# Patient Record
Sex: MALE | Race: WHITE | Employment: OTHER | URBAN - METROPOLITAN AREA
[De-identification: names, ages, dates, MRNs, and addresses within clinical notes are randomized per-mention and may not be internally consistent; named-entity substitution may affect disease eponyms.]

---

## 2017-05-10 ENCOUNTER — OFFICE VISIT (OUTPATIENT)
Dept: UROLOGY | Facility: CLINIC | Age: 77
End: 2017-05-10
Payer: COMMERCIAL

## 2017-05-10 VITALS
WEIGHT: 160 LBS | SYSTOLIC BLOOD PRESSURE: 124 MMHG | BODY MASS INDEX: 23.7 KG/M2 | HEART RATE: 68 BPM | DIASTOLIC BLOOD PRESSURE: 66 MMHG | HEIGHT: 69 IN

## 2017-05-10 DIAGNOSIS — C61 MALIGNANT NEOPLASM OF PROSTATE (H): Primary | ICD-10-CM

## 2017-05-10 PROCEDURE — 99214 OFFICE O/P EST MOD 30 MIN: CPT | Performed by: UROLOGY

## 2017-05-10 RX ORDER — CIPROFLOXACIN 500 MG/1
500 TABLET, FILM COATED ORAL 2 TIMES DAILY
Qty: 3 TABLET | Refills: 0 | Status: SHIPPED | OUTPATIENT
Start: 2017-05-10 | End: 2017-05-12

## 2017-05-10 ASSESSMENT — PAIN SCALES - GENERAL: PAINLEVEL: NO PAIN (0)

## 2017-05-10 NOTE — MR AVS SNAPSHOT
After Visit Summary   5/10/2017    Lowell Arredondo    MRN: [id number]           Patient Information     Date Of Birth          1940        Visit Information        Provider Department      5/10/2017 11:10 AM Mushtaq Astorga MD [facility] [place]        Today's Diagnoses     Malignant neoplasm of prostate (H)    -  1      Care Instructions    Ultrasound Guided Prostate Biopsy      What is a prostate biopsy?  A prostate biopsy is a relatively painless procedure performed in the physician's office, outpatient facility or hospital.  A long, thin needle is inserted into the prostate to collect a sample of tissue from the prostate.  These samples are then examined by a pathologist for abnormal cells.    Why do I need a prostate biopsy? During a man's lifetime the prostate gradually increases in size.  The patient may or may not experience symptoms.  Symptoms of an enlarge prostate include:    Increased frequency of urination    Decreased force of urinary stream    Trouble with urination    Awakening at night to urinate    When the prostate is examined, the physician may feel a nodule (hard or firm growth) which would require a biopsy.    Another reason for having a prostate biopsy is an increase in the prostate specific androgen (PSA) in your blood.  A prostate biopsy may be necessary to determine the cause of the increased PSA.    Your physician will also perform an ultrasound (an image created by sound waves).  The ultrasound is performed by placing a small probe in the rectum to image the prosate gland.    Preparation for the Prostate Biopsy    1.  During the week before your prostate biopsy substances that may thin your blood (ASPIRIN, BABY ASPIRIN, ACETYLSALICYLIC ACID, ADVIL/MOTRIN, IBUPROFEN, ALEVE, COUMADIN (WARFARIN), PRADAXA, ELIQUIS, PLAVIX, XARELTO, VITAMIN E, FISH OIL) must be discontinued.  If you are in doubt, please call.  This is a very important  requirement and your procedure may be cancelled if you have not stopped taking all of these medications.    2.  If you have any bleeding problems (thin blood), please tell your doctor.    3.  If you have been told by another doctor to take antibiotics before dental work or surgery, please tell the doctor.  This is common for patients that have had a joint replacement.    YOU HAVE BEEN PRESCRIBED AN ANTIBIOTIC.  Please follow the directions written on the bottle.  The directions usually will tell you to start the antibiotic the day before your biopsy.  You will continue taking the medication until it is gone.    The day of the biopsy you will be asked to use an enema one hour before you leave for your appointment.    Unless you have been prescribed a sedative (Valium or a similar drug) you will be able to drive to and from your appointment.  If you have taken a sedative you must have a ride.    AFTER THE BIOPSY    DANGER SIGNS    Small amount of blood in the urine for 10-14 days Excessive blood in the urine, stool or ejaculate  Small amount of blood in the stool for 48 hours Fever over 100 or chills  Small amount of blood in the ejaculate for up to Frequent urination or burning when you urinate   4 weeks          Your biopsy is scheduled on____________________________ at our [place] office.  Please be at     The office at ____________________.  A follow up visit has been scheduled for you on     _______________________________ at the  ________________________________.    Your doctor will discuss your results with you at this visit.    CALL THE DOCTOR'S OFFICE [phone] IF YOU HAVE ANY OF THESE SYMPTOMS.  IF YOU CANNOT CONTACT THE OFFICE, GO TO THE EMERGENCY ROOM.    _           Follow-ups after your visit        Follow-up notes from your care team     Return for TRUS with prostate biopsy.      Your next 10 appointments already scheduled     May 23, 2017  9:00 AM CDT   Sonography/Biopsy with Mushtaq Astorga  "MD, [facility] BX ROOM   [facility] [place] ([facility])    [address]  [address]  [facility] [address] [phone]              Who to contact     If you have questions or need follow up information about today's clinic visit or your schedule please contact [facility] directly at [phone].  Normal or non-critical lab and imaging results will be communicated to you by Kwanjihart, letter or phone within 4 business days after the clinic has received the results. If you do not hear from us within 7 days, please contact the clinic through Adzerkt or phone. If you have a critical or abnormal lab result, we will notify you by phone as soon as possible.  Submit refill requests through [email or web address] or call your pharmacy and they will forward the refill request to us. Please allow 3 business days for your refill to be completed.          Additional Information About Your Visit        KwanjiharEmergent Views Information     [email or web address] gives you secure access to your electronic health record. If you see a primary care provider, you can also send messages to your care team and make appointments. If you have questions, please call your primary care clinic.  If you do not have a primary care provider, please call [phone] and they will assist you.        Care EveryWhere ID     This is your Care EveryWhere ID. This could be used by other organizations to access your [facility] medical records  [id number]        Your Vitals Were     Pulse Height BMI (Body Mass Index)             68 1.753 m (5' 9\") 23.63 kg/m2          Blood Pressure from Last 3 Encounters:   05/10/17 124/66   10/19/16 128/82    Weight from Last 3 Encounters:   05/10/17 72.6 kg (160 lb)   10/19/16 72.6 kg (160 lb)              Today, you had the following     No orders found for display         Today's Medication Changes          These changes are accurate as of: 5/10/17 11:52 AM.  If you have " any questions, ask your nurse or doctor.               Start taking these medicines.        Dose/Directions    ciprofloxacin 500 MG tablet   Commonly known as:  CIPRO   Used for:  Malignant neoplasm of prostate (H)   Started by:  Mushtaq Astorga MD        Dose:  500 mg   Take 1 tablet (500 mg) by mouth 2 times daily for 3 doses   Quantity:  3 tablet   Refills:  0            Where to get your medicines      These medications were sent to [email or web address] [id number] [place] [address] AT [facility] [address] [address], [facility] [address]    Hours:  24-hours Phone:  [phone]     ciprofloxacin 500 MG tablet                Primary Care Provider Office Phone # Fax #    Dawson Dang [phone]       [address] [address]        Thank you!     Thank you for choosing [facility] [place]  for your care. Our goal is always to provide you with excellent care. Hearing back from our patients is one way we can continue to improve our services. Please take a few minutes to complete the written survey that you may receive in the mail after your visit with us. Thank you!             Your Updated Medication List - Protect others around you: Learn how to safely use, store and throw away your medicines at www.disposemymeds.org.          This list is accurate as of: 5/10/17 11:52 AM.  Always use your most recent med list.                   Brand Name Dispense Instructions for use    ASPIRIN ADULT LOW STRENGTH PO      Take 81 mg by mouth daily       ciprofloxacin 500 MG tablet    CIPRO    3 tablet    Take 1 tablet (500 mg) by mouth 2 times daily for 3 doses       tamsulosin 80 mcg/mL Susp    FLOMAX     Take 0.4 mg by mouth

## 2017-05-10 NOTE — PROGRESS NOTES
History: it is a great pleasure to see this very pleasant 77-year-old gentlemanin follow-up consultation today.  We recall, he had been diagnosed with low volume adenocarcinoma of the prostate after being seen with a rising PSA and some textural changes on digital rectal examination at the right apex the prostateto course in this area showed 40% of the specimen involved byGleason 6 adenocarcinoma and only one other biopsy which was in the left lateral base showed any disease and this was also Swapnil 6 with only 5% of the specimen.   He's been managed by surveillance since that time.  Recently however  The PSA had risen againup to 6.9.  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  The patient has no major symptoms at this time    History reviewed. No pertinent past medical history.    Social History     Social History     Marital status:      Spouse name: N/A     Number of children: N/A     Years of education: N/A     Social History Main Topics     Smoking status: Never Smoker     Smokeless tobacco: None     Alcohol use 6.0 - 8.4 oz/week     10 - 14 Standard drinks or equivalent per week     Drug use: None     Sexual activity: Not Asked     Other Topics Concern     None     Social History Narrative       Past Surgical History:   Procedure Laterality Date     FOOT SURGERY Left 1992     PROSTATE SURGERY         History reviewed. No pertinent family history.      Current Outpatient Prescriptions:      ciprofloxacin (CIPRO) 500 MG tablet, Take 1 tablet (500 mg) by mouth 2 times daily for 3 doses, Disp: 3 tablet, Rfl: 0     tamsulosin (FLOMAX) 80 mcg/mL, Take 0.4 mg by mouth, Disp: , Rfl:      ASPIRIN ADULT LOW STRENGTH PO, Take 81 mg by mouth daily, Disp: , Rfl:     10 point ROS of systems including Constitutional, Eyes, Respiratory, Cardiovascular, Gastroenterology, Genitourinary, Integumentary, Muscularskeletal, Psychiatric were all negative except for  "pertinent positives noted in my HPI.    Examination:   /66 (BP Location: Left arm)  Pulse 68  Ht 1.753 m (5' 9\")  Wt 72.6 kg (160 lb)  BMI 23.63 kg/m2  General Impression: very pleasant gentleman in no acute distress otherwise well oriented to time place and person  Mental Status: normal      Impression: I went over the current and the previous MRIs with the patient and his wife in detail today..  Volume of the prostateis only 22 cc  There is still considerable concern about the findings on the MRI although there is no evidence of significant spread beyond the prostate.   It does not appear to be significant change since the study 2 years ago.  There is no sign of spread Beyond the prostate.  We did have a lengthy discussion therefore to discuss how we should proceed.  Clearly the concern is that  They could be disease there that is more aggressive might influence our current plan of management.  However he is 77 years of age, and if we do consider an alternative management he clearly would not be radical prostatectomy.  I would recommend that we do repeat the biopsies however to determine if there is more aggressive disease present so we can then have further discussion about potential treatment options.  I did carefully go over the entire situation with both the patient  And his wife in detail today.  i carefully reviewed all the previous records and previous and current MRI scans  I answered many questions    Plan: transrectal ultrasound biopsy of prostate.  He will discontinue aspirin for 10 days prior to the procedure    Time: 25 minutes.  Greater than 50% was spent in discussion and consultation    \"This dictation was performed with voice recognition software and may contain errors,  omissions and inadvertent word substitution.\"      "

## 2017-05-10 NOTE — PATIENT INSTRUCTIONS
Ultrasound Guided Prostate Biopsy      What is a prostate biopsy?  A prostate biopsy is a relatively painless procedure performed in the physician's office, outpatient facility or hospital.  A long, thin needle is inserted into the prostate to collect a sample of tissue from the prostate.  These samples are then examined by a pathologist for abnormal cells.    Why do I need a prostate biopsy? During a man's lifetime the prostate gradually increases in size.  The patient may or may not experience symptoms.  Symptoms of an enlarge prostate include:    Increased frequency of urination    Decreased force of urinary stream    Trouble with urination    Awakening at night to urinate    When the prostate is examined, the physician may feel a nodule (hard or firm growth) which would require a biopsy.    Another reason for having a prostate biopsy is an increase in the prostate specific androgen (PSA) in your blood.  A prostate biopsy may be necessary to determine the cause of the increased PSA.    Your physician will also perform an ultrasound (an image created by sound waves).  The ultrasound is performed by placing a small probe in the rectum to image the prosate gland.    Preparation for the Prostate Biopsy    1.  During the week before your prostate biopsy substances that may thin your blood (ASPIRIN, BABY ASPIRIN, ACETYLSALICYLIC ACID, ADVIL/MOTRIN, IBUPROFEN, ALEVE, COUMADIN (WARFARIN), PRADAXA, ELIQUIS, PLAVIX, XARELTO, VITAMIN E, FISH OIL) must be discontinued.  If you are in doubt, please call.  This is a very important requirement and your procedure may be cancelled if you have not stopped taking all of these medications.    2.  If you have any bleeding problems (thin blood), please tell your doctor.    3.  If you have been told by another doctor to take antibiotics before dental work or surgery, please tell the doctor.  This is common for patients that have had a joint replacement.    YOU HAVE BEEN PRESCRIBED AN  ANTIBIOTIC.  Please follow the directions written on the bottle.  The directions usually will tell you to start the antibiotic the day before your biopsy.  You will continue taking the medication until it is gone.    The day of the biopsy you will be asked to use an enema one hour before you leave for your appointment.    Unless you have been prescribed a sedative (Valium or a similar drug) you will be able to drive to and from your appointment.  If you have taken a sedative you must have a ride.    AFTER THE BIOPSY    DANGER SIGNS    Small amount of blood in the urine for 10-14 days Excessive blood in the urine, stool or ejaculate  Small amount of blood in the stool for 48 hours Fever over 100 or chills  Small amount of blood in the ejaculate for up to Frequent urination or burning when you urinate   4 weeks          Your biopsy is scheduled on____________________________ at our [place] office.  Please be at     The office at ____________________.  A follow up visit has been scheduled for you on     _______________________________ at the  ________________________________.    Your doctor will discuss your results with you at this visit.    CALL THE DOCTOR'S OFFICE -[phone] IF YOU HAVE ANY OF THESE SYMPTOMS.  IF YOU CANNOT CONTACT THE OFFICE, GO TO THE EMERGENCY ROOM.    _

## 2017-05-10 NOTE — NURSING NOTE
Chief Complaint   Patient presents with     Results     Go over MRI-History of Prostate Cancer     Eveline Machado LPN

## 2017-05-10 NOTE — LETTER
5/10/2017       RE: Lowell Arredondo  [address] [place] [facility] [address]     Dear Colleague,    Thank you for referring your patient, Lowell Arredondo, to the [facility] at [facility]. Please see a copy of my visit note below.    History: it is a great pleasure to see this very pleasant 77-year-old gentlemanin follow-up consultation today.  We recall, he had been diagnosed with low volume adenocarcinoma of the prostate after being seen with a rising PSA and some textural changes on digital rectal examination at the right apex the prostateto course in this area showed 40% of the specimen involved byGleason 6 adenocarcinoma and only one other biopsy which was in the left lateral base showed any disease and this was also Swapnil 6 with only 5% of the specimen.   He's been managed by surveillance since that time.  Recently however  The PSA had risen againup to 6.9.  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  The patient has no major symptoms at this time    History reviewed. No pertinent past medical history.    Social History     Social History     Marital status:      Spouse name: N/A     Number of children: N/A     Years of education: N/A     Social History Main Topics     Smoking status: Never Smoker     Smokeless tobacco: None     Alcohol use 6.0 - 8.4 oz/week     10 - 14 Standard drinks or equivalent per week     Drug use: None     Sexual activity: Not Asked     Other Topics Concern     None     Social History Narrative       Past Surgical History:   Procedure Laterality Date     FOOT SURGERY Left 1992     PROSTATE SURGERY         History reviewed. No pertinent family history.      Current Outpatient Prescriptions:      ciprofloxacin (CIPRO) 500 MG tablet, Take 1 tablet (500 mg) by mouth 2 times daily for 3 doses, Disp: 3 tablet, Rfl: 0     tamsulosin (FLOMAX) 80  "mcg/mL, Take 0.4 mg by mouth, Disp: , Rfl:      ASPIRIN ADULT LOW STRENGTH PO, Take 81 mg by mouth daily, Disp: , Rfl:     10 point ROS of systems including Constitutional, Eyes, Respiratory, Cardiovascular, Gastroenterology, Genitourinary, Integumentary, Muscularskeletal, Psychiatric were all negative except for pertinent positives noted in my HPI.    Examination:   /66 (BP Location: Left arm)  Pulse 68  Ht 1.753 m (5' 9\")  Wt 72.6 kg (160 lb)  BMI 23.63 kg/m2  General Impression: very pleasant gentleman in no acute distress otherwise well oriented to time place and person  Mental Status: normal      Impression: I went over the current and the previous MRIs with the patient and his wife in detail today..  Volume of the prostateis only 22 cc  There is still considerable concern about the findings on the MRI although there is no evidence of significant spread beyond the prostate.   It does not appear to be significant change since the study 2 years ago.  There is no sign of spread Beyond the prostate.  We did have a lengthy discussion therefore to discuss how we should proceed.  Clearly the concern is that  They could be disease there that is more aggressive might influence our current plan of management.  However he is 77 years of age, and if we do consider an alternative management he clearly would not be radical prostatectomy.  I would recommend that we do repeat the biopsies however to determine if there is more aggressive disease present so we can then have further discussion about potential treatment options.  I did carefully go over the entire situation with both the patient  And his wife in detail today.  i carefully reviewed all the previous records and previous and current MRI scans  I answered many questions    Plan: transrectal ultrasound biopsy of prostate.  He will discontinue aspirin for 10 days prior to the procedure    Time: 25 minutes.  Greater than 50% was spent in discussion and " "consultation    \"This dictation was performed with voice recognition software and may contain errors,  omissions and inadvertent word substitution.\"    Again, thank you for allowing me to participate in the care of your patient.      Sincerely,    Mushtaq Astorga MD      "

## 2017-05-23 ENCOUNTER — HOSPITAL ENCOUNTER (EMERGENCY)
Facility: CLINIC | Age: 77
Discharge: HOME | End: 2017-05-23
Attending: PHYSICIAN ASSISTANT | Admitting: PHYSICIAN ASSISTANT
Payer: MEDICARE

## 2017-05-23 ENCOUNTER — OFFICE VISIT (OUTPATIENT)
Dept: UROLOGY | Facility: CLINIC | Age: 77
End: 2017-05-23
Payer: COMMERCIAL

## 2017-05-23 ENCOUNTER — TRANSFERRED RECORDS (OUTPATIENT)
Dept: HEALTH INFORMATION MANAGEMENT | Facility: CLINIC | Age: 77
End: 2017-05-23

## 2017-05-23 VITALS — HEART RATE: 56 BPM | DIASTOLIC BLOOD PRESSURE: 76 MMHG | SYSTOLIC BLOOD PRESSURE: 110 MMHG

## 2017-05-23 VITALS
OXYGEN SATURATION: 96 % | HEART RATE: 61 BPM | RESPIRATION RATE: 18 BRPM | HEIGHT: 69 IN | DIASTOLIC BLOOD PRESSURE: 70 MMHG | TEMPERATURE: 97.6 F | BODY MASS INDEX: 23.7 KG/M2 | SYSTOLIC BLOOD PRESSURE: 124 MMHG | WEIGHT: 160 LBS

## 2017-05-23 DIAGNOSIS — I95.9 TRANSIENT HYPOTENSION: ICD-10-CM

## 2017-05-23 DIAGNOSIS — R97.20 ELEVATED PROSTATE SPECIFIC ANTIGEN (PSA): Primary | ICD-10-CM

## 2017-05-23 DIAGNOSIS — R55 NEAR SYNCOPE: ICD-10-CM

## 2017-05-23 LAB
ANION GAP SERPL CALCULATED.3IONS-SCNC: 8 MMOL/L (ref 3–14)
BASOPHILS # BLD AUTO: 0 10E9/L (ref 0–0.2)
BASOPHILS NFR BLD AUTO: 0.5 %
BUN SERPL-MCNC: 18 MG/DL (ref 7–30)
CALCIUM SERPL-MCNC: 9 MG/DL (ref 8.5–10.1)
CHLORIDE SERPL-SCNC: 105 MMOL/L (ref 94–109)
CO2 SERPL-SCNC: 27 MMOL/L (ref 20–32)
CREAT SERPL-MCNC: 1.1 MG/DL (ref 0.66–1.25)
DIFFERENTIAL METHOD BLD: ABNORMAL
EOSINOPHIL # BLD AUTO: 0.1 10E9/L (ref 0–0.7)
EOSINOPHIL NFR BLD AUTO: 1.6 %
ERYTHROCYTE [DISTWIDTH] IN BLOOD BY AUTOMATED COUNT: 14.3 % (ref 10–15)
GFR SERPL CREATININE-BSD FRML MDRD: 65 ML/MIN/1.7M2
GLUCOSE SERPL-MCNC: 112 MG/DL (ref 70–99)
HCT VFR BLD AUTO: 38.8 % (ref 40–53)
HGB BLD-MCNC: 13.6 G/DL (ref 13.3–17.7)
IMM GRANULOCYTES # BLD: 0 10E9/L (ref 0–0.4)
IMM GRANULOCYTES NFR BLD: 0.2 %
INTERPRETATION ECG - MUSE: NORMAL
LYMPHOCYTES # BLD AUTO: 1.2 10E9/L (ref 0.8–5.3)
LYMPHOCYTES NFR BLD AUTO: 13.7 %
MCH RBC QN AUTO: 32.9 PG (ref 26.5–33)
MCHC RBC AUTO-ENTMCNC: 35.1 G/DL (ref 31.5–36.5)
MCV RBC AUTO: 94 FL (ref 78–100)
MONOCYTES # BLD AUTO: 0.5 10E9/L (ref 0–1.3)
MONOCYTES NFR BLD AUTO: 6.3 %
NEUTROPHILS # BLD AUTO: 6.5 10E9/L (ref 1.6–8.3)
NEUTROPHILS NFR BLD AUTO: 77.7 %
NRBC # BLD AUTO: 0 10*3/UL
NRBC BLD AUTO-RTO: 0 /100
PLATELET # BLD AUTO: 194 10E9/L (ref 150–450)
POTASSIUM SERPL-SCNC: 4.1 MMOL/L (ref 3.4–5.3)
RBC # BLD AUTO: 4.14 10E12/L (ref 4.4–5.9)
SODIUM SERPL-SCNC: 140 MMOL/L (ref 133–144)
WBC # BLD AUTO: 8.4 10E9/L (ref 4–11)

## 2017-05-23 PROCEDURE — 80048 BASIC METABOLIC PNL TOTAL CA: CPT | Performed by: PHYSICIAN ASSISTANT

## 2017-05-23 PROCEDURE — 88344 IMHCHEM/IMCYTCHM EA MLT ANTB: CPT | Mod: 59 | Performed by: UROLOGY

## 2017-05-23 PROCEDURE — 00000093 ZZHCL STATISTIC COURTESY CONSULT: Performed by: UROLOGY

## 2017-05-23 PROCEDURE — 99285 EMERGENCY DEPT VISIT HI MDM: CPT | Mod: 25

## 2017-05-23 PROCEDURE — 99284 EMERGENCY DEPT VISIT MOD MDM: CPT

## 2017-05-23 PROCEDURE — 85025 COMPLETE CBC W/AUTO DIFF WBC: CPT | Performed by: PHYSICIAN ASSISTANT

## 2017-05-23 PROCEDURE — 96360 HYDRATION IV INFUSION INIT: CPT

## 2017-05-23 PROCEDURE — 88305 TISSUE EXAM BY PATHOLOGIST: CPT | Mod: 59 | Performed by: UROLOGY

## 2017-05-23 PROCEDURE — 93005 ELECTROCARDIOGRAM TRACING: CPT

## 2017-05-23 PROCEDURE — 25000128 H RX IP 250 OP 636: Performed by: PHYSICIAN ASSISTANT

## 2017-05-23 PROCEDURE — 88342 IMHCHEM/IMCYTCHM 1ST ANTB: CPT | Mod: 59 | Performed by: UROLOGY

## 2017-05-23 PROCEDURE — 55700 ZZHC BIOPSY PROSTATE NEEDLE/PUNCH: CPT | Performed by: UROLOGY

## 2017-05-23 PROCEDURE — 76872 US TRANSRECTAL: CPT | Performed by: UROLOGY

## 2017-05-23 RX ADMIN — SODIUM CHLORIDE 1000 ML: 9 INJECTION, SOLUTION INTRAVENOUS at 11:39

## 2017-05-23 ASSESSMENT — ENCOUNTER SYMPTOMS
DIZZINESS: 0
VOMITING: 1
SHORTNESS OF BREATH: 0
WEAKNESS: 0
LIGHT-HEADEDNESS: 1
NAUSEA: 1
NUMBNESS: 0
FACIAL ASYMMETRY: 0
SPEECH DIFFICULTY: 1
HEADACHES: 0
ABDOMINAL PAIN: 0
FEVER: 0

## 2017-05-23 ASSESSMENT — PAIN SCALES - GENERAL: PAINLEVEL: NO PAIN (0)

## 2017-05-23 NOTE — ED AVS SNAPSHOT
Emergency Department    [address] [place] [address]    Phone:  [phone]    Fax:  [phone]                                       Lowell Arredondo   MRN: [id number]    Department:   Emergency Department   Date of Visit:  5/23/2017           After Visit Summary Signature Page     I have received my discharge instructions, and my questions have been answered. I have discussed any challenges I see with this plan with the nurse or doctor.    ..........................................................................................................................................  Patient/Patient Representative Signature      ..........................................................................................................................................  Patient Representative Print Name and Relationship to Patient    ..................................................               ................................................  Date                                            Time    ..........................................................................................................................................  Reviewed by Signature/Title    ...................................................              ..............................................  Date                                                            Time

## 2017-05-23 NOTE — LETTER
5/23/2017       RE: Lowell Arredondo  [address] [facility] [address]     Dear Colleague,    Thank you for referring your patient, Lowell Arredondo, to the [facility] at [facility]. Please see a copy of my visit note below.    This very pleasant 77-year-old gentleman returns today for transrectal ultrasound biopsy of prostate.  We recall he has a history of low volume low-grade prostate cancer and is being managed by active surveillance.  However we were concerned about a mild rise in PSA and some slight nodularity of the right apical area of prostate.  The T3 MRI had indicated areas considered PIRADS 5, in the anterior aspect of the gland and the peripheral zone and the transitional zone near the apex.    Procedure.  A transrectal ultrasound biopsy of prostate.  Surgeon. Gauri.  Anesthesia.  Local periprostatic block.  Description.  With the patient in the left lateral position and the genital area prepped and draped in the customary fashion, and after digital rectal examination, the transrectal ultrasound probe was inserted into the rectum.  Following the prostate was 22 cc.  Right apical area did show some hypoechogenicity.  S[place]ant biopsies were taken from each side of the gland.      The patient has had a mild reactive to lidocaine following the procedure.    Impression.  I discussed the situation with the patient.  We will write the results of the pathology report.  We may need to consider a change in management if we do find I'll volume of disease of more aggressive disease in the gland.    I did discuss the situation with the patient in detail today.  I answered all questions.    Plan.  I will see him in follow-up in the office to discuss the pathology report.    Time.  10 minutes is spent in addition to the procedure to discuss the findings of about potential alternative treatment options based on the results of the  "biopsies.    \"This dictation was performed with voice recognition software and may contain errors,  omissions and inadvertent word substitution.\"       Again, thank you for allowing me to participate in the care of your patient.      Sincerely,  Mushtaq Astorga MD      "

## 2017-05-23 NOTE — ED AVS SNAPSHOT
Emergency Department    [address] [facility] [address]    Phone:  [phone]    Fax:  [phone]                                       Lowell Arredondo   MRN: [id number]    Department:   Emergency Department   Date of Visit:  5/23/2017           Patient Information     Date Of Birth          1940        Your diagnoses for this visit were:     Near syncope     Transient hypotension        You were seen by Olamide Rodriguez PA-C.      Follow-up Information     Follow up with Dawson Dang MD.    Specialty:  Family Practice    Contact information:    [facility] [place] [facility]  [address]  [facility] [id number] [phone]          Follow up with  Emergency Department.    Specialty:  EMERGENCY MEDICINE    Why:  If symptoms worsen    Contact information:    [address] [facility] [address] [phone]        Discharge Instructions       Discharge Instructions  Syncope    Syncope (fainting) is a sudden, short loss of consciousness (passing out spell). People will usually fall to the ground when they faint or slump over if seated.  At this time your doctor does not find that your fainting spell is a sign of anything dangerous or life-threatening.  However, sometimes the signs of serious illness do not show up right away.  If you have new or worse symptoms, you may need to be seen again in the Emergency Department or by your primary doctor. Be sure to follow up as directed, or at least within 1 week.      Return to the Emergency Department if:    You faint again.     You have any significant bleeding.    You have chest pain or fast heartbeat, or if your heartbeat is irregular or skipping beats.    You feel short of breath.    You cough up any blood.    You have belly (abdominal) pain or unusual back pain.    You have ongoing vomiting (throwing up) or diarrhea, or have a black or tarry bowel movement or blood in the bowels or blood in your vomit.    You have  a fever over 101 degrees.    You lose feeling or cannot move a part of your body or cannot talk normally.    You are confused, have a headache, cannot see well, or have a seizure.    DO NOT DRIVE. CALL 911 INSTEAD!    What can I do to help myself?    Follow any specific instructions that your provider discussed with you.    If you feel light-headed, make sure to sit down right away, even if you have to sit on the floor.    Follow up with your regular medical doctor as discussed for further management. This may include lowering your blood pressure medications, insulin or other diabetic medications, checking your blood sugar more frequently, and drinking more fluids, taking medicines for vomiting or diarrhea or getting up slower.  If you were given a prescription for medicine here today, be sure to read all of the information (including the package insert) that comes with your prescription.  This will include important information about the medicine, its side effects, and any warnings that you need to know about.  The pharmacist who fills the prescription can provide more information and answer questions you may have about the medicine.  If you have questions or concerns that the pharmacist cannot address, please call or return to the Emergency Department.       Remember that you can always come back to the Emergency Department if you are not able to see your regular doctor in the amount of time listed above, if you get any new symptoms, or if there is anything that worries you.      Discharge Instructions for Low Blood Pressure (Hypotension)  When you have hypotension, your blood pressure is lower than normal. Low blood pressure can make you feel dizzy or faint. This condition is sometimes a side effect of taking certain medications, including medications for high blood pressure (hypertension). It can also result from medical conditions such as dehydration.  Home care  These home care steps can help manage your  condition:    Follow your doctor s instructions.    Rest in bed and ask for help with daily activities until you feel better. You may need to slowly increase the amount of time you spend sitting or doing light activity.    Don t drive while your blood pressure is not controlled.    Be careful when you get up from sitting or lying down.    Take your time. Sudden movements can cause dizziness or fainting.    When you first sit up after lying down, be sure to sit in bed for 30 seconds or so before getting up to walk.    Tell your doctor about the medications you are taking. Many kinds of medications trigger low blood pressure.    Limit your alcohol intake to no more than 2 drinks a day for men and 1 drink a day for women.    Prevent dehydration by drinking plenty of fluids, unless otherwise instructed by your doctor    Learn to take your own blood pressure. Keep a record of your results. Ask your doctor which readings mean that you need medical attention.    Tell your family members to call an ambulance if you become unconscious. Request that they learn CPR.  Follow-up care  Make a follow-up appointment as directed by our staff.      When to seek medical care  Call your doctor immediately if you have any of the following:    Chest pain or shortness of breath (call 911)    Dizziness or fainting spells    Black, maroon, or tarry stools    Irregular heartbeat    Stiff neck    Severe upper back pain    Diarrhea or vomiting that doesn t go away    Inability to eat or drink    Burning sensation when you urinate    Urine with a strong, unpleasant odor     8086-3072 [employer]. [address] [place] [address], [place] [address]. All rights reserved. This information is not intended as a substitute for professional medical care. Always follow your healthcare professional's instructions.             Future Appointments        Provider Department Dept Phone Center    6/6/2017 4:20 PM Mushtaq Astorga MD [facility]  [facility] [place] [phone]  [facility]      24 Hour Appointment Hotline       To make an appointment at any [facility] call [phone] ([phone]). If you don't have a family doctor or clinic, we will help you find one. [facility] are conveniently located to serve the needs of you and your family.             Review of your medicines      Our records show that you are taking the medicines listed below. If these are incorrect, please call your family doctor or clinic.        Dose / Directions Last dose taken    ASPIRIN ADULT LOW STRENGTH PO   Dose:  81 mg        Take 81 mg by mouth daily   Refills:  0        tamsulosin 80 mcg/mL Susp   Commonly known as:  FLOMAX   Dose:  0.4 mg        Take 0.4 mg by mouth   Refills:  0                Procedures and tests performed during your visit     Basic metabolic panel    CBC with platelets differential    EKG 12-lead, tracing only      Orders Needing Specimen Collection     None      Pending Results     Date and Time Order Name Status Description    5/23/2017 1019 SURGICAL PATHOLOGY EXAM In process             Pending Culture Results     Date and Time Order Name Status Description    5/23/2017 1019 SURGICAL PATHOLOGY EXAM In process             Pending Results Instructions     If you had any lab results that were not finalized at the time of your Discharge, you can call the ED Lab Result RN at [phone]. You will be contacted by this team for any positive Lab results or changes in treatment. The nurses are available 7 days a week from 10A to 6:30P.  You can leave a message 24 hours per day and they will return your call.        Test Results From Your Hospital Stay        5/23/2017 11:52 AM      Component Results     Component Value Ref Range & Units Status    WBC 8.4 4.0 - 11.0 10e9/L Final    RBC Count 4.14 (L) 4.4 - 5.9 10e12/L Final    Hemoglobin 13.6 13.3 - 17.7 g/dL Final    Hematocrit 38.8 (L) 40.0 - 53.0 % Final    MCV 94 78 -  100 fl Final    MCH 32.9 26.5 - 33.0 pg Final    MCHC 35.1 31.5 - 36.5 g/dL Final    RDW 14.3 10.0 - 15.0 % Final    Platelet Count 194 150 - 450 10e9/L Final    Diff Method Automated Method  Final    % Neutrophils 77.7 % Final    % Lymphocytes 13.7 % Final    % Monocytes 6.3 % Final    % Eosinophils 1.6 % Final    % Basophils 0.5 % Final    % Immature Granulocytes 0.2 % Final    Nucleated RBCs 0 0 /100 Final    Absolute Neutrophil 6.5 1.6 - 8.3 10e9/L Final    Absolute Lymphocytes 1.2 0.8 - 5.3 10e9/L Final    Absolute Monocytes 0.5 0.0 - 1.3 10e9/L Final    Absolute Eosinophils 0.1 0.0 - 0.7 10e9/L Final    Absolute Basophils 0.0 0.0 - 0.2 10e9/L Final    Abs Immature Granulocytes 0.0 0 - 0.4 10e9/L Final    Absolute Nucleated RBC 0.0  Final         5/23/2017 12:10 PM      Component Results     Component Value Ref Range & Units Status    Sodium 140 133 - 144 mmol/L Final    Potassium 4.1 3.4 - 5.3 mmol/L Final    Chloride 105 94 - 109 mmol/L Final    Carbon Dioxide 27 20 - 32 mmol/L Final    Anion Gap 8 3 - 14 mmol/L Final    Glucose 112 (H) 70 - 99 mg/dL Final    Urea Nitrogen 18 7 - 30 mg/dL Final    Creatinine 1.10 0.66 - 1.25 mg/dL Final    GFR Estimate 65 >60 mL/min/1.7m2 Final    Non  GFR Calc    GFR Estimate If Black 78 >60 mL/min/1.7m2 Final    African American GFR Calc    Calcium 9.0 8.5 - 10.1 mg/dL Final                Clinical Quality Measure: Blood Pressure Screening     Your blood pressure was checked while you were in the emergency department today. The last reading we obtained was  BP: 122/73 . Please read the guidelines below about what these numbers mean and what you should do about them.  If your systolic blood pressure (the top number) is less than 120 and your diastolic blood pressure (the bottom number) is less than 80, then your blood pressure is normal. There is nothing more that you need to do about it.  If your systolic blood pressure (the top number) is 120-139 or your  diastolic blood pressure (the bottom number) is 80-89, your blood pressure may be higher than it should be. You should have your blood pressure rechecked within a year by a primary care provider.  If your systolic blood pressure (the top number) is 140 or greater or your diastolic blood pressure (the bottom number) is 90 or greater, you may have high blood pressure. High blood pressure is treatable, but if left untreated over time it can put you at risk for heart attack, stroke, or kidney failure. You should have your blood pressure rechecked by a primary care provider within the next 4 weeks.  If your provider in the emergency department today gave you specific instructions to follow-up with your doctor or provider even sooner than that, you should follow that instruction and not wait for up to 4 weeks for your follow-up visit.        Thank you for choosing [facility]       Thank you for choosing [facility] for your care. Our goal is always to provide you with excellent care. Hearing back from our patients is one way we can continue to improve our services. Please take a few minutes to complete the written survey that you may receive in the mail after you visit with us. Thank you!        [employer] Information     [employer] gives you secure access to your electronic health record. If you see a primary care provider, you can also send messages to your care team and make appointments. If you have questions, please call your primary care clinic.  If you do not have a primary care provider, please call [phone] and they will assist you.        Care EveryWhere ID     This is your Care EveryWhere ID. This could be used by other organizations to access your [facility] medical records  [id number]        After Visit Summary       This is your record. Keep this with you and show to your community pharmacist(s) and doctor(s) at your next visit.

## 2017-05-23 NOTE — DISCHARGE INSTRUCTIONS
Discharge Instructions  Syncope    Syncope (fainting) is a sudden, short loss of consciousness (passing out spell). People will usually fall to the ground when they faint or slump over if seated.  At this time your doctor does not find that your fainting spell is a sign of anything dangerous or life-threatening.  However, sometimes the signs of serious illness do not show up right away.  If you have new or worse symptoms, you may need to be seen again in the Emergency Department or by your primary doctor. Be sure to follow up as directed, or at least within 1 week.      Return to the Emergency Department if:    You faint again.     You have any significant bleeding.    You have chest pain or fast heartbeat, or if your heartbeat is irregular or skipping beats.    You feel short of breath.    You cough up any blood.    You have belly (abdominal) pain or unusual back pain.    You have ongoing vomiting (throwing up) or diarrhea, or have a black or tarry bowel movement or blood in the bowels or blood in your vomit.    You have a fever over 101 degrees.    You lose feeling or cannot move a part of your body or cannot talk normally.    You are confused, have a headache, cannot see well, or have a seizure.    DO NOT DRIVE. CALL 911 INSTEAD!    What can I do to help myself?    Follow any specific instructions that your provider discussed with you.    If you feel light-headed, make sure to sit down right away, even if you have to sit on the floor.    Follow up with your regular medical doctor as discussed for further management. This may include lowering your blood pressure medications, insulin or other diabetic medications, checking your blood sugar more frequently, and drinking more fluids, taking medicines for vomiting or diarrhea or getting up slower.  If you were given a prescription for medicine here today, be sure to read all of the information (including the package insert) that comes with your prescription.  This  will include important information about the medicine, its side effects, and any warnings that you need to know about.  The pharmacist who fills the prescription can provide more information and answer questions you may have about the medicine.  If you have questions or concerns that the pharmacist cannot address, please call or return to the Emergency Department.       Remember that you can always come back to the Emergency Department if you are not able to see your regular doctor in the amount of time listed above, if you get any new symptoms, or if there is anything that worries you.      Discharge Instructions for Low Blood Pressure (Hypotension)  When you have hypotension, your blood pressure is lower than normal. Low blood pressure can make you feel dizzy or faint. This condition is sometimes a side effect of taking certain medications, including medications for high blood pressure (hypertension). It can also result from medical conditions such as dehydration.  Home care  These home care steps can help manage your condition:    Follow your doctor s instructions.    Rest in bed and ask for help with daily activities until you feel better. You may need to slowly increase the amount of time you spend sitting or doing light activity.    Don t drive while your blood pressure is not controlled.    Be careful when you get up from sitting or lying down.    Take your time. Sudden movements can cause dizziness or fainting.    When you first sit up after lying down, be sure to sit in bed for 30 seconds or so before getting up to walk.    Tell your doctor about the medications you are taking. Many kinds of medications trigger low blood pressure.    Limit your alcohol intake to no more than 2 drinks a day for men and 1 drink a day for women.    Prevent dehydration by drinking plenty of fluids, unless otherwise instructed by your doctor    Learn to take your own blood pressure. Keep a record of your results. Ask your doctor  which readings mean that you need medical attention.    Tell your family members to call an ambulance if you become unconscious. Request that they learn CPR.  Follow-up care  Make a follow-up appointment as directed by our staff.      When to seek medical care  Call your doctor immediately if you have any of the following:    Chest pain or shortness of breath (call 911)    Dizziness or fainting spells    Black, maroon, or tarry stools    Irregular heartbeat    Stiff neck    Severe upper back pain    Diarrhea or vomiting that doesn t go away    Inability to eat or drink    Burning sensation when you urinate    Urine with a strong, unpleasant odor     [id number] [employer]. [address] [place] [address], [place] [address]. All rights reserved. This information is not intended as a substitute for professional medical care. Always follow your healthcare professional's instructions.

## 2017-05-23 NOTE — NURSING NOTE
Pt had a lidocaine / vasovagal response post procedure with slurred speech. His vitals have been taken Q 20 min and have remained a steady 110/70 with variance of only 4 points either way with heart rate fluctuating between 60 and 70. His O2 has remained steady at 97%, this includes bringing him up in slow positions from trendelenburg to sitting. He was sat up slowly in small increments Q 20 min. Pt was steady while sitting, however, each time he would go to stands we would need to start the process over. I consulted with Dr Francois who agreed the pt should go to the ER for observation and fluids. She did speak with the pt. The pt's wife insisted that the pt do so, as he was being a bit hesitant. Pt was discharged and taken to the ER at 1100am with vitals remaining steady at /70 / HR 63 / O2 67%. Mary Ann Gerardo,A

## 2017-05-23 NOTE — MR AVS SNAPSHOT
After Visit Summary   5/23/2017    Lowell Arredondo    MRN: [id number]           Patient Information     Date Of Birth          1940        Visit Information        Provider Department      5/23/2017 9:00 AM Mushtaq Astorga MD; UA BX ROOM [facility] [place]        Today's Diagnoses     Elevated prostate specific antigen (PSA)    -  1      Care Instructions    [facility] Urology  Transrectal Ultrasound Post Operative Information    The physician who performed your Transrectal Ultrasound is Dr. Astorga (telephone number [phone]).  Please contact this doctor if you have any problems or questions.  If unable to reach your doctor, please return to the Emergency Department.      Take one antibiotic the evening of the procedure and then as directed on your prescription.    Drink at least 6-8 glasses of fluids for the first 48 hours.    Avoid heavy lifting and strenuous activity for 48 hours.    Avoid sexual intercourse for the first 24 hours.    No aspirin or ibuprofen products (Motrin, Advil, Nuprin, ect.) for one week.  You may take acetaminophen (Tylenol) for pain.    You may notice a small amount of blood on the tissue after a bowel movement.    You may pass blood with clots in your urine following the procedure.  The amount will decrease with time but may be visible for up to two weeks.     You make have blood in your semen for 4 weeks after the procedure.    You may experience mild perineal (groin area) discomfort after the procedure.    Please call you doctor if you have any of the follow symptoms:   Fever   Increase in the amount of blood passed   Severe discomfort or pain          Follow-ups after your visit        Your next 10 appointments already scheduled     Jun 06, 2017  4:20 PM CDT   Return Visit with Mushtaq Astorga MD   [facility] [place] ([facility])    [address]  [facility] [address] [phone]              Future tests that were ordered for you today     Open Future Orders        Priority Expected Expires Ordered    Surgical pathology exam Routine  5/23/2018 5/23/2017            Who to contact     If you have questions or need follow up information about today's clinic visit or your schedule please contact [facility] directly at [phone].  Normal or non-critical lab and imaging results will be communicated to you by Brightbluehart, letter or phone within 4 business days after the clinic has received the results. If you do not hear from us within 7 days, please contact the clinic through [employer] or phone. If you have a critical or abnormal lab result, we will notify you by phone as soon as possible.  Submit refill requests through [employer] or call your pharmacy and they will forward the refill request to us. Please allow 3 business days for your refill to be completed.          Additional Information About Your Visit        [employer]right[employer] Information     [employer] gives you secure access to your electronic health record. If you see a primary care provider, you can also send messages to your care team and make appointments. If you have questions, please call your primary care clinic.  If you do not have a primary care provider, please call [phone] and they will assist you.        Care EveryWhere ID     This is your Care EveryWhere ID. This could be used by other organizations to access your [facility] medical records  [id number]        Your Vitals Were     Pulse                   56            Blood Pressure from Last 3 Encounters:   05/23/17 110/76   05/10/17 124/66   10/19/16 128/82    Weight from Last 3 Encounters:   05/10/17 72.6 kg (160 lb)   10/19/16 72.6 kg (160 lb)               Primary Care Provider Office Phone # Fax #    Dawson Dang [phone]       [place] [address] [place] [address]  [place] [address]        Thank you!     Thank you for choosing [facility] [place]  for your care. Our goal is always to provide you with excellent care. Hearing back from our patients is one way we can continue to improve our services. Please take a few minutes to complete the written survey that you may receive in the mail after your visit with us. Thank you!             Your Updated Medication List - Protect others around you: Learn how to safely use, store and throw away your medicines at www.disposemymeds.org.          This list is accurate as of: 5/23/17  9:38 AM.  Always use your most recent med list.                   Brand Name Dispense Instructions for use    ASPIRIN ADULT LOW STRENGTH PO      Take 81 mg by mouth daily       tamsulosin 80 mcg/mL Susp    FLOMAX     Take 0.4 mg by mouth

## 2017-05-23 NOTE — ED PROVIDER NOTES
"  History     Chief Complaint:  Hypotension       HPI   Lowell Arredondo is a 77 year old male with a past medical history of prostate cancer, hyperlipidemia who presents with wife for evaluation of hypotension. The patient was having a trans rectal ultrasound with biopsy done today when he started to feel lightheaded and nauseous. He had one episode of vomiting and states that he felt back to \"normal\" after this. The patient's blood pressure dropped during the procedure with his last reading 110/70 and he was requested he present to the ED for further evaluation. The patient reports he also started to have slurred speech during this that resolved completely. He denies any headache, chest pain, shortness of breath, visual changes, numbness or tingling, trouble walking or any other complications or concerns. He indicates that he is symptom free currently.      Allergies:  Demerol     Medications:    Flomax  Aspirin     Past Medical History:    Prostate cancer  Hyperlipidemia  Basal cell carcinoma of kin     Past Surgical History:    Foot surgery  Prostate surgery   Hernia repair    Family History:    Father: Prostate cancer  Brother: Prostate cancer    Social History:  Marital Status:     Smoking status: Never smoker  Alcohol use: 10-14 standard drinks/week      Review of Systems   Constitutional: Negative for fever.   Eyes: Negative for visual disturbance.   Respiratory: Negative for shortness of breath.    Cardiovascular: Negative for chest pain.   Gastrointestinal: Positive for nausea and vomiting. Negative for abdominal pain.   Musculoskeletal: Negative for gait problem.   Neurological: Positive for speech difficulty and light-headedness. Negative for dizziness, syncope, facial asymmetry, weakness, numbness and headaches.   All other systems reviewed and are negative.    Physical Exam     Patient Vitals for the past 24 hrs:   BP Temp Temp src Pulse Resp SpO2 Height Weight   05/23/17 1245 124/70 - - 61 18 96 " "% - -   05/23/17 1200 122/73 - - 59 18 97 % - -   05/23/17 1130 125/68 - - 58 16 99 % - -   05/23/17 1120 129/73 - - 62 18 98 % - -   05/23/17 1114 131/69 97.6  F (36.4  C) Oral 60 16 98 % 1.753 m (5' 9\") 72.6 kg (160 lb)   05/23/17 1108 131/69 - - - - - - -           Physical Exam  General: Resting comfortably on the gurney.    Head:  The scalp, head and face appear normal. No evidence of trauma.   ENT:  Pupils are equal, round and reactive to light. EOM intact.     Oropharynx is moist.  No uvular deviation. Posterior oropharynx is without erythema, exudate or tonsillar swelling.   Neck:  Supple, no rigidity noted. Normal ROM.     Trachea midline. No mass detected.    Resp:  Non-labored breathing. No tachypnea.     Lung fields clear to auscultation without wheezes or rales.   CV:  Regular rate and rhythm. Normal S1 and S2, no S3 or S4.     No pathological murmur detected.     Radial, DP and PT pulses intact and symmetric.   GI:  Abdomen is soft and non-distended.     Non-tender to palpation in all four quadrants.    MS:  Normal muscular tone.     Normal and symmetric motor strength of all four extremities.     No asymmetrical lower leg swelling or calf tenderness.   Neuro:  Awake and alert. Speech is clear.     Normal coordination. Symmetric facial expressions.   Skin:  No rash or pallor.  Psych: Normal affect. Appropriate interactions.       Emergency Department Course   ECG @ 1144  Indication: Hypotension  Rate 58 bpm.   VA interval 190 ms.   QRS duration 94 ms.   QT/QTc 444/435 ms.   P-R-T axes 22.  Notes: Sinus bradycardia. Cannot rule out anterior infarct, age undetermined.    Time read 1201    Laboratory:  CBC: WBC 8.4 (WNL) HGB 13.6 (WNL)  (WNL)   BMP: Cr 1.10 (WNL) Glucose 112 (H)  Rest WNL    Interventions:  1139: Normal saline, 1000 ml, IV    ED Course:  Nursing notes and past medical history reviewed.   I performed a physical examination of the patient as documented above.  I explained the plan " with the patient and wife who consents to this.   The above workups were undertaken.   1200: The patient was updated.   I personally reviewed the laboratory results with the Patient and wife and answered all related questions prior to discharge.   Findings and plan explained to the Patient. Patient discharged home with instructions regarding supportive care, medications, and reasons to return. The importance of close follow-up was reviewed.     Impression & Plan      Medical Decision Making:  Lowell Arredondo is a 77 year old male with a history of prostate cancer and hyperlipidemia who presents to the ED with hypotension. He was receiving a prostate biopsy in clinic when he had onset of lightheadedness, slurred speech and an episode of emesis, he did not fully pass out. At that time, he was noted to have blood pressure in the 110s/70s so he was sent to the ED. On arrival to the ED, his blood pressure is normal and he is completely asymptomatic. This sounds to be vasovagal response which can be quite common with rectal stimulation. His basic blood work is reassuring with no leukocytosis, anemia, concerning electrolyte abnormality or renal dysfunction. He was given a liter of fluids. EKG shows mild sinus bradycardia, but no signs of acute ischemic event or arrhythmia. He was able to ambulate here in the ED without any symptoms. At this time, I do feel he is safe to be discharged home. He will follow with primary care and return to the ED with return of his symptoms or if he comes worse in any way. I discussed the results, plan and any additional questions with the patient and his wife. They verbalized understanding and agreement with the plan.      Diagnosis:    ICD-10-CM   1. Near syncope R55   2. Transient hypotension I95.9         Disposition:   Discharge to home with primary care follow up.       Carmelita BORDEN, am serving as a scribe on 5/23/2017 at 11:14 AM to personally document services performed by Michael  Olamide THOMASON PA-C, based on my observations and the provider's statements to me.       Olamide Rodriguez PA-C  05/23/17 1826

## 2017-05-23 NOTE — PATIENT INSTRUCTIONS
[facility] Urology  Transrectal Ultrasound Post Operative Information    The physician who performed your Transrectal Ultrasound is Dr. Astorga (telephone number [phone]).  Please contact this doctor if you have any problems or questions.  If unable to reach your doctor, please return to the Emergency Department.      Take one antibiotic the evening of the procedure and then as directed on your prescription.    Drink at least 6-8 glasses of fluids for the first 48 hours.    Avoid heavy lifting and strenuous activity for 48 hours.    Avoid sexual intercourse for the first 24 hours.    No aspirin or ibuprofen products (Motrin, Advil, Nuprin, ect.) for one week.  You may take acetaminophen (Tylenol) for pain.    You may notice a small amount of blood on the tissue after a bowel movement.    You may pass blood with clots in your urine following the procedure.  The amount will decrease with time but may be visible for up to two weeks.     You make have blood in your semen for 4 weeks after the procedure.    You may experience mild perineal (groin area) discomfort after the procedure.    Please call you doctor if you have any of the follow symptoms:   Fever   Increase in the amount of blood passed   Severe discomfort or pain

## 2017-05-23 NOTE — NURSING NOTE
Pt has signed the consent form today affirming that a TRUS BIOPSY OF THE PROSTATE is the correct procedure. I verbally confirmed the patient s identity using two indicators, that the pt has started any medication as prescribed for this procedure, relevant allergies, that they have not used blood thinning products in the last 7 - 10 days, that they have done their fleet enema within the last two hours, and that the correct equipment was available. Immediately prior to starting the procedure I conducted the Time Out with the procedural staff and re-confirmed the patient s name and procedure. Pathology ordered and sent to lab. Post-procedure information given to pt at time of check out.    TATY Aquino

## 2017-06-06 ENCOUNTER — OFFICE VISIT (OUTPATIENT)
Dept: UROLOGY | Facility: CLINIC | Age: 77
End: 2017-06-06
Payer: COMMERCIAL

## 2017-06-06 VITALS — HEIGHT: 69 IN | HEART RATE: 60 BPM | BODY MASS INDEX: 23.7 KG/M2 | WEIGHT: 160 LBS | OXYGEN SATURATION: 97 %

## 2017-06-06 DIAGNOSIS — C61 PROSTATE CANCER (H): Primary | ICD-10-CM

## 2017-06-06 PROCEDURE — 99214 OFFICE O/P EST MOD 30 MIN: CPT | Performed by: UROLOGY

## 2017-06-06 ASSESSMENT — PAIN SCALES - GENERAL: PAINLEVEL: NO PAIN (0)

## 2017-06-06 NOTE — MR AVS SNAPSHOT
"              After Visit Summary   6/6/2017    Lowell Arredondo    MRN: [id number]           Patient Information     Date Of Birth          1940        Visit Information        Provider Department      6/6/2017 4:20 PM Mushtaq Astorga MD [facility] [place]        Today's Diagnoses     Prostate cancer (H)    -  1       Follow-ups after your visit        Who to contact     If you have questions or need follow up information about today's clinic visit or your schedule please contact [facility] [place] directly at [phone].  Normal or non-critical lab and imaging results will be communicated to you by Spotzerhart, letter or phone within 4 business days after the clinic has received the results. If you do not hear from us within 7 days, please contact the clinic through FANCRUt or phone. If you have a critical or abnormal lab result, we will notify you by phone as soon as possible.  Submit refill requests through Casentric or call your pharmacy and they will forward the refill request to us. Please allow 3 business days for your refill to be completed.          Additional Information About Your Visit        MyChart Information     Casentric gives you secure access to your electronic health record. If you see a primary care provider, you can also send messages to your care team and make appointments. If you have questions, please call your primary care clinic.  If you do not have a primary care provider, please call [phone] and they will assist you.        Care EveryWhere ID     This is your Care EveryWhere ID. This could be used by other organizations to access your [place] medical records  [id number]        Your Vitals Were     Pulse Height Pulse Oximetry BMI (Body Mass Index)          60 1.753 m (5' 9\") 97% 23.63 kg/m2         Blood Pressure from Last 3 Encounters:   05/23/17 124/70   05/23/17 110/76   05/10/17 124/66    Weight " from Last 3 Encounters:   06/06/17 72.6 kg (160 lb)   05/23/17 72.6 kg (160 lb)   05/10/17 72.6 kg (160 lb)              Today, you had the following     No orders found for display       Primary Care Provider Office Phone # Fax #    Dawson Dang [phone]       [facility] [address] [address]        Thank you!     Thank you for choosing [facility] [place]  for your care. Our goal is always to provide you with excellent care. Hearing back from our patients is one way we can continue to improve our services. Please take a few minutes to complete the written survey that you may receive in the mail after your visit with us. Thank you!             Your Updated Medication List - Protect others around you: Learn how to safely use, store and throw away your medicines at www.disposemymeds.org.          This list is accurate as of: 6/6/17  5:14 PM.  Always use your most recent med list.                   Brand Name Dispense Instructions for use    ASPIRIN ADULT LOW STRENGTH PO      Take 81 mg by mouth daily       tamsulosin 80 mcg/mL Susp    FLOMAX     Take 0.4 mg by mouth

## 2017-06-06 NOTE — LETTER
6/6/2017       RE: Lowell Arredondo  [address] [address]     Dear Colleague,    Thank you for referring your patient, Lowell Arredondo, to the [facility] [place] at [facility]. Please see a copy of my visit note below.    History: This very pleasant 77-year-old gentleman returns today.  We recall, he has a history of low volume low-grade adenocarcinoma prostate, first diagnosed in 2015, with 2 biopsies showing low volume, low-grade adenocarcinoma with a Sentinel score 6.  We had become recently more concerned because the PSA had risen to 6.9.  We had therefore done a T3 MRI which showed suspicious findings consideredPIRADS 5.  Subsequently therefore we have now rebiopsy of the prostate  The results of the biopsies show that 2 biopsies, both on the left side showed evidence of low volume Swapnil 6 and the second showing low volume Swapnil 7 adenocarcinoma.  In both cases less than 5% of the biopsy was involved.  10 other biopsies were negative although one did show some atypia     History reviewed. No pertinent past medical history.    Social History     Social History     Marital status:      Spouse name: N/A     Number of children: N/A     Years of education: N/A     Social History Main Topics     Smoking status: Never Smoker     Smokeless tobacco: None     Alcohol use 6.0 - 8.4 oz/week     10 - 14 Standard drinks or equivalent per week     Drug use: None     Sexual activity: Not Asked     Other Topics Concern     None     Social History Narrative       Past Surgical History:   Procedure Laterality Date     FOOT SURGERY Left 1992     PROSTATE SURGERY         History reviewed. No pertinent family history.      Current Outpatient Prescriptions:      tamsulosin (FLOMAX) 80 mcg/mL, Take 0.4 mg by mouth, Disp: , Rfl:      ASPIRIN ADULT LOW STRENGTH PO, Take 81 mg by mouth daily, Disp: , Rfl:     10 point ROS of  "systems including Constitutional, Eyes, Respiratory, Cardiovascular, Gastroenterology, Genitourinary, Integumentary, Muscularskeletal, Psychiatric were all negative except for pertinent positives noted in my HPI.    Examination:   Pulse 60  Ht 1.753 m (5' 9\")  Wt 72.6 kg (160 lb)  SpO2 97%  BMI 23.63 kg/m2  General Impression: Very pleasant gentleman in no acute distress, well-oriented to time place and person   Mental Status: Normal     Impression: I discussed the situation in detail with the patient and his wife.  The findings showed that he continues to have a low volume R adenocarcinoma though one biopsy does show a slight increase in the grade 7 out of 10 however this biopsy has less than 5% of the specimen involved and the second positive biopsy is low volume [place] 6 disease.  He is 77 years of age with a neck to where a life expectancy of 8.73 years.  There is however a strong family history of prostate cancer   We did have a careful discussion about whether we should continue active surveillance or whether we should consider other options.  I'm going to recommend that we do a genomic test to give us a genetic profile of the biopsy specimens that were positive and to determine what his level of risk is.  I am confident however that we will likely continue active surveillance unless this test is significantly aggressive.  I did go over all the potential treatment options we might consider although clearly radical surgery would not be one of them.  I went over the entire situation with the patient in detail.  I answered all questions   Plan: We will arrange for genomic test I would like to see him in the office once we have the results of this test to finalize our plan of management   Time: 35 minutes.  Greater than 50% was spent in discussion and consultation    \"This dictation was performed with voice recognition software and may contain errors,  omissions and inadvertent word substitution.\"        Mushtaq " MD Gauri

## 2017-06-06 NOTE — PROGRESS NOTES
"History: This very pleasant 77-year-old gentleman returns today.  We recall, he has a history of low volume low-grade adenocarcinoma prostate, first diagnosed in 2015, with 2 biopsies showing low volume, low-grade adenocarcinoma with a Swapnil score 6.  We had become recently more concerned because the PSA had risen to 6.9.  We had therefore done a T3 MRI which showed suspicious findings consideredPIRADS 5.  Subsequently therefore we have now rebiopsy of the prostate  The results of the biopsies show that 2 biopsies, both on the left side showed evidence of low volume Swapnil 6 and the second showing low volume Swapnil 7 adenocarcinoma.  In both cases less than 5% of the biopsy was involved.  10 other biopsies were negative although one did show some atypia     History reviewed. No pertinent past medical history.    Social History     Social History     Marital status:      Spouse name: N/A     Number of children: N/A     Years of education: N/A     Social History Main Topics     Smoking status: Never Smoker     Smokeless tobacco: None     Alcohol use 6.0 - 8.4 oz/week     10 - 14 Standard drinks or equivalent per week     Drug use: None     Sexual activity: Not Asked     Other Topics Concern     None     Social History Narrative       Past Surgical History:   Procedure Laterality Date     FOOT SURGERY Left 1992     PROSTATE SURGERY         History reviewed. No pertinent family history.      Current Outpatient Prescriptions:      tamsulosin (FLOMAX) 80 mcg/mL, Take 0.4 mg by mouth, Disp: , Rfl:      ASPIRIN ADULT LOW STRENGTH PO, Take 81 mg by mouth daily, Disp: , Rfl:     10 point ROS of systems including Constitutional, Eyes, Respiratory, Cardiovascular, Gastroenterology, Genitourinary, Integumentary, Muscularskeletal, Psychiatric were all negative except for pertinent positives noted in my HPI.    Examination:   Pulse 60  Ht 1.753 m (5' 9\")  Wt 72.6 kg (160 lb)  SpO2 97%  BMI 23.63 kg/m2  General " "Impression: Very pleasant gentleman in no acute distress, well-oriented to time place and person   Mental Status: Normal     Impression: I discussed the situation in detail with the patient and his wife.  The findings showed that he continues to have a low volume R adenocarcinoma though one biopsy does show a slight increase in the grade 7 out of 10 however this biopsy has less than 5% of the specimen involved and the second positive biopsy is low volume Pauline 6 disease.  He is 77 years of age with a neck to where a life expectancy of 8.73 years.  There is however a strong family history of prostate cancer   We did have a careful discussion about whether we should continue active surveillance or whether we should consider other options.  I'm going to recommend that we do a genomic test to give us a genetic profile of the biopsy specimens that were positive and to determine what his level of risk is.  I am confident however that we will likely continue active surveillance unless this test is significantly aggressive.  I did go over all the potential treatment options we might consider although clearly radical surgery would not be one of them.  I went over the entire situation with the patient in detail.  I answered all questions   Plan: We will arrange for genomic test I would like to see him in the office once we have the results of this test to finalize our plan of management   Time: 35 minutes.  Greater than 50% was spent in discussion and consultation    \"This dictation was performed with voice recognition software and may contain errors,  omissions and inadvertent word substitution.\"      "

## 2017-06-07 LAB — COPATH REPORT: NORMAL

## 2018-05-25 DIAGNOSIS — R97.20 ELEVATED PROSTATE SPECIFIC ANTIGEN (PSA): Primary | ICD-10-CM

## 2018-05-29 ENCOUNTER — OFFICE VISIT (OUTPATIENT)
Dept: UROLOGY | Facility: CLINIC | Age: 78
End: 2018-05-29
Payer: COMMERCIAL

## 2018-05-29 VITALS
WEIGHT: 160 LBS | SYSTOLIC BLOOD PRESSURE: 130 MMHG | DIASTOLIC BLOOD PRESSURE: 76 MMHG | BODY MASS INDEX: 23.7 KG/M2 | HEIGHT: 69 IN

## 2018-05-29 DIAGNOSIS — C61 PROSTATE CANCER (H): Primary | ICD-10-CM

## 2018-05-29 DIAGNOSIS — R97.20 ELEVATED PROSTATE SPECIFIC ANTIGEN (PSA): ICD-10-CM

## 2018-05-29 LAB — PSA SERPL-MCNC: 10.5 NG/ML (ref 0–4)

## 2018-05-29 PROCEDURE — 36415 COLL VENOUS BLD VENIPUNCTURE: CPT | Performed by: UROLOGY

## 2018-05-29 PROCEDURE — 84153 ASSAY OF PSA TOTAL: CPT | Performed by: UROLOGY

## 2018-05-29 PROCEDURE — 99214 OFFICE O/P EST MOD 30 MIN: CPT | Performed by: UROLOGY

## 2018-05-29 ASSESSMENT — PAIN SCALES - GENERAL: PAINLEVEL: NO PAIN (0)

## 2018-05-29 NOTE — LETTER
5/29/2018       RE: Lowell Arredondo  [address] [address]     Dear Colleague,    Thank you for referring your patient, Lowell Arredondo, to the [facility] [place] at [facility]. Please see a copy of my visit note below.    History: He is a great pleasure to see this very pleasant 78-year-old gentleman in follow-up consultation today.  He has a history of low volume, low-grade adenocarcinoma the prostate, first diagnosed in 2015, with 2 biopsies showing low volume low-grade with a [place] score of 6.  More recently we did know concerned because the PSA had risen to 6.9 and we therefore did a T3 MRI of the prostate which showed suspicious findings considered P.IRADS 5  We therefore decided to rebiopsy of the prostate and on this occasion 2 biopsies were positive both on the left side showing evidence of low volume Swapnil 6 in the first biopsy and the second showing low-volume [place] 7.  In both cases less than 5% of the biopsy was involved by malignancy.  10 other biopsies are negative for malignancy.  The PSA today however has risen to 10.5    No past medical history on file.    Social History     Social History     Marital status:      Spouse name: N/A     Number of children: N/A     Years of education: N/A     Social History Main Topics     Smoking status: Never Smoker     Smokeless tobacco: Not on file     Alcohol use 6.0 - 8.4 oz/week     10 - 14 Standard drinks or equivalent per week     Drug use: Not on file     Sexual activity: Not on file     Other Topics Concern     Not on file     Social History Narrative       Past Surgical History:   Procedure Laterality Date     FOOT SURGERY Left 1992     PROSTATE SURGERY         No family history on file.      Current Outpatient Prescriptions:      ASPIRIN ADULT LOW STRENGTH PO, Take 81 mg by mouth daily, Disp: , Rfl:      tamsulosin (FLOMAX) 80 mcg/mL, Take 0.4 mg by  "mouth, Disp: , Rfl:     10 point ROS of systems including Constitutional, Eyes, Respiratory, Cardiovascular, Gastroenterology, Genitourinary, Integumentary, Muscularskeletal, Psychiatric were all negative except for pertinent positives noted in my HPI.    Examination:   /76  Ht 1.753 m (5' 9\")  Wt 72.6 kg (160 lb)  BMI 23.63 kg/m2  General Impression: Very pleasant gentleman in no acute distress, well oriented in time place and person  Mental Status: Normal.  HEENT.  There is no clinical evidence of jaundice in the mucous membranes are normal  Skin: Skin is otherwise normal to examination  Respiratory System: The respiratory cycle is normal  Lymph Nodes: Not examined  Back/Flank Tenderness: Not examined  Cardiovascular System: Not examined  Abdominal Examination: Not examined  Extremities: There is no significant peripheral edema  Genitial: Not examined  Rectal Examination: Good sphincter tone, normal perianal sensation.  Rectal mucosa without hemorrhoids or fissures.  Smooth soft and benign feeling prostate which is mildly enlarged but show no evidence of tenderness, bogginess, though there may be slight thickening in the region of the apex in the midline  Seminal vesicles.  No palpable.  Perineum is otherwise normal to examination    Neurologic System: There are no focal abnormal clinical neurological signs in the central, or peripheral nervous systems    Impression: There has been a significant rise in the PSA.  This is of some concern given the fact that we know there is low volume Swapnil 7 disease in the prostate which we are managing by active surveillance.  For this reason I would like to repeat the T3 MRI of the prostate see if there is been any significant change and we may decide if there are areas of concern that we should do MRI fusion biopsy on this occasion to see if there is a greater volume of more aggressive disease.  Clearly not going to be considering major surgery even if we do find " "more significant cancer, but we may consider the need for the possibility of radiation therapy.  I did carefully discuss the situation with the patient in detail today, we did have a careful discussion about treatments such as active surveillance, radiation therapy, hormone treatment and cryotherapy as potential options.  I did describe the entire situation to the patient in detail today.  I answered all questions    Plan:  T3 MRI of prostate and see me off the    Time: 35 minutes with greater than 50% spent in careful discussion and consultation, involving a discussion about prostate cancer, active surveillance and some of the controversial issues related to the management of prostate cancer related to his particular situation    \"This dictation was performed with voice recognition software and may contain errors,  omissions and inadvertent word substitution.\"        Again, thank you for allowing me to participate in the care of your patient.      Sincerely,    Mushtaq Astorga MD      "

## 2018-05-29 NOTE — PROGRESS NOTES
"History: He is a great pleasure to see this very pleasant 78-year-old gentleman in follow-up consultation today.  He has a history of low volume, low-grade adenocarcinoma the prostate, first diagnosed in 2015, with 2 biopsies showing low volume low-grade with a Broseley score of 6.  More recently we did know concerned because the PSA had risen to 6.9 and we therefore did a T3 MRI of the prostate which showed suspicious findings considered P.IRADS 5  We therefore decided to rebiopsy of the prostate and on this occasion 2 biopsies were positive both on the left side showing evidence of low volume Broseley 6 in the first biopsy and the second showing low-volume Broseley 7.  In both cases less than 5% of the biopsy was involved by malignancy.  10 other biopsies are negative for malignancy.  The PSA today however has risen to 10.5    No past medical history on file.    Social History     Social History     Marital status:      Spouse name: N/A     Number of children: N/A     Years of education: N/A     Social History Main Topics     Smoking status: Never Smoker     Smokeless tobacco: Not on file     Alcohol use 6.0 - 8.4 oz/week     10 - 14 Standard drinks or equivalent per week     Drug use: Not on file     Sexual activity: Not on file     Other Topics Concern     Not on file     Social History Narrative       Past Surgical History:   Procedure Laterality Date     FOOT SURGERY Left 1992     PROSTATE SURGERY         No family history on file.      Current Outpatient Prescriptions:      ASPIRIN ADULT LOW STRENGTH PO, Take 81 mg by mouth daily, Disp: , Rfl:      tamsulosin (FLOMAX) 80 mcg/mL, Take 0.4 mg by mouth, Disp: , Rfl:     10 point ROS of systems including Constitutional, Eyes, Respiratory, Cardiovascular, Gastroenterology, Genitourinary, Integumentary, Muscularskeletal, Psychiatric were all negative except for pertinent positives noted in my HPI.    Examination:   /76  Ht 1.753 m (5' 9\")  Wt 72.6 kg " (160 lb)  BMI 23.63 kg/m2  General Impression: Very pleasant gentleman in no acute distress, well oriented in time place and person  Mental Status: Normal.  HEENT.  There is no clinical evidence of jaundice in the mucous membranes are normal  Skin: Skin is otherwise normal to examination  Respiratory System: The respiratory cycle is normal  Lymph Nodes: Not examined  Back/Flank Tenderness: Not examined  Cardiovascular System: Not examined  Abdominal Examination: Not examined  Extremities: There is no significant peripheral edema  Genitial: Not examined  Rectal Examination: Good sphincter tone, normal perianal sensation.  Rectal mucosa without hemorrhoids or fissures.  Smooth soft and benign feeling prostate which is mildly enlarged but show no evidence of tenderness, bogginess, though there may be slight thickening in the region of the apex in the midline  Seminal vesicles.  No palpable.  Perineum is otherwise normal to examination    Neurologic System: There are no focal abnormal clinical neurological signs in the central, or peripheral nervous systems    Impression: There has been a significant rise in the PSA.  This is of some concern given the fact that we know there is low volume Swapnil 7 disease in the prostate which we are managing by active surveillance.  For this reason I would like to repeat the T3 MRI of the prostate see if there is been any significant change and we may decide if there are areas of concern that we should do MRI fusion biopsy on this occasion to see if there is a greater volume of more aggressive disease.  Clearly not going to be considering major surgery even if we do find more significant cancer, but we may consider the need for the possibility of radiation therapy.  I did carefully discuss the situation with the patient in detail today, we did have a careful discussion about treatments such as active surveillance, radiation therapy, hormone treatment and cryotherapy as potential  "options.  I did describe the entire situation to the patient in detail today.  I answered all questions    Plan:  T3 MRI of prostate and see me off the    Time: 35 minutes with greater than 50% spent in careful discussion and consultation, involving a discussion about prostate cancer, active surveillance and some of the controversial issues related to the management of prostate cancer related to his particular situation    \"This dictation was performed with voice recognition software and may contain errors,  omissions and inadvertent word substitution.\"      "

## 2018-06-13 ENCOUNTER — HOSPITAL ENCOUNTER (OUTPATIENT)
Dept: MRI IMAGING | Facility: CLINIC | Age: 78
Discharge: HOME | End: 2018-06-13
Attending: UROLOGY | Admitting: UROLOGY
Payer: MEDICARE

## 2018-06-13 DIAGNOSIS — C61 PROSTATE CANCER (H): ICD-10-CM

## 2018-06-13 LAB
CREAT BLD-MCNC: 1.1 MG/DL (ref 0.66–1.25)
GFR SERPL CREATININE-BSD FRML MDRD: 64 ML/MIN/1.7M2

## 2018-06-13 PROCEDURE — 72197 MRI PELVIS W/O & W/DYE: CPT

## 2018-06-13 PROCEDURE — 25000128 H RX IP 250 OP 636: Performed by: UROLOGY

## 2018-06-13 PROCEDURE — A9585 GADOBUTROL INJECTION: HCPCS | Performed by: UROLOGY

## 2018-06-13 PROCEDURE — 82565 ASSAY OF CREATININE: CPT

## 2018-06-13 RX ORDER — GADOBUTROL 604.72 MG/ML
7.5 INJECTION INTRAVENOUS ONCE
Status: COMPLETED | OUTPATIENT
Start: 2018-06-13 | End: 2018-06-13

## 2018-06-13 RX ADMIN — GADOBUTROL 7.5 ML: 604.72 INJECTION INTRAVENOUS at 14:07

## 2018-06-20 ENCOUNTER — TELEPHONE (OUTPATIENT)
Dept: UROLOGY | Facility: CLINIC | Age: 78
End: 2018-06-20

## 2018-07-11 ENCOUNTER — OFFICE VISIT (OUTPATIENT)
Dept: UROLOGY | Facility: CLINIC | Age: 78
End: 2018-07-11
Payer: COMMERCIAL

## 2018-07-11 VITALS
HEIGHT: 68 IN | BODY MASS INDEX: 24.25 KG/M2 | SYSTOLIC BLOOD PRESSURE: 120 MMHG | WEIGHT: 160 LBS | HEART RATE: 60 BPM | DIASTOLIC BLOOD PRESSURE: 70 MMHG

## 2018-07-11 DIAGNOSIS — C61 PROSTATE CANCER (H): Primary | ICD-10-CM

## 2018-07-11 PROCEDURE — 99214 OFFICE O/P EST MOD 30 MIN: CPT | Performed by: UROLOGY

## 2018-07-11 ASSESSMENT — PAIN SCALES - GENERAL: PAINLEVEL: NO PAIN (0)

## 2018-07-11 NOTE — PROGRESS NOTES
History: This is a great pleasure to see this very pleasant 78-year-old gentleman in follow-up consultation today.  He has a history of low volume [place] 7 adenocarcinoma the prostate which we have managed by active surveillance given his age  One year ago his PSA was 6.9, T3 MRI of the prostate showed suspicious findings in the anterior area of the kidney considered PIRADS 5, at that time the prostate was rebiopsied which showed evidence of low volume Swapnil 6 and low volume [place] 7 disease but in less than 5% of the biopsy.  10 other biopsies were all negative.  A year later however the PSA deisi to 10.5 and we therefore decided to repeat the MRI scan.    MRI PROSTATE:  6/13/2018 2:07 PM     CLINICAL HISTORY: Prostate cancer (H). According to medical record,  PSA at 6.9. Prostate biopsy: Left lateral mid gland based on 6; left  lateral base placement 6; left lateral apex atypical cells suspicious  for malignancy.     Comparison: 5/7/2017.     TECHNIQUE:      The following sequences were obtained: High-resolution axial  T2-weighted, coronal T2-weighted, 3D volumetric T2-weighted, axial  pre-contrast T1, axial diffusion-weighted, axial apparent diffusion  coefficient and axial dynamic contrast-enhanced T1. Postcontrast  images were evaluated on a separate workstation to evaluate dynamic  contrast enhancement.  Contrast dose: 7.5cc Gadavist Injected     The images are interpreted according to PI-RADS v.2. Using an image  analysis software package (IntroMaps), three-dimensional (3D) volumetric  images of the prostate were reconstructed by a radiologist and  archived to PACS for prostate lesion analysis and biopsy planning.   Additionally, the software package was utilized for contrast kinetic  analysis.     FINDINGS:  Prostate gland size: 2.8 x 4.0 x 3.3 cm  Volume: 19 g     Peripheral zone: Again noted 1.2 cm irregular area of T2 hypointense  signal in the anterior aspect of the gland, centered in the right  apex,  10-1:00 position involving the peripheral and transitional  zones, series 7 image 47. There is low ADC signal, ADC value of  550-600, mild increased signal at high B value DWI and early contrast  enhancement. There is a long segment of capsular abutment with bulging  and irregularity concerning for extracapsular extension. There is also  involvement of the anterior fibromuscular stroma and abutment of the  external urethral sphincter.     PI-RADS:  Peripheral zone T2: 5  Diffusion-weighted image: 4    Contrast-enhanced images: Positive       Overall assessment: 4     Transitional zone: There are BPH type changes. As described above  there is involvement of the anterior aspect of the right transitional  zone.     Remainder of the pelvis:  Colonic diverticulosis. No associated  findings of acute diverticulitis. Normally distended urinary bladder,  unremarkable. No inguinal lymphadenopathy. No free fluid in the  pelvis. Symmetric seminal vesicles. No suspicious pelvic lymph nodes.  Degenerative changes of the spine. No suspicious bone lesions.         IMPRESSION:   1. Based on the most suspicious abnormality, this exam is  characterized as PIRADS 4 - High probability.  Clinically significant  cancer is likely to be present.  The most suspicious abnormality is  located at the right apex, anterior aspect of the gland involving the  peripheral and transitional zones, 10-1:00 position, stable from the  prior study. There is a long segment of capsular abutment diffuse  bulging and irregularity concerning for extracapsular extension. There  is also involvement of the anterior fibromuscular stroma and abutment  of the external urethral sphincter.  2. No evidence of extraprostatic malignancy. No suspicious adenopathy  or evidence of pelvic metastases.     The images are interpreted according to PI-RADS v2.  http://www.acr.org/~/media/ACR/Documents/PDF/QualitySafety/Resources  PIRADS/[email or web address]     I have personally  "reviewed the examination and initial interpretation  and I agree with the findings.     ROBERTA CLINTON MD      The findings, which I have compared to the previous MRIs show that the area on the anterior aspect of the prostate is quite stable and is now considered only PIRADS 4.  In addition I reviewed the actuarial statistics indicating the expected life expectancy  Of the average of 80-year-old American male is 8.25 years.    I had a lengthy discussion with the patient and his wife today about this situation, carefully reviewing both the previous and current MRI scans which although indicating an area suspicious in the anterior zone of the prostate, indicate stability.  For that reason I am content to continue careful active surveillance given the above issues outlined.  We did have a discussion about radiation therapy which we still may need to consider if we are concerned in the future but in the current circumstances we will continue with active surveillance at the present time.  We did have an extended discussion to go over this in detail, we will continue to monitor the PSA closely.  The patient does live in [place] more than half of the year and will return to my office early next year for PSA and examination once again.  If I am a total concerned at that time we will arrange for MRI fusion biopsy of this area.  I did discuss this in great detail with him today.  I answered many questions     Plan.  1 year for PSA, examination and we may decide to repeat the MRI scan at that time    Time.  25 minutes with greater than 50% of discussion consultation given the time that was required to review current and previous MRI scans and have a lengthy discussion with the patient about the entire situation as noted above    \"This dictation was performed with voice recognition software and may contain errors,  omissions and inadvertent word substitution.\"          "

## 2018-07-11 NOTE — MR AVS SNAPSHOT
"              After Visit Summary   7/11/2018    Lowell Arredondo    MRN: [id number]           Patient Information     Date Of Birth          1940        Visit Information        Provider Department      7/11/2018 9:40 AM Mushtaq Astorga MD [facility] Urology [facility]        Today's Diagnoses     Prostate cancer (H)    -  1       Follow-ups after your visit        Follow-up notes from your care team     Return in about 1 year (around 7/11/2019) for PSA, Physical Exam.      Who to contact     If you have questions or need follow up information about today's clinic visit or your schedule please contact [facility] UROLOGY [facility] directly at [phone].  Normal or non-critical lab and imaging results will be communicated to you by DealsAndYouhart, letter or phone within 4 business days after the clinic has received the results. If you do not hear from us within 7 days, please contact the clinic through DealsAndYouhart or phone. If you have a critical or abnormal lab result, we will notify you by phone as soon as possible.  Submit refill requests through [email or web address] or call your pharmacy and they will forward the refill request to us. Please allow 3 business days for your refill to be completed.          Additional Information About Your Visit        MyChart Information     [email or web address] gives you secure access to your electronic health record. If you see a primary care provider, you can also send messages to your care team and make appointments. If you have questions, please call your primary care clinic.  If you do not have a primary care provider, please call [phone] and they will assist you.        Care EveryWhere ID     This is your Care EveryWhere ID. This could be used by other organizations to access your [facility] medical records  [id number]        Your Vitals Were     Pulse Height BMI (Body Mass Index)             60 1.727 m (5' 8\") 24.33 kg/m2          Blood Pressure " from Last 3 Encounters:   07/11/18 120/70   05/29/18 130/76   05/23/17 124/70    Weight from Last 3 Encounters:   07/11/18 72.6 kg (160 lb)   05/29/18 72.6 kg (160 lb)   06/06/17 72.6 kg (160 lb)              Today, you had the following     No orders found for display       Primary Care Provider Office Phone # Fax #    Dawson Dang [phone]       [facility] [address] [facility] [address]        Equal Access to Services     [facility]: Hadii aad ku hadasho Soomaali, waaxda luqadaha, qaybta kaalmada adeegyada, cherry moran . So [facility] [phone].    ATENCIÓN: Si habla español, tiene a bhardwaj disposición servicios gratuitos de asistencia lingüística. [facility] [phone].    We comply with applicable federal civil rights laws and [place] laws. We do not discriminate on the basis of race, color, national origin, age, disability, sex, sexual orientation, or gender identity.            Thank you!     Thank you for choosing [facility]IN[facility] [place]  for your care. Our goal is always to provide you with excellent care. Hearing back from our patients is one way we can continue to improve our services. Please take a few minutes to complete the written survey that you may receive in the mail after your visit with us. Thank you!             Your Updated Medication List - Protect others around you: Learn how to safely use, store and throw away your medicines at www.disposemymeds.org.          This list is accurate as of 7/11/18 10:39 AM.  Always use your most recent med list.                   Brand Name Dispense Instructions for use Diagnosis    ASPIRIN ADULT LOW STRENGTH PO      Take 81 mg by mouth daily        tamsulosin 80 mcg/mL Susp    FLOMAX     Take 0.4 mg by mouth

## 2018-07-11 NOTE — NURSING NOTE
Chief Complaint   Patient presents with     Follow Up For     Pt. is here to go over the imaging.      Milly Dee

## 2018-07-11 NOTE — LETTER
7/11/2018       RE: Lowell Arredondo  [address] [address]     Dear Colleague,    Thank you for referring your patient, Lowell Arredondo, to the [facility] [place] at [facility]. Please see a copy of my visit note below.    History: This is a great pleasure to see this very pleasant 78-year-old gentleman in follow-up consultation today.  He has a history of low volume Swapnil 7 adenocarcinoma the prostate which we have managed by active surveillance given his age  One year ago his PSA was 6.9, T3 MRI of the prostate showed suspicious findings in the anterior area of the kidney considered PIRADS 5, at that time the prostate was rebiopsied which showed evidence of low volume [place] 6 and low volume [place] 7 disease but in less than 5% of the biopsy.  10 other biopsies were all negative.  A year later however the PSA deisi to 10.5 and we therefore decided to repeat the MRI scan.    MRI PROSTATE:  6/13/2018 2:07 PM     CLINICAL HISTORY: Prostate cancer (H). According to medical record,  PSA at 6.9. Prostate biopsy: Left lateral mid gland based on 6; left  lateral base placement 6; left lateral apex atypical cells suspicious  for malignancy.     Comparison: 5/7/2017.     TECHNIQUE:      The following sequences were obtained: High-resolution axial  T2-weighted, coronal T2-weighted, 3D volumetric T2-weighted, axial  pre-contrast T1, axial diffusion-weighted, axial apparent diffusion  coefficient and axial dynamic contrast-enhanced T1. Postcontrast  images were evaluated on a separate workstation to evaluate dynamic  contrast enhancement.  Contrast dose: 7.5cc Gadavist Injected     The images are interpreted according to PI-RADS v.2. Using an image  analysis software package (Brayola), three-dimensional (3D) volumetric  images of the prostate were reconstructed by a radiologist and  archived to PACS for prostate lesion analysis and  biopsy planning.   Additionally, the software package was utilized for contrast kinetic  analysis.     FINDINGS:  Prostate gland size: 2.8 x 4.0 x 3.3 cm  Volume: 19 g     Peripheral zone: Again noted 1.2 cm irregular area of T2 hypointense  signal in the anterior aspect of the gland, centered in the right  apex, 10-1:00 position involving the peripheral and transitional  zones, series 7 image 47. There is low ADC signal, ADC value of  550-600, mild increased signal at high B value DWI and early contrast  enhancement. There is a long segment of capsular abutment with bulging  and irregularity concerning for extracapsular extension. There is also  involvement of the anterior fibromuscular stroma and abutment of the  external urethral sphincter.     PI-RADS:  Peripheral zone T2: 5  Diffusion-weighted image: 4    Contrast-enhanced images: Positive       Overall assessment: 4     Transitional zone: There are BPH type changes. As described above  there is involvement of the anterior aspect of the right transitional  zone.     Remainder of the pelvis:  Colonic diverticulosis. No associated  findings of acute diverticulitis. Normally distended urinary bladder,  unremarkable. No inguinal lymphadenopathy. No free fluid in the  pelvis. Symmetric seminal vesicles. No suspicious pelvic lymph nodes.  Degenerative changes of the spine. No suspicious bone lesions.         IMPRESSION:   1. Based on the most suspicious abnormality, this exam is  characterized as PIRADS 4 - High probability.  Clinically significant  cancer is likely to be present.  The most suspicious abnormality is  located at the right apex, anterior aspect of the gland involving the  peripheral and transitional zones, 10-1:00 position, stable from the  prior study. There is a long segment of capsular abutment diffuse  bulging and irregularity concerning for extracapsular extension. There  is also involvement of the anterior fibromuscular stroma and abutment  of  the external urethral sphincter.  2. No evidence of extraprostatic malignancy. No suspicious adenopathy  or evidence of pelvic metastases.     The images are interpreted according to PI-RADS v2.  http://www.acr.org/~/media/ACR/Documents/PDF/QualitySafety/Resources  PIRADS/[email or web address].pdf     I have personally reviewed the examination and initial interpretation  and I agree with the findings.     ROBERTA CLINTON MD      The findings, which I have compared to the previous MRIs show that the area on the anterior aspect of the prostate is quite stable and is now considered only PIRADS 4.  In addition I reviewed the actuarial statistics indicating the expected life expectancy  Of the average of 80-year-old American male is 8.25 years.    I had a lengthy discussion with the patient and his wife today about this situation, carefully reviewing both the previous and current MRI scans which although indicating an area suspicious in the anterior zone of the prostate, indicate stability.  For that reason I am content to continue careful active surveillance given the above issues outlined.  We did have a discussion about radiation therapy which we still may need to consider if we are concerned in the future but in the current circumstances we will continue with active surveillance at the present time.  We did have an extended discussion to go over this in detail, we will continue to monitor the PSA closely.  The patient does live in [place] more than half of the year and will return to my office early next year for PSA and examination once again.  If I am a total concerned at that time we will arrange for MRI fusion biopsy of this area.  I did discuss this in great detail with him today.  I answered many questions     Plan.  1 year for PSA, examination and we may decide to repeat the MRI scan at that time    Time.  25 minutes with greater than 50% of discussion consultation given the time that was required to review current  "and previous MRI scans and have a lengthy discussion with the patient about the entire situation as noted above    \"This dictation was performed with voice recognition software and may contain errors,  omissions and inadvertent word substitution.\"      Again, thank you for allowing me to participate in the care of your patient.      Sincerely,    Mushtaq Astorga MD      "

## 2019-05-26 NOTE — MR AVS SNAPSHOT
After Visit Summary   5/29/2018    Lowell Arredondo    MRN: [id number]           Patient Information     Date Of Birth          1940        Visit Information        Provider Department      5/29/2018 3:50 PM Mushtaq Astorga MD [facility] [place]        Today's Diagnoses     Prostate cancer (H)    -  1    Elevated prostate specific antigen (PSA)           Follow-ups after your visit        Follow-up notes from your care team     Return for T3 MRI of prostate at , SEE ME AFTER.      Your next 10 appointments already scheduled     Jun 11, 2018  1:00 PM CDT   MR PROSTATE with UUMR2   [facility], [place], MRI ([facility], [facility])    [address] [address] [phone]           Take your medicines as usual, unless your doctor tells you not to. Bring a list of your current medicines to your exam (including vitamins, minerals and over-the-counter drugs).  You may or may not receive intravenous (IV) contrast for this exam pending the discretion of the Radiologist.  You do not need to do anything special to prepare.  The MRI machine uses a strong magnet. Please wear clothes without metal (snaps, zippers). A sweatsuit works well, or we may give you a hospital gown.  Please remove any body piercings and hair extensions before you arrive. You will also remove watches, jewelry, hairpins, wallets, dentures, partial dental plates and hearing aids. You may wear contact lenses, and you may be able to wear your rings. We have a safe place to keep your personal items, but it is safer to leave them at home.  **IMPORTANT** THE INSTRUCTIONS BELOW ARE ONLY FOR THOSE PATIENTS WHO HAVE BEEN PRESCRIBED SEDATION OR GENERAL ANESTHESIA DURING THEIR MRI PROCEDURE:  IF YOUR DOCTOR PRESCRIBED ORAL SEDATION (take medicine to help you relax during your exam):   You must get the medicine from your doctor (oral medication) before  you arrive. Bring the medicine to the exam. Do not take it at home. You ll be told when to take it upon arriving for your exam.   Arrive one hour early. Bring someone who can take you home after the test. Your medicine will make you sleepy. After the exam, you may not drive, take a bus or take a taxi by yourself.  IF YOUR DOCTOR PRESCRIBED IV SEDATION:   Arrive one hour early. Bring someone who can take you home after the test. Your medicine will make you sleepy. After the exam, you may not drive, take a bus or take a taxi by yourself.   No eating 6 hours before your exam. You may have clear liquids up until 4 hours before your exam. (Clear liquids include water, clear tea, black coffee and fruit juice without pulp.)  IF YOUR DOCTOR PRESCRIBED ANESTHESIA (be asleep for your exam):   Arrive 1 1/2 hours early. Bring someone who can take you home after the test. You may not drive, take a bus or take a taxi by yourself.   No eating 8 hours before your exam. You may have clear liquids up until 4 hours before your exam. (Clear liquids include water, clear tea, black coffee and fruit juice without pulp.)   You will spend four to five hours in the recovery room.  Please call the Imaging Department at your exam site with any questions.              Future tests that were ordered for you today     Open Future Orders        Priority Expected Expires Ordered    MR Prostate [[id number]] Routine  5/29/2019 5/29/2018            Who to contact     If you have questions or need follow up information about today's clinic visit or your schedule please contact [facility] directly at [phone].  Normal or non-critical lab and imaging results will be communicated to you by MyChart, letter or phone within 4 business days after the clinic has received the results. If you do not hear from us within 7 days, please contact the clinic through MyChart or phone. If you have a critical or abnormal lab  "result, we will notify you by phone as soon as possible.  Submit refill requests through [employer] or call your pharmacy and they will forward the refill request to us. Please allow 3 business days for your refill to be completed.          Additional Information About Your Visit        [facility] [employer] Information     [employer] gives you secure access to your electronic health record. If you see a primary care provider, you can also send messages to your care team and make appointments. If you have questions, please call your primary care clinic.  If you do not have a primary care provider, please call [phone] and they will assist you.        Care EveryWhere ID     This is your Care EveryWhere ID. This could be used by other organizations to access your [facility] medical records  [id number]        Your Vitals Were     Height BMI (Body Mass Index)                1.753 m (5' 9\") 23.63 kg/m2           Blood Pressure from Last 3 Encounters:   05/29/18 130/76   05/23/17 124/70   05/23/17 110/76    Weight from Last 3 Encounters:   05/29/18 72.6 kg (160 lb)   06/06/17 72.6 kg (160 lb)   05/23/17 72.6 kg (160 lb)              We Performed the Following     PSA Diag Urologic Phys [[id number]]        Primary Care Provider Office Phone # Fax #    Dawson TATUM Dang [phone]       [address] [address]        Equal Access to Services     [facility] AH: Hadii aad ku shao Sosalud, waaxda luqadaha, qaybta kaalmada adeegyada, cherry hinton. So [facility] [phone].    ATENCIÓN: Si habla español, tiene a bhardwaj disposición servicios gratuitos de asistencia lingüística. Dameon al [phone].    We comply with applicable federal civil rights laws and [place] laws. We do not discriminate on the basis of race, color, national origin, age, disability, sex, sexual orientation, or gender identity.            Thank you!     Thank you for choosing [facility] [place]  for your care. Our goal is always to provide you with excellent care. Hearing back from our patients is one way we can continue to improve our services. Please take a few minutes to complete the written survey that you may receive in the mail after your visit with us. Thank you!             Your Updated Medication List - Protect others around you: Learn how to safely use, store and throw away your medicines at www.disposemymeds.org.          This list is accurate as of 5/29/18  5:01 PM.  Always use your most recent med list.                   Brand Name Dispense Instructions for use Diagnosis    ASPIRIN ADULT LOW STRENGTH PO      Take 81 mg by mouth daily        tamsulosin 80 mcg/mL Susp    FLOMAX     Take 0.4 mg by mouth           No

## 2019-07-03 DIAGNOSIS — C61 PROSTATE CANCER (H): Primary | ICD-10-CM

## 2019-07-11 ENCOUNTER — OFFICE VISIT (OUTPATIENT)
Dept: UROLOGY | Facility: CLINIC | Age: 79
End: 2019-07-11
Payer: COMMERCIAL

## 2019-07-11 VITALS
WEIGHT: 160 LBS | HEIGHT: 68 IN | OXYGEN SATURATION: 98 % | BODY MASS INDEX: 24.25 KG/M2 | SYSTOLIC BLOOD PRESSURE: 102 MMHG | DIASTOLIC BLOOD PRESSURE: 64 MMHG | HEART RATE: 66 BPM

## 2019-07-11 DIAGNOSIS — C61 PROSTATE CANCER (H): ICD-10-CM

## 2019-07-11 LAB — PSA SERPL-MCNC: 13.2 NG/ML (ref 0–4)

## 2019-07-11 PROCEDURE — 36415 COLL VENOUS BLD VENIPUNCTURE: CPT | Performed by: UROLOGY

## 2019-07-11 PROCEDURE — 84153 ASSAY OF PSA TOTAL: CPT | Performed by: UROLOGY

## 2019-07-11 PROCEDURE — 99214 OFFICE O/P EST MOD 30 MIN: CPT | Performed by: UROLOGY

## 2019-07-11 ASSESSMENT — PAIN SCALES - GENERAL: PAINLEVEL: NO PAIN (0)

## 2019-07-11 ASSESSMENT — MIFFLIN-ST. JEOR: SCORE: 1405.26

## 2019-07-11 NOTE — LETTER
7/11/2019       RE: Kari Yang  [address] [address]     Dear Colleague,    Thank you for referring your patient, Kari Yang, to the [facility] at [facility]. Please see a copy of my visit note below.    History: It is a great pleasure to see this very pleasant 79-year-old gentleman in follow-up consultation today.  We recall, he was first diagnosed in 2015 with low volume, Swapnil 6 adenocarcinoma the prostate in 2 out of 12 biopsies after having been found to have an elevated PSA.  The initial MRI scan in 2015 had revealed a small gland of only 21 cc, with changes considered PIRADS 5, at that time the most suspicious area was located at the anterior apex.    The MRI scan was repeated in 2017 which showed a volume of 19 ml , was consideredPIRADS 4, but no interval change in the concerning findings seen 2 years before.  However because of the rise in the PSA at that time, further biopsies were taken on this occasion 2 biopsies once again showed evidence of cancer both with a very low volume, both from the left side 1 of which was Swapnil 6 with very low volume of 1 [place] 7 with very low volume.  Results for KARI YANG (MRN [id number]) as of 7/11/2019 13:43   Ref. Range 10/19/2016 13:56 5/29/2018 15:58 7/11/2019 13:01   PSA Diag Urologic Phys Latest Ref Range: 0.00 - 4.00 ng/mL 6.90 (H) 10.50 (H) 13.20 (H)   Today however the PSA has risen from 10.51-year ago up to 13.2.  His general health is otherwise stable      History reviewed. No pertinent past medical history.    Social History     Socioeconomic History     Marital status:      Spouse name: None     Number of children: None     Years of education: None     Highest education level: None   Occupational History     None   Social Needs     Financial resource strain: None     Food insecurity:     Worry: None     Inability: None      "Transportation needs:     Medical: None     Non-medical: None   Tobacco Use     Smoking status: Never Smoker     Smokeless tobacco: Never Used   Substance and Sexual Activity     Alcohol use: Yes     Alcohol/week: 6.0 - 8.4 oz     Types: 10 - 14 Standard drinks or equivalent per week     Drug use: None     Sexual activity: None   Lifestyle     Physical activity:     Days per week: None     Minutes per session: None     Stress: None   Relationships     Social connections:     Talks on phone: None     Gets together: None     Attends Baptism service: None     Active member of club or organization: None     Attends meetings of clubs or organizations: None     Relationship status: None     Intimate partner violence:     Fear of current or ex partner: None     Emotionally abused: None     Physically abused: None     Forced sexual activity: None   Other Topics Concern     Parent/sibling w/ CABG, MI or angioplasty before 65F 55M? Not Asked   Social History Narrative     None       Past Surgical History:   Procedure Laterality Date     FOOT SURGERY Left 1992     PROSTATE SURGERY         History reviewed. No pertinent family history.      Current Outpatient Medications:      ASPIRIN ADULT LOW STRENGTH PO, Take 81 mg by mouth daily, Disp: , Rfl:      tamsulosin (FLOMAX) 80 mcg/mL, Take 0.4 mg by mouth, Disp: , Rfl:     10 point ROS of systems including Constitutional, Eyes, Respiratory, Cardiovascular, Gastroenterology, Genitourinary, Integumentary, Muscularskeletal, Psychiatric were all negative except for pertinent positives noted in my HPI.    Examination:   /64 (BP Location: Right arm, Patient Position: Sitting, Cuff Size: Adult Regular)   Pulse 66   Ht 1.727 m (5' 8\")   Wt 72.6 kg (160 lb)   SpO2 98%   BMI 24.33 kg/m     General Impression: Very pleasant gentleman in no acute distress, well-oriented in time place and person  Mental Status: Normal.  HEENT.  There is no clinical evidence of jaundice in the " "mucous membranes are normal  Skin: Skin is normal to examination  Respiratory System: The respiratory cycle is normal  Lymph Nodes: Not examined  Back/Flank Tenderness: There is no flank tenderness  Cardiovascular System: There is no significant peripheral edema  Abdominal Examination: The abdomen is not obese  Extremities: The extremities are otherwise unremarkable  Genitial: Not examined  Rectal Examination: Good sphincter tone, normal perianal sensation.  Smooth rectal mucosa without hemorrhoids or fissures.  Smooth soft small prostate without evidence of tenderness or bogginess, there is a very slight change in texture prior to the apex on the right side.  The seminal vesicles are not palpable.  Perineum is otherwise normal to examination  Neurologic System: There are no focal abnormal clinical neurological signs and central, peripheral nervous systems    Impression: There has been slight increase in the PSA with a PSA velocity over the last year of 25%.  He is 79 years of age and I am reluctant to be aggressive but I think it appropriate we recheck the MRI scan once again.  If there is obvious evidence of new disease we may have to consider an MRI fusion biopsy but only if we think it might change current management.  I carefully explained the entire situation to the patient in detail today.  I went over the previous and current records labs and other pertinent studies in detail.  I answered all the questions    Plan: T3 MRI of the prostate and see me after    We did do a careful review of all records today given the complexity of the situation associated with a lengthy discussion about the issues as outlined above    \"This dictation was performed with voice recognition software and may contain errors,  omissions and inadvertent word substitution.\"        Again, thank you for allowing me to participate in the care of your patient.      Sincerely,    Mushtaq Astorga MD      "

## 2019-07-11 NOTE — NURSING NOTE
Chief Complaint   Patient presents with     Clinic Care Coordination - Follow-up     Pt here for same day PSA     Veronique Alvarado

## 2019-07-11 NOTE — PROGRESS NOTES
History: It is a great pleasure to see this very pleasant 79-year-old gentleman in follow-up consultation today.  We recall, he was first diagnosed in 2015 with low volume, Swapnil 6 adenocarcinoma the prostate in 2 out of 12 biopsies after having been found to have an elevated PSA.  The initial MRI scan in 2015 had revealed a small gland of only 21 cc, with changes considered PIRADS 5, at that time the most suspicious area was located at the anterior apex.    The MRI scan was repeated in 2017 which showed a volume of 19 ml , was consideredPIRADS 4, but no interval change in the concerning findings seen 2 years before.  However because of the rise in the PSA at that time, further biopsies were taken on this occasion 2 biopsies once again showed evidence of cancer both with a very low volume, both from the left side 1 of which was [place] 6 with very low volume of 1 [place] 7 with very low volume.  Results for KARI YANG (MRN [id number]) as of 7/11/2019 13:43   Ref. Range 10/19/2016 13:56 5/29/2018 15:58 7/11/2019 13:01   PSA Diag Urologic Phys Latest Ref Range: 0.00 - 4.00 ng/mL 6.90 (H) 10.50 (H) 13.20 (H)   Today however the PSA has risen from 10.51-year ago up to 13.2.  His general health is otherwise stable      History reviewed. No pertinent past medical history.    Social History     Socioeconomic History     Marital status:      Spouse name: None     Number of children: None     Years of education: None     Highest education level: None   Occupational History     None   Social Needs     Financial resource strain: None     Food insecurity:     Worry: None     Inability: None     Transportation needs:     Medical: None     Non-medical: None   Tobacco Use     Smoking status: Never Smoker     Smokeless tobacco: Never Used   Substance and Sexual Activity     Alcohol use: Yes     Alcohol/week: 6.0 - 8.4 oz     Types: 10 - 14 Standard drinks or equivalent per week     Drug use: None     Sexual activity:  "None   Lifestyle     Physical activity:     Days per week: None     Minutes per session: None     Stress: None   Relationships     Social connections:     Talks on phone: None     Gets together: None     Attends Yarsani service: None     Active member of club or organization: None     Attends meetings of clubs or organizations: None     Relationship status: None     Intimate partner violence:     Fear of current or ex partner: None     Emotionally abused: None     Physically abused: None     Forced sexual activity: None   Other Topics Concern     Parent/sibling w/ CABG, MI or angioplasty before 65F 55M? Not Asked   Social History Narrative     None       Past Surgical History:   Procedure Laterality Date     FOOT SURGERY Left 1992     PROSTATE SURGERY         History reviewed. No pertinent family history.      Current Outpatient Medications:      ASPIRIN ADULT LOW STRENGTH PO, Take 81 mg by mouth daily, Disp: , Rfl:      tamsulosin (FLOMAX) 80 mcg/mL, Take 0.4 mg by mouth, Disp: , Rfl:     10 point ROS of systems including Constitutional, Eyes, Respiratory, Cardiovascular, Gastroenterology, Genitourinary, Integumentary, Muscularskeletal, Psychiatric were all negative except for pertinent positives noted in my HPI.    Examination:   /64 (BP Location: Right arm, Patient Position: Sitting, Cuff Size: Adult Regular)   Pulse 66   Ht 1.727 m (5' 8\")   Wt 72.6 kg (160 lb)   SpO2 98%   BMI 24.33 kg/m    General Impression: Very pleasant gentleman in no acute distress, well-oriented in time place and person  Mental Status: Normal.  HEENT.  There is no clinical evidence of jaundice in the mucous membranes are normal  Skin: Skin is normal to examination  Respiratory System: The respiratory cycle is normal  Lymph Nodes: Not examined  Back/Flank Tenderness: There is no flank tenderness  Cardiovascular System: There is no significant peripheral edema  Abdominal Examination: The abdomen is not obese  Extremities: The " "extremities are otherwise unremarkable  Genitial: Not examined  Rectal Examination: Good sphincter tone, normal perianal sensation.  Smooth rectal mucosa without hemorrhoids or fissures.  Smooth soft small prostate without evidence of tenderness or bogginess, there is a very slight change in texture prior to the apex on the right side.  The seminal vesicles are not palpable.  Perineum is otherwise normal to examination  Neurologic System: There are no focal abnormal clinical neurological signs and central, peripheral nervous systems    Impression: There has been slight increase in the PSA with a PSA velocity over the last year of 25%.  He is 79 years of age and I am reluctant to be aggressive but I think it appropriate we recheck the MRI scan once again.  If there is obvious evidence of new disease we may have to consider an MRI fusion biopsy but only if we think it might change current management.  I carefully explained the entire situation to the patient in detail today.  I went over the previous and current records labs and other pertinent studies in detail.  I answered all the questions    Plan: T3 MRI of the prostate and see me after    We did do a careful review of all records today given the complexity of the situation associated with a lengthy discussion about the issues as outlined above    \"This dictation was performed with voice recognition software and may contain errors,  omissions and inadvertent word substitution.\"      "

## 2019-07-20 ENCOUNTER — ANCILLARY PROCEDURE (OUTPATIENT)
Dept: MRI IMAGING | Facility: CLINIC | Age: 79
End: 2019-07-20
Attending: UROLOGY
Payer: COMMERCIAL

## 2019-07-20 DIAGNOSIS — C61 PROSTATE CANCER (H): ICD-10-CM

## 2019-07-20 RX ORDER — GADOBUTROL 604.72 MG/ML
6 INJECTION INTRAVENOUS ONCE
Status: COMPLETED | OUTPATIENT
Start: 2019-07-20 | End: 2019-07-20

## 2019-07-20 RX ADMIN — GADOBUTROL 6 ML: 604.72 INJECTION INTRAVENOUS at 10:05

## 2019-07-20 NOTE — DISCHARGE INSTRUCTIONS
MRI Contrast Discharge Instructions    The IV contrast you received today will pass out of your body in your  urine. This will happen in the next 24 hours. You will not feel this process.  Your urine will not change color.    Drink at least 4 extra glasses of water or juice today (unless your doctor  has restricted your fluids). This reduces the stress on your kidneys.  You may take your regular medicines.    If you are on dialysis: It is best to have dialysis today.    If you have a reaction: Most reactions happen right away. If you have  any new symptoms after leaving the hospital (such as hives or swelling),  call your hospital at the correct number below. Or call your family doctor.  If you have breathing distress or wheezing, call 911.    Special instructions: ***    I have read and understand the above information.    Signature:______________________________________ Date:___________    Staff:__________________________________________ Date:___________     Time:__________    [place] [facility] Departments:    ___[place]: [phone]  ___[facility]: [phone]  ___[place]: [phone] ___[facility]: [phone]  ___[facility]: [phone]  ___[facility]: [phone]  ___[phone]  ___[facility]: [phone]  ___[phone]

## 2019-07-22 LAB
CREAT BLD-MCNC: 1.2 MG/DL (ref 0.66–1.25)
GFR SERPL CREATININE-BSD FRML MDRD: 58 ML/MIN/{1.73_M2}

## 2019-08-06 ENCOUNTER — OFFICE VISIT (OUTPATIENT)
Dept: UROLOGY | Facility: CLINIC | Age: 79
End: 2019-08-06
Payer: COMMERCIAL

## 2019-08-06 VITALS
HEIGHT: 68 IN | DIASTOLIC BLOOD PRESSURE: 52 MMHG | SYSTOLIC BLOOD PRESSURE: 116 MMHG | WEIGHT: 160 LBS | BODY MASS INDEX: 24.25 KG/M2

## 2019-08-06 DIAGNOSIS — Z85.46 PERSONAL HISTORY OF MALIGNANT NEOPLASM OF PROSTATE: Primary | ICD-10-CM

## 2019-08-06 DIAGNOSIS — R97.20 ELEVATED PROSTATE SPECIFIC ANTIGEN (PSA): ICD-10-CM

## 2019-08-06 PROCEDURE — 99214 OFFICE O/P EST MOD 30 MIN: CPT | Performed by: UROLOGY

## 2019-08-06 ASSESSMENT — MIFFLIN-ST. JEOR: SCORE: 1405.26

## 2019-08-06 NOTE — PROGRESS NOTES
This is a very pleasant 79-year-old gentleman returns today for review of her recent T3 MRI of the prostate.  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  2 out of 12 biopsies were found to be involved with a low volume of low-grade disease.  The initial MRI scan that showed a small gland of only 21 cc with changes considered PI-RADS 5 and the most suspicious areas at that point were at the anterior apex.  The MRI scan was repeated in 2017 which showed at that time a volume of only 19 cc  There had been a rise in the PSA and further biopsies were taken on this occasion 2 biopsies once again showed evidence of cancer with a very low volume, both on the left side 1 of which was [place] 6 and the second a very low volume of [place] 7.  We decided to continue, in particular in view of his age, active surveillance at that time.  We will continue to monitor the PSA, and it deisi again from 10.5 in Billie of May 2020 18-13.2 in July 2019.  Examination of the prostate at this time showed a slight change in texture on the right side of the prostate adjacent to the apex.  We therefore decided to repeat the MRI scan.  MRI PROSTATE: 7/20/2019 11:07 AM     CLINICAL HISTORY: Prostate cancer (H) [place] 6.     Most Recent PSA: 13.2 ug/L     Comparison: 6/13/2018; 5/7/2017.     TECHNIQUE:  The following sequences were obtained: High-resolution axial  T2-weighted, coronal T2-weighted, 3D volumetric T2-weighted, axial  pre-contrast T1, axial diffusion-weighted, axial apparent diffusion  coefficient and axial dynamic contrast-enhanced T1. Postcontrast  images were evaluated on a separate workstation to evaluate dynamic  contrast enhancement. The technique of this exam is PI-RADS v2.1  compliant. Contrast dose: 6mL Gadavist     FINDINGS:  Size: 20 grams  Hemorrhage: Absent  Again noted a 1.7 cm irregular lesion with decreased T2 signal in  the  anterior aspect of the gland involving the peripheral and transitional  zones centered on the right apex, Amber-1:00 position, series 7 image  57. Restricted diffusion at high B value DWI. Low signal on ADC  ranging from 650-700. Early and contemporaneous contrast enhancement.  There is abutment of the external urethral sphincter. There is  involvement of the anterior fibromuscular stroma. Again noted a long  segment of capsular abutment with bulging and irregularity which  remain concerning for extracapsular extension.  PIRADS 5.     Neurovascular bundles: No neurovascular bundle involvement by  malignancy.    Seminal vesicles: No seminal vesicle involvement by malignancy.   Colonic diverticulosis. Urinary bladder wall thickening with  trabeculation likely due to chronic outlet obstructive changes.  Scattered pelvic lymph nodes measuring up to 6 mm with no convincing  worrisome features. No convincing suspicious bone lesions. Minimal  free fluid in the pelvis.                                                                      IMPRESSION:  1. Based on the most suspicious abnormality, this exam is  characterized as PIRADS 5 - Clinically significant cancer is highly  likely to be present.  The most suspicious abnormality is located at  the anterior aspect of the gland involving the peripheral and  transitional zones centered at the right apex, 10-1:00 position  increased in size from the prior study. There is high suspicion of  extraprostatic extension. There is associated involvement off the  anterior fibromuscular stroma and abutment of the external urethral  sphincter.  2. No suspicious adenopathy or evidence of pelvic metastases.        PIRADS? v2.1 Assessment Categories   PIRADS 1: Very low (clinically significant cancer is highly unlikely  to be present)   PIRADS 2: Low (clinically significant cancer is unlikely to be  present)   PIRADS 3: Intermediate (the presence of clinically significant cancer  is  equivocal)   PIRADS 4: High (clinically significant cancer is likely to be present)     PIRADS 5: Very high (clinically significant cancer is highly likely to  be present)              FRANK DICKERSON MD    I reviewed these films very carefully.  Once again this is described as BI-RADS 5, and once again we are seeing the suspicious area between 10 and 1:00 in the region of the right apex of the prostate with a suspicion of extraprostatic extension.  I have carefully compared these to previous MRI scans in the penis that the area of concern is not 1.7 cm in diameter and appears to be gradually enlarging.    Impression and discussion.  I had a careful discussion with the patient about the situation.  He is 79 years of age,  However the lesion seems to be increasing in size and it would be important to know, even at his age, if there is more aggressive disease in this area than we have previously identified  I am going to recommend that we do an MRI fusion biopsy based on the recent MRI scan and I went over this procedure with the patient in detail today including the potential side effects and complications such as hematuria and blood in the stool, blood in the semen and the rare potential for urosepsis.  If we find more extensive disease and more aggressive disease we may need to consider treatment other than active surveillance which would likely include either intermittent hormone treatment or radiation therapy.  I did have a very careful discussion with him about these 2 different options today so he is familiar with him.  I have also advised him I did not think speed was necessary, he has a wife who is requiring treatment of the [facility] and any further investigation can be delayed until the situation with his wife is more manageable for him.  I did have a very careful discussion about the entire situation with him in detail today.  I addressed and answered all his questions.    Plan.  We will plan to do  "an MRI fusion biopsy of the prostate at the next convenient time for him.    Time.  25 minutes was spent in careful discussion today about an issue of some complexity as noted above which required careful description of different therapeutic plans and plans for further investigation as well as careful review of current and previous lab and radiologic studies and records    \"This dictation was performed with voice recognition software and may contain errors,  omissions and inadvertent word substitution.\"        "

## 2019-08-06 NOTE — PATIENT INSTRUCTIONS
[facility]  MRI Fusion Prostate Bx Patient Instructions  What is MRI Fusion Prostate Bx?  The MRI fusion biopsy is used to target a specific area for sampling. It requires a needle guide to be inserted into the rectum next to the prostate. Next, MR images are viewed, the abnormal area is identified, and a needle is inserted through the guide to the targeted area for tissue samples.  How do I prepare for the exam?    Take Cipro 500mg one tablet in the morning and one in the evening starting the day prior to procedure x 3 days    You will be receiving a Gentamicin intramuscular injection in the Urology clinic 30 min prior to your procedure. Please plan to arrive 30 min earlier.      Do Fleets Enema 2 hrs prior to procedure (eat a light meal)    Stop NSAIDS/Aspirin 7 days prior to procedure. If you are taking any other anticoagulation medications such as Coumadin, Heparin, or Lovenox, please consult with your physician prior to scheduling the procedure    How long will procedure take?  MRI fusion biopsy will take about 1 hr. Please allow 2 hrs for the whole procedure (including pre and post)  When will I know my results?  We will schedule a 2 week follow up appointment for you to review your pathology results with your physician.   *Please arrive 30 min prior to your scheduled procedure time*  Who do I contact if I have questions?  Please call [facility] at [phone] Opt # 3 to speak to a nurse.

## 2019-08-06 NOTE — LETTER
8/6/2019       RE: Lowell Arredondo  [address] [address]     Dear Colleague,    Thank you for referring your patient, Lowell Arredondo, to the [facility] [place] at [facility]. Please see a copy of my visit note below.    This is a very pleasant 79-year-old gentleman returns today for review of her recent T3 MRI of the prostate.  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  2 out of 12 biopsies were found to be involved with a low volume of low-grade disease.  The initial MRI scan that showed a small gland of only 21 cc with changes considered PI-RADS 5 and the most suspicious areas at that point were at the anterior apex.  The MRI scan was repeated in 2017 which showed at that time a volume of only 19 cc  There had been a rise in the PSA and further biopsies were taken on this occasion 2 biopsies once again showed evidence of cancer with a very low volume, both on the left side 1 of which was [place] 6 and the second a very low volume of [place] 7.  We decided to continue, in particular in view of his age, active surveillance at that time.  We will continue to monitor the PSA, and it deisi again from 10.5 in Billie of May 2020 18-13.2 in July 2019.  Examination of the prostate at this time showed a slight change in texture on the right side of the prostate adjacent to the apex.  We therefore decided to repeat the MRI scan.  MRI PROSTATE: 7/20/2019 11:07 AM     CLINICAL HISTORY: Prostate cancer (H) [place] 6.     Most Recent PSA: 13.2 ug/L     Comparison: 6/13/2018; 5/7/2017.     TECHNIQUE:  The following sequences were obtained: High-resolution axial  T2-weighted, coronal T2-weighted, 3D volumetric T2-weighted, axial  pre-contrast T1, axial diffusion-weighted, axial apparent diffusion  coefficient and axial dynamic  contrast-enhanced T1. Postcontrast  images were evaluated on a separate workstation to evaluate dynamic  contrast enhancement. The technique of this exam is PI-RADS v2.1  compliant. Contrast dose: 6mL Gadavist     FINDINGS:  Size: 20 grams  Hemorrhage: Absent  Again noted a 1.7 cm irregular lesion with decreased T2 signal in the  anterior aspect of the gland involving the peripheral and transitional  zones centered on the right apex, Amber-1:00 position, series 7 image  57. Restricted diffusion at high B value DWI. Low signal on ADC  ranging from 650-700. Early and contemporaneous contrast enhancement.  There is abutment of the external urethral sphincter. There is  involvement of the anterior fibromuscular stroma. Again noted a long  segment of capsular abutment with bulging and irregularity which  remain concerning for extracapsular extension.  PIRADS 5.     Neurovascular bundles: No neurovascular bundle involvement by  malignancy.    Seminal vesicles: No seminal vesicle involvement by malignancy.   Colonic diverticulosis. Urinary bladder wall thickening with  trabeculation likely due to chronic outlet obstructive changes.  Scattered pelvic lymph nodes measuring up to 6 mm with no convincing  worrisome features. No convincing suspicious bone lesions. Minimal  free fluid in the pelvis.                                                                      IMPRESSION:  1. Based on the most suspicious abnormality, this exam is  characterized as PIRADS 5 - Clinically significant cancer is highly  likely to be present.  The most suspicious abnormality is located at  the anterior aspect of the gland involving the peripheral and  transitional zones centered at the right apex, 10-1:00 position  increased in size from the prior study. There is high suspicion of  extraprostatic extension. There is associated involvement off the  anterior fibromuscular stroma and abutment of the external urethral  sphincter.  2. No suspicious  adenopathy or evidence of pelvic metastases.        PIRADS? v2.1 Assessment Categories   PIRADS 1: Very low (clinically significant cancer is highly unlikely  to be present)   PIRADS 2: Low (clinically significant cancer is unlikely to be  present)   PIRADS 3: Intermediate (the presence of clinically significant cancer  is equivocal)   PIRADS 4: High (clinically significant cancer is likely to be present)     PIRADS 5: Very high (clinically significant cancer is highly likely to  be present)              FRANK DICKERSON MD    I reviewed these films very carefully.  Once again this is described as BI-RADS 5, and once again we are seeing the suspicious area between 10 and 1:00 in the region of the right apex of the prostate with a suspicion of extraprostatic extension.  I have carefully compared these to previous MRI scans in the penis that the area of concern is not 1.7 cm in diameter and appears to be gradually enlarging.    Impression and discussion.  I had a careful discussion with the patient about the situation.  He is 79 years of age,  However the lesion seems to be increasing in size and it would be important to know, even at his age, if there is more aggressive disease in this area than we have previously identified  I am going to recommend that we do an MRI fusion biopsy based on the recent MRI scan and I went over this procedure with the patient in detail today including the potential side effects and complications such as hematuria and blood in the stool, blood in the semen and the rare potential for urosepsis.  If we find more extensive disease and more aggressive disease we may need to consider treatment other than active surveillance which would likely include either intermittent hormone treatment or radiation therapy.  I did have a very careful discussion with him about these 2 different options today so he is familiar with him.  I have also advised him I did not think speed was necessary, he has a  "wife who is requiring treatment of the [facility] and any further investigation can be delayed until the situation with his wife is more manageable for him.  I did have a very careful discussion about the entire situation with him in detail today.  I addressed and answered all his questions.    Plan.  We will plan to do an MRI fusion biopsy of the prostate at the next convenient time for him.    Time.  25 minutes was spent in careful discussion today about an issue of some complexity as noted above which required careful description of different therapeutic plans and plans for further investigation as well as careful review of current and previous lab and radiologic studies and records    \"This dictation was performed with voice recognition software and may contain errors,  omissions and inadvertent word substitution.\"      Again, thank you for allowing me to participate in the care of your patient.      Sincerely,    Mushtaq Astorga MD      "

## 2019-08-06 NOTE — NURSING NOTE
"Chief Complaint   Patient presents with     Follow Up     Pt is here to review MRI       There is no problem list on file for this patient.      Allergies   Allergen Reactions     Demerol [Meperidine] Difficulty breathing       /52   Ht 1.727 m (5' 8\")   Wt 72.6 kg (160 lb)   BMI 24.33 kg/m            Darrell Ferrari, EMT-B  8/6/2019         "

## 2019-08-19 ENCOUNTER — TELEPHONE (OUTPATIENT)
Dept: UROLOGY | Facility: CLINIC | Age: 79
End: 2019-08-19

## 2019-08-19 DIAGNOSIS — Z79.2 PROPHYLACTIC ANTIBIOTIC: Primary | ICD-10-CM

## 2019-08-19 RX ORDER — CIPROFLOXACIN 500 MG/1
500 TABLET, FILM COATED ORAL 2 TIMES DAILY
Qty: 6 TABLET | Refills: 0 | Status: SHIPPED | OUTPATIENT
Start: 2019-08-19 | End: 2019-10-03

## 2019-08-19 NOTE — TELEPHONE ENCOUNTER
[facility] Call Center    Phone Message    May a detailed message be left on voicemail: yes    Reason for Call: Other: Patient has upcoming procedure on 9-10 and the instructions state to take Cipro prior and they are calling to figure out how to get the Cipro from the pharmacy.      Action Taken: Message routed to:  [facility] ([facility] Uro

## 2019-08-19 NOTE — TELEPHONE ENCOUNTER
Spoke with patient's wife about ABT. Sent into patient's preferred pharmacy and they will pick-up ABT.     Eveline Machado LPN

## 2019-09-09 ENCOUNTER — OFFICE VISIT (OUTPATIENT)
Dept: UROLOGY | Facility: CLINIC | Age: 79
End: 2019-09-09
Payer: COMMERCIAL

## 2019-09-09 ENCOUNTER — APPOINTMENT (OUTPATIENT)
Dept: UROLOGY | Facility: CLINIC | Age: 79
End: 2019-09-09
Payer: COMMERCIAL

## 2019-09-09 VITALS
DIASTOLIC BLOOD PRESSURE: 60 MMHG | SYSTOLIC BLOOD PRESSURE: 120 MMHG | WEIGHT: 160 LBS | OXYGEN SATURATION: 98 % | HEIGHT: 68 IN | HEART RATE: 63 BPM | BODY MASS INDEX: 24.25 KG/M2

## 2019-09-09 DIAGNOSIS — C61 PROSTATE CANCER (H): Primary | ICD-10-CM

## 2019-09-09 PROCEDURE — 99212 OFFICE O/P EST SF 10 MIN: CPT | Mod: 25 | Performed by: UROLOGY

## 2019-09-09 PROCEDURE — 55700 HC BIOPSY PROSTATE NEEDLE/PUNCH: CPT | Performed by: UROLOGY

## 2019-09-09 PROCEDURE — 88305 TISSUE EXAM BY PATHOLOGIST: CPT | Performed by: UROLOGY

## 2019-09-09 PROCEDURE — 76872 US TRANSRECTAL: CPT | Performed by: UROLOGY

## 2019-09-09 RX ORDER — GENTAMICIN 40 MG/ML
80 INJECTION, SOLUTION INTRAMUSCULAR; INTRAVENOUS ONCE
Status: DISCONTINUED | OUTPATIENT
Start: 2019-09-09 | End: 2019-09-09 | Stop reason: HOSPADM

## 2019-09-09 ASSESSMENT — MIFFLIN-ST. JEOR
SCORE: 1405.26
SCORE: 1405.26

## 2019-09-09 ASSESSMENT — PAIN SCALES - GENERAL
PAINLEVEL: NO PAIN (0)
PAINLEVEL: NO PAIN (0)

## 2019-09-09 NOTE — PROGRESS NOTES
This very pleasant 79-year-old patient returns today for MRI fusion biopsy of the prostate.  We recall, he had previously been diagnosed with low volume [place] 6 adenocarcinoma of the prostate in 2015 when his PSA was found to be elevated with 2 out of 12 biopsies involved with low volume low-grade [place] 6 disease.  At that time the gland was only 21 cc with changes on the MRI consistent with PI-RADS 5 with the most suspicious areas at the anterior apex.  MRI was repeated in 2017 which showed a volume of only 19 cc.  A further rise in the PSA prompted further biopsies being taken and only 2 biopsies showed evidence of the disease with a very low volume of [place] 6 and one biopsy and a second showing a very low volume of [place] 7 and we decided to continue active surveillance.  However, the PSA is continued to rise, from 10.5 in June 2018 up to 13.2 in July 2019.  At this time the clinical examination of the prostate did show a slight change in texture on the right side of the prostate gland adjacent to the apex.  The MRI scan was therefore repeated.  Once again it was considered PI-RADS 5 with the most suspicious abnormality on the anterior aspect of the gland at the right apex with a high suspicion of extraprostatic extension.  We felt therefore we should consider biopsying this area to determine if there is a large volume of more aggressive disease that might change our treatment plans.  The procedure have been carefully discussed with the patient previously in detail including potential side effects and complications.    Procedure.  MRI fusion biopsy of the prostate.  Surgeon.  Gauri.  Anesthesia.  Periprosthetic block.  Description.  With the patient in the left lateral position, and after digital rectal examination, the ultrasound probe was placed in the rectum.  Routine ultrasonography was performed.  Utilizing a scan of the prostate by the ultrasound probe from seminal vesicles down to the apex this  "image was then fused to the MRI and very precise localization of the concerning area in the anterior aspects on the right side was achieved.  For very carefully directed biopsies of this area were taken and then subsequently 3 biopsies were taken from the base, mid section and apical area on each side in addition.  Digital pressure was then applied to the prostate through the rectum for approximately 2 minutes.  Patient tolerated the procedure well.    Discussion.  I explained the potential side effects once again to the patient including blood in the urine, blood in the stool or blood in the semen and then cautioned him that he should come straight to the emergency room should there be any evidence of high fever or chills in the postoperative period.  We will go over the results when we receive them and discuss how we should proceed based on these results.  I had a careful discussion with the patient regarding all of these issues today.  I answered all his questions.    Plan.  We will see him in approximately 1 week's time for discussion of the biopsy results.    Time.  10 minutes was spent in addition to procedure in order to carefully review the records discussed the findings of the procedure go over the precautions to be taken afterwards and to discuss possible applications and how to address these and discuss follow-up.    \"This dictation was performed with voice recognition software and may contain errors,  omissions and inadvertent word substitution.\"      "

## 2019-09-09 NOTE — LETTER
9/9/2019       RE: Lowell Arredondo  [address] [address]     Dear Colleague,    Thank you for referring your patient, Lowell Arredondo, to the [facility] [place] at [facility]. Please see a copy of my visit note below.    This very pleasant 79-year-old patient returns today for MRI fusion biopsy of the prostate.  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  At that time the gland was only 21 cc with changes on the MRI consistent with PI-RADS 5 with the most suspicious areas at the anterior apex.  MRI was repeated in 2017 which showed a volume of only 19 cc.  A further rise in the PSA prompted further biopsies being taken and only 2 biopsies showed evidence of the disease with a very low volume of [place] 6 and one biopsy and a second showing a very low volume of [place] 7 and we decided to continue active surveillance.  However, the PSA is continued to rise, from 10.5 in June 2018 up to 13.2 in July 2019.  At this time the clinical examination of the prostate did show a slight change in texture on the right side of the prostate gland adjacent to the apex.  The MRI scan was therefore repeated.  Once again it was considered PI-RADS 5 with the most suspicious abnormality on the anterior aspect of the gland at the right apex with a high suspicion of extraprostatic extension.  We felt therefore we should consider biopsying this area to determine if there is a large volume of more aggressive disease that might change our treatment plans.  The procedure have been carefully discussed with the patient previously in detail including potential side effects and complications.    Procedure.  MRI fusion biopsy of the prostate.  Surgeon.   "Gauri.  Anesthesia.  Periprosthetic block.  Description.  With the patient in the left lateral position, and after digital rectal examination, the ultrasound probe was placed in the rectum.  Routine ultrasonography was performed.  Utilizing a scan of the prostate by the ultrasound probe from seminal vesicles down to the apex this image was then fused to the MRI and very precise localization of the concerning area in the anterior aspects on the right side was achieved.  For very carefully directed biopsies of this area were taken and then subsequently 3 biopsies were taken from the base, mid section and apical area on each side in addition.  Digital pressure was then applied to the prostate through the rectum for approximately 2 minutes.  Patient tolerated the procedure well.    Discussion.  I explained the potential side effects once again to the patient including blood in the urine, blood in the stool or blood in the semen and then cautioned him that he should come straight to the emergency room should there be any evidence of high fever or chills in the postoperative period.  We will go over the results when we receive them and discuss how we should proceed based on these results.  I had a careful discussion with the patient regarding all of these issues today.  I answered all his questions.    Plan.  We will see him in approximately 1 week's time for discussion of the biopsy results.    Time.  10 minutes was spent in addition to procedure in order to carefully review the records discussed the findings of the procedure go over the precautions to be taken afterwards and to discuss possible applications and how to address these and discuss follow-up.    \"This dictation was performed with voice recognition software and may contain errors,  omissions and inadvertent word substitution.\"        Again, thank you for allowing me to participate in the care of your patient.      Sincerely,    Mushtaq Astorga MD      "

## 2019-09-09 NOTE — NURSING NOTE
"Chief Complaint   Patient presents with     Prostate Cancer     Patient her today for Transrectal Ultrasound Prostate Biopsy       Blood pressure 120/60, pulse 67, height 1.727 m (5' 8\"), weight 72.6 kg (160 lb), SpO2 99 %. Body mass index is 24.33 kg/m .    There is no problem list on file for this patient.      Allergies   Allergen Reactions     Demerol [Meperidine] Difficulty breathing       Current Outpatient Medications   Medication Sig Dispense Refill     tamsulosin (FLOMAX) 80 mcg/mL Take 0.4 mg by mouth       ASPIRIN ADULT LOW STRENGTH PO Take 81 mg by mouth daily         Social History     Tobacco Use     Smoking status: Never Smoker     Smokeless tobacco: Never Used   Substance Use Topics     Alcohol use: Yes     Alcohol/week: 6.0 - 8.4 oz     Types: 10 - 14 Standard drinks or equivalent per week     Drug use: None         Procedure was explained to patient prior to performing said procedure. The patient signed the consent form and all questions were answered prior to the procedure. Any pre-procedural antibiotics were given according to the performing physicians recommendation. Pt's information was confirmed on samples and samples were sent for analysis. [facility] reviewed information on labels sent with patient and confirmed the accuracy of all the labels.    Consent read and signed: Yes     Allergies   Allergen Reactions     Demerol [Meperidine] Difficulty breathing     Performing Physician: Dr. Astorga  Antibiotic taken? Yes  Aspirin or other blood thinning medications discontinued 7-10 days:  Yes  Time of Fleet's enema: 11:45AM  Patient given Gentamicin intramuscular injection 30 minutes prior to procedure Yes    6 samples were taken from the right and left, medial and  base, mid, and apex of the prostate respectively.  additional samples were taken from  Right Anterior Lesion x4. Vitals were repeated prior to patient leaving and instructions for post TRUS care were explained to the patient.     The " following medication was given:     MEDICATION:  GENTAMICIN  ROUTE: IM  SITE: LUQ - Gluteus  DOSE: 80MG  LOT #: [id number]  : [email or web address]  EXPIRATION DATE: 06/20  NDC#: 11984-387-32  Was there drug waste? No  Multi-dose vial: TATY Pineda  9/9/2019  2:12 PM

## 2019-09-09 NOTE — PATIENT INSTRUCTIONS
[facility] Urology  Transrectal Ultrasound  Post Operative Information    The physician who performed your Transrectal Ultrasound is Dr. Astorga (telephone number [phone]).  Please contact this doctor if you have any problems or questions.  If unable to reach your doctor, please return to the Emergency Department.      Take one antibiotic the evening of the procedure and then as directed on your prescription.    Drink at least 6-8 glasses of fluids for the first 48 hours.    Avoid heavy lifting and strenuous activity for 48 hours.    Avoid sexual intercourse for the first 24 hours.    No aspirin or ibuprofen products (Motrin, Advil, Nuprin, ect.) for one week.  You may take acetaminophen (Tylenol) for pain.    You may notice a small amount of blood on the tissue after a bowel movement.    You may pass blood with clots in your urine following the procedure.  The amount will decrease with time but may be visible for up to two weeks.     You make have blood in your semen for 4 weeks after the procedure.    You may experience mild perineal (groin area) discomfort after the procedure.    Please call you doctor if you have any of the follow symptoms:  Fever  Increase in the amount of blood passed  Severe discomfort or pain

## 2019-09-11 LAB — COPATH REPORT: NORMAL

## 2019-09-24 ENCOUNTER — OFFICE VISIT (OUTPATIENT)
Dept: UROLOGY | Facility: CLINIC | Age: 79
End: 2019-09-24
Payer: COMMERCIAL

## 2019-09-24 VITALS
SYSTOLIC BLOOD PRESSURE: 108 MMHG | HEIGHT: 68 IN | OXYGEN SATURATION: 98 % | WEIGHT: 160 LBS | BODY MASS INDEX: 24.25 KG/M2 | HEART RATE: 65 BPM | DIASTOLIC BLOOD PRESSURE: 66 MMHG

## 2019-09-24 DIAGNOSIS — C61 PROSTATE CANCER (H): Primary | ICD-10-CM

## 2019-09-24 PROCEDURE — 99215 OFFICE O/P EST HI 40 MIN: CPT | Performed by: UROLOGY

## 2019-09-24 ASSESSMENT — PAIN SCALES - GENERAL: PAINLEVEL: NO PAIN (0)

## 2019-09-24 ASSESSMENT — MIFFLIN-ST. JEOR: SCORE: 1405.26

## 2019-09-24 NOTE — PROGRESS NOTES
It is a great pleasure to see this very pleasant 79-year-old gentleman in follow-up consultation today.  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  At that time the gland was only 21 cc with changes on the MRI consistent with PI-RADS 5 with the most suspicious areas at the anterior apex.  MRI was repeated in 2017 which showed a volume of only 19 cc.  A further rise in the PSA prompted further biopsies being taken and only 2 biopsies showed evidence of the disease with a very low volume of [place] 6 and one biopsy and a second showing a very low volume of Swapnil 7 and we decided to continue active surveillance.  However, the PSA is continued to rise, from 10.5 in June 2018 up to 13.2 in July 2019.  At this time the clinical examination of the prostate did show a slight change in texture on the right side of the prostate gland adjacent to the apex.  The MRI scan was therefore repeated.  Once again it was considered PI-RADS 5 with the most suspicious abnormality on the anterior aspect of the gland at the right apex with a high suspicion of extraprostatic extension.  We felt therefore we should consider biopsying this area to determine if there is a large volume of more aggressive disease that might change our treatment plans.    He has had biopsies of the prostate now performed by MRI fusion biopsy.  FINAL DIAGNOSIS:   A. Prostate biopsies, right anterior lesion:   Adenocarcinoma, acinar type, Swapnil score 3 + 4(5%) = 7 (of 10) (Grade   Group 2), involving 4 of 4 needle   core(s), greatest single focus of tumor is 1.2 mm;  overall approximately   70% of biopsy sample is tumor).   Perineural invasion is identified.     B. Prostate biopsy, left base:   - Adenocarcinoma, acinar type, Swapnil score 3 + 3 = 6 (of 10) (Grade   Group 1),  involving 1 of 1 needle   core(s), greatest single focus of tumor is 1 mm;  overall approximately 5   % of biopsy sample is tumor).   Perineural invasion is not identified.     C. Prostate biopsy, left mid:   -No evidence of high-grade prostatic intraepithelial neoplasia or invasive    carcinoma     D. Prostate biopsy, left apex:   -No evidence of high-grade prostatic intraepithelial neoplasia or invasive    carcinoma     E. Prostate biopsy, right base:   - No evidence of high-grade prostatic intraepithelial neoplasia or   invasive carcinoma     F. Prostate biopsy, right mid:   -No evidence of high-grade prostatic intraepithelial neoplasia or invasive    carcinoma     G. Prostate biopsy, right apex:   - No evidence of high grade prostatic intraepithelial neoplasia or   invasive carcinoma     COMMENT:   Slides from A, B and G in this case have been seen by my colleagues, Rajendra Mcgraw and Haylee, who agree   with the findings and diagnosis     Electronically signed out by:     Iram Baird M.D.       I had a careful discussion today with the patient and his wife about the findings of the biopsy.  I went over all the biopsies in detail with the patient explaining the meaning of Swapnil score, the volume of disease in each biopsy, the location of the disease and the conclusion is that we have found more aggressive disease in the precise to the area was highlighted by the MRI scan.  This however is [place] 7 disease with moderate volume compared to the previous diagnosis of Swapnil 6 disease in the past.  We therefore had a careful discussion today about how we should proceed and I discussed with him the different therapeutic approaches which we should be considering these included the following  1.  Continuing active surveillance.  2.  Considering radical prostatectomy.  3.  Considering radiation therapy.  4.  Considering cryotherapy.  5.  Considering hormonal treatment    I went over each of these different  options with the patient and his wife in detail today.  We went over the potential benefits, side effects, complications, convalescence and other expectations associated with each.  At the conclusion of that we have ruled out ongoing hormonal treatment unless we find when we do a bone scan and CT scan that there is evidence of spread of disease, I have ruled out cryotherapy, and I certainly ruled out radical prostatectomy as he is 79 years of age.    We had a more in-depth discussion about whether we should continue active surveillance or continued radiation therapy.  His actuarial life expectancy is about 7 years as he is now 80 years of age.  His general health is otherwise satisfactory with some significant longevity in the family.  There is a significant volume of Swapnil 7 disease in the recent biopsies from the anterior area of the prostate highlighted by the MRI scan.  It is therefore my opinion that we should at least consider radiation therapy and I would like to arrange for him to meet Dr. Daryl Akhtar for his opinion regarding the advisability of radiation therapy.  I did have a discussion with him about the side effects of radiation therapy in detail today.  We did also touch on whether we should continue active surveillance and the significant volume of disease Swapnil 7 disease does raise a question about the advisability of doing this although if the patient decides not to have radiation therapy I would consider active surveillance ongoing as an option although that may have to change in the future even if we do decide to do that now.  I went over the entire situation with the patient in detail today.  I answered all the questions.    Plan.  To see Dr. Daryl Gibbons for his opinion regarding radiation therapy in this situationon.  If he proceeds with radiation therapy I would like to see him 3 months after he completes radiation for PSA and examination.  If he does have radiation therapy he will need a  "6-month leuprolide injection before he start radiation therapy  If the patient decides not to do radiation therapy I would wish to see the patient for another discussion in the near future.    Time.  40 minutes with greater than 50% in discussion and consultation going over a very considerable number of details as noted above    \"This dictation was performed with voice recognition software and may contain errors,  omissions and inadvertent word substitution.\"              "

## 2019-09-24 NOTE — LETTER
9/24/2019       RE: Lowell Arredondo  [address] [id number]     Dear Colleague,    Thank you for referring your patient, Lowell Arredondo, to the [facility] at [facility]. Please see a copy of my visit note below.    It is a great pleasure to see this very pleasant 79-year-old gentleman in follow-up consultation today.  We recall, he had previously been diagnosed with low volume [place] 6 adenocarcinoma of the prostate in 2015 when his PSA was found to be elevated with 2 out of 12 biopsies involved with low volume low-grade [place] 6 disease.  At that time the gland was only 21 cc with changes on the MRI consistent with PI-RADS 5 with the most suspicious areas at the anterior apex.  MRI was repeated in 2017 which showed a volume of only 19 cc.  A further rise in the PSA prompted further biopsies being taken and only 2 biopsies showed evidence of the disease with a very low volume of Swapnil 6 and one biopsy and a second showing a very low volume of [place] 7 and we decided to continue active surveillance.  However, the PSA is continued to rise, from 10.5 in June 2018 up to 13.2 in July 2019.  At this time the clinical examination of the prostate did show a slight change in texture on the right side of the prostate gland adjacent to the apex.  The MRI scan was therefore repeated.  Once again it was considered PI-RADS 5 with the most suspicious abnormality on the anterior aspect of the gland at the right apex with a high suspicion of extraprostatic extension.  We felt therefore we should consider biopsying this area to determine if there is a large volume of more aggressive disease that might change our treatment plans.    He has had biopsies of the prostate now performed by MRI fusion biopsy.  FINAL DIAGNOSIS:   A. Prostate biopsies, right anterior lesion:   Adenocarcinoma, acinar type, Swapnil score 3 + 4(5%) = 7 (of  10) (Grade   Group 2), involving 4 of 4 needle   core(s), greatest single focus of tumor is 1.2 mm;  overall approximately   70% of biopsy sample is tumor).   Perineural invasion is identified.     B. Prostate biopsy, left base:   - Adenocarcinoma, acinar type, [place] score 3 + 3 = 6 (of 10) (Grade   Group 1), involving 1 of 1 needle   core(s), greatest single focus of tumor is 1 mm;  overall approximately 5   % of biopsy sample is tumor).   Perineural invasion is not identified.     C. Prostate biopsy, left mid:   -No evidence of high-grade prostatic intraepithelial neoplasia or invasive    carcinoma     D. Prostate biopsy, left apex:   -No evidence of high-grade prostatic intraepithelial neoplasia or invasive    carcinoma     E. Prostate biopsy, right base:   - No evidence of high-grade prostatic intraepithelial neoplasia or   invasive carcinoma     F. Prostate biopsy, right mid:   -No evidence of high-grade prostatic intraepithelial neoplasia or invasive    carcinoma     G. Prostate biopsy, right apex:   - No evidence of high grade prostatic intraepithelial neoplasia or   invasive carcinoma     COMMENT:   Slides from A, B and G in this case have been seen by my colleagues, Rajendra Mcgraw and Haylee, who agree   with the findings and diagnosis     Electronically signed out by:     Irma Baird M.D.       I had a careful discussion today with the patient and his wife about the findings of the biopsy.  I went over all the biopsies in detail with the patient explaining the meaning of [place] score, the volume of disease in each biopsy, the location of the disease and the conclusion is that we have found more aggressive disease in the precise to the area was highlighted by the MRI scan.  This however is Swapnil 7 disease with moderate volume compared to the previous diagnosis of [place] 6 disease in the past.  We therefore had a careful discussion today about how we should proceed and I discussed with him the  different therapeutic approaches which we should be considering these included the following  1.  Continuing active surveillance.  2.  Considering radical prostatectomy.  3.  Considering radiation therapy.  4.  Considering cryotherapy.  5.  Considering hormonal treatment    I went over each of these different options with the patient and his wife in detail today.  We went over the potential benefits, side effects, complications, convalescence and other expectations associated with each.  At the conclusion of that we have ruled out ongoing hormonal treatment unless we find when we do a bone scan and CT scan that there is evidence of spread of disease, I have ruled out cryotherapy, and I certainly ruled out radical prostatectomy as he is 79 years of age.    We had a more in-depth discussion about whether we should continue active surveillance or continued radiation therapy.  His actuarial life expectancy is about 7 years as he is now 80 years of age.  His general health is otherwise satisfactory with some significant longevity in the family.  There is a significant volume of Swapnil 7 disease in the recent biopsies from the anterior area of the prostate highlighted by the MRI scan.  It is therefore my opinion that we should at least consider radiation therapy and I would like to arrange for him to meet Dr. Daryl Akhtar for his opinion regarding the advisability of radiation therapy.  I did have a discussion with him about the side effects of radiation therapy in detail today.  We did also touch on whether we should continue active surveillance and the significant volume of disease Swapnil 7 disease does raise a question about the advisability of doing this although if the patient decides not to have radiation therapy I would consider active surveillance ongoing as an option although that may have to change in the future even if we do decide to do that now.  I went over the entire situation with the patient in detail  "today.  I answered all the questions.    Plan.  To see Dr. Daryl Gibbons for his opinion regarding radiation therapy in this situationon.  If he proceeds with radiation therapy I would like to see him 3 months after he completes radiation for PSA and examination.  If he does have radiation therapy he will need a 6-month leuprolide injection before he start radiation therapy  If the patient decides not to do radiation therapy I would wish to see the patient for another discussion in the near future.    Time.  40 minutes with greater than 50% in discussion and consultation going over a very considerable number of details as noted above    \"This dictation was performed with voice recognition software and may contain errors,  omissions and inadvertent word substitution.\"    Again, thank you for allowing me to participate in the care of your patient.      Sincerely,    Mushtaq Astorga MD      "

## 2019-09-24 NOTE — NURSING NOTE
Chief Complaint   Patient presents with     Clinic Care Coordination - Follow-up     Pt here to review biopsy results     Veronique Alvarado CMA

## 2019-09-26 ENCOUNTER — HOSPITAL ENCOUNTER (OUTPATIENT)
Dept: NUCLEAR MEDICINE | Facility: CLINIC | Age: 79
Setting detail: NUCLEAR MEDICINE
Discharge: HOME | End: 2019-09-26
Attending: UROLOGY | Admitting: UROLOGY
Payer: COMMERCIAL

## 2019-09-26 ENCOUNTER — HOSPITAL ENCOUNTER (OUTPATIENT)
Dept: CT IMAGING | Facility: CLINIC | Age: 79
Discharge: HOME | End: 2019-09-26
Attending: UROLOGY | Admitting: UROLOGY
Payer: COMMERCIAL

## 2019-09-26 ENCOUNTER — HOSPITAL ENCOUNTER (OUTPATIENT)
Dept: NUCLEAR MEDICINE | Facility: CLINIC | Age: 79
Setting detail: NUCLEAR MEDICINE
End: 2019-09-26
Attending: UROLOGY
Payer: COMMERCIAL

## 2019-09-26 DIAGNOSIS — C61 PROSTATE CANCER (H): ICD-10-CM

## 2019-09-26 LAB
CREAT BLD-MCNC: 0.8 MG/DL (ref 0.66–1.25)
GFR SERPL CREATININE-BSD FRML MDRD: >90 ML/MIN/{1.73_M2}

## 2019-09-26 PROCEDURE — 25000128 H RX IP 250 OP 636: Performed by: RADIOLOGY

## 2019-09-26 PROCEDURE — 82565 ASSAY OF CREATININE: CPT

## 2019-09-26 PROCEDURE — A9561 TC99M OXIDRONATE: HCPCS | Performed by: UROLOGY

## 2019-09-26 PROCEDURE — 25000125 ZZHC RX 250: Performed by: RADIOLOGY

## 2019-09-26 PROCEDURE — 34300033 ZZH RX 343: Performed by: UROLOGY

## 2019-09-26 PROCEDURE — 74177 CT ABD & PELVIS W/CONTRAST: CPT

## 2019-09-26 PROCEDURE — 78306 BONE IMAGING WHOLE BODY: CPT

## 2019-09-26 RX ORDER — IOPAMIDOL 755 MG/ML
500 INJECTION, SOLUTION INTRAVASCULAR ONCE
Status: COMPLETED | OUTPATIENT
Start: 2019-09-26 | End: 2019-09-26

## 2019-09-26 RX ADMIN — SODIUM CHLORIDE 60 ML: 9 INJECTION, SOLUTION INTRAVENOUS at 10:22

## 2019-09-26 RX ADMIN — Medication 26 MILLICURIE: at 10:03

## 2019-09-26 RX ADMIN — IOPAMIDOL 81 ML: 755 INJECTION, SOLUTION INTRAVENOUS at 10:22

## 2019-10-01 ENCOUNTER — HEALTH MAINTENANCE LETTER (OUTPATIENT)
Age: 79
End: 2019-10-01

## 2019-10-03 ENCOUNTER — HOSPITAL ENCOUNTER (OUTPATIENT)
Facility: CLINIC | Age: 79
Setting detail: SPECIMEN
Discharge: HOME | End: 2019-10-03
Attending: INTERNAL MEDICINE | Admitting: INTERNAL MEDICINE
Payer: COMMERCIAL

## 2019-10-03 ENCOUNTER — ONCOLOGY VISIT (OUTPATIENT)
Dept: ONCOLOGY | Facility: CLINIC | Age: 79
End: 2019-10-03
Attending: INTERNAL MEDICINE
Payer: COMMERCIAL

## 2019-10-03 ENCOUNTER — PRE VISIT (OUTPATIENT)
Dept: ONCOLOGY | Facility: CLINIC | Age: 79
End: 2019-10-03

## 2019-10-03 VITALS
OXYGEN SATURATION: 98 % | SYSTOLIC BLOOD PRESSURE: 113 MMHG | DIASTOLIC BLOOD PRESSURE: 66 MMHG | HEIGHT: 68 IN | WEIGHT: 156.4 LBS | HEART RATE: 59 BPM | RESPIRATION RATE: 16 BRPM | BODY MASS INDEX: 23.7 KG/M2

## 2019-10-03 DIAGNOSIS — R63.4 WEIGHT LOSS: ICD-10-CM

## 2019-10-03 DIAGNOSIS — K86.89 PANCREATIC MASS: Primary | ICD-10-CM

## 2019-10-03 LAB
ALBUMIN SERPL-MCNC: 4.1 G/DL (ref 3.4–5)
ALP SERPL-CCNC: 53 U/L (ref 40–150)
ALT SERPL W P-5'-P-CCNC: 17 U/L (ref 0–70)
ANION GAP SERPL CALCULATED.3IONS-SCNC: 4 MMOL/L (ref 3–14)
AST SERPL W P-5'-P-CCNC: 13 U/L (ref 0–45)
BILIRUB SERPL-MCNC: 0.7 MG/DL (ref 0.2–1.3)
BUN SERPL-MCNC: 15 MG/DL (ref 7–30)
CALCIUM SERPL-MCNC: 9.7 MG/DL (ref 8.5–10.1)
CHLORIDE SERPL-SCNC: 107 MMOL/L (ref 94–109)
CO2 SERPL-SCNC: 30 MMOL/L (ref 20–32)
CREAT SERPL-MCNC: 1.05 MG/DL (ref 0.66–1.25)
ERYTHROCYTE [DISTWIDTH] IN BLOOD BY AUTOMATED COUNT: 14.1 % (ref 10–15)
GFR SERPL CREATININE-BSD FRML MDRD: 66 ML/MIN/{1.73_M2}
GLUCOSE SERPL-MCNC: 83 MG/DL (ref 70–99)
HCT VFR BLD AUTO: 44.1 % (ref 40–53)
HGB BLD-MCNC: 15 G/DL (ref 13.3–17.7)
MCH RBC QN AUTO: 31.4 PG (ref 26.5–33)
MCHC RBC AUTO-ENTMCNC: 34 G/DL (ref 31.5–36.5)
MCV RBC AUTO: 92 FL (ref 78–100)
PLATELET # BLD AUTO: 173 10E9/L (ref 150–450)
POTASSIUM SERPL-SCNC: 4.2 MMOL/L (ref 3.4–5.3)
PROT SERPL-MCNC: 7.7 G/DL (ref 6.8–8.8)
RBC # BLD AUTO: 4.78 10E12/L (ref 4.4–5.9)
SODIUM SERPL-SCNC: 141 MMOL/L (ref 133–144)
TSH SERPL DL<=0.005 MIU/L-ACNC: 1.62 MU/L (ref 0.4–4)
WBC # BLD AUTO: 5.6 10E9/L (ref 4–11)

## 2019-10-03 PROCEDURE — 36415 COLL VENOUS BLD VENIPUNCTURE: CPT

## 2019-10-03 PROCEDURE — 85027 COMPLETE CBC AUTOMATED: CPT | Performed by: INTERNAL MEDICINE

## 2019-10-03 PROCEDURE — 99204 OFFICE O/P NEW MOD 45 MIN: CPT | Performed by: INTERNAL MEDICINE

## 2019-10-03 PROCEDURE — 80053 COMPREHEN METABOLIC PANEL: CPT | Performed by: INTERNAL MEDICINE

## 2019-10-03 PROCEDURE — G0463 HOSPITAL OUTPT CLINIC VISIT: HCPCS

## 2019-10-03 PROCEDURE — 84443 ASSAY THYROID STIM HORMONE: CPT | Performed by: INTERNAL MEDICINE

## 2019-10-03 RX ORDER — POLYETHYLENE GLYCOL 3350 17 G/17G
17 POWDER, FOR SOLUTION ORAL DAILY PRN
COMMUNITY

## 2019-10-03 ASSESSMENT — MIFFLIN-ST. JEOR: SCORE: 1388.93

## 2019-10-03 ASSESSMENT — PAIN SCALES - GENERAL: PAINLEVEL: NO PAIN (0)

## 2019-10-03 NOTE — PATIENT INSTRUCTIONS
1. Labs today. SLS  2. EUS and biopsy by Dr. Davidson.(scheduled, 10/7/19 @ 9:20am  3. Follow-up after biopsy.

## 2019-10-03 NOTE — PROGRESS NOTES
"Medical Assistant Note:  Lowell Arredondo presents today for BLOOD DRAW.    Patient seen by provider today: Yes: TONNY.   present during visit today: Not Applicable.    Concerns: No Concerns.    Procedure:  Lab draw site: RAC, Needle type: BF, Gauge: 23.    Post Assessment:  Labs drawn without difficulty: Yes.    Discharge Plan:  Departure Mode: Ambulatory.    Face to Face Time: 5 MIN  .    Sarah Cifuentes CMA        Oncology Rooming Note    October 3, 2019 10:52 AM   Lowell Arredondo is a 79 year old male who presents for:    No chief complaint on file.    Initial Vitals: /66   Pulse 59   Resp 16   Ht 1.727 m (5' 8\")   Wt 70.9 kg (156 lb 6.4 oz)   SpO2 98%   BMI 23.78 kg/m   Estimated body mass index is 23.78 kg/m  as calculated from the following:    Height as of this encounter: 1.727 m (5' 8\").    Weight as of this encounter: 70.9 kg (156 lb 6.4 oz). Body surface area is 1.84 meters squared.  No Pain (0) Comment: Data Unavailable   No LMP for male patient.  Allergies reviewed: Yes  Medications reviewed: Yes    Medications: Medication refills not needed today.  Pharmacy name entered into [employer]:    [id number] [place] [address] [facility] [address] [address]  [id number]  [place], [place] - [address]    Clinical concerns:  doctor was notified.      Sarah Cifuentes CMA              "

## 2019-10-03 NOTE — Clinical Note
"    10/3/2019         RE: Lowell Arredondo  [address] [address] [address]        Dear Colleague,    Thank you for referring your patient, Lowell Arredondo, to the [facility]. Please see a copy of my visit note below.    Medical Assistant Note:  Lowell Arredondo presents today for BLOOD DRAW.    Patient seen by provider today: Yes: TONNY.   present during visit today: Not Applicable.    Concerns: No Concerns.    Procedure:  Lab draw site: RAC, Needle type: BF, Gauge: 23.    Post Assessment:  Labs drawn without difficulty: Yes.    Discharge Plan:  Departure Mode: Ambulatory.    Face to Face Time: 5 MIN  .    Sarah Cifuentes CMA        Oncology Rooming Note    October 3, 2019 10:52 AM   Lowell Arredondo is a 79 year old male who presents for:    No chief complaint on file.    Initial Vitals: /66   Pulse 59   Resp 16   Ht 1.727 m (5' 8\")   Wt 70.9 kg (156 lb 6.4 oz)   SpO2 98%   BMI 23.78 kg/m    Estimated body mass index is 23.78 kg/m  as calculated from the following:    Height as of this encounter: 1.727 m (5' 8\").    Weight as of this encounter: 70.9 kg (156 lb 6.4 oz). Body surface area is 1.84 meters squared.  No Pain (0) Comment: Data Unavailable   No LMP for male patient.  Allergies reviewed: Yes  Medications reviewed: Yes    Medications: Medication refills not needed today.  Pharmacy name entered into [facility]:    [id number] [address] AT [facility] [address] & [id number] [place] [address]    Clinical concerns:  doctor was notified.      Sarah Cifuentes CMA                Visit Date:   10/03/2019      This consult has been requested, by Dr. Astorga, for pancreatic tail mass.      Mr. Arredondo is an 79-year-old gentleman with [place] 7 prostate cancer on active surveillance.  His PSA increased from 10.50 on 05/29/2018 to 13.20 on 07/11/2019.  Because of that, he had multiple investigations " done.   1.  Prostate MRI on 2019 revealed PI-RADS 5.  No suspicious adenopathy or evidence of pelvic metastasis.   2.  Bone scan on 2019 does not reveal any bone metastasis.  There is some cardiac uptake.   3.  CT abdomen and pelvis on 2019 reveals new soft tissue mesenteric mass in the region of pancreatic tail.  This could be a new pancreatic mass or peritoneal metastatic disease.  This appears to encase some of the splenic artery branches.  There is a stable 0.7 right lower lobe lung nodule.      Dr. Astorga had discussed the case with the radiologist regarding biopsy of this pancreatic/mesenteric mass.  They have recommended EUS and biopsy.      The patient, overall, is doing good.  No abdominal pain.  No nausea or vomiting.  Appetite has been good.  No urinary or bowel complaints.  No bleeding.  No fever, chills or night sweats.  The patient says that he is pretty active.  He does a lot of cycling.  In last few months, he had about 8 pounds of unintentional weight loss.      All other review of systems negative.      ALLERGIES:  Reviewed.      MEDICATIONS:  Reviewed.      PAST MEDICAL HISTORY:   1.  Prostate cancer. Swapnil 7.   2.  BPH.   3.  Hernia repair.   4.  Left foot surgery.      SOCIAL HISTORY:   -No history of smoking.   -Occasional alcohol use.      FAMILY HISTORY:     -Father  of prostate cancer.   -Mother  of old age.   -He has 2 brothers.  Both have prostate cancer.   -He has 2 sisters.  As far as he knows, they are in good health.      PHYSICAL EXAMINATION:   GENERAL:  Alert and oriented x 3.   VITAL SIGNS:  Reviewed.  ECOG PS of 1.     EYES:  No icterus.   THROAT:  No ulcer. No thrush.   NECK:  Supple. No lymphadenopathy. No thyromegaly.   AXILLAE:  No lymphadenopathy.   LUNGS:  Good air entry bilaterally.  No crackles or wheezing.   HEART:  Regular.  No murmur.   ABDOMEN:  Soft.  Nontender. No mass.   EXTREMITIES:  No pedal edema.  No calf swelling or tenderness.    SKIN:  No rash.      LABORATORY DATA:  Reviewed.      ASSESSMENT:   1.  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   2.  Swapnil 7 prostate cancer on active surveillance.   3.  Right lung nodule, which has been stable for many years.      RECOMMENDATIONS:   1.  I had a long discussion with the patient and his wife.  CT scan was reviewed.  Pictures shown.  I explained to the patient that I am worried about primary pancreatic cancer.      We discussed regarding further workup.  I would recommend EUS and biopsy.  The patient is agreeable for it.  We will schedule for EUS and biopsy.      2.  The patient has unintentional 8-pound weight loss.  We will get some labs including CBC, CMP and TSH.      3.  I will see him back after the biopsy.  Depending on the biopsy report, further investigation will be decided.  If it is pancreatic cancer, he will need further investigation including a PET scan.      4.  The patient advised to call us with any questions or concerns.      Thanks for the consult.      Total face-to-face time spent 45 minutes, more than 50% of the time was spent in counseling and coordination of care.      ADDENDUM:  Case discussed with Dr. Davidson. He will do the EUS.        ROXY LANCASTER MD             D: 10/03/2019   T: 10/03/2019   MT: DAVID      Name:     KARI YANG   MRN:      [id number]        Account:      [id number]   :      1940           Visit Date:   10/03/2019      Document: [id number]        Again, thank you for allowing me to participate in the care of your patient.        Sincerely,        Roxy Lancaster MD

## 2019-10-03 NOTE — RESULT ENCOUNTER NOTE
Dear Mr. Crowellholgerdustin,    Your blood test are normal.    Please, call me with any questions.    Diego Segura MD

## 2019-10-04 NOTE — PROGRESS NOTES
Visit Date:   10/03/2019      This consult has been requested, by Dr. Astorga, for pancreatic tail mass.      Mr. Arredondo is an 79-year-old gentleman with [place] 7 prostate cancer on active surveillance.  His PSA increased from 10.50 on 2018 to 13.20 on 2019.  Because of that, he had multiple investigations done.   1.  Prostate MRI on 2019 revealed PI-RADS 5.  No suspicious adenopathy or evidence of pelvic metastasis.   2.  Bone scan on 2019 does not reveal any bone metastasis.  There is some cardiac uptake.   3.  CT abdomen and pelvis on 2019 reveals new soft tissue mesenteric mass in the region of pancreatic tail.  This could be a new pancreatic mass or peritoneal metastatic disease.  This appears to encase some of the splenic artery branches.  There is a stable 0.7 right lower lobe lung nodule.      Dr. Astorga had discussed the case with the radiologist regarding biopsy of this pancreatic/mesenteric mass.  They have recommended EUS and biopsy.      The patient, overall, is doing good.  No abdominal pain.  No nausea or vomiting.  Appetite has been good.  No urinary or bowel complaints.  No bleeding.  No fever, chills or night sweats.  The patient says that he is pretty active.  He does a lot of cycling.  In last few months, he had about 8 pounds of unintentional weight loss.      All other review of systems negative.      ALLERGIES:  Reviewed.      MEDICATIONS:  Reviewed.      PAST MEDICAL HISTORY:   1.  Prostate cancer. [place] 7.   2.  BPH.   3.  Hernia repair.   4.  Left foot surgery.      SOCIAL HISTORY:   -No history of smoking.   -Occasional alcohol use.      FAMILY HISTORY:     -Father  of prostate cancer.   -Mother  of old age.   -He has 2 brothers.  Both have prostate cancer.   -He has 2 sisters.  As far as he knows, they are in good health.      PHYSICAL EXAMINATION:   GENERAL:  Alert and oriented x 3.   VITAL SIGNS:  Reviewed.  ECOG PS of 1.     EYES:  No icterus.    THROAT:  No ulcer. No thrush.   NECK:  Supple. No lymphadenopathy. No thyromegaly.   AXILLAE:  No lymphadenopathy.   LUNGS:  Good air entry bilaterally.  No crackles or wheezing.   HEART:  Regular.  No murmur.   ABDOMEN:  Soft.  Nontender. No mass.   EXTREMITIES:  No pedal edema.  No calf swelling or tenderness.   SKIN:  No rash.      LABORATORY DATA:  Reviewed.      ASSESSMENT:   1.  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   2.  [place] 7 prostate cancer on active surveillance.   3.  Right lung nodule, which has been stable for many years.      RECOMMENDATIONS:   1.  I had a long discussion with the patient and his wife.  CT scan was reviewed.  Pictures shown.  I explained to the patient that I am worried about primary pancreatic cancer.      We discussed regarding further workup.  I would recommend EUS and biopsy.  The patient is agreeable for it.  We will schedule for EUS and biopsy.      2.  The patient has unintentional 8-pound weight loss.  We will get some labs including CBC, CMP and TSH.      3.  I will see him back after the biopsy.  Depending on the biopsy report, further investigation will be decided.  If it is pancreatic cancer, he will need further investigation including a PET scan.      4.  The patient advised to call us with any questions or concerns.      Thanks for the consult.      Total face-to-face time spent 45 minutes, more than 50% of the time was spent in counseling and coordination of care.      ADDENDUM:  Case discussed with Dr. Davidson. He will do the EUS.        ROXY LANCASTER MD             D: 10/03/2019   T: 10/03/2019   MT: DAVID      Name:     KARI YANG   MRN:      [id number]        Account:      [id number]   :      1940           Visit Date:   10/03/2019      Document: [id number]

## 2019-10-07 ENCOUNTER — TRANSFERRED RECORDS (OUTPATIENT)
Dept: HEALTH INFORMATION MANAGEMENT | Facility: CLINIC | Age: 79
End: 2019-10-07

## 2019-10-10 ENCOUNTER — TELEPHONE (OUTPATIENT)
Dept: ONCOLOGY | Facility: CLINIC | Age: 79
End: 2019-10-10

## 2019-10-10 ENCOUNTER — PATIENT OUTREACH (OUTPATIENT)
Dept: ONCOLOGY | Facility: CLINIC | Age: 79
End: 2019-10-10

## 2019-10-10 DIAGNOSIS — C25.2 MALIGNANT NEOPLASM OF TAIL OF PANCREAS (H): Primary | ICD-10-CM

## 2019-10-10 NOTE — PROGRESS NOTES
Patient had EUS by GI.  It reveals pancreatic tail mass.  FNA is positive for adenocarcinoma.    Patient will get a PET scan.  I will see him after the PET scan.

## 2019-10-10 NOTE — PROGRESS NOTES
I called patient to briefly review pathology results and to coordinate both a PETSCAN and a follow up with Dr. Segura to review in detail the scan and pathology results .     I was able to speak with both patient and his wife, June. They had a few general questions regarding scan and upcoming appointments that I was able to answer.     Patient and wife are aware of pathology results being positive for pancreatic cancer and the appointment details of PETSCAN at [facility] on 10/15 checking in at 8:45am and to ariana lu with Dr. Segura on 10/16 at 3:20p.    Kathe Coles RN

## 2019-10-10 NOTE — TELEPHONE ENCOUNTER
I returned call and spoke with patient and his wife, June.    Please see encounter.    Kathe Coles RN

## 2019-10-15 ENCOUNTER — HOSPITAL ENCOUNTER (OUTPATIENT)
Dept: PET IMAGING | Facility: CLINIC | Age: 79
Discharge: HOME | End: 2019-10-15
Attending: INTERNAL MEDICINE | Admitting: INTERNAL MEDICINE
Payer: COMMERCIAL

## 2019-10-15 DIAGNOSIS — C25.2 MALIGNANT NEOPLASM OF TAIL OF PANCREAS (H): ICD-10-CM

## 2019-10-15 PROCEDURE — 78816 PET IMAGE W/CT FULL BODY: CPT | Mod: PI

## 2019-10-15 PROCEDURE — A9552 F18 FDG: HCPCS | Performed by: INTERNAL MEDICINE

## 2019-10-15 PROCEDURE — 34300033 ZZH RX 343: Performed by: INTERNAL MEDICINE

## 2019-10-15 RX ADMIN — FLUDEOXYGLUCOSE F-18 11.63 MCI.: 500 INJECTION, SOLUTION INTRAVENOUS at 09:10

## 2019-10-16 ENCOUNTER — ONCOLOGY VISIT (OUTPATIENT)
Dept: ONCOLOGY | Facility: CLINIC | Age: 79
End: 2019-10-16
Attending: INTERNAL MEDICINE
Payer: COMMERCIAL

## 2019-10-16 VITALS
HEIGHT: 68 IN | TEMPERATURE: 97.6 F | WEIGHT: 156.6 LBS | OXYGEN SATURATION: 99 % | BODY MASS INDEX: 23.73 KG/M2 | SYSTOLIC BLOOD PRESSURE: 125 MMHG | HEART RATE: 54 BPM | DIASTOLIC BLOOD PRESSURE: 77 MMHG | RESPIRATION RATE: 16 BRPM

## 2019-10-16 DIAGNOSIS — C25.2 MALIGNANT NEOPLASM OF TAIL OF PANCREAS (H): Primary | ICD-10-CM

## 2019-10-16 PROCEDURE — 99214 OFFICE O/P EST MOD 30 MIN: CPT | Performed by: INTERNAL MEDICINE

## 2019-10-16 PROCEDURE — G0463 HOSPITAL OUTPT CLINIC VISIT: HCPCS

## 2019-10-16 ASSESSMENT — MIFFLIN-ST. JEOR: SCORE: 1389.83

## 2019-10-16 ASSESSMENT — PAIN SCALES - GENERAL: PAINLEVEL: NO PAIN (0)

## 2019-10-16 NOTE — PROGRESS NOTES
"Oncology Rooming Note    October 16, 2019 3:29 PM   Lowell Arredondo is a 79 year old male who presents for:    Chief Complaint   Patient presents with     Oncology Clinic Visit     Initial Vitals: /77   Pulse 54   Temp 97.6  F (36.4  C) (Oral)   Resp 16   Ht 1.727 m (5' 8\")   Wt 71 kg (156 lb 9.6 oz)   SpO2 99%   BMI 23.81 kg/m   Estimated body mass index is 23.81 kg/m  as calculated from the following:    Height as of this encounter: 1.727 m (5' 8\").    Weight as of this encounter: 71 kg (156 lb 9.6 oz). Body surface area is 1.85 meters squared.  No Pain (0) Comment: Data Unavailable   No LMP for male patient.  Allergies reviewed: Yes  Medications reviewed: Yes    Medications: Medication refills not needed today.  Pharmacy name entered into [facility]:    [id number] - [place], [place] - [address] [facility] [address]  [id number]  [place] - [address]    Clinical concerns: DOCTOR WAS NOTIFIED      Sarah Cifuentes CMA            "

## 2019-10-16 NOTE — LETTER
"    10/16/2019         RE: Lowell Arredondo  [address] [facility] [address]        Dear Colleague,    Thank you for referring your patient, Lowell Arredondo, to the [facility]. Please see a copy of my visit note below.    Oncology Rooming Note    October 16, 2019 3:29 PM   Lowell Arredondo is a 79 year old male who presents for:    Chief Complaint   Patient presents with     Oncology Clinic Visit     Initial Vitals: /77   Pulse 54   Temp 97.6  F (36.4  C) (Oral)   Resp 16   Ht 1.727 m (5' 8\")   Wt 71 kg (156 lb 9.6 oz)   SpO2 99%   BMI 23.81 kg/m    Estimated body mass index is 23.81 kg/m  as calculated from the following:    Height as of this encounter: 1.727 m (5' 8\").    Weight as of this encounter: 71 kg (156 lb 9.6 oz). Body surface area is 1.85 meters squared.  No Pain (0) Comment: Data Unavailable   No LMP for male patient.  Allergies reviewed: Yes  Medications reviewed: Yes    Medications: Medication refills not needed today.  Pharmacy name entered into [facility]:    [id number] - [place], [place] - [address]  [id number] [place], [place] - [address]    Clinical concerns: DOCTOR WAS NOTIFIED      Sarah Cifuentes CMA              EDUCATION CHEMOTHERAPY INFUSION    Discussed with patient and family information regarding Gemzar /Abraxane infusions. Went over side affects of medications, as self care while on chemotherapy.   Informed patient and family when he should call the triage nurse at clinic or seek medical attention if you have chills and/or temperature greater than or equal to 100.5, uncontrolled nausea/vomiting, diarrhea, constipation, dizziness, shortness of breath, chest pain, heart palpitations, weakness or any other new or concerning symptoms, questions or concerns.  You can not have any live virus vaccines prior to or during treatment or up to 6 months post infusion.  If you have an upcoming " surgery, medical procedure or dental procedure during treatment, this should be discussed with your ordering physician and your surgeon/dentist.  If you are having any concerning symptom, if you are unsure if you should get your next infusion or wish to speak to a provider before your next infusion, please call your care coordinator or triage nurse at your clinic to notify them so we can adequately serve you.     Kathe Coles RN      Visit Date:   10/16/2019      SUBJECTIVE:  Mr. Arredondo is an 79-year-old gentleman with newly diagnosed pancreatic cancer.  He is here for followup on the result of investigation.  The patient has history of prostate cancer.  CT abdomen and pelvis in 09/2019 had revealed a pancreatic tail mass.      The patient was seen by gastroenterologist.  The patient had EUS done on 10/07/2019.  There was an irregular mass in the pancreatic tail measuring 3.8 x 2.2 cm.  There was some evidence of invasion into the portal vein.  No lymphadenopathy seen.  Based on the EUS, it was T3 NX disease.  FNA is positive for moderately differentiated pancreatic adenocarcinoma.      The patient was informed of the pathology.  A PET scan was ordered for staging.  PET scan was done on 10/15/2019.  It revealed a hypermetabolic 4.2 cm mass in the pancreatic tail.  There is an adjacent 1 cm peripancreatic lymph node.  No other suspicious hypermetabolic activity in the chest, abdomen and pelvis.  No evidence of any metastatic disease.  There is 8 mm right lower lobe lung nodule without any uptake.  This has been stable since 2015.  There are 2 foci of mild FDG uptake in the prostate gland.  He does have prostate cancer.      The patient's overall condition is stable.  No severe abdominal pain.  No nausea or vomiting.  No bleeding.  Appetite fairly good.      ASSESSMENT:   1.  An 79-year-old gentleman with newly diagnosed pancreatic tail adenocarcinoma.  Based on the EUS and the PET scan, he has T3 N1 M0 disease.    2.  Other medical problems including [place] 7 prostate cancer, on active surveillance.      PLAN:   1.  I had a long discussion with the patient and his wife.  Findings of EUS, PET scan, and FNA were all discussed in detail.  The patient has pancreatic tail adenocarcinoma.  No evidence of metastatic disease.   2.  Discussed regarding treatment.  He will be treated with curative intent.  I would recommend neoadjuvant chemotherapy followed by surgery.      Rationale for neoadjuvant chemotherapy discussed.  Different treatment options discussed.  He is elderly.  I would favor giving gemcitabine and Abraxane, followed by surgery.      I told the patient that he needs to be evaluated by a surgeon to see if he is a candidate for surgery.  We will set him up to see a surgeon at [facility].  He is agreeable for this plan.   3.  The patient wants to start chemotherapy after he has been evaluated by Surgery.  We will schedule chemotherapy at [facility] after his surgery consult.  I will see him in 2 months' time.  If the patient is found to be unsuitable for surgery, I will see him sooner.  The patient and wife advised to call me with any questions or concerns.      Total face to face time spent was 25 minutes, more than 50% of the time spent in counseling and coordination of care.         ROXY LANCASTER MD             D: 10/20/2019   T: 10/20/2019   MT: WT      Name:     KARI YANG   MRN:      [id number]        Account:      [id number]   :      1940           Visit Date:   10/16/2019      Document: [id number]       Again, thank you for allowing me to participate in the care of your patient.        Sincerely,        Roxy Lancaster MD

## 2019-10-16 NOTE — PATIENT INSTRUCTIONS
1. Surgery referral at [facility] for pancreatic cancer.  2. Side-effect of gemzar and abraxane. To be started after surgery consult. Schedule at [facility].  3. See NP/provider at [facility] when he starts chemotherapy.  4. See me in 2 months.      Sydni contacted Surgery Onc  at [facility]- they will have the chart reviewed and will call the patient within 24-48 hours for scheduling- Patient spouse June informed of this - June instructed to call us if not scheduled by Tuesday next week /Sydni

## 2019-10-17 ENCOUNTER — TELEPHONE (OUTPATIENT)
Dept: ONCOLOGY | Facility: CLINIC | Age: 79
End: 2019-10-17

## 2019-10-17 NOTE — TELEPHONE ENCOUNTER
"I received a call from Sydni at  requesting a surgical consult for dx \"Malignant neoplasm of tail of pancreas\" per Dr Segura. I told Sydni I would forward the information to Rosa ARCOS to review, and will call pt to schedule once referral is reviewed & a date/time is provided by Rosa.   "

## 2019-10-17 NOTE — TELEPHONE ENCOUNTER
ONCOLOGY INTAKE: Records Information      APPT INFORMATION:  Referring provider:  Diego Segura MD  Referring provider s clinic:   CANCER CLINIC  Reason for visit/diagnosis:  C25.2 (ICD-10-CM) - 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)  Has patient been notified of appointment date and time?: Yes, Spoke to wife     RECORDS INFORMATION:  Were the records received with the referral (via [employer])? No, Internal    Has patient been seen for any external appt for this diagnosis? No    If yes, where? NA    Has patient had any imaging or procedures outside of [place]  [place] for this condition? No      If Yes, where? NA    ADDITIONAL INFORMATION:  Internal referral

## 2019-10-18 DIAGNOSIS — C25.2 MALIGNANT NEOPLASM OF TAIL OF PANCREAS (H): Primary | ICD-10-CM

## 2019-10-18 NOTE — PROGRESS NOTES
Surgical Oncology RN Care Coordination Note:     Reviewed with Dr. Ryder who would like patient to have a pancreatic protocol CT scan prior to consult.     Orders placed and routed to scheduling team.     Rosa Machado, RN, BSN  Care Coordinator   [phone]

## 2019-10-18 NOTE — TELEPHONE ENCOUNTER
RECORDS STATUS - ALL OTHER DIAGNOSIS      RECORDS RECEIVED FROM: [facility]   DATE RECEIVED: 10/18/19   NOTES STATUS DETAILS   OFFICE NOTE from referring provider Diego Mejía MD   OFFICE NOTE from medical oncologist [facility] Dr. Segura - 10/16/19 (Most recent OV). Also see's:    Urology - G[place]i: 9/24/19   DISCHARGE SUMMARY from hospital NA    DISCHARGE REPORT from the ER [facility] 5/23/17   Endoscopy [facility] 10/7/19   OPERATIVE REPORT NA    MEDICATION LIST [facility]    CLINICAL TRIAL TREATMENTS TO DATE     LABS     PATHOLOGY REPORTS [facility]/Complete 9/9/19, 5/23/17   ANYTHING RELATED TO DIAGNOSIS Epic 10/3/19   GENONOMIC TESTING     TYPE:     IMAGING (NEED IMAGES & REPORT)     CT SCANS PACS 9/26/19, 8/13/15   MRI PACS 6/13/18, 5/7/17, 6/25/15   MAMMO NA    ULTRASOUND NA    NM Bone Scan  PACS 9/26/19, 8/13/15   PET PACS 10/15/19

## 2019-10-20 NOTE — PROGRESS NOTES
Visit Date:   10/16/2019     ONCOLOGY HISTORY: Mr. Arredondo is a gentleman with pancreatic cancer.T3 N1 M0.  -Also has prostate cancer Swapnil 7 prostate.   1.  Prostate MRI on 07/20/2019 revealed PI-RADS 5.  No suspicious adenopathy or evidence of pelvic metastasis.   2.  Bone scan on 09/26/2019 does not reveal any bone metastasis.  There is some cardiac uptake.   3.  CT abdomen and pelvis on 09/26/2019 reveals new soft tissue mesenteric mass in the region of pancreatic tail.   4.  EUS on 10/07/2019 revealed an irregular mass in the pancreatic tail measuring 3.8 x 2.2 cm.  There was some evidence of invasion into the portal vein.  No lymphadenopathy seen.  Based on the EUS, it was T3 Nx disease.  FNA is positive for moderately differentiated pancreatic adenocarcinoma.   5. PET scan on 10/15/2019 reveals hypermetabolic 4.2 cm mass in the pancreatic tail and an adjacent 1 cm peripancreatic lymph node. No evidence of any metastatic disease. There are 2 foci of mild FDG uptake in the prostate gland from prostate cancer.      SUBJECTIVE:  Mr. Arredondo is an 79-year-old gentleman with newly diagnosed pancreatic cancer.  He is here for followup on the result of investigation.  The patient has history of prostate cancer.  CT abdomen and pelvis in 09/2019 had revealed a pancreatic tail mass.      The patient was seen by gastroenterologist.  The patient had EUS done on 10/07/2019.  There was an irregular mass in the pancreatic tail measuring 3.8 x 2.2 cm.  There was some evidence of invasion into the portal vein.  No lymphadenopathy seen.  Based on the EUS, it was T3 Nx disease.  FNA is positive for moderately differentiated pancreatic adenocarcinoma.      The patient was informed of the pathology.  A PET scan was ordered for staging.  PET scan was done on 10/15/2019.  It revealed a hypermetabolic 4.2 cm mass in the pancreatic tail.  There is an adjacent 1 cm peripancreatic lymph node.  No other suspicious hypermetabolic  activity in the chest, abdomen and pelvis.  No evidence of any metastatic disease.  There is 8 mm right lower lobe lung nodule without any uptake.  This has been stable since 2015.  There are 2 foci of mild FDG uptake in the prostate gland.  He does have prostate cancer.      The patient's overall condition is stable.  No severe abdominal pain.  No nausea or vomiting.  No bleeding.  Appetite fairly good.      ASSESSMENT:   1.  An 79-year-old gentleman with newly diagnosed pancreatic tail adenocarcinoma.  Based on the EUS and the PET scan, he has T3 N1 M0 disease.   2.  Other medical problems including [place] 7 prostate cancer, on active surveillance.      PLAN:   1.  I had a long discussion with the patient and his wife.  Findings of EUS, PET scan, and FNA were all discussed in detail.  The patient has pancreatic tail adenocarcinoma.  No evidence of metastatic disease.     2.  Discussed regarding treatment.  He will be treated with curative intent.  I would recommend neoadjuvant chemotherapy followed by surgery.      Rationale for neoadjuvant chemotherapy discussed.  Different treatment options discussed.  He is elderly.  I would favor giving gemcitabine and Abraxane, followed by surgery.      I told the patient that he needs to be evaluated by a surgeon to see if he is a candidate for surgery.  We will set him up to see a surgeon at [facility].  He is agreeable for this plan.     3.  The patient wants to start chemotherapy after he has been evaluated by Surgery.  We will schedule chemotherapy at [facility] after his surgery consult.  I will see him in 2 months' time.  If the patient is found to be unsuitable for surgery, I will see him sooner.  The patient and wife advised to call me with any questions or concerns.      Total face to face time spent was 25 minutes, more than 50% of the time spent in counseling and coordination of care.         ROXY LANCASTER MD             D: 10/20/2019   T:  10/20/2019   MT: WT      Name:     KARI YANG   MRN:      [id number]        Account:      [id number]   :      1940           Visit Date:   10/16/2019      Document: [id number]

## 2019-10-21 ASSESSMENT — ENCOUNTER SYMPTOMS
JOINT SWELLING: 0
DYSURIA: 0
FEVER: 0
MUSCLE WEAKNESS: 0
SORE THROAT: 0
SINUS CONGESTION: 0
STIFFNESS: 1
FATIGUE: 0
FLANK PAIN: 0
MYALGIAS: 0
TASTE DISTURBANCE: 0
MUSCLE CRAMPS: 0
TROUBLE SWALLOWING: 0
BACK PAIN: 0
EYE IRRITATION: 1
ARTHRALGIAS: 0
DIFFICULTY URINATING: 1
CHILLS: 0
DOUBLE VISION: 0
HALLUCINATIONS: 0
NECK PAIN: 0
HEMATURIA: 0
SMELL DISTURBANCE: 0
HOARSE VOICE: 1
EYE WATERING: 0
INCREASED ENERGY: 0
WEIGHT LOSS: 1
WEIGHT GAIN: 0
EYE REDNESS: 0
POLYDIPSIA: 0
POLYPHAGIA: 0
EYE PAIN: 0
ALTERED TEMPERATURE REGULATION: 0
DECREASED APPETITE: 0
NIGHT SWEATS: 0
SINUS PAIN: 0
NECK MASS: 0

## 2019-10-23 ENCOUNTER — TELEPHONE (OUTPATIENT)
Dept: SURGERY | Facility: CLINIC | Age: 79
End: 2019-10-23

## 2019-10-23 NOTE — TELEPHONE ENCOUNTER
I called Lowell to change the time of his appts on 10/25/19 per kassidy request from Rosa. I spoke with Billie & confirmed 10/25/19 - CT at 1020am (ok per Robert - pt will arrive at 10am) & 11am with Dr Ryder.

## 2019-10-25 ENCOUNTER — OFFICE VISIT (OUTPATIENT)
Dept: SURGERY | Facility: CLINIC | Age: 79
End: 2019-10-25
Attending: INTERNAL MEDICINE
Payer: COMMERCIAL

## 2019-10-25 ENCOUNTER — ANCILLARY PROCEDURE (OUTPATIENT)
Dept: CT IMAGING | Facility: CLINIC | Age: 79
End: 2019-10-25
Attending: SURGERY
Payer: COMMERCIAL

## 2019-10-25 VITALS
SYSTOLIC BLOOD PRESSURE: 122 MMHG | DIASTOLIC BLOOD PRESSURE: 72 MMHG | HEART RATE: 69 BPM | BODY MASS INDEX: 23.72 KG/M2 | WEIGHT: 156.5 LBS | HEIGHT: 68 IN | OXYGEN SATURATION: 100 % | RESPIRATION RATE: 14 BRPM

## 2019-10-25 DIAGNOSIS — C25.2 MALIGNANT NEOPLASM OF TAIL OF PANCREAS (H): Primary | ICD-10-CM

## 2019-10-25 DIAGNOSIS — C25.2 MALIGNANT NEOPLASM OF TAIL OF PANCREAS (H): ICD-10-CM

## 2019-10-25 PROBLEM — E78.5 HYPERLIPIDEMIA: Status: ACTIVE | Noted: 2019-10-25

## 2019-10-25 PROCEDURE — G0463 HOSPITAL OUTPT CLINIC VISIT: HCPCS | Mod: ZF

## 2019-10-25 RX ORDER — IOPAMIDOL 755 MG/ML
96 INJECTION, SOLUTION INTRAVASCULAR ONCE
Status: COMPLETED | OUTPATIENT
Start: 2019-10-25 | End: 2019-10-25

## 2019-10-25 RX ADMIN — IOPAMIDOL 96 ML: 755 INJECTION, SOLUTION INTRAVASCULAR at 10:11

## 2019-10-25 ASSESSMENT — MIFFLIN-ST. JEOR: SCORE: 1389.25

## 2019-10-25 ASSESSMENT — PAIN SCALES - GENERAL: PAINLEVEL: NO PAIN (0)

## 2019-10-25 NOTE — PATIENT INSTRUCTIONS
Surgical Oncology RN Care Coordination Note:     - Proceed with chemotherapy. This will be for approximately 3 months. We will send a note to Dr. Segura's team to assist with setting up closer to home for you.     - Following the 3 months of chemotherapy we will plan to see you back in the office with updated imaging studies (CT scan) and a visit in the office with Dr. Ryder to discuss and finalized surgery plan.     - We will also arrange for visit with our pre op clinic for clearance at that time and have tentative surgery date based on your chemotherapy schedule.     - Surgery will be approximately 4-6 weeks after your last infusion.     Rosa Machado RN, BSN  Care Coordinator   [phone]

## 2019-10-25 NOTE — PROGRESS NOTES
[facility] - Surgical Oncology    NEW CONSULTATION  Oct 25, 2019    Reason for Visit:    Lowell Arredondo is a 79 year old male who presents with newly diagnosed adenocarcinoma of the tail of the pancreas.  He was referred by Dr Segura.    Pertinent Oncologic History: Patient found to have a mass in the tail of the pancreas on imaging 9/26/19.  Underwent EUS on 10/7/19 with findings of a 3.8 cm mass in the tail of the pancreas with FNA showing adenocarcinoma.  No critical vascular involvement other than the splenic.  Subsequent PET/CT did not show evidence of metastatic disease, but did show the avid tail mass and an adjacent positive peripancreatic lymph node.  Other than a history of prostate cancer, he has no other major medical problems.  He presents for recommendations on management.    Pertinent Exam: Soft, non-tender, and non-distended.  No pain, scleral icterus, or jaundice.    HISTORY OF PRESENT ILLNESS:  He is doing well and is without symptoms.  Highly active and independent.  No prior abdominal operations.  No major medical problems.    Pertinent Work-Up/Findings:    Labs: Most recent labs on 10/3/19 were normal.  I do not see that tumor markers have been drawn.    CT pancreas protocol (10/25/19): Mass in the tail of the pancreas with abutment and involvement of the distal splenic vessels.  Possible abutment of the splenic flexure of the colon.    Biopsy: Moderately differentiated adenocarcinoma.    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  I had an extensive discussion about pancreas cancer and it's natural history.  I discussed the 70-80% recurrence rate for all comers following R0 resection, but that surgery in combination with systemic therapy is the only chance for cure.  Standard of care is currently for up front resection for resectable tumors, but  increasing interest has grown in the administration of neoadjuvant therapy given the higher compliance rate with therapy, ability to assess in situ tumor response, the chance for down-staging, and also assessment of biology in order to best understand those patients most likely to benefit from surgery.  As such, I have recommended a 3 month course of neoadjuvant therapy followed by restaging.  If the restaging shows disease that remains resectable, then we would proceed with surgery approximately 6 weeks following completion of his systemic therapy.  I briefly discussed that surgery would involve laparoscopic, possible open distal pancreatectomy and splenectomy.  I briefly discussed the main risks of recurrence, bleeding, infection, < 2% death rate, pancreatic leak, and post-splenectomy sepsis.  I plan to have a more involved conversation if we proceed with resection in the future.  He will see his medical oncologist for administration of systemic therapy and will RTC in 3 months with repeat imaging and potential discussion of surgery.  All questions were answered.  The patient was in agreement with and understanding of the plan.    Total time spent with the patient was 45 minutes, of which more than half was counseling and coordination of care.

## 2019-10-25 NOTE — NURSING NOTE
".  Oncology Rooming Note    October 25, 2019 10:36 AM   Lowell Arredondo is a 79 year old male who presents for:    Chief Complaint   Patient presents with     Oncology Clinic Visit     New; Malignant Neoplasm of Tail of Pancreas     Initial Vitals: /72   Pulse 69   Resp 14   Ht 1.727 m (5' 7.99\")   Wt 71 kg (156 lb 8 oz)   SpO2 100%   BMI 23.80 kg/m   Estimated body mass index is 23.8 kg/m  as calculated from the following:    Height as of this encounter: 1.727 m (5' 7.99\").    Weight as of this encounter: 71 kg (156 lb 8 oz). Body surface area is 1.85 meters squared.  No Pain (0) Comment: Data Unavailable   No LMP for male patient.  Allergies reviewed: Yes  Medications reviewed: Yes    Medications: Medication refills not needed today.  Pharmacy name entered into [employer]:    [id number] - [place], [place] - [address] [facility] [address]  [id number] [place] - [address]    Clinical concerns: CT scan results       Nedra Dennis CMA              "

## 2019-10-25 NOTE — DISCHARGE INSTRUCTIONS

## 2019-10-25 NOTE — LETTER
10/25/2019       RE: Lowell Arredondo  [address] [address]     Dear Colleague,    Thank you for referring your patient, Lowell Arredondo, to the [facility]. Please see a copy of my visit note below.    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  He was referred by Dr Segura.    Pertinent Oncologic History:  Patient found to have a mass in the tail of the pancreas on imaging 9/26/19.  Underwent EUS on 10/7/19 with findings of a 3.8 cm mass in the tail of the pancreas with FNA showing adenocarcinoma.  No critical vascular involvement other than the splenic.  Subsequent PET/CT did not show evidence of metastatic disease, but did show the avid tail mass and an adjacent positive peripancreatic lymph node.  Other than a history of prostate cancer, he has no other major medical problems.  He presents for recommendations on management.    Pertinent Exam: Soft, non-tender, and non-distended.  No pain, scleral icterus, or jaundice.    HISTORY OF PRESENT ILLNESS:  He is doing well and is without symptoms.  Highly active and independent.  No prior abdominal operations.  No major medical problems.    Pertinent Work-Up/Findings:    Labs: Most recent labs on 10/3/19 were normal.  I do not see that tumor markers have been drawn.    CT pancreas protocol (10/25/19): Mass in the tail of the pancreas with abutment and involvement of the distal splenic vessels.  Possible abutment of the splenic flexure of the colon.    Biopsy: Moderately differentiated adenocarcinoma.    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  I had an extensive discussion about pancreas cancer and it's natural  history.  I discussed the 70-80% recurrence rate for all comers following R0 resection, but that surgery in combination with systemic therapy is the only chance for cure.  Standard of care is currently for up front resection for resectable tumors, but increasing interest has grown in the administration of neoadjuvant therapy given the higher compliance rate with therapy, ability to assess in situ tumor response, the chance for down-staging, and also assessment of biology in order to best understand those patients most likely to benefit from surgery.  As such, I have recommended a 3 month course of neoadjuvant therapy followed by restaging.  If the restaging shows disease that remains resectable, then we would proceed with surgery approximately 6 weeks following completion of his systemic therapy.  I briefly discussed that surgery would involve laparoscopic, possible open distal pancreatectomy and splenectomy.  I briefly discussed the main risks of recurrence, bleeding, infection, < 2% death rate, pancreatic leak, and post-splenectomy sepsis.  I plan to have a more involved conversation if we proceed with resection in the future.  He will see his medical oncologist for administration of systemic therapy and will RTC in 3 months with repeat imaging and potential discussion of surgery.  All questions were answered.  The patient was in agreement with and understanding of the plan.    Total time spent with the patient was 45 minutes, of which more than half was counseling and coordination of care.     Again, thank you for allowing me to participate in the care of your patient.      Sincerely,    Sanjay Ryder MD

## 2019-10-25 NOTE — PROGRESS NOTES
EDUCATION CHEMOTHERAPY INFUSION    Discussed with patient and family information regarding Gemzar /Abraxane infusions. Went over side affects of medications, as self care while on chemotherapy.   Informed patient and family when he should call the triage nurse at clinic or seek medical attention if you have chills and/or temperature greater than or equal to 100.5, uncontrolled nausea/vomiting, diarrhea, constipation, dizziness, shortness of breath, chest pain, heart palpitations, weakness or any other new or concerning symptoms, questions or concerns.  You can not have any live virus vaccines prior to or during treatment or up to 6 months post infusion.  If you have an upcoming surgery, medical procedure or dental procedure during treatment, this should be discussed with your ordering physician and your surgeon/dentist.  If you are having any concerning symptom, if you are unsure if you should get your next infusion or wish to speak to a provider before your next infusion, please call your care coordinator or triage nurse at your clinic to notify them so we can adequately serve you.     Kathe Coles RN

## 2019-10-30 ENCOUNTER — ONCOLOGY VISIT (OUTPATIENT)
Dept: ONCOLOGY | Facility: CLINIC | Age: 79
End: 2019-10-30
Attending: INTERNAL MEDICINE
Payer: COMMERCIAL

## 2019-10-30 VITALS
SYSTOLIC BLOOD PRESSURE: 111 MMHG | HEART RATE: 65 BPM | WEIGHT: 158 LBS | DIASTOLIC BLOOD PRESSURE: 66 MMHG | RESPIRATION RATE: 15 BRPM | BODY MASS INDEX: 24.03 KG/M2 | OXYGEN SATURATION: 100 %

## 2019-10-30 DIAGNOSIS — C25.2 MALIGNANT NEOPLASM OF TAIL OF PANCREAS (H): Primary | ICD-10-CM

## 2019-10-30 PROCEDURE — G0463 HOSPITAL OUTPT CLINIC VISIT: HCPCS

## 2019-10-30 PROCEDURE — 99215 OFFICE O/P EST HI 40 MIN: CPT | Performed by: INTERNAL MEDICINE

## 2019-10-30 RX ORDER — LORAZEPAM 2 MG/ML
0.5 INJECTION INTRAMUSCULAR EVERY 4 HOURS PRN
Status: CANCELLED
Start: 2019-11-13

## 2019-10-30 RX ORDER — EPINEPHRINE 0.3 MG/.3ML
0.3 INJECTION SUBCUTANEOUS EVERY 5 MIN PRN
Status: CANCELLED | OUTPATIENT
Start: 2019-11-13

## 2019-10-30 RX ORDER — PACLITAXEL 100 MG/20ML
125 INJECTION, POWDER, LYOPHILIZED, FOR SUSPENSION INTRAVENOUS ONCE
Status: CANCELLED | OUTPATIENT
Start: 2019-11-13

## 2019-10-30 RX ORDER — ALBUTEROL SULFATE 90 UG/1
1-2 AEROSOL, METERED RESPIRATORY (INHALATION)
Status: CANCELLED
Start: 2019-11-13

## 2019-10-30 RX ORDER — METHYLPREDNISOLONE SODIUM SUCCINATE 125 MG/2ML
125 INJECTION, POWDER, LYOPHILIZED, FOR SOLUTION INTRAMUSCULAR; INTRAVENOUS
Status: CANCELLED
Start: 2019-11-20

## 2019-10-30 RX ORDER — LORAZEPAM 2 MG/ML
0.5 INJECTION INTRAMUSCULAR EVERY 4 HOURS PRN
Status: CANCELLED
Start: 2019-11-07

## 2019-10-30 RX ORDER — PACLITAXEL 100 MG/20ML
125 INJECTION, POWDER, LYOPHILIZED, FOR SUSPENSION INTRAVENOUS ONCE
Status: CANCELLED | OUTPATIENT
Start: 2019-11-07

## 2019-10-30 RX ORDER — NALOXONE HYDROCHLORIDE 0.4 MG/ML
.1-.4 INJECTION, SOLUTION INTRAMUSCULAR; INTRAVENOUS; SUBCUTANEOUS
Status: CANCELLED | OUTPATIENT
Start: 2019-11-13

## 2019-10-30 RX ORDER — ALBUTEROL SULFATE 90 UG/1
1-2 AEROSOL, METERED RESPIRATORY (INHALATION)
Status: CANCELLED
Start: 2019-11-07

## 2019-10-30 RX ORDER — DIPHENHYDRAMINE HYDROCHLORIDE 50 MG/ML
50 INJECTION INTRAMUSCULAR; INTRAVENOUS
Status: CANCELLED
Start: 2019-11-13

## 2019-10-30 RX ORDER — SODIUM CHLORIDE 9 MG/ML
1000 INJECTION, SOLUTION INTRAVENOUS CONTINUOUS PRN
Status: CANCELLED
Start: 2019-11-20

## 2019-10-30 RX ORDER — ALBUTEROL SULFATE 0.83 MG/ML
2.5 SOLUTION RESPIRATORY (INHALATION)
Status: CANCELLED | OUTPATIENT
Start: 2019-11-13

## 2019-10-30 RX ORDER — PACLITAXEL 100 MG/20ML
125 INJECTION, POWDER, LYOPHILIZED, FOR SUSPENSION INTRAVENOUS ONCE
Status: CANCELLED | OUTPATIENT
Start: 2019-11-20

## 2019-10-30 RX ORDER — ALBUTEROL SULFATE 0.83 MG/ML
2.5 SOLUTION RESPIRATORY (INHALATION)
Status: CANCELLED | OUTPATIENT
Start: 2019-11-20

## 2019-10-30 RX ORDER — ALBUTEROL SULFATE 0.83 MG/ML
2.5 SOLUTION RESPIRATORY (INHALATION)
Status: CANCELLED | OUTPATIENT
Start: 2019-11-07

## 2019-10-30 RX ORDER — DIPHENHYDRAMINE HYDROCHLORIDE 50 MG/ML
50 INJECTION INTRAMUSCULAR; INTRAVENOUS
Status: CANCELLED
Start: 2019-11-20

## 2019-10-30 RX ORDER — SODIUM CHLORIDE 9 MG/ML
1000 INJECTION, SOLUTION INTRAVENOUS CONTINUOUS PRN
Status: CANCELLED
Start: 2019-11-07

## 2019-10-30 RX ORDER — LORAZEPAM 2 MG/ML
0.5 INJECTION INTRAMUSCULAR EVERY 4 HOURS PRN
Status: CANCELLED
Start: 2019-11-20

## 2019-10-30 RX ORDER — METHYLPREDNISOLONE SODIUM SUCCINATE 125 MG/2ML
125 INJECTION, POWDER, LYOPHILIZED, FOR SOLUTION INTRAMUSCULAR; INTRAVENOUS
Status: CANCELLED
Start: 2019-11-13

## 2019-10-30 RX ORDER — EPINEPHRINE 0.3 MG/.3ML
0.3 INJECTION SUBCUTANEOUS EVERY 5 MIN PRN
Status: CANCELLED | OUTPATIENT
Start: 2019-11-20

## 2019-10-30 RX ORDER — EPINEPHRINE 0.3 MG/.3ML
0.3 INJECTION SUBCUTANEOUS EVERY 5 MIN PRN
Status: CANCELLED | OUTPATIENT
Start: 2019-11-07

## 2019-10-30 RX ORDER — SODIUM CHLORIDE 9 MG/ML
1000 INJECTION, SOLUTION INTRAVENOUS CONTINUOUS PRN
Status: CANCELLED
Start: 2019-11-13

## 2019-10-30 RX ORDER — EPINEPHRINE 1 MG/ML
0.3 INJECTION, SOLUTION INTRAMUSCULAR; SUBCUTANEOUS EVERY 5 MIN PRN
Status: CANCELLED | OUTPATIENT
Start: 2019-11-07

## 2019-10-30 RX ORDER — ALBUTEROL SULFATE 90 UG/1
1-2 AEROSOL, METERED RESPIRATORY (INHALATION)
Status: CANCELLED
Start: 2019-11-20

## 2019-10-30 RX ORDER — DIPHENHYDRAMINE HYDROCHLORIDE 50 MG/ML
50 INJECTION INTRAMUSCULAR; INTRAVENOUS
Status: CANCELLED
Start: 2019-11-07

## 2019-10-30 RX ORDER — EPINEPHRINE 1 MG/ML
0.3 INJECTION, SOLUTION INTRAMUSCULAR; SUBCUTANEOUS EVERY 5 MIN PRN
Status: CANCELLED | OUTPATIENT
Start: 2019-11-20

## 2019-10-30 RX ORDER — NALOXONE HYDROCHLORIDE 0.4 MG/ML
.1-.4 INJECTION, SOLUTION INTRAMUSCULAR; INTRAVENOUS; SUBCUTANEOUS
Status: CANCELLED | OUTPATIENT
Start: 2019-11-07

## 2019-10-30 RX ORDER — METHYLPREDNISOLONE SODIUM SUCCINATE 125 MG/2ML
125 INJECTION, POWDER, LYOPHILIZED, FOR SOLUTION INTRAMUSCULAR; INTRAVENOUS
Status: CANCELLED
Start: 2019-11-07

## 2019-10-30 RX ORDER — NALOXONE HYDROCHLORIDE 0.4 MG/ML
.1-.4 INJECTION, SOLUTION INTRAMUSCULAR; INTRAVENOUS; SUBCUTANEOUS
Status: CANCELLED | OUTPATIENT
Start: 2019-11-20

## 2019-10-30 RX ORDER — EPINEPHRINE 1 MG/ML
0.3 INJECTION, SOLUTION INTRAMUSCULAR; SUBCUTANEOUS EVERY 5 MIN PRN
Status: CANCELLED | OUTPATIENT
Start: 2019-11-13

## 2019-10-30 ASSESSMENT — PAIN SCALES - GENERAL: PAINLEVEL: NO PAIN (0)

## 2019-10-30 NOTE — PROGRESS NOTES
Ordering Clinic: Dr. Segura, oncology  Procedure: port placement pancreatic & prostate ca  Arrival date: 11.01.2019  Arrival time: 0900  NPO explained: yes                 Does patient have transportation available pre and post procedure?   yes  Check-in procedure explained: yes  Allergies reviewed: no, not with patient  Blood thinners: no, per family member  Labs: see epic  H&P:  See epic  Does patient require ?    no  If so, language?      services called to order ?    date requested:    arrival time requested:    Name of individual from  services assisting with scheduling appointment:    Previous sedation:  Last oral intake:    solid: ________  Liquids: _______

## 2019-10-30 NOTE — PROGRESS NOTES
Visit Date:   10/30/2019     ONCOLOGY HISTORY: Mr. Arredondo is a gentleman with pancreatic cancer.T3 N1 M0.  -Also has prostate cancer Swapnil 7 prostate.   1.  Prostate MRI on 07/20/2019 revealed PI-RADS 5.  No suspicious adenopathy or evidence of pelvic metastasis.   2.  Bone scan on 09/26/2019 does not reveal any bone metastasis.  There is some cardiac uptake.   3.  CT abdomen and pelvis on 09/26/2019 reveals new soft tissue mesenteric mass in the region of pancreatic tail.   4.  EUS on 10/07/2019 revealed an irregular mass in the pancreatic tail measuring 3.8 x 2.2 cm.  There was some evidence of invasion into the portal vein.  No lymphadenopathy seen.  Based on the EUS, it was T3 Nx disease.  FNA is positive for moderately differentiated pancreatic adenocarcinoma.   5. PET scan on 10/15/2019 reveals hypermetabolic 4.2 cm mass in the pancreatic tail and an adjacent 1 cm peripancreatic lymph node. No evidence of any metastatic disease. There are 2 foci of mild FDG uptake in the prostate gland from prostate cancer.      SUBJECTIVE:  Mr. Arredondo is an 79-year-old gentleman with resectable pancreatic cancer.  He was evaluated by Dr. Ryder in Surgery at [facility].  He is a resectable candidate.      The patient is here to discuss regarding neoadjuvant chemotherapy.  Overall, his condition is stable.  No headache or dizziness.  No chest pain or shortness of breath.  No abdominal pain, nausea or vomiting.  Appetite is fairly good.  No urinary or bowel complaint.  He has some fatigue.  No worsening of fatigue.      PHYSICAL EXAMINATION:   GENERAL:  He is alert, oriented x3.   VITAL SIGNS: Reviewed. ECOG PS of 1.   The rest of the systems not examined.      ASSESSMENT:    1.  An 79-year-old gentleman with resectable pancreatic tail adenocarcinoma.   2.  Swapnil 7 prostate cancer, on active surveillance.      PLAN:   1.  I had a long discussion with the patient and his wife.  Discussed regarding pancreatic  cancer.  He has resectable pancreatic cancer.  He recently had CT chest, abdomen and pelvis on 10/25/2019.  It again reveals a 5 cm distal pancreatic tail mass with 2 small adjacent metastatic lymph nodes.  No evidence of distant metastatic disease.     Discussed regarding neoadjuvant chemotherapy.  Different options discussed.  I would recommend gemcitabine and Abraxane.  Regimen was discussed.  Side effects including hair loss, thrush, oral ulcer, nausea, vomiting, gastric ulcer, fatigue, neuropathy, neutropenia, anemia, thrombocytopenia and other side effects were all discussed.  The patient is agreeable for chemotherapy.  He will be scheduled at [facility] as it is closer to him.       2.  Discussed regarding port placement.  He is agreeable for it.  That will be scheduled.     3.  He will see nurse practitioner at [facility].  I will see him in 4-6 weeks' time.  I advised him to call us with any questions or concerns.  Plan is to give him about 3 months of neoadjuvant chemotherapy followed by surgery followed by adjuvant chemotherapy.      TOTAL FACE-TO-FACE TIME SPENT:  45 minutes; more than 50% of the time was spent in counseling and coordination of care.         ROXY LANCASTER MD             D: 10/30/2019   T: 10/30/2019   MT: BRUNILDA      Name:     KARI YANG   MRN:      [id number]        Account:      [id number]   :      1940           Visit Date:   10/30/2019      Document: [id number]

## 2019-10-30 NOTE — Clinical Note
"    10/30/2019         RE: Lowell Arredondo  [address] [address]        Dear Colleague,    Thank you for referring your patient, Lowell Arredondo, to the [facility]. Please see a copy of my visit note below.    Oncology Rooming Note    October 30, 2019 9:29 AM   Lowell Arredondo is a 79 year old male who presents for:    Chief Complaint   Patient presents with     Oncology Clinic Visit     Initial Vitals: /66   Pulse 65   Resp 15   Wt 71.7 kg (158 lb)   SpO2 100%   BMI 24.03 kg/m    Estimated body mass index is 24.03 kg/m  as calculated from the following:    Height as of 10/25/19: 1.727 m (5' 7.99\").    Weight as of this encounter: 71.7 kg (158 lb). Body surface area is 1.85 meters squared.  No Pain (0) Comment: Data Unavailable   No LMP for male patient.  Allergies reviewed: Yes  Medications reviewed: Yes    Medications: Medication refills not needed today.  Pharmacy name entered into [employer]:    [id number] - [place], [place] - [address]  [id number] - [place], [place] - [address]    Clinical concerns:  doctor was notified.      Sarah Cifuentes, [facility]              Visit Date:   10/30/2019      SUBJECTIVE:  Mr. Arredondo is an 79-year-old gentleman with resectable pancreatic cancer.  He was evaluated by Dr. Ryder in Surgery at [facility].  He is a resectable candidate.      The patient is here to discuss regarding neoadjuvant chemotherapy.  Overall, his condition is stable.  No headache or dizziness.  No chest pain or shortness of breath.  No abdominal pain, nausea or vomiting.  Appetite fairly good.  No urinary or bowel complaint.  He has some fatigue.  No worsening of fatigue.      PHYSICAL EXAMINATION:   GENERAL:  He is alert, oriented x3.   VITAL SIGNS:  ECOG PS of 1.   The rest of the systems not examined.      ASSESSMENT:  An 79-year-old gentleman with:     1.  Resectable pancreatic tail " adenocarcinoma.   2.  [place] 7 prostate cancer, on active surveillance.      PLAN:   1.  I had a long discussion with the patient and his wife.  Discussed regarding pancreatic cancer.  He has resectable pancreatic cancer.  He recently had CT chest, abdomen and pelvis on 10/25/2019.  It again reveals a 5 cm distal pancreatic tail mass with 2 small adjacent metastatic lymph nodes.  No evidence of distant metastatic disease.  Discussed regarding neoadjuvant chemotherapy.  Different options discussed.  I would recommend gemcitabine and Abraxane.  Regimen was discussed.  Side effects including hair loss, thrush, oral ulcer, nausea, vomiting, gastric ulcer, fatigue, neuropathy, neutropenia, anemia, thrombocytopenia and other side effects were all discussed.  The patient is agreeable for chemotherapy.  He will be scheduled at [facility] as it is closer to him.     2.  Discussed regarding port placement.  He is agreeable for it.  That will be scheduled.   3.  He will see nurse practitioner at [facility].  I will see him in 4-6 weeks' time.  I advised him to call us with any questions or concerns.  Plan is to give him about 3 months of neoadjuvant chemotherapy followed by surgery followed by adjuvant chemotherapy.      TOTAL FACE-TO-FACE TIME SPENT:  45 minutes; more than 50% of the time was spent in counseling and coordination of care.         ROXY LANCASTER MD             D: 10/30/2019   T: 10/30/2019   MT: BRUNILDA      Name:     KARI YANG   MRN:      [id number]        Account:      [id number]   :      1940           Visit Date:   10/30/2019      Document: [id number]       Again, thank you for allowing me to participate in the care of your patient.        Sincerely,        Roxy Lancaster MD

## 2019-10-30 NOTE — PROGRESS NOTES
"Oncology Rooming Note    October 30, 2019 9:29 AM   Lowell Arredondo is a 79 year old male who presents for:    Chief Complaint   Patient presents with     Oncology Clinic Visit     Initial Vitals: /66   Pulse 65   Resp 15   Wt 71.7 kg (158 lb)   SpO2 100%   BMI 24.03 kg/m   Estimated body mass index is 24.03 kg/m  as calculated from the following:    Height as of 10/25/19: 1.727 m (5' 7.99\").    Weight as of this encounter: 71.7 kg (158 lb). Body surface area is 1.85 meters squared.  No Pain (0) Comment: Data Unavailable   No LMP for male patient.  Allergies reviewed: Yes  Medications reviewed: Yes    Medications: Medication refills not needed today.  Pharmacy name entered into [employer]:    [id number] [place], [place] - [address]  [id number] - [place], [place] - [address]    Clinical concerns:  doctor was notified.      Sarah Cifuentes CMA            "

## 2019-10-30 NOTE — PATIENT INSTRUCTIONS
1. Port-placement. (left message with [facility], they were with a patient, they said they will call the patient back.    2. Start chemotherapy next week at [facility]. Have NP see the patient when he is there to start chemotherapy. Unable to start at [facility]    -Side-effect of gemzar and abraxane.  3. See me in about 4-6 weeks. (schedule chemotherapy here when he sees me). Scheduled

## 2019-11-01 ENCOUNTER — APPOINTMENT (OUTPATIENT)
Dept: INTERVENTIONAL RADIOLOGY/VASCULAR | Facility: CLINIC | Age: 79
End: 2019-11-01
Attending: INTERNAL MEDICINE
Payer: COMMERCIAL

## 2019-11-01 ENCOUNTER — HOSPITAL ENCOUNTER (OUTPATIENT)
Facility: CLINIC | Age: 79
Discharge: HOME | End: 2019-11-01
Attending: INTERNAL MEDICINE | Admitting: INTERNAL MEDICINE
Payer: COMMERCIAL

## 2019-11-01 VITALS
RESPIRATION RATE: 16 BRPM | TEMPERATURE: 97.8 F | OXYGEN SATURATION: 99 % | HEART RATE: 60 BPM | SYSTOLIC BLOOD PRESSURE: 121 MMHG | DIASTOLIC BLOOD PRESSURE: 65 MMHG

## 2019-11-01 DIAGNOSIS — C25.2 MALIGNANT NEOPLASM OF TAIL OF PANCREAS (H): ICD-10-CM

## 2019-11-01 PROCEDURE — 27210820 ZZH WOUND GLUE CR3

## 2019-11-01 PROCEDURE — 99153 MOD SED SAME PHYS/QHP EA: CPT

## 2019-11-01 PROCEDURE — 77001 FLUOROGUIDE FOR VEIN DEVICE: CPT

## 2019-11-01 PROCEDURE — C1788 PORT, INDWELLING, IMP: HCPCS

## 2019-11-01 PROCEDURE — 25000128 H RX IP 250 OP 636

## 2019-11-01 PROCEDURE — 25000125 ZZHC RX 250

## 2019-11-01 PROCEDURE — C1769 GUIDE WIRE: HCPCS

## 2019-11-01 PROCEDURE — 99152 MOD SED SAME PHYS/QHP 5/>YRS: CPT

## 2019-11-01 PROCEDURE — 36561 INSERT TUNNELED CV CATH: CPT

## 2019-11-01 PROCEDURE — 76937 US GUIDE VASCULAR ACCESS: CPT

## 2019-11-01 PROCEDURE — 25000128 H RX IP 250 OP 636: Performed by: RADIOLOGY

## 2019-11-01 PROCEDURE — 27210908 ZZH NEEDLE CR4

## 2019-11-01 PROCEDURE — 25000125 ZZHC RX 250: Performed by: RADIOLOGY

## 2019-11-01 PROCEDURE — 25800030 ZZH RX IP 258 OP 636

## 2019-11-01 RX ORDER — CEFAZOLIN SODIUM 2 G/100ML
2 INJECTION, SOLUTION INTRAVENOUS
Status: DISCONTINUED | OUTPATIENT
Start: 2019-11-01 | End: 2019-11-01 | Stop reason: HOSPADM

## 2019-11-01 RX ORDER — LIDOCAINE HYDROCHLORIDE AND EPINEPHRINE 10; 10 MG/ML; UG/ML
INJECTION, SOLUTION INFILTRATION; PERINEURAL
Status: COMPLETED
Start: 2019-11-01 | End: 2019-11-01

## 2019-11-01 RX ORDER — LIDOCAINE HYDROCHLORIDE 10 MG/ML
INJECTION, SOLUTION EPIDURAL; INFILTRATION; INTRACAUDAL; PERINEURAL
Status: DISCONTINUED
Start: 2019-11-01 | End: 2019-11-01 | Stop reason: HOSPADM

## 2019-11-01 RX ORDER — NALOXONE HYDROCHLORIDE 0.4 MG/ML
.1-.4 INJECTION, SOLUTION INTRAMUSCULAR; INTRAVENOUS; SUBCUTANEOUS
Status: DISCONTINUED | OUTPATIENT
Start: 2019-11-01 | End: 2019-11-01 | Stop reason: HOSPADM

## 2019-11-01 RX ORDER — LIDOCAINE 40 MG/G
CREAM TOPICAL
Status: DISCONTINUED | OUTPATIENT
Start: 2019-11-01 | End: 2019-11-01 | Stop reason: HOSPADM

## 2019-11-01 RX ORDER — FLUMAZENIL 0.1 MG/ML
0.2 INJECTION, SOLUTION INTRAVENOUS
Status: DISCONTINUED | OUTPATIENT
Start: 2019-11-01 | End: 2019-11-01 | Stop reason: HOSPADM

## 2019-11-01 RX ORDER — NICOTINE POLACRILEX 4 MG
15-30 LOZENGE BUCCAL
Status: CANCELLED | OUTPATIENT
Start: 2019-11-01

## 2019-11-01 RX ORDER — DEXTROSE MONOHYDRATE 25 G/50ML
25-50 INJECTION, SOLUTION INTRAVENOUS
Status: CANCELLED | OUTPATIENT
Start: 2019-11-01

## 2019-11-01 RX ORDER — DEXTROSE MONOHYDRATE 25 G/50ML
25-50 INJECTION, SOLUTION INTRAVENOUS
Status: DISCONTINUED | OUTPATIENT
Start: 2019-11-01 | End: 2019-11-01 | Stop reason: HOSPADM

## 2019-11-01 RX ORDER — NICOTINE POLACRILEX 4 MG
15-30 LOZENGE BUCCAL
Status: DISCONTINUED | OUTPATIENT
Start: 2019-11-01 | End: 2019-11-01 | Stop reason: HOSPADM

## 2019-11-01 RX ORDER — FENTANYL CITRATE 50 UG/ML
25-50 INJECTION, SOLUTION INTRAMUSCULAR; INTRAVENOUS EVERY 5 MIN PRN
Status: DISCONTINUED | OUTPATIENT
Start: 2019-11-01 | End: 2019-11-01 | Stop reason: HOSPADM

## 2019-11-01 RX ORDER — HEPARIN SODIUM (PORCINE) LOCK FLUSH IV SOLN 100 UNIT/ML 100 UNIT/ML
SOLUTION INTRAVENOUS
Status: COMPLETED
Start: 2019-11-01 | End: 2019-11-01

## 2019-11-01 RX ORDER — FENTANYL CITRATE 50 UG/ML
INJECTION, SOLUTION INTRAMUSCULAR; INTRAVENOUS
Status: DISCONTINUED
Start: 2019-11-01 | End: 2019-11-01 | Stop reason: HOSPADM

## 2019-11-01 RX ORDER — CEFAZOLIN SODIUM 2 G/100ML
INJECTION, SOLUTION INTRAVENOUS
Status: COMPLETED
Start: 2019-11-01 | End: 2019-11-01

## 2019-11-01 RX ADMIN — FENTANYL CITRATE 75 MCG: 50 INJECTION, SOLUTION INTRAMUSCULAR; INTRAVENOUS at 09:44

## 2019-11-01 RX ADMIN — HEPARIN 500 UNITS: 100 SYRINGE at 10:08

## 2019-11-01 RX ADMIN — CEFAZOLIN SODIUM 2 G: 2 INJECTION, SOLUTION INTRAVENOUS at 09:44

## 2019-11-01 RX ADMIN — HEPARIN SODIUM 10000 UNITS: 10000 INJECTION INTRAVENOUS; SUBCUTANEOUS at 09:45

## 2019-11-01 RX ADMIN — MIDAZOLAM 1.5 MG: 1 INJECTION INTRAMUSCULAR; INTRAVENOUS at 09:45

## 2019-11-01 RX ADMIN — LIDOCAINE HYDROCHLORIDE 10 ML: 10 INJECTION, SOLUTION EPIDURAL; INFILTRATION; INTRACAUDAL; PERINEURAL at 09:48

## 2019-11-01 RX ADMIN — LIDOCAINE HYDROCHLORIDE,EPINEPHRINE BITARTRATE 5 ML: 10; .01 INJECTION, SOLUTION INFILTRATION; PERINEURAL at 10:03

## 2019-11-01 NOTE — IP AVS SNAPSHOT
[facility]  [address]  [facility] [address]  Phone:  [phone]                                    After Visit Summary   11/1/2019    Lowell Arredondo    MRN: [id number]           After Visit Summary Signature Page    I have received my discharge instructions, and my questions have been answered. I have discussed any challenges I see with this plan with the nurse or doctor.    ..........................................................................................................................................  Patient/Patient Representative Signature      ..........................................................................................................................................  Patient Representative Print Name and Relationship to Patient    ..................................................               ................................................  Date                                   Time    ..........................................................................................................................................  Reviewed by Signature/Title    ...................................................              ..............................................  Date                                               Time          22EPIC Rev 08/18

## 2019-11-01 NOTE — IP AVS SNAPSHOT
MRN:[id number]                      After Visit Summary   11/1/2019    Lowell Arredondo    MRN: [id number]           Visit Information        Department      11/1/2019  8:41 AM [facility]          Review of your medicines      UNREVIEWED medicines. Ask your doctor about these medicines       Dose / Directions   METAMUCIL 28.3 % Powd  Generic drug:  psyllium      Refills:  0     polyethylene glycol packet  Commonly known as:  MIRALAX/GLYCOLAX      Dose:  1 packet  Take 1 packet by mouth daily  Refills:  0     tamsulosin 80 mcg/mL Susp  Commonly known as:  FLOMAX      Dose:  0.4 mg  Take 0.4 mg by mouth  Refills:  0              Protect others around you: Learn how to safely use, store and throw away your medicines at www.disposemymeds.org.       Follow-ups after your visit       Your next 10 appointments already scheduled    Nov 07, 2019  9:00 AM CST  Return Visit with YAN Carranza CNP  [facility] ([facility]) [facility]  [address]  [facility] [address]  [phone]      Nov 07, 2019  9:00 AM CST  Level 3 with SH INFUSION CHAIR 8  [facility] ([facility]) [facility] [place] [id number]  [facility] [address]  [phone]      Nov 13, 2019 12:00 PM CST  Level 3 with RH INFUSION CHAIR 5  [facility] ([facility]) [facility] [place] [address]  [place] [address]  [phone]      Nov 20, 2019 12:30 PM CST  Level 3 with RH INFUSION CHAIR 1  [facility] ([facility]) [facility]  [address]  [place] [address]  [phone]      Dec 04, 2019  8:30 AM CST  Level 3 with SH INFUSION CHAIR 8  [facility] ([facility]) [facility]  [place]  [address]  [place] [address]  [phone]      Dec 04, 2019  9:40 AM CST  Return Visit with Diego Segura MD  [facility] ([facility]) [facility] [place]  [address]  [place] [address]  [phone]         Care Instructions       Further instructions from your care team         Going Home after Your Port Is Inserted  _______________________________________    Patient Name: Lowell Arredondo  Today's Date: November 1, 2019    The doctor who inserted your port was:   at [facility])      Have your port site and/or neck wound checked on:  Next week at chemo appointment.      Go to:  Interventional Radiology    Your port may be accessed right away. A nurse needs to flush your port every 30 days or after each use.     When you get home:    You should have an adult with you for the first 6 hours.    No driving or drinking alcohol until tomorrow. You may still have side effects from the medicine you received today (you may feel drowsy, unsteady or forgetful).     You may go back to your regular diet today.     If you take aspirin or Plavix, you may begin taking it again tomorrow. You may restart all other medicines today. Use pain medicine as directed.    Avoid heavy lifting or the overuse of your shoulder for three days.    Caring for the port site and/or neck wound:    Keep the port site clean and dry. Cover the site with plastic before taking a shower. Do this until the site has healed.     Keep the bandage on your port site for three days. Change it if it gets wet or dirty. After three days, you may use Band-Aids until the wound has healed.    For a neck wound, leave the tape on for three days. You may cover it with a Band-Aid for comfort.     If you have oozing or bleeding from the port site or from the cut in your neck:     Put direct pressure on the wound for 5 to 10 minutes with a gauze pad.  If you still  have bleeding after 10 minutes, call your doctor.    If you are bleeding a lot and can't control it with direct pressure, call 911.    Call your doctor at   if you have:    Bleeding from a wound after 10 minutes of direct pressure.    Swelling in your neck or over your port site.    Signs of infection: a fever over 100 F (37.8 C) under the tongue; the site is red, tender or draining.      Additional Information About Your Visit       MyChart Information    [facility] gives you secure access to your electronic health record. If you see a primary care provider, you can also send messages to your care team and make appointments. If you have questions, please call your primary care clinic.  If you do not have a primary care provider, please call [phone] and they will assist you.       Care EveryWhere ID    This is your Care EveryWhere ID. This could be used by other organizations to access your [place] medical records  [id number]       Your Vitals Were  Most recent update: 11/1/2019 10:15 AM    Blood Pressure   108/55    Pulse   60    Temperature   97.7  F (36.5  C) (Temporal)    Respirations   14    Pulse Oximetry   99%          Primary Care Provider Office Phone # Fax #    Dawson A MD Lau[place] [phone]      Equal Access to Services    TANNER KERR : Hadii rosario daltono Sosalud, waaxda luqadaha, qaybta kaalmada adeegyada, cherry hinton. So [facility] [phone].    ATENCIÓN: Si habla español, tiene a bhardwaj disposición servicios gratuitos de asistencia lingüística. Llame al [phone].    We comply with applicable federal civil rights laws and [place] laws. We do not discriminate on the basis of race, color, national origin, age, disability, sex, sexual orientation, or gender identity.           Thank you!    Thank you for choosing [facility] for your care. Our goal is always to provide you with excellent care. Hearing back from our patients is one way  we can continue to improve our services. Please take a few minutes to complete the written survey that you may receive in the mail after you visit. If you would like to speak to someone directly about your visit please contact Patient Relations at [phone]. Thank you!              Medication List      ASK your doctor about these medications          Morning Afternoon Evening Bedtime As Needed    METAMUCIL 28.3 % Powd  Generic drug:  psyllium                     polyethylene glycol packet  Also known as:  MIRALAX/GLYCOLAX  INSTRUCTIONS:  Take 1 packet by mouth daily                     tamsulosin 80 mcg/mL Susp  Also known as:  FLOMAX  INSTRUCTIONS:  Take 0.4 mg by mouth

## 2019-11-01 NOTE — IR NOTE
RADIOLOGY POST PROCEDURE NOTE w/ SEDATION  Patient name: Lowell Arredondo  MRN: [id number]  : 1940    Pre-procedure diagnosis: prostate cancer  Post-procedure diagnosis: Same    Procedure Date/Time: 2019  9:16 AM  Procedure: port  Estimated blood loss: None  Specimen(s) collected with description: none    I determined this patient to be an appropriate candidate for the planned sedation and procedure and reassessed the patient IMMEDIATELY PRIOR to sedation and procedure.     The patient tolerated the procedure well with no immediate complications.  Significant findings:none    See imaging dictation for procedural details.    Provider name: Baudilio Loco MD  Assistant(s):None

## 2019-11-01 NOTE — DISCHARGE INSTRUCTIONS
Going Home after Your Port Is Inserted  _______________________________________    Patient Name: Lowell Arredondo  Today's Date: November 1, 2019    The doctor who inserted your port was:   at [facility])      Have your port site and/or neck wound checked on:  Next week at chemo appointment.      Go to:  Interventional Radiology    Your port may be accessed right away. A nurse needs to flush your port every 30 days or after each use.     When you get home:    You should have an adult with you for the first 6 hours.    No driving or drinking alcohol until tomorrow. You may still have side effects from the medicine you received today (you may feel drowsy, unsteady or forgetful).     You may go back to your regular diet today.     If you take aspirin or Plavix, you may begin taking it again tomorrow. You may restart all other medicines today. Use pain medicine as directed.    Avoid heavy lifting or the overuse of your shoulder for three days.    Caring for the port site and/or neck wound:    Keep the port site clean and dry. Cover the site with plastic before taking a shower. Do this until the site has healed.     Keep the bandage on your port site for three days. Change it if it gets wet or dirty. After three days, you may use Band-Aids until the wound has healed.    For a neck wound, leave the tape on for three days. You may cover it with a Band-Aid for comfort.     If you have oozing or bleeding from the port site or from the cut in your neck:     Put direct pressure on the wound for 5 to 10 minutes with a gauze pad.  If you still have bleeding after 10 minutes, call your doctor.    If you are bleeding a lot and can't control it with direct pressure, call 911.    Call your doctor at   if you have:    Bleeding from a wound after 10 minutes of direct pressure.    Swelling in your neck or over your port site.    Signs of infection: a fever over 100 F (37.8 C) under the tongue; the site is red, tender  or draining.

## 2019-11-06 ENCOUNTER — TELEPHONE (OUTPATIENT)
Dept: PHARMACY | Facility: CLINIC | Age: 79
End: 2019-11-06

## 2019-11-06 RX ORDER — LORAZEPAM 0.5 MG/1
0.5 TABLET ORAL EVERY 4 HOURS PRN
Qty: 30 TABLET | Refills: 2 | Status: CANCELLED | OUTPATIENT
Start: 2019-11-06

## 2019-11-06 RX ORDER — LORAZEPAM 0.5 MG/1
0.5 TABLET ORAL EVERY 4 HOURS PRN
Qty: 30 TABLET | Refills: 2 | Status: SHIPPED | OUTPATIENT
Start: 2019-11-06 | End: 2020-04-24

## 2019-11-06 RX ORDER — PROCHLORPERAZINE MALEATE 10 MG
10 TABLET ORAL EVERY 6 HOURS PRN
Qty: 30 TABLET | Refills: 2 | Status: SHIPPED | OUTPATIENT
Start: 2019-11-06 | End: 2020-04-24

## 2019-11-06 RX ORDER — PROCHLORPERAZINE MALEATE 10 MG
10 TABLET ORAL EVERY 6 HOURS PRN
Qty: 30 TABLET | Refills: 2 | Status: CANCELLED | OUTPATIENT
Start: 2019-11-06

## 2019-11-07 ENCOUNTER — HOSPITAL ENCOUNTER (OUTPATIENT)
Facility: CLINIC | Age: 79
Setting detail: SPECIMEN
Discharge: HOME | End: 2019-11-07
Attending: INTERNAL MEDICINE | Admitting: INTERNAL MEDICINE
Payer: COMMERCIAL

## 2019-11-07 ENCOUNTER — INFUSION THERAPY VISIT (OUTPATIENT)
Dept: INFUSION THERAPY | Facility: CLINIC | Age: 79
End: 2019-11-07
Attending: NURSE PRACTITIONER
Payer: COMMERCIAL

## 2019-11-07 ENCOUNTER — ONCOLOGY VISIT (OUTPATIENT)
Dept: ONCOLOGY | Facility: CLINIC | Age: 79
End: 2019-11-07
Attending: NURSE PRACTITIONER
Payer: COMMERCIAL

## 2019-11-07 VITALS
OXYGEN SATURATION: 99 % | TEMPERATURE: 97.8 F | RESPIRATION RATE: 20 BRPM | BODY MASS INDEX: 24.24 KG/M2 | SYSTOLIC BLOOD PRESSURE: 119 MMHG | DIASTOLIC BLOOD PRESSURE: 68 MMHG | WEIGHT: 159.4 LBS | HEART RATE: 69 BPM

## 2019-11-07 VITALS
TEMPERATURE: 97.5 F | OXYGEN SATURATION: 99 % | WEIGHT: 159.39 LBS | HEART RATE: 69 BPM | RESPIRATION RATE: 16 BRPM | BODY MASS INDEX: 24.16 KG/M2 | HEIGHT: 68 IN | SYSTOLIC BLOOD PRESSURE: 105 MMHG | DIASTOLIC BLOOD PRESSURE: 64 MMHG

## 2019-11-07 DIAGNOSIS — Z95.828 PORT-A-CATH IN PLACE: ICD-10-CM

## 2019-11-07 DIAGNOSIS — C25.2 MALIGNANT NEOPLASM OF TAIL OF PANCREAS (H): Primary | ICD-10-CM

## 2019-11-07 LAB
ALBUMIN SERPL-MCNC: 3.4 G/DL (ref 3.4–5)
ALP SERPL-CCNC: 59 U/L (ref 40–150)
ALT SERPL W P-5'-P-CCNC: 16 U/L (ref 0–70)
ANION GAP SERPL CALCULATED.3IONS-SCNC: 4 MMOL/L (ref 3–14)
AST SERPL W P-5'-P-CCNC: 15 U/L (ref 0–45)
BASOPHILS # BLD AUTO: 0 10E9/L (ref 0–0.2)
BASOPHILS NFR BLD AUTO: 0.6 %
BILIRUB SERPL-MCNC: 0.5 MG/DL (ref 0.2–1.3)
BUN SERPL-MCNC: 21 MG/DL (ref 7–30)
CALCIUM SERPL-MCNC: 8.6 MG/DL (ref 8.5–10.1)
CHLORIDE SERPL-SCNC: 109 MMOL/L (ref 94–109)
CO2 SERPL-SCNC: 28 MMOL/L (ref 20–32)
CREAT SERPL-MCNC: 0.95 MG/DL (ref 0.66–1.25)
DIFFERENTIAL METHOD BLD: ABNORMAL
EOSINOPHIL # BLD AUTO: 0.2 10E9/L (ref 0–0.7)
EOSINOPHIL NFR BLD AUTO: 4.4 %
ERYTHROCYTE [DISTWIDTH] IN BLOOD BY AUTOMATED COUNT: 14.4 % (ref 10–15)
GFR SERPL CREATININE-BSD FRML MDRD: 74 ML/MIN/{1.73_M2}
GLUCOSE SERPL-MCNC: 110 MG/DL (ref 70–99)
HCT VFR BLD AUTO: 39.4 % (ref 40–53)
HGB BLD-MCNC: 13.1 G/DL (ref 13.3–17.7)
IMM GRANULOCYTES # BLD: 0 10E9/L (ref 0–0.4)
IMM GRANULOCYTES NFR BLD: 0.2 %
LYMPHOCYTES # BLD AUTO: 1.9 10E9/L (ref 0.8–5.3)
LYMPHOCYTES NFR BLD AUTO: 37.8 %
MCH RBC QN AUTO: 30.6 PG (ref 26.5–33)
MCHC RBC AUTO-ENTMCNC: 33.2 G/DL (ref 31.5–36.5)
MCV RBC AUTO: 92 FL (ref 78–100)
MONOCYTES # BLD AUTO: 0.5 10E9/L (ref 0–1.3)
MONOCYTES NFR BLD AUTO: 9 %
NEUTROPHILS # BLD AUTO: 2.4 10E9/L (ref 1.6–8.3)
NEUTROPHILS NFR BLD AUTO: 48 %
NRBC # BLD AUTO: 0 10*3/UL
NRBC BLD AUTO-RTO: 0 /100
PLATELET # BLD AUTO: 158 10E9/L (ref 150–450)
POTASSIUM SERPL-SCNC: 4.2 MMOL/L (ref 3.4–5.3)
PROT SERPL-MCNC: 6.9 G/DL (ref 6.8–8.8)
RBC # BLD AUTO: 4.28 10E12/L (ref 4.4–5.9)
SODIUM SERPL-SCNC: 141 MMOL/L (ref 133–144)
WBC # BLD AUTO: 5 10E9/L (ref 4–11)

## 2019-11-07 PROCEDURE — 25000128 H RX IP 250 OP 636: Performed by: NURSE PRACTITIONER

## 2019-11-07 PROCEDURE — 96417 CHEMO IV INFUS EACH ADDL SEQ: CPT

## 2019-11-07 PROCEDURE — 99213 OFFICE O/P EST LOW 20 MIN: CPT | Performed by: NURSE PRACTITIONER

## 2019-11-07 PROCEDURE — 96367 TX/PROPH/DG ADDL SEQ IV INF: CPT

## 2019-11-07 PROCEDURE — 80053 COMPREHEN METABOLIC PANEL: CPT | Performed by: INTERNAL MEDICINE

## 2019-11-07 PROCEDURE — 86301 IMMUNOASSAY TUMOR CA 19-9: CPT | Performed by: INTERNAL MEDICINE

## 2019-11-07 PROCEDURE — 25000128 H RX IP 250 OP 636: Performed by: INTERNAL MEDICINE

## 2019-11-07 PROCEDURE — 25800030 ZZH RX IP 258 OP 636: Performed by: INTERNAL MEDICINE

## 2019-11-07 PROCEDURE — 96413 CHEMO IV INFUSION 1 HR: CPT

## 2019-11-07 PROCEDURE — 85025 COMPLETE CBC W/AUTO DIFF WBC: CPT | Performed by: INTERNAL MEDICINE

## 2019-11-07 RX ORDER — HEPARIN SODIUM (PORCINE) LOCK FLUSH IV SOLN 100 UNIT/ML 100 UNIT/ML
5 SOLUTION INTRAVENOUS
Status: CANCELLED | OUTPATIENT
Start: 2019-11-07

## 2019-11-07 RX ORDER — HEPARIN SODIUM,PORCINE 10 UNIT/ML
5 VIAL (ML) INTRAVENOUS
Status: CANCELLED | OUTPATIENT
Start: 2019-11-07

## 2019-11-07 RX ORDER — PACLITAXEL 100 MG/20ML
125 INJECTION, POWDER, LYOPHILIZED, FOR SUSPENSION INTRAVENOUS ONCE
Status: COMPLETED | OUTPATIENT
Start: 2019-11-07 | End: 2019-11-07

## 2019-11-07 RX ORDER — HEPARIN SODIUM (PORCINE) LOCK FLUSH IV SOLN 100 UNIT/ML 100 UNIT/ML
5 SOLUTION INTRAVENOUS
Status: DISCONTINUED | OUTPATIENT
Start: 2019-11-07 | End: 2019-11-07 | Stop reason: HOSPADM

## 2019-11-07 RX ADMIN — HEPARIN SODIUM (PORCINE) LOCK FLUSH IV SOLN 100 UNIT/ML 5 ML: 100 SOLUTION at 13:20

## 2019-11-07 RX ADMIN — SODIUM CHLORIDE 250 ML: 9 INJECTION, SOLUTION INTRAVENOUS at 10:46

## 2019-11-07 RX ADMIN — PACLITAXEL 231 MG: 100 INJECTION, POWDER, LYOPHILIZED, FOR SUSPENSION INTRAVENOUS at 11:49

## 2019-11-07 RX ADMIN — GEMCITABINE 1800 MG: 38 INJECTION, SOLUTION INTRAVENOUS at 12:45

## 2019-11-07 RX ADMIN — DEXAMETHASONE SODIUM PHOSPHATE 12 MG: 10 INJECTION, SOLUTION INTRAMUSCULAR; INTRAVENOUS at 10:46

## 2019-11-07 ASSESSMENT — PAIN SCALES - GENERAL
PAINLEVEL: NO PAIN (0)
PAINLEVEL: NO PAIN (0)

## 2019-11-07 ASSESSMENT — MIFFLIN-ST. JEOR: SCORE: 1402.34

## 2019-11-07 NOTE — PROGRESS NOTES
"Oncology Rooming Note    November 7, 2019 9:36 AM   Lowell Arredondo is a 79 year old male who presents for:    Chief Complaint   Patient presents with     Oncology Clinic Visit     Initial Vitals: /64   Pulse 69   Temp 97.5  F (36.4  C) (Oral)   Resp 16   Ht 1.727 m (5' 7.99\")   Wt 72.3 kg (159 lb 6.3 oz)   SpO2 99%   BMI 24.24 kg/m   Estimated body mass index is 24.24 kg/m  as calculated from the following:    Height as of this encounter: 1.727 m (5' 7.99\").    Weight as of this encounter: 72.3 kg (159 lb 6.3 oz). Body surface area is 1.86 meters squared.  No Pain (0) Comment: Data Unavailable   No LMP for male patient.  Allergies reviewed: Yes  Medications reviewed: Yes    Medications: Medication refills not needed today.  Pharmacy name entered into [facility]:    [id number] - [place], [place] - [address]  [id number] [place] - [address]    Clinical concerns: no      Amber Gomez CMA            "

## 2019-11-07 NOTE — LETTER
"    11/7/2019         RE: Lowell Arredondo  [address] [address]        Dear Colleague,    Thank you for referring your patient, Lowell Arredondo, to the [facility]. Please see a copy of my visit note below.    Oncology Rooming Note    November 7, 2019 9:36 AM   Lowell Arredondo is a 79 year old male who presents for:    Chief Complaint   Patient presents with     Oncology Clinic Visit     Initial Vitals: /64   Pulse 69   Temp 97.5  F (36.4  C) (Oral)   Resp 16   Ht 1.727 m (5' 7.99\")   Wt 72.3 kg (159 lb 6.3 oz)   SpO2 99%   BMI 24.24 kg/m    Estimated body mass index is 24.24 kg/m  as calculated from the following:    Height as of this encounter: 1.727 m (5' 7.99\").    Weight as of this encounter: 72.3 kg (159 lb 6.3 oz). Body surface area is 1.86 meters squared.  No Pain (0) Comment: Data Unavailable   No LMP for male patient.  Allergies reviewed: Yes  Medications reviewed: Yes    Medications: Medication refills not needed today.  Pharmacy name entered into [facility]:    [id number] - [place], [place] - [address]  [id number] - [place], [place] - [address]    Clinical concerns: no      Amber Gomez CMA              Oncology/Hematology Visit Note  Nov 7, 2019    Reason for Visit: follow up of pancreatic cancer  -Cancer is resectable.  Patient here to start neoadjuvant chemotherapy with gemcitabine and Abraxane      Interval History:  he reports feeling overall well.  He denies pain denies nausea vomiting diarrhea denies abdominal pain denies bleeding.  Denies fever chills sweats denies cough no shortness of breath denies chest pain  Energy and appetite are good    Review of Systems:  14 point ROS of systems including Constitutional, Eyes, Respiratory, Cardiovascular, Gastroenterology, Genitourinary, Integumentary, Muscularskeletal, Psychiatric were all negative except for pertinent positives noted in " "my HPI.      Current Outpatient Medications   Medication Sig Dispense Refill     LORazepam (ATIVAN) 0.5 MG tablet Take 1 tablet (0.5 mg) by mouth every 4 hours as needed (Anxiety, Nausea/Vomiting or Sleep) 30 tablet 2     polyethylene glycol (MIRALAX/GLYCOLAX) packet Take 1 packet by mouth daily       prochlorperazine (COMPAZINE) 10 MG tablet Take 1 tablet (10 mg) by mouth every 6 hours as needed (Nausea/Vomiting) 30 tablet 2     psyllium (METAMUCIL) 28.3 % POWD        tamsulosin (FLOMAX) 80 mcg/mL Take 0.4 mg by mouth         Physical Examination:  General: The patient is a pleasant male in no acute distress.  /64   Pulse 69   Temp 97.5  F (36.4  C) (Oral)   Resp 16   Ht 1.727 m (5' 7.99\")   Wt 72.3 kg (159 lb 6.3 oz)   SpO2 99%   BMI 24.24 kg/m     HEENT: EOMI, PERRL. Sclerae are anicteric. Oral mucosa is pink and moist with no lesions or thrush.   Lymph: Neck is supple with no lymphadenopathy in the cervical or supraclavicular areas.   Heart: Regular rate and rhythm.   Lungs: Clear to auscultation bilaterally.   GI: Bowel sounds present, soft, nontender with no palpable hepatosplenomegaly or masses.   Extremities: No lower extremity edema noted bilaterally.   Skin: No rashes, petechiae, or bruising noted on exposed skin.    Laboratory Data:  Results for orders placed or performed in visit on 11/07/19 (from the past 24 hour(s))   CBC with platelets differential   Result Value Ref Range    WBC 5.0 4.0 - 11.0 10e9/L    RBC Count 4.28 (L) 4.4 - 5.9 10e12/L    Hemoglobin 13.1 (L) 13.3 - 17.7 g/dL    Hematocrit 39.4 (L) 40.0 - 53.0 %    MCV 92 78 - 100 fl    MCH 30.6 26.5 - 33.0 pg    MCHC 33.2 31.5 - 36.5 g/dL    RDW 14.4 10.0 - 15.0 %    Platelet Count 158 150 - 450 10e9/L    Diff Method Automated Method     % Neutrophils 48.0 %    % Lymphocytes 37.8 %    % Monocytes 9.0 %    % Eosinophils 4.4 %    % Basophils 0.6 %    % Immature Granulocytes 0.2 %    Nucleated RBCs 0 0 /100    Absolute Neutrophil 2.4 " 1.6 - 8.3 10e9/L    Absolute Lymphocytes 1.9 0.8 - 5.3 10e9/L    Absolute Monocytes 0.5 0.0 - 1.3 10e9/L    Absolute Eosinophils 0.2 0.0 - 0.7 10e9/L    Absolute Basophils 0.0 0.0 - 0.2 10e9/L    Abs Immature Granulocytes 0.0 0 - 0.4 10e9/L    Absolute Nucleated RBC 0.0    Comprehensive metabolic panel   Result Value Ref Range    Sodium 141 133 - 144 mmol/L    Potassium 4.2 3.4 - 5.3 mmol/L    Chloride 109 94 - 109 mmol/L    Carbon Dioxide 28 20 - 32 mmol/L    Anion Gap 4 3 - 14 mmol/L    Glucose 110 (H) 70 - 99 mg/dL    Urea Nitrogen 21 7 - 30 mg/dL    Creatinine 0.95 0.66 - 1.25 mg/dL    GFR Estimate 74 >60 mL/min/[1.73_m2]    GFR Estimate If Black 86 >60 mL/min/[1.73_m2]    Calcium 8.6 8.5 - 10.1 mg/dL    Bilirubin Total 0.5 0.2 - 1.3 mg/dL    Albumin 3.4 3.4 - 5.0 g/dL    Protein Total 6.9 6.8 - 8.8 g/dL    Alkaline Phosphatase 59 40 - 150 U/L    ALT 16 0 - 70 U/L    AST 15 0 - 45 U/L         Assessment and Plan:      This is a 79-year-old male with     pancreatic cancer  -Cancer is resectable.  Patient here to start neoadjuvant chemotherapy with gemcitabine and Abraxane  -Side effects of the treatment reviewed with patient.  Patient agreeable to proceed with treatment  -Plan is to be given 3 months of neoadjuvant therapy followed by surgery and adjuvant chemotherapy  Labs reviewed okay to proceed with chemotherapy  -Schedule with me next week at [facility]      Prostate cancer patient is on surveillance now      Maintenance  Patient had flu shot    Patient is advised to call our clinic or go to ER in the event of fever chills sweats cough shortness of breath chest pain nausea vomiting diarrhea abdominal pain bleeding or any changes in health condition      YAN Carranza [facility]     Chart documentation with Dragon Voice recognition Software. Although reviewed after completion, some words and grammatical errors may remain.          Again, thank you for allowing me to  participate in the care of your patient.        Sincerely,        YAN Carranza CNP

## 2019-11-07 NOTE — PROGRESS NOTES
Infusion Nursing Note:  Lowell Arredondo presents today for C1D1 Abraxane, Gemzar.    Patient seen by provider today: Yes: Gerson Massey NP   present during visit today: Not Applicable.    Note: pt here with spouse for cycle 1 day 1 of chemo. Pt oriented to clinic/infusion area/visit process and timing..    BSA 1.86  Intravenous Access:  Implanted Port.    Treatment Conditions:  Lab Results   Component Value Date    HGB 13.1 11/07/2019     Lab Results   Component Value Date    WBC 5.0 11/07/2019      Lab Results   Component Value Date    ANEU 2.4 11/07/2019     Lab Results   Component Value Date     11/07/2019      Lab Results   Component Value Date     11/07/2019                   Lab Results   Component Value Date    POTASSIUM 4.2 11/07/2019           No results found for: MAG         Lab Results   Component Value Date    CR 0.95 11/07/2019                   Lab Results   Component Value Date    VICENTE 8.6 11/07/2019                Lab Results   Component Value Date    BILITOTAL 0.5 11/07/2019           Lab Results   Component Value Date    ALBUMIN 3.4 11/07/2019                    Lab Results   Component Value Date    ALT 16 11/07/2019           Lab Results   Component Value Date    AST 15 11/07/2019       Results reviewed, labs MET treatment parameters, ok to proceed with treatment.      Post Infusion Assessment:  Patient tolerated infusion without incident.  Blood return noted pre and post infusion.  Site patent and intact, free from redness, edema or discomfort.  No evidence of extravasations.  Access discontinued per protocol.       Discharge Plan:   Discharge instructions reviewed with: Patient and Family.  Patient and/or family verbalized understanding of discharge instructions and all questions answered.  Copy of AVS reviewed with patient and/or family.  Patient will return as scheduled for next appointment.  Patient discharged in stable condition accompanied by: wife.  Departure Mode:  Ambulatory.    Lenka Chavez RN

## 2019-11-07 NOTE — PROGRESS NOTES
"Oncology/Hematology Visit Note  Nov 7, 2019    Reason for Visit: follow up of pancreatic cancer  -Cancer is resectable.  Patient here to start neoadjuvant chemotherapy with gemcitabine and Abraxane      Interval History:  he reports feeling overall well.  He denies pain denies nausea vomiting diarrhea denies abdominal pain denies bleeding.  Denies fever chills sweats denies cough no shortness of breath denies chest pain  Energy and appetite are good    Review of Systems:  14 point ROS of systems including Constitutional, Eyes, Respiratory, Cardiovascular, Gastroenterology, Genitourinary, Integumentary, Muscularskeletal, Psychiatric were all negative except for pertinent positives noted in my HPI.      Current Outpatient Medications   Medication Sig Dispense Refill     LORazepam (ATIVAN) 0.5 MG tablet Take 1 tablet (0.5 mg) by mouth every 4 hours as needed (Anxiety, Nausea/Vomiting or Sleep) 30 tablet 2     polyethylene glycol (MIRALAX/GLYCOLAX) packet Take 1 packet by mouth daily       prochlorperazine (COMPAZINE) 10 MG tablet Take 1 tablet (10 mg) by mouth every 6 hours as needed (Nausea/Vomiting) 30 tablet 2     psyllium (METAMUCIL) 28.3 % POWD        tamsulosin (FLOMAX) 80 mcg/mL Take 0.4 mg by mouth         Physical Examination:  General: The patient is a pleasant male in no acute distress.  /64   Pulse 69   Temp 97.5  F (36.4  C) (Oral)   Resp 16   Ht 1.727 m (5' 7.99\")   Wt 72.3 kg (159 lb 6.3 oz)   SpO2 99%   BMI 24.24 kg/m    HEENT: EOMI, PERRL. Sclerae are anicteric. Oral mucosa is pink and moist with no lesions or thrush.   Lymph: Neck is supple with no lymphadenopathy in the cervical or supraclavicular areas.   Heart: Regular rate and rhythm.   Lungs: Clear to auscultation bilaterally.   GI: Bowel sounds present, soft, nontender with no palpable hepatosplenomegaly or masses.   Extremities: No lower extremity edema noted bilaterally.   Skin: No rashes, petechiae, or bruising noted on exposed " skin.    Laboratory Data:  Results for orders placed or performed in visit on 11/07/19 (from the past 24 hour(s))   CBC with platelets differential   Result Value Ref Range    WBC 5.0 4.0 - 11.0 10e9/L    RBC Count 4.28 (L) 4.4 - 5.9 10e12/L    Hemoglobin 13.1 (L) 13.3 - 17.7 g/dL    Hematocrit 39.4 (L) 40.0 - 53.0 %    MCV 92 78 - 100 fl    MCH 30.6 26.5 - 33.0 pg    MCHC 33.2 31.5 - 36.5 g/dL    RDW 14.4 10.0 - 15.0 %    Platelet Count 158 150 - 450 10e9/L    Diff Method Automated Method     % Neutrophils 48.0 %    % Lymphocytes 37.8 %    % Monocytes 9.0 %    % Eosinophils 4.4 %    % Basophils 0.6 %    % Immature Granulocytes 0.2 %    Nucleated RBCs 0 0 /100    Absolute Neutrophil 2.4 1.6 - 8.3 10e9/L    Absolute Lymphocytes 1.9 0.8 - 5.3 10e9/L    Absolute Monocytes 0.5 0.0 - 1.3 10e9/L    Absolute Eosinophils 0.2 0.0 - 0.7 10e9/L    Absolute Basophils 0.0 0.0 - 0.2 10e9/L    Abs Immature Granulocytes 0.0 0 - 0.4 10e9/L    Absolute Nucleated RBC 0.0    Comprehensive metabolic panel   Result Value Ref Range    Sodium 141 133 - 144 mmol/L    Potassium 4.2 3.4 - 5.3 mmol/L    Chloride 109 94 - 109 mmol/L    Carbon Dioxide 28 20 - 32 mmol/L    Anion Gap 4 3 - 14 mmol/L    Glucose 110 (H) 70 - 99 mg/dL    Urea Nitrogen 21 7 - 30 mg/dL    Creatinine 0.95 0.66 - 1.25 mg/dL    GFR Estimate 74 >60 mL/min/[1.73_m2]    GFR Estimate If Black 86 >60 mL/min/[1.73_m2]    Calcium 8.6 8.5 - 10.1 mg/dL    Bilirubin Total 0.5 0.2 - 1.3 mg/dL    Albumin 3.4 3.4 - 5.0 g/dL    Protein Total 6.9 6.8 - 8.8 g/dL    Alkaline Phosphatase 59 40 - 150 U/L    ALT 16 0 - 70 U/L    AST 15 0 - 45 U/L         Assessment and Plan:      This is a 79-year-old male with     pancreatic cancer  -Cancer is resectable.  Patient here to start neoadjuvant chemotherapy with gemcitabine and Abraxane  -Side effects of the treatment reviewed with patient.  Patient agreeable to proceed with treatment  -Plan is to be given 3 months of neoadjuvant therapy  followed by surgery and adjuvant chemotherapy  Labs reviewed okay to proceed with chemotherapy  -Schedule with me next week at [facility]      Prostate cancer patient is on surveillance now      Maintenance  Patient had flu shot    Patient is advised to call our clinic or go to ER in the event of fever chills sweats cough shortness of breath chest pain nausea vomiting diarrhea abdominal pain bleeding or any changes in health condition      YAN Carranza [facility] [place]     Chart documentation with Dragon Voice recognition Software. Although reviewed after completion, some words and grammatical errors may remain.

## 2019-11-08 ENCOUNTER — PRE VISIT (OUTPATIENT)
Dept: SURGERY | Facility: CLINIC | Age: 79
End: 2019-11-08

## 2019-11-08 ENCOUNTER — PATIENT OUTREACH (OUTPATIENT)
Dept: ONCOLOGY | Facility: CLINIC | Age: 79
End: 2019-11-08

## 2019-11-08 LAB — CANCER AG19-9 SERPL-ACNC: 2492 U/ML (ref 0–37)

## 2019-11-08 NOTE — PROGRESS NOTES
"I called patient to follow up on treatment of C1D1 Gemzar and Abraxane received on 11/7. Patient report feeling well . He is doing his oil paintings and eating and drinking well \" it's just a normal day\".      Patient advised to call with a fever, uncontrolled, nausea, diarrhea, or vomiting.    Patient verbalized understanding.    Kathe Coles RN    "

## 2019-11-13 ENCOUNTER — HOSPITAL ENCOUNTER (OUTPATIENT)
Facility: CLINIC | Age: 79
Setting detail: SPECIMEN
Discharge: HOME | End: 2019-11-13
Attending: NURSE PRACTITIONER | Admitting: INTERNAL MEDICINE
Payer: COMMERCIAL

## 2019-11-13 ENCOUNTER — HOSPITAL ENCOUNTER (OUTPATIENT)
Facility: CLINIC | Age: 79
Setting detail: SPECIMEN
End: 2019-11-13
Attending: NURSE PRACTITIONER
Payer: COMMERCIAL

## 2019-11-13 ENCOUNTER — INFUSION THERAPY VISIT (OUTPATIENT)
Dept: INFUSION THERAPY | Facility: CLINIC | Age: 79
End: 2019-11-13
Attending: NURSE PRACTITIONER
Payer: COMMERCIAL

## 2019-11-13 ENCOUNTER — ONCOLOGY VISIT (OUTPATIENT)
Dept: ONCOLOGY | Facility: CLINIC | Age: 79
End: 2019-11-13
Attending: NURSE PRACTITIONER
Payer: COMMERCIAL

## 2019-11-13 ENCOUNTER — INFUSION THERAPY VISIT (OUTPATIENT)
Dept: INFUSION THERAPY | Facility: CLINIC | Age: 79
End: 2019-11-13
Attending: INTERNAL MEDICINE
Payer: COMMERCIAL

## 2019-11-13 VITALS
RESPIRATION RATE: 14 BRPM | DIASTOLIC BLOOD PRESSURE: 65 MMHG | SYSTOLIC BLOOD PRESSURE: 102 MMHG | TEMPERATURE: 96.6 F | WEIGHT: 157.2 LBS | BODY MASS INDEX: 23.91 KG/M2 | HEART RATE: 83 BPM | OXYGEN SATURATION: 99 %

## 2019-11-13 VITALS — DIASTOLIC BLOOD PRESSURE: 82 MMHG | SYSTOLIC BLOOD PRESSURE: 124 MMHG

## 2019-11-13 DIAGNOSIS — Z95.828 PORT-A-CATH IN PLACE: ICD-10-CM

## 2019-11-13 DIAGNOSIS — C25.2 MALIGNANT NEOPLASM OF TAIL OF PANCREAS (H): Primary | ICD-10-CM

## 2019-11-13 LAB
BASOPHILS # BLD AUTO: 0 10E9/L (ref 0–0.2)
BASOPHILS NFR BLD AUTO: 0.5 %
DIFFERENTIAL METHOD BLD: ABNORMAL
EOSINOPHIL # BLD AUTO: 0.1 10E9/L (ref 0–0.7)
EOSINOPHIL NFR BLD AUTO: 3.1 %
ERYTHROCYTE [DISTWIDTH] IN BLOOD BY AUTOMATED COUNT: 13.7 % (ref 10–15)
HCT VFR BLD AUTO: 38.2 % (ref 40–53)
HGB BLD-MCNC: 12.4 G/DL (ref 13.3–17.7)
IMM GRANULOCYTES # BLD: 0 10E9/L (ref 0–0.4)
IMM GRANULOCYTES NFR BLD: 0 %
LYMPHOCYTES # BLD AUTO: 2.1 10E9/L (ref 0.8–5.3)
LYMPHOCYTES NFR BLD AUTO: 49.6 %
MCH RBC QN AUTO: 30.4 PG (ref 26.5–33)
MCHC RBC AUTO-ENTMCNC: 32.5 G/DL (ref 31.5–36.5)
MCV RBC AUTO: 94 FL (ref 78–100)
MONOCYTES # BLD AUTO: 0.2 10E9/L (ref 0–1.3)
MONOCYTES NFR BLD AUTO: 3.6 %
NEUTROPHILS # BLD AUTO: 1.8 10E9/L (ref 1.6–8.3)
NEUTROPHILS NFR BLD AUTO: 43.2 %
NRBC # BLD AUTO: 0 10*3/UL
NRBC BLD AUTO-RTO: 0 /100
PLATELET # BLD AUTO: 142 10E9/L (ref 150–450)
RBC # BLD AUTO: 4.08 10E12/L (ref 4.4–5.9)
WBC # BLD AUTO: 4.2 10E9/L (ref 4–11)

## 2019-11-13 PROCEDURE — 25000128 H RX IP 250 OP 636: Performed by: NURSE PRACTITIONER

## 2019-11-13 PROCEDURE — 25800030 ZZH RX IP 258 OP 636: Performed by: INTERNAL MEDICINE

## 2019-11-13 PROCEDURE — G0463 HOSPITAL OUTPT CLINIC VISIT: HCPCS | Mod: 25

## 2019-11-13 PROCEDURE — 85025 COMPLETE CBC W/AUTO DIFF WBC: CPT | Performed by: INTERNAL MEDICINE

## 2019-11-13 PROCEDURE — 96417 CHEMO IV INFUS EACH ADDL SEQ: CPT

## 2019-11-13 PROCEDURE — 25000128 H RX IP 250 OP 636: Performed by: INTERNAL MEDICINE

## 2019-11-13 PROCEDURE — 96413 CHEMO IV INFUSION 1 HR: CPT

## 2019-11-13 PROCEDURE — 99214 OFFICE O/P EST MOD 30 MIN: CPT | Performed by: NURSE PRACTITIONER

## 2019-11-13 PROCEDURE — 96367 TX/PROPH/DG ADDL SEQ IV INF: CPT

## 2019-11-13 PROCEDURE — 25800030 ZZH RX IP 258 OP 636: Performed by: NURSE PRACTITIONER

## 2019-11-13 RX ORDER — PACLITAXEL 100 MG/20ML
125 INJECTION, POWDER, LYOPHILIZED, FOR SUSPENSION INTRAVENOUS ONCE
Status: COMPLETED | OUTPATIENT
Start: 2019-11-13 | End: 2019-11-13

## 2019-11-13 RX ORDER — HEPARIN SODIUM (PORCINE) LOCK FLUSH IV SOLN 100 UNIT/ML 100 UNIT/ML
5 SOLUTION INTRAVENOUS
Status: CANCELLED | OUTPATIENT
Start: 2019-11-13

## 2019-11-13 RX ORDER — HEPARIN SODIUM (PORCINE) LOCK FLUSH IV SOLN 100 UNIT/ML 100 UNIT/ML
5 SOLUTION INTRAVENOUS
Status: DISCONTINUED | OUTPATIENT
Start: 2019-11-13 | End: 2019-11-13 | Stop reason: HOSPADM

## 2019-11-13 RX ORDER — HEPARIN SODIUM,PORCINE 10 UNIT/ML
5 VIAL (ML) INTRAVENOUS
Status: CANCELLED | OUTPATIENT
Start: 2019-11-13

## 2019-11-13 RX ADMIN — SODIUM CHLORIDE 1000 ML: 9 INJECTION, SOLUTION INTRAVENOUS at 14:27

## 2019-11-13 RX ADMIN — PACLITAXEL 231 MG: 100 INJECTION, POWDER, LYOPHILIZED, FOR SUSPENSION INTRAVENOUS at 15:09

## 2019-11-13 RX ADMIN — Medication 5 ML: at 16:30

## 2019-11-13 RX ADMIN — GEMCITABINE 1800 MG: 38 INJECTION, SOLUTION INTRAVENOUS at 15:54

## 2019-11-13 RX ADMIN — DEXAMETHASONE SODIUM PHOSPHATE 12 MG: 10 INJECTION, SOLUTION INTRAMUSCULAR; INTRAVENOUS at 14:27

## 2019-11-13 ASSESSMENT — PAIN SCALES - GENERAL: PAINLEVEL: NO PAIN (0)

## 2019-11-13 NOTE — PROGRESS NOTES
Oncology/Hematology Visit Note  Nov 13, 2019    Reason for Visit: follow up of pancreatic cancer  -Cancer is resectable.  Patient here to start neoadjuvant chemotherapy with gemcitabine and Abraxane      Interval History:  Pt reports he is feeling well. He has beeing tolarating chemo well thus far. He denies fever chills or sweats, denies cough or SOB, denies chest pain,  denies NVD, denies abdominal pain, denies bleeding   Pt reports energy is good     Review of Systems:  14 point ROS of systems including Constitutional, Eyes, Respiratory, Cardiovascular, Gastroenterology, Genitourinary, Integumentary, Muscularskeletal, Psychiatric were all negative except for pertinent positives noted in my HPI.      Current Outpatient Medications   Medication Sig Dispense Refill     psyllium (METAMUCIL) 28.3 % POWD        tamsulosin (FLOMAX) 80 mcg/mL Take 0.4 mg by mouth       LORazepam (ATIVAN) 0.5 MG tablet Take 1 tablet (0.5 mg) by mouth every 4 hours as needed (Anxiety, Nausea/Vomiting or Sleep) (Patient not taking: Reported on 11/13/2019) 30 tablet 2     polyethylene glycol (MIRALAX/GLYCOLAX) packet Take 1 packet by mouth daily       prochlorperazine (COMPAZINE) 10 MG tablet Take 1 tablet (10 mg) by mouth every 6 hours as needed (Nausea/Vomiting) (Patient not taking: Reported on 11/13/2019) 30 tablet 2       Physical Examination:  General: The patient is a pleasant male in no acute distress.  BP 92/56 (BP Location: Right arm, Patient Position: Sitting, Cuff Size: Adult Regular)   Pulse 83   Temp 96.6  F (35.9  C) (Oral)   Resp 14   Wt 71.3 kg (157 lb 3.2 oz)   SpO2 99%   BMI 23.91 kg/m       BP recheck 102/65  HEENT: EOMI, PERRL. Sclerae are anicteric. Oral mucosa is pink and moist with no lesions or thrush.   Lymph: Neck is supple with no lymphadenopathy in the cervical or supraclavicular areas.   Heart: Regular rate and rhythm.   Lungs: Clear to auscultation bilaterally.   GI: Bowel sounds present, soft, nontender  with no palpable hepatosplenomegaly or masses.   Extremities: No lower extremity edema noted bilaterally.   Skin: No rashes, petechiae, or bruising noted on exposed skin.    Laboratory Data:  Results for orders placed or performed in visit on 11/13/19 (from the past 24 hour(s))   CBC with platelets differential   Result Value Ref Range    WBC 4.2 4.0 - 11.0 10e9/L    RBC Count 4.08 (L) 4.4 - 5.9 10e12/L    Hemoglobin 12.4 (L) 13.3 - 17.7 g/dL    Hematocrit 38.2 (L) 40.0 - 53.0 %    MCV 94 78 - 100 fl    MCH 30.4 26.5 - 33.0 pg    MCHC 32.5 31.5 - 36.5 g/dL    RDW 13.7 10.0 - 15.0 %    Platelet Count 142 (L) 150 - 450 10e9/L    Diff Method Automated Method     % Neutrophils 43.2 %    % Lymphocytes 49.6 %    % Monocytes 3.6 %    % Eosinophils 3.1 %    % Basophils 0.5 %    % Immature Granulocytes 0.0 %    Nucleated RBCs 0 0 /100    Absolute Neutrophil 1.8 1.6 - 8.3 10e9/L    Absolute Lymphocytes 2.1 0.8 - 5.3 10e9/L    Absolute Monocytes 0.2 0.0 - 1.3 10e9/L    Absolute Eosinophils 0.1 0.0 - 0.7 10e9/L    Absolute Basophils 0.0 0.0 - 0.2 10e9/L    Abs Immature Granulocytes 0.0 0 - 0.4 10e9/L    Absolute Nucleated RBC 0.0          Assessment and Plan:      This is a 79-year-old male with     pancreatic cancer  -Cancer is resectable.  Patient here to start neoadjuvant chemotherapy with gemcitabine and Abraxane  -Side effects of the treatment reviewed with patient.  Patient agreeable to proceed with treatment  -Plan is to be given 3 months of neoadjuvant therapy followed by surgery and adjuvant chemotherapy  Labs reviewed okay to proceed with chemotherapy  Pt has follow up with       Prostate cancer patient is on surveillance now      Maintenance  Patient had flu shot    Hypotension   Pt asymptomatic   I rechecked /65   will give NS bolus 1L today   Proper hydration discussed   Symptoms of hypotension reviewed with pt and wife   Check BP at home daily call if sbp 100 or less or symptomatic       Patient is advised  to call our clinic or go to ER in the event of fever chills sweats cough shortness of breath chest pain nausea vomiting diarrhea abdominal pain bleeding or any changes in health condition      YAN Carranza CNP [facility]     Chart documentation with Dragon Voice recognition Software. Although reviewed after completion, some words and grammatical errors may remain.

## 2019-11-13 NOTE — PROGRESS NOTES
Infusion Nursing Note:  Lowell Arredondo presents today for Abraxane, Gemzar.    Patient seen by provider today: Yes: Gerson   present during visit today: Not Applicable.    Note: N/A.    Intravenous Access:  Implanted Port.    Treatment Conditions:  Lab Results   Component Value Date    HGB 12.4 11/13/2019     Lab Results   Component Value Date    WBC 4.2 11/13/2019      Lab Results   Component Value Date    ANEU 1.8 11/13/2019     Lab Results   Component Value Date     11/13/2019      Results reviewed, labs MET treatment parameters, ok to proceed with treatment.      Post Infusion Assessment:  Patient tolerated infusion without incident.  Blood return noted pre and post infusion.  Site patent and intact, free from redness, edema or discomfort.  No evidence of extravasations.  Access discontinued per protocol.       Discharge Plan:   Discharge instructions reviewed with: Patient and Family.  Patient and/or family verbalized understanding of discharge instructions and all questions answered.  AVS to patient via [employer].  Patient will return 11/30 for next appointment.   Patient discharged in stable condition accompanied by: self and wife.  Departure Mode: Ambulatory.    Roya Hawthorne RN

## 2019-11-13 NOTE — LETTER
11/13/2019         RE: Lowell Arredondo  [address] [facility] [address]        Dear Colleague,    Thank you for referring your patient, Lowell Arredondo, to the [facility]. Please see a copy of my visit note below.    Oncology/Hematology Visit Note  Nov 13, 2019    Reason for Visit: follow up of pancreatic cancer  -Cancer is resectable.  Patient here to start neoadjuvant chemotherapy with gemcitabine and Abraxane      Interval History:  Pt reports he is feeling well. He has beeing tolarating chemo well thus far. He denies fever chills or sweats, denies cough or SOB, denies chest pain,  denies NVD, denies abdominal pain, denies bleeding   Pt reports energy is good     Review of Systems:  14 point ROS of systems including Constitutional, Eyes, Respiratory, Cardiovascular, Gastroenterology, Genitourinary, Integumentary, Muscularskeletal, Psychiatric were all negative except for pertinent positives noted in my HPI.      Current Outpatient Medications   Medication Sig Dispense Refill     psyllium (METAMUCIL) 28.3 % POWD        tamsulosin (FLOMAX) 80 mcg/mL Take 0.4 mg by mouth       LORazepam (ATIVAN) 0.5 MG tablet Take 1 tablet (0.5 mg) by mouth every 4 hours as needed (Anxiety, Nausea/Vomiting or Sleep) (Patient not taking: Reported on 11/13/2019) 30 tablet 2     polyethylene glycol (MIRALAX/GLYCOLAX) packet Take 1 packet by mouth daily       prochlorperazine (COMPAZINE) 10 MG tablet Take 1 tablet (10 mg) by mouth every 6 hours as needed (Nausea/Vomiting) (Patient not taking: Reported on 11/13/2019) 30 tablet 2       Physical Examination:  General: The patient is a pleasant male in no acute distress.  BP 92/56 (BP Location: Right arm, Patient Position: Sitting, Cuff Size: Adult Regular)   Pulse 83   Temp 96.6  F (35.9  C) (Oral)   Resp 14   Wt 71.3 kg (157 lb 3.2 oz)   SpO2 99%   BMI 23.91 kg/m        BP recheck 102/65  HEENT: EOMI, PERRL. Sclerae are anicteric. Oral mucosa is pink and  moist with no lesions or thrush.   Lymph: Neck is supple with no lymphadenopathy in the cervical or supraclavicular areas.   Heart: Regular rate and rhythm.   Lungs: Clear to auscultation bilaterally.   GI: Bowel sounds present, soft, nontender with no palpable hepatosplenomegaly or masses.   Extremities: No lower extremity edema noted bilaterally.   Skin: No rashes, petechiae, or bruising noted on exposed skin.    Laboratory Data:  Results for orders placed or performed in visit on 11/13/19 (from the past 24 hour(s))   CBC with platelets differential   Result Value Ref Range    WBC 4.2 4.0 - 11.0 10e9/L    RBC Count 4.08 (L) 4.4 - 5.9 10e12/L    Hemoglobin 12.4 (L) 13.3 - 17.7 g/dL    Hematocrit 38.2 (L) 40.0 - 53.0 %    MCV 94 78 - 100 fl    MCH 30.4 26.5 - 33.0 pg    MCHC 32.5 31.5 - 36.5 g/dL    RDW 13.7 10.0 - 15.0 %    Platelet Count 142 (L) 150 - 450 10e9/L    Diff Method Automated Method     % Neutrophils 43.2 %    % Lymphocytes 49.6 %    % Monocytes 3.6 %    % Eosinophils 3.1 %    % Basophils 0.5 %    % Immature Granulocytes 0.0 %    Nucleated RBCs 0 0 /100    Absolute Neutrophil 1.8 1.6 - 8.3 10e9/L    Absolute Lymphocytes 2.1 0.8 - 5.3 10e9/L    Absolute Monocytes 0.2 0.0 - 1.3 10e9/L    Absolute Eosinophils 0.1 0.0 - 0.7 10e9/L    Absolute Basophils 0.0 0.0 - 0.2 10e9/L    Abs Immature Granulocytes 0.0 0 - 0.4 10e9/L    Absolute Nucleated RBC 0.0          Assessment and Plan:      This is a 79-year-old male with     pancreatic cancer  -Cancer is resectable.  Patient here to start neoadjuvant chemotherapy with gemcitabine and Abraxane  -Side effects of the treatment reviewed with patient.  Patient agreeable to proceed with treatment  -Plan is to be given 3 months of neoadjuvant therapy followed by surgery and adjuvant chemotherapy  Labs reviewed okay to proceed with chemotherapy  Pt has follow up with       Prostate cancer patient is on surveillance now      Maintenance  Patient had flu  shot    Hypotension   Pt asymptomatic   I rechecked /65   will give NS bolus 1L today   Proper hydration discussed   Symptoms of hypotension reviewed with pt and wife   Check BP at home daily call if sbp 100 or less or symptomatic       Patient is advised to call our clinic or go to ER in the event of fever chills sweats cough shortness of breath chest pain nausea vomiting diarrhea abdominal pain bleeding or any changes in health condition      YAN Carranza CNP  [facility] [place]     Chart documentation with Dragon Voice recognition Software. Although reviewed after completion, some words and grammatical errors may remain.          Again, thank you for allowing me to participate in the care of your patient.        Sincerely,        YAN Carranza CNP

## 2019-11-13 NOTE — PROGRESS NOTES
Nursing Note:  Lowell Arredondo presents today for labs.    Patient seen by provider today: Yes: Bryson   present during visit today: Not Applicable.    Note: N/A.    Intravenous Access:  Labs drawn without difficulty.  Implanted Port.    Discharge Plan:   Patient was sent to clinic for NP appointment.    Helena Davenport RN, RN

## 2019-11-17 ENCOUNTER — NURSE TRIAGE (OUTPATIENT)
Dept: NURSING | Facility: CLINIC | Age: 79
End: 2019-11-17

## 2019-11-17 NOTE — TELEPHONE ENCOUNTER
Wife of pt calls in with concern of rash - on back and under arms     Wife says - it is from the chemo - side effect    She also says they have been using cortisone 10 cream with some relief - itching   Wondering if there is something stronger they could use ?    Care advice gone thru with wife    Also advised wife to call clinic right away tomorrow morning Monday - when open and see if they have a better suggestion for a cream    Protocol and care advice reviewed  Caller states understanding of the recommended disposition    Advised to call back if further questions or concerns    Deondre Oropeza , RN / [facility]            Additional Information    Negative: [1] Sudden onset of rash (within last 2 hours) AND [2] difficulty with breathing or swallowing    Negative: Sounds like a life-threatening emergency to the triager    Negative: [1] Localized purple or blood-colored spots or dots AND [2] not from injury or friction AND [3] fever    Negative: Patient sounds very sick or weak to the triager    Negative: [1] Looks infected (spreading redness, pus) AND [2] no fever    Negative: Looks like a boil, infected sore, deep ulcer or other infected rash    Negative: [1] Localized rash is very painful AND [2] no fever    Negative: Genital area rash    Negative: Lyme disease suspected (e.g., bull's eye rash or tick bite / exposure)    Negative: [1] Red area or streak AND [2] fever    Negative: [1] Rash is painful to touch AND [2] fever    Negative: [1] Looks infected (spreading redness, pus) AND [2] large red area (> 2 in. or 5 cm)    Negative: [1] Looks infected (spreading redness, pus) AND [2] diabetes mellitus or weak immune system (e.g., HIV positive,  cancer chemotherapy, chronic steroid treatment, splenectomy)    Negative: [1] Localized purple or blood-colored spots or dots AND [2] not from injury or friction AND [3] no fever    Negative: [1] Applying cream or ointment AND [2] causes severe itch, burning  or pain    Negative: [1] Pimples (localized) AND [2 ] no improvement after using CARE ADVICE    Negative: Tender bumps in armpits    Negative: [1] Severe localized itching AND [2] after 2 days of steroid cream    Negative: Localized rash present > 7 days    Negative: Red, moist, irritated area between skin folds (or under larger breasts)    Mild localized rash    Protocols used: RASH OR REDNESS - [id number]

## 2019-11-19 ENCOUNTER — APPOINTMENT (OUTPATIENT)
Dept: CT IMAGING | Facility: CLINIC | Age: 79
End: 2019-11-19
Attending: EMERGENCY MEDICINE
Payer: COMMERCIAL

## 2019-11-19 ENCOUNTER — HOSPITAL ENCOUNTER (EMERGENCY)
Facility: CLINIC | Age: 79
Discharge: HOME | End: 2019-11-19
Attending: EMERGENCY MEDICINE | Admitting: EMERGENCY MEDICINE
Payer: COMMERCIAL

## 2019-11-19 VITALS
OXYGEN SATURATION: 96 % | TEMPERATURE: 97.4 F | SYSTOLIC BLOOD PRESSURE: 115 MMHG | HEART RATE: 74 BPM | WEIGHT: 156 LBS | RESPIRATION RATE: 14 BRPM | BODY MASS INDEX: 23.73 KG/M2 | DIASTOLIC BLOOD PRESSURE: 71 MMHG

## 2019-11-19 DIAGNOSIS — C25.9 MALIGNANT NEOPLASM OF PANCREAS, UNSPECIFIED LOCATION OF MALIGNANCY (H): ICD-10-CM

## 2019-11-19 DIAGNOSIS — R55 NEAR SYNCOPE: ICD-10-CM

## 2019-11-19 DIAGNOSIS — D64.9 ANEMIA, UNSPECIFIED TYPE: ICD-10-CM

## 2019-11-19 LAB
ALBUMIN UR-MCNC: NEGATIVE MG/DL
ANION GAP SERPL CALCULATED.3IONS-SCNC: 6 MMOL/L (ref 3–14)
APPEARANCE UR: CLEAR
BACTERIA #/AREA URNS HPF: ABNORMAL /HPF
BASOPHILS # BLD AUTO: 0 10E9/L (ref 0–0.2)
BASOPHILS NFR BLD AUTO: 0.4 %
BILIRUB UR QL STRIP: NEGATIVE
BUN SERPL-MCNC: 14 MG/DL (ref 7–30)
CALCIUM SERPL-MCNC: 9.1 MG/DL (ref 8.5–10.1)
CHLORIDE SERPL-SCNC: 107 MMOL/L (ref 94–109)
CO2 SERPL-SCNC: 25 MMOL/L (ref 20–32)
COLOR UR AUTO: ABNORMAL
CREAT SERPL-MCNC: 0.98 MG/DL (ref 0.66–1.25)
D DIMER PPP FEU-MCNC: 0.9 UG/ML FEU (ref 0–0.5)
DIFFERENTIAL METHOD BLD: ABNORMAL
EOSINOPHIL # BLD AUTO: 0.1 10E9/L (ref 0–0.7)
EOSINOPHIL NFR BLD AUTO: 4 %
ERYTHROCYTE [DISTWIDTH] IN BLOOD BY AUTOMATED COUNT: 13.4 % (ref 10–15)
GFR SERPL CREATININE-BSD FRML MDRD: 72 ML/MIN/{1.73_M2}
GLUCOSE SERPL-MCNC: 108 MG/DL (ref 70–99)
GLUCOSE UR STRIP-MCNC: NEGATIVE MG/DL
HCT VFR BLD AUTO: 33 % (ref 40–53)
HGB BLD-MCNC: 10.7 G/DL (ref 13.3–17.7)
HGB UR QL STRIP: NEGATIVE
IMM GRANULOCYTES # BLD: 0 10E9/L (ref 0–0.4)
IMM GRANULOCYTES NFR BLD: 0 %
INTERPRETATION ECG - MUSE: NORMAL
KETONES UR STRIP-MCNC: NEGATIVE MG/DL
LEUKOCYTE ESTERASE UR QL STRIP: NEGATIVE
LYMPHOCYTES # BLD AUTO: 1.5 10E9/L (ref 0.8–5.3)
LYMPHOCYTES NFR BLD AUTO: 58.2 %
MCH RBC QN AUTO: 30.5 PG (ref 26.5–33)
MCHC RBC AUTO-ENTMCNC: 32.4 G/DL (ref 31.5–36.5)
MCV RBC AUTO: 94 FL (ref 78–100)
MONOCYTES # BLD AUTO: 0.2 10E9/L (ref 0–1.3)
MONOCYTES NFR BLD AUTO: 6.8 %
MUCOUS THREADS #/AREA URNS LPF: PRESENT /LPF
NEUTROPHILS # BLD AUTO: 0.8 10E9/L (ref 1.6–8.3)
NEUTROPHILS NFR BLD AUTO: 30.6 %
NITRATE UR QL: NEGATIVE
NRBC # BLD AUTO: 0 10*3/UL
NRBC BLD AUTO-RTO: 0 /100
PH UR STRIP: 6 PH (ref 5–7)
PLATELET # BLD AUTO: 106 10E9/L (ref 150–450)
POTASSIUM SERPL-SCNC: 4.3 MMOL/L (ref 3.4–5.3)
RBC # BLD AUTO: 3.51 10E12/L (ref 4.4–5.9)
RBC #/AREA URNS AUTO: 0 /HPF (ref 0–2)
SODIUM SERPL-SCNC: 138 MMOL/L (ref 133–144)
SOURCE: ABNORMAL
SP GR UR STRIP: 1.01 (ref 1–1.03)
TROPONIN I SERPL-MCNC: <0.015 UG/L (ref 0–0.04)
UROBILINOGEN UR STRIP-MCNC: NORMAL MG/DL (ref 0–2)
WBC # BLD AUTO: 2.5 10E9/L (ref 4–11)
WBC #/AREA URNS AUTO: 1 /HPF (ref 0–5)

## 2019-11-19 PROCEDURE — 85025 COMPLETE CBC W/AUTO DIFF WBC: CPT | Performed by: EMERGENCY MEDICINE

## 2019-11-19 PROCEDURE — 71275 CT ANGIOGRAPHY CHEST: CPT

## 2019-11-19 PROCEDURE — 25000125 ZZHC RX 250: Performed by: EMERGENCY MEDICINE

## 2019-11-19 PROCEDURE — 81001 URINALYSIS AUTO W/SCOPE: CPT | Performed by: EMERGENCY MEDICINE

## 2019-11-19 PROCEDURE — 84484 ASSAY OF TROPONIN QUANT: CPT | Performed by: EMERGENCY MEDICINE

## 2019-11-19 PROCEDURE — 85379 FIBRIN DEGRADATION QUANT: CPT | Performed by: EMERGENCY MEDICINE

## 2019-11-19 PROCEDURE — 96360 HYDRATION IV INFUSION INIT: CPT

## 2019-11-19 PROCEDURE — 80048 BASIC METABOLIC PNL TOTAL CA: CPT | Performed by: EMERGENCY MEDICINE

## 2019-11-19 PROCEDURE — 93005 ELECTROCARDIOGRAM TRACING: CPT

## 2019-11-19 PROCEDURE — 25800030 ZZH RX IP 258 OP 636: Performed by: EMERGENCY MEDICINE

## 2019-11-19 PROCEDURE — 25000128 H RX IP 250 OP 636: Performed by: EMERGENCY MEDICINE

## 2019-11-19 PROCEDURE — 99285 EMERGENCY DEPT VISIT HI MDM: CPT | Mod: 25

## 2019-11-19 RX ORDER — LIDOCAINE 40 MG/G
CREAM TOPICAL
Status: DISCONTINUED | OUTPATIENT
Start: 2019-11-19 | End: 2019-11-19 | Stop reason: HOSPADM

## 2019-11-19 RX ORDER — SODIUM CHLORIDE 9 MG/ML
1000 INJECTION, SOLUTION INTRAVENOUS CONTINUOUS
Status: DISCONTINUED | OUTPATIENT
Start: 2019-11-19 | End: 2019-11-19 | Stop reason: HOSPADM

## 2019-11-19 RX ORDER — IOPAMIDOL 755 MG/ML
500 INJECTION, SOLUTION INTRAVASCULAR ONCE
Status: COMPLETED | OUTPATIENT
Start: 2019-11-19 | End: 2019-11-19

## 2019-11-19 RX ADMIN — SODIUM CHLORIDE 88 ML: 9 INJECTION, SOLUTION INTRAVENOUS at 16:53

## 2019-11-19 RX ADMIN — SODIUM CHLORIDE 500 ML: 9 INJECTION, SOLUTION INTRAVENOUS at 14:29

## 2019-11-19 RX ADMIN — IOPAMIDOL 66 ML: 755 INJECTION, SOLUTION INTRAVENOUS at 16:53

## 2019-11-19 ASSESSMENT — ENCOUNTER SYMPTOMS
NAUSEA: 0
APPETITE CHANGE: 0
FREQUENCY: 0
ABDOMINAL PAIN: 0
FEVER: 0
DYSURIA: 0
VOMITING: 0
PALPITATIONS: 0
HEMATURIA: 0
SHORTNESS OF BREATH: 0
LIGHT-HEADEDNESS: 1
DIARRHEA: 0

## 2019-11-19 NOTE — ED AVS SNAPSHOT
[facility]  [address]  [facility] [address]  Phone:  [phone]  Fax:  [phone]                                    Lowell Arredondo   MRN: [id number]    Department:  [facility]   Date of Visit:  11/19/2019           After Visit Summary Signature Page    I have received my discharge instructions, and my questions have been answered. I have discussed any challenges I see with this plan with the nurse or doctor.    ..........................................................................................................................................  Patient/Patient Representative Signature      ..........................................................................................................................................  Patient Representative Print Name and Relationship to Patient    ..................................................               ................................................  Date                                   Time    ..........................................................................................................................................  Reviewed by Signature/Title    ...................................................              ..............................................  Date                                               Time          22EPIC Rev 08/18

## 2019-11-19 NOTE — ED NOTES
Bed: ED31  Expected date: 11/19/19  Expected time: 1:39 PM  Means of arrival: Ambulance  Comments:  [id number]  80yo near-syncope

## 2019-11-19 NOTE — ED NOTES
Pt ambulated approximately 20 meters to and from restroom independently with a steady gait. Pt denied dizziness or lightheadedness upon being asked. RN and MD notified. Pt sitting in chair watching TV at this time.

## 2019-11-19 NOTE — ED PROVIDER NOTES
History     Chief Complaint:  Lightheadedness     HPI   Lowell Arredondo is a 79 year old male with a history of recently diagnosed pancreatic and prostate cancer who started chemotherapy last week who presents via EMS for evaluation of lightheadedness. Today shortly prior to arrival, the patient was at the [facility] exercising on an elliptical machine and after about 20 minutes he started to feel lightheaded, described as the sensation that he is going to lose consciousness, and became pale. The patient had staff help him sit down and after resting for a little while he felt improved enough to try standing but each time he tried to stand he became lightheaded again and had to sit back down. After three attempts to stand EMS was called to bring him into the ED for evaluation. He did not actually lose consciousness. Currently in the ED, the patient reports that he is feeling significantly improved. He notes that he generally exercises regularly at the [facility] but had not been exercising for the last few weeks due to his recent diagnoses. Otherwise, he denies any recent fever, appetite change, shortness of breath, chest pain, palpitations, abdominal pain, nausea, vomiting, diarrhea, dysuria, hematuria, or urinary frequency, and he reports that he has been feeling well recently. Notably, the patient is scheduled for his next chemotherapy infusion tomorrow.     Cardiac Risk Factors:  CAD:    Negative   Hypertension:   Negative   Hyperlipidemia:  Positive   Diabetes:   Negative   Tobacco use:   Negative   Gender:   Male   Age:    79  Familial Hx of CAD:  Negative      PE/DVT Risk Factors:   Hx of PE/DVT:   Negative   Hx of clotting disorder:  Negative   Hx of cancer:    Positive   Tobacco use:    Negative   Hormone therapy:   Negative   Prolonged immobilization:  Negative   Recent surgery:   Negative   Recent travel:    Negative   Familial Hx of PE/DVT:  Negative      Allergies:  Demerol      Medications:     Ativan  Miralax  Compazine   Metamucil  Flomax     Past Medical History:    Benign prostatic hyperplasia  Prostate cancer   Malignant neoplasm of tail of pancreas  Basal cell carcinoma   Hyperlipidemia     Past Surgical History:    Foot surgery  IR chest port placement   Prostate surgery     Family History:    Cancer, prostate - Father and brother     Social History:  Tobacco use:    Never smoker   Alcohol use:    Positive   Drug use:    Negative   Marital status:       Accompanied to ED by:  EMS and wife     Review of Systems   Constitutional: Negative for appetite change and fever.   Respiratory: Negative for shortness of breath.    Cardiovascular: Negative for chest pain and palpitations.   Gastrointestinal: Negative for abdominal pain, diarrhea, nausea and vomiting.   Genitourinary: Negative for dysuria, frequency and hematuria.   Neurological: Positive for light-headedness. Negative for syncope.   All other systems reviewed and are negative.    Physical Exam     Patient Vitals for the past 24 hrs:   BP Temp Temp src Pulse Heart Rate Resp SpO2 Weight   11/19/19 1645 115/71 -- -- 74 72 14 96 % --   11/19/19 1630 112/70 -- -- 72 72 14 100 % --   11/19/19 1615 109/67 -- -- 70 70 15 97 % --   11/19/19 1600 113/65 -- -- 70 71 17 99 % --   11/19/19 1545 136/76 -- -- 82 89 -- -- --   11/19/19 1530 122/72 -- -- 71 71 16 99 % --   11/19/19 1515 122/72 -- -- 74 73 14 100 % --   11/19/19 1500 116/73 -- -- 67 69 8 98 % --   11/19/19 1445 110/69 -- -- 70 70 13 99 % --   11/19/19 1430 112/64 -- -- 71 70 -- 98 % --   11/19/19 1415 107/68 -- -- 71 73 12 99 % --   11/19/19 1406 114/68 97.4  F (36.3  C) Oral 73 -- 16 99 % 70.8 kg (156 lb)   11/19/19 1400 114/68 -- -- 70 75 19 99 % --        Physical Exam    Nursing note and vitals reviewed.    Constitutional: Pleasant and well groomed. Lean habitus.          HENT:    Mouth/Throat: Oropharynx is without swelling or erythema. Oral mucosa moist.    Eyes: Conjunctivae are  normal. No scleral icterus.    Neck: Neck supple.   Cardiovascular: Normal rate, regular rhythm and intact distal pulses.    Pulmonary/Chest: Effort normal and breath sounds normal.   Abdominal: Soft.  No distension. There is no tenderness.   Musculoskeletal:  No edema, No calf tenderness  Neurological:Alert and oriented. Coordination normal. Cranial nerves II through XII intact.  Upper and lower extremity strength intact.  Skin: Skin is warm and dry.   Psychiatric: Normal mood and affect.     Emergency Department Course   ECG (13:52:37):  Indication: Screening for cardiovascular disease.   Rate 75 bpm. IL interval 176 ms. QRS duration 84 ms. QT/QTc 392/437 ms. P-R-T axes 63 6 44.   Interpretation: Normal sinus rhythm, Possible anterior infarct age undetermined, Abnormal ECG   Agree with computer interpretation. Yes   No significant change compared to EKG dated 5/23/2017.   Interpreted at 1410 by Dr. Pollack.      Imaging:  Radiographic findings were communicated with the patient and family who voiced understanding of the findings.    CT Chest Pulmonary Embolism w Contrast:  IMPRESSION:  1.  Negative for pulmonary embolism.  2.  Coronary artery calcifications.  3.  2.7 x 2.5 cm hypodense mass in the tail of pancreas.  Preliminary report per radiology.      Laboratory:  CBC: WBC 2.5 low, HGB 10.7 low,  low   BMP: Glucose 108 high, o/w WNL (Creatinine 0.98)   Troponin I 1354: <0.015   D dimer quantitative: 0.9 high   UA with Microscopic: Few bacteria, Mucous present, o/w Negative     Interventions:  1429  mL IV     Emergency Department Course:  Patient was brought to the ED via EMS.     Nursing notes and vitals reviewed.  1400: I performed an exam of the patient as documented above.     1540: I updated and reassessed the patient.     1725: Ambulated in the ED without any symptoms.     I personally reviewed the laboratory results with the patient and spouse and answered all related questions prior to  discharge.     Findings and plan explained to the Patient and spouse. Patient discharged home with instructions regarding supportive care, medications, and reasons to return. The importance of close follow-up was reviewed.     Impression & Plan      Medical Decision Making:  Lowell Arredondo is a 79 year old male who presents with near syncope while working out on a treadmill today.  This was his first return to the gym after initiating chemotherapy for pancreatic cancer.  Differential diagnosis was broad and included dehydration, electrolyte abnormality, anemia, acute coronary syndrome, arrhythmia pulmonary embolism.  It seems that the symptoms were likely orthostatic in nature as they did improve when he lie down.  His vital signs were stable at the scene.  CT pulmonary angiogram was obtained to evaluate for PE as he is at high risk for this.  This was negative for pulmonary embolism did reveal a pancreatic mass.  He has had a significant drop in his hemoglobin over the past 2 weeks since initiating chemotherapy which may have contributed to his symptoms while exercising.  The remainder of his evaluation was unremarkable.  He received some IV fluids in the emergency department.  He has been ambulating without any recurrent symptoms.  With reasonable clinical certainty ability safe for discharge home with ongoing evaluation and management as an outpatient.  He has an appointment tomorrow for chemotherapy and will have labs repeated.  Recommended that he discuss today's visit with his oncologist at that time as well    Diagnosis:    ICD-10-CM   1. Near syncope R55   2. Anemia, unspecified type D64.9   3. Malignant neoplasm of pancreas, unspecified location of malignancy (H) C25.9     Disposition:  Discharged to home.     I, Kody Burrows, am serving as a scribe at 2:00 PM on 11/19/2019 to document services personally performed by Dr. Pollack, based on my observations and the provider's statements to me.    [facility]  [facility] EMERGENCY DEPARTMENT       Nedra Pollack MD  11/19/19 3902

## 2019-11-19 NOTE — ED TRIAGE NOTES
Pt arrives via EMS from [facility] d/t 3 episodes of near syncope and dizziness after completing 20 mins on the eliptical. Pt had first round chemo tx last week for pancreatic/prostate cancer. Tomorrow is next tx. Pt was on eliptical when told staff he felt dizzy and needs to lay down. Pt was pale, but A&O x4 Able to get up for EMS on his own. Denies CP, SOB, or visual changes. Pt hasn't worked out in a few weeks since recently dx with cancer. BG 97. /65. EKG SR with PVC. ABC intact.

## 2019-11-20 ENCOUNTER — ONCOLOGY VISIT (OUTPATIENT)
Dept: ONCOLOGY | Facility: CLINIC | Age: 79
End: 2019-11-20
Attending: NURSE PRACTITIONER
Payer: COMMERCIAL

## 2019-11-20 ENCOUNTER — HOSPITAL ENCOUNTER (OUTPATIENT)
Facility: CLINIC | Age: 79
Setting detail: SPECIMEN
Discharge: HOME | End: 2019-11-20
Attending: INTERNAL MEDICINE | Admitting: NURSE PRACTITIONER
Payer: COMMERCIAL

## 2019-11-20 ENCOUNTER — PATIENT OUTREACH (OUTPATIENT)
Dept: ONCOLOGY | Facility: CLINIC | Age: 79
End: 2019-11-20

## 2019-11-20 ENCOUNTER — INFUSION THERAPY VISIT (OUTPATIENT)
Dept: INFUSION THERAPY | Facility: CLINIC | Age: 79
End: 2019-11-20
Attending: INTERNAL MEDICINE
Payer: COMMERCIAL

## 2019-11-20 VITALS
TEMPERATURE: 98 F | RESPIRATION RATE: 18 BRPM | OXYGEN SATURATION: 98 % | SYSTOLIC BLOOD PRESSURE: 147 MMHG | HEART RATE: 78 BPM | DIASTOLIC BLOOD PRESSURE: 85 MMHG

## 2019-11-20 VITALS
RESPIRATION RATE: 16 BRPM | HEART RATE: 68 BPM | WEIGHT: 157.9 LBS | TEMPERATURE: 98 F | DIASTOLIC BLOOD PRESSURE: 76 MMHG | BODY MASS INDEX: 24.02 KG/M2 | OXYGEN SATURATION: 100 % | SYSTOLIC BLOOD PRESSURE: 145 MMHG

## 2019-11-20 DIAGNOSIS — T45.1X5A CHEMOTHERAPY-INDUCED NEUTROPENIA (H): ICD-10-CM

## 2019-11-20 DIAGNOSIS — Z95.828 PORT-A-CATH IN PLACE: ICD-10-CM

## 2019-11-20 DIAGNOSIS — D70.1 CHEMOTHERAPY-INDUCED NEUTROPENIA (H): ICD-10-CM

## 2019-11-20 DIAGNOSIS — C25.2 MALIGNANT NEOPLASM OF TAIL OF PANCREAS (H): Primary | ICD-10-CM

## 2019-11-20 LAB
ALBUMIN SERPL-MCNC: 3.2 G/DL (ref 3.4–5)
ALBUMIN UR-MCNC: NEGATIVE MG/DL
ALP SERPL-CCNC: 42 U/L (ref 40–150)
ALT SERPL W P-5'-P-CCNC: 40 U/L (ref 0–70)
ANION GAP SERPL CALCULATED.3IONS-SCNC: 5 MMOL/L (ref 3–14)
APPEARANCE UR: CLEAR
AST SERPL W P-5'-P-CCNC: 24 U/L (ref 0–45)
BASOPHILS # BLD AUTO: 0 10E9/L (ref 0–0.2)
BASOPHILS NFR BLD AUTO: 0.3 %
BILIRUB SERPL-MCNC: 0.3 MG/DL (ref 0.2–1.3)
BILIRUB UR QL STRIP: NEGATIVE
BUN SERPL-MCNC: 12 MG/DL (ref 7–30)
CALCIUM SERPL-MCNC: 8.8 MG/DL (ref 8.5–10.1)
CHLORIDE SERPL-SCNC: 109 MMOL/L (ref 94–109)
CO2 SERPL-SCNC: 26 MMOL/L (ref 20–32)
COLOR UR AUTO: NORMAL
CREAT SERPL-MCNC: 1.06 MG/DL (ref 0.66–1.25)
DIFFERENTIAL METHOD BLD: ABNORMAL
EOSINOPHIL # BLD AUTO: 0.2 10E9/L (ref 0–0.7)
EOSINOPHIL NFR BLD AUTO: 4.9 %
ERYTHROCYTE [DISTWIDTH] IN BLOOD BY AUTOMATED COUNT: 13.5 % (ref 10–15)
GFR SERPL CREATININE-BSD FRML MDRD: 65 ML/MIN/{1.73_M2}
GLUCOSE SERPL-MCNC: 105 MG/DL (ref 70–99)
GLUCOSE UR STRIP-MCNC: NEGATIVE MG/DL
HCT VFR BLD AUTO: 33.1 % (ref 40–53)
HGB BLD-MCNC: 10.5 G/DL (ref 13.3–17.7)
HGB UR QL STRIP: NEGATIVE
IMM GRANULOCYTES # BLD: 0 10E9/L (ref 0–0.4)
IMM GRANULOCYTES NFR BLD: 0.3 %
KETONES UR STRIP-MCNC: NEGATIVE MG/DL
LEUKOCYTE ESTERASE UR QL STRIP: NEGATIVE
LYMPHOCYTES # BLD AUTO: 1.6 10E9/L (ref 0.8–5.3)
LYMPHOCYTES NFR BLD AUTO: 48.9 %
MCH RBC QN AUTO: 30.1 PG (ref 26.5–33)
MCHC RBC AUTO-ENTMCNC: 31.7 G/DL (ref 31.5–36.5)
MCV RBC AUTO: 95 FL (ref 78–100)
MONOCYTES # BLD AUTO: 0.3 10E9/L (ref 0–1.3)
MONOCYTES NFR BLD AUTO: 7.9 %
NEUTROPHILS # BLD AUTO: 1.2 10E9/L (ref 1.6–8.3)
NEUTROPHILS NFR BLD AUTO: 37.7 %
NITRATE UR QL: NEGATIVE
NRBC # BLD AUTO: 0 10*3/UL
NRBC BLD AUTO-RTO: 0 /100
PH UR STRIP: 5.5 PH (ref 5–7)
PLATELET # BLD AUTO: 95 10E9/L (ref 150–450)
POTASSIUM SERPL-SCNC: 4.3 MMOL/L (ref 3.4–5.3)
PROT SERPL-MCNC: 6.6 G/DL (ref 6.8–8.8)
RBC # BLD AUTO: 3.49 10E12/L (ref 4.4–5.9)
RBC #/AREA URNS AUTO: 1 /HPF (ref 0–2)
SODIUM SERPL-SCNC: 140 MMOL/L (ref 133–144)
SOURCE: NORMAL
SP GR UR STRIP: 1.01 (ref 1–1.03)
UROBILINOGEN UR STRIP-MCNC: NORMAL MG/DL (ref 0–2)
WBC # BLD AUTO: 3.3 10E9/L (ref 4–11)
WBC #/AREA URNS AUTO: 1 /HPF (ref 0–5)

## 2019-11-20 PROCEDURE — 99214 OFFICE O/P EST MOD 30 MIN: CPT | Performed by: NURSE PRACTITIONER

## 2019-11-20 PROCEDURE — 25000128 H RX IP 250 OP 636: Performed by: INTERNAL MEDICINE

## 2019-11-20 PROCEDURE — 85025 COMPLETE CBC W/AUTO DIFF WBC: CPT | Performed by: NURSE PRACTITIONER

## 2019-11-20 PROCEDURE — 96360 HYDRATION IV INFUSION INIT: CPT

## 2019-11-20 PROCEDURE — 80053 COMPREHEN METABOLIC PANEL: CPT | Performed by: NURSE PRACTITIONER

## 2019-11-20 PROCEDURE — 81001 URINALYSIS AUTO W/SCOPE: CPT | Performed by: NURSE PRACTITIONER

## 2019-11-20 PROCEDURE — 96372 THER/PROPH/DIAG INJ SC/IM: CPT | Mod: XS

## 2019-11-20 PROCEDURE — 25800030 ZZH RX IP 258 OP 636: Performed by: NURSE PRACTITIONER

## 2019-11-20 PROCEDURE — 87040 BLOOD CULTURE FOR BACTERIA: CPT | Performed by: NURSE PRACTITIONER

## 2019-11-20 PROCEDURE — 25000128 H RX IP 250 OP 636: Performed by: NURSE PRACTITIONER

## 2019-11-20 RX ORDER — HEPARIN SODIUM (PORCINE) LOCK FLUSH IV SOLN 100 UNIT/ML 100 UNIT/ML
5 SOLUTION INTRAVENOUS
Status: CANCELLED | OUTPATIENT
Start: 2019-11-21

## 2019-11-20 RX ORDER — HEPARIN SODIUM (PORCINE) LOCK FLUSH IV SOLN 100 UNIT/ML 100 UNIT/ML
5 SOLUTION INTRAVENOUS
Status: DISCONTINUED | OUTPATIENT
Start: 2019-11-20 | End: 2019-11-20 | Stop reason: HOSPADM

## 2019-11-20 RX ORDER — HEPARIN SODIUM,PORCINE 10 UNIT/ML
5 VIAL (ML) INTRAVENOUS
Status: CANCELLED | OUTPATIENT
Start: 2019-11-21

## 2019-11-20 RX ADMIN — FILGRASTIM 480 MCG: 480 INJECTION, SOLUTION INTRAVENOUS; SUBCUTANEOUS at 14:17

## 2019-11-20 RX ADMIN — SODIUM CHLORIDE 1000 ML: 9 INJECTION, SOLUTION INTRAVENOUS at 13:38

## 2019-11-20 RX ADMIN — SODIUM CHLORIDE, PRESERVATIVE FREE 5 ML: 5 INJECTION INTRAVENOUS at 14:48

## 2019-11-20 ASSESSMENT — PAIN SCALES - GENERAL: PAINLEVEL: NO PAIN (0)

## 2019-11-20 NOTE — LETTER
11/20/2019         RE: Lowell Arredondo  [address] [id number]        Dear Colleague,    Thank you for referring your patient, Lowell Arredondo, to the [facility]. Please see a copy of my visit note below.    Oncology/Hematology Visit Note  Nov 20, 2019    Reason for Visit: follow up of pancreatic cancer  -Cancer is resectable.  -Patient met with Dr. Segura who recommended   neoadjuvant chemotherapy with gemcitabine and Abraxane started on 11/7/2019    Patient comes for follow-up after ER discharge      Interval History:  -Patient states he was working out at [employer] yesterday.  He was using the [employer] machine and then wanted to use treadmill.  He had syncope event.  Ambulance was called.  Patient reports to the ER multiple testings were done which were all negative and he was given IV fluids and was discharged home.    Today patient feels well.  He denies headache dizziness seizures lightheadedness.  He denies cough shortness of breath chest pain.  Denies fever chills sweats.  Patient denies nausea vomiting diarrhea denies abdominal pain denies bleeding.  Denies injury from yesterday syncope event      Review of Systems:  14 point ROS of systems including Constitutional, Eyes, Respiratory, Cardiovascular, Gastroenterology, Genitourinary, Integumentary, Muscularskeletal, Psychiatric were all negative except for pertinent positives noted in my HPI.      Current Outpatient Medications   Medication Sig Dispense Refill     LORazepam (ATIVAN) 0.5 MG tablet Take 1 tablet (0.5 mg) by mouth every 4 hours as needed (Anxiety, Nausea/Vomiting or Sleep) (Patient not taking: Reported on 11/13/2019) 30 tablet 2     polyethylene glycol (MIRALAX/GLYCOLAX) packet Take 1 packet by mouth daily       prochlorperazine (COMPAZINE) 10 MG tablet Take 1 tablet (10 mg) by mouth every 6 hours as needed (Nausea/Vomiting) (Patient not taking: Reported on 11/13/2019) 30 tablet 2     psyllium (METAMUCIL) 28.3 %  AJITH        tamsulosin (FLOMAX) 80 mcg/mL Take 0.4 mg by mouth         Physical Examination:  General: The patient is a pleasant male in no acute distress.  BP (!) 147/85   Pulse 78   Temp 98  F (36.7  C)   Resp 18   SpO2 98%     HEENT: EOMI, PERRL. Sclerae are anicteric. Oral mucosa is pink and moist with no lesions or thrush.   Lymph: Neck is supple with no lymphadenopathy in the cervical or supraclavicular areas.   Heart: Regular rate and rhythm.   Lungs: Clear to auscultation bilaterally.   GI: Bowel sounds present, soft, nontender with no palpable hepatosplenomegaly or masses.   Extremities: No lower extremity edema noted bilaterally.   Skin: Maculopapular skin rash -lower back, no vesicles no lesions noted,  Laboratory Data:  Results for orders placed or performed in visit on 11/20/19 (from the past 24 hour(s))   CBC with platelets differential   Result Value Ref Range    WBC 3.3 (L) 4.0 - 11.0 10e9/L    RBC Count 3.49 (L) 4.4 - 5.9 10e12/L    Hemoglobin 10.5 (L) 13.3 - 17.7 g/dL    Hematocrit 33.1 (L) 40.0 - 53.0 %    MCV 95 78 - 100 fl    MCH 30.1 26.5 - 33.0 pg    MCHC 31.7 31.5 - 36.5 g/dL    RDW 13.5 10.0 - 15.0 %    Platelet Count 95 (L) 150 - 450 10e9/L    Diff Method Automated Method     % Neutrophils 37.7 %    % Lymphocytes 48.9 %    % Monocytes 7.9 %    % Eosinophils 4.9 %    % Basophils 0.3 %    % Immature Granulocytes 0.3 %    Nucleated RBCs 0 0 /100    Absolute Neutrophil 1.2 (L) 1.6 - 8.3 10e9/L    Absolute Lymphocytes 1.6 0.8 - 5.3 10e9/L    Absolute Monocytes 0.3 0.0 - 1.3 10e9/L    Absolute Eosinophils 0.2 0.0 - 0.7 10e9/L    Absolute Basophils 0.0 0.0 - 0.2 10e9/L    Abs Immature Granulocytes 0.0 0 - 0.4 10e9/L    Absolute Nucleated RBC 0.0    Comprehensive metabolic panel   Result Value Ref Range    Sodium 140 133 - 144 mmol/L    Potassium 4.3 3.4 - 5.3 mmol/L    Chloride 109 94 - 109 mmol/L    Carbon Dioxide 26 20 - 32 mmol/L    Anion Gap 5 3 - 14 mmol/L    Glucose 105 (H) 70 - 99 mg/dL     Urea Nitrogen 12 7 - 30 mg/dL    Creatinine 1.06 0.66 - 1.25 mg/dL    GFR Estimate 65 >60 mL/min/[1.73_m2]    GFR Estimate If Black 76 >60 mL/min/[1.73_m2]    Calcium 8.8 8.5 - 10.1 mg/dL    Bilirubin Total 0.3 0.2 - 1.3 mg/dL    Albumin 3.2 (L) 3.4 - 5.0 g/dL    Protein Total 6.6 (L) 6.8 - 8.8 g/dL    Alkaline Phosphatase 42 40 - 150 U/L    ALT 40 0 - 70 U/L    AST 24 0 - 45 U/L         Assessment and Plan:      This is a 79-year-old male with     pancreatic cancer  -Cancer is resectable.  Patient here to start neoadjuvant chemotherapy with gemcitabine and Abraxane  On 11/7/2019  -Today he is scheduled for cycle 1 day 15  -Due to neutropenia will hold chemotherapy today  Give Neupogen shot today and tomorrow and reschedule chemo for next week  -We will ask Dr. Segura if Abraxane can be dose reduced  -Schedule with Lianna next week for chemo    Syncope event  -happened yesterday after heavy exercise at the [facility].  I Told patient that exercise is okay but he has to slow down and take frequent rests  -Patient was in the emergency room yesterday   CT negative for pulmonary embolism, negative troponin, he responded well after hydration was given in the ER.  -Advised to buy blood pressure machine and check blood pressure daily, proper  hydration discussed  -We will give another NS bolus hydration today    Prostate cancer patient is on surveillance now      Skin rash lower back  Secondary to chemo--mild rash  On exam no appearance of infection or shingles  Pt reports significant improvement with Claratin   -Continue Claritin  -Can use hydrocortisone cream if needed  -Advised to call clinic if worsening of rash painful rash or lesions vesicles or any changes in the rash appearance          Maintenance  Patient had flu shot      Patient is advised to call our clinic or go to ER in the event of fever chills sweats cough shortness of breath chest pain nausea vomiting diarrhea abdominal pain bleeding or any changes in health  condition      YAN Carranza CNP  [facility]     Chart documentation with Dragon Voice recognition Software. Although reviewed after completion, some words and grammatical errors may remain.          Again, thank you for allowing me to participate in the care of your patient.        Sincerely,        YAN Carranza CNP

## 2019-11-20 NOTE — TELEPHONE ENCOUNTER
I received a call from infusion RN of Laura Ontiveros, regarding patient. Patient was evaluated in the ED yesterday for evaluation of lightheadedness.  Lab revealed an ANC of 0.8.      Patient is scheduled for CD15 of Abraxane /Gemzar today and needs further instructions regarding ANC of 0.8.    I spoke with Dr. Segura who advised that patient be evaluated by Gerson RÍOS  for possible IVF's .    Laura NAYLOR is aware of the above instructions and will have schedulers assist with adding patient on to Gerson's CNP schedule.    Kathe Coles, DAYDAY

## 2019-11-20 NOTE — PROGRESS NOTES
Oncology/Hematology Visit Note  Nov 20, 2019    Reason for Visit: follow up of pancreatic cancer  -Cancer is resectable.  -Patient met with Dr. Segura who recommended   neoadjuvant chemotherapy with gemcitabine and Abraxane started on 11/7/2019    Patient comes for follow-up after ER discharge      Interval History:  -Patient states he was working out at [employer] yesterday.  He was using the elliptical machine and then wanted to use treadmill.  He had syncope event.  Ambulance was called.  Patient reports to the ER multiple testings were done which were all negative and he was given IV fluids and was discharged home.    Today patient feels well.  He denies headache dizziness seizures lightheadedness.  He denies cough shortness of breath chest pain.  Denies fever chills sweats.  Patient denies nausea vomiting diarrhea denies abdominal pain denies bleeding.  Denies injury from yesterday syncope event      Review of Systems:  14 point ROS of systems including Constitutional, Eyes, Respiratory, Cardiovascular, Gastroenterology, Genitourinary, Integumentary, Muscularskeletal, Psychiatric were all negative except for pertinent positives noted in my HPI.      Current Outpatient Medications   Medication Sig Dispense Refill     LORazepam (ATIVAN) 0.5 MG tablet Take 1 tablet (0.5 mg) by mouth every 4 hours as needed (Anxiety, Nausea/Vomiting or Sleep) (Patient not taking: Reported on 11/13/2019) 30 tablet 2     polyethylene glycol (MIRALAX/GLYCOLAX) packet Take 1 packet by mouth daily       prochlorperazine (COMPAZINE) 10 MG tablet Take 1 tablet (10 mg) by mouth every 6 hours as needed (Nausea/Vomiting) (Patient not taking: Reported on 11/13/2019) 30 tablet 2     psyllium (METAMUCIL) 28.3 % POWD        tamsulosin (FLOMAX) 80 mcg/mL Take 0.4 mg by mouth         Physical Examination:  General: The patient is a pleasant male in no acute distress.  BP (!) 147/85   Pulse 78   Temp 98  F (36.7  C)   Resp 18   SpO2 98%     HEENT:  EOMI, PERRL. Sclerae are anicteric. Oral mucosa is pink and moist with no lesions or thrush.   Lymph: Neck is supple with no lymphadenopathy in the cervical or supraclavicular areas.   Heart: Regular rate and rhythm.   Lungs: Clear to auscultation bilaterally.   GI: Bowel sounds present, soft, nontender with no palpable hepatosplenomegaly or masses.   Extremities: No lower extremity edema noted bilaterally.   Skin: Maculopapular skin rash -lower back, no vesicles no lesions noted,  Laboratory Data:  Results for orders placed or performed in visit on 11/20/19 (from the past 24 hour(s))   CBC with platelets differential   Result Value Ref Range    WBC 3.3 (L) 4.0 - 11.0 10e9/L    RBC Count 3.49 (L) 4.4 - 5.9 10e12/L    Hemoglobin 10.5 (L) 13.3 - 17.7 g/dL    Hematocrit 33.1 (L) 40.0 - 53.0 %    MCV 95 78 - 100 fl    MCH 30.1 26.5 - 33.0 pg    MCHC 31.7 31.5 - 36.5 g/dL    RDW 13.5 10.0 - 15.0 %    Platelet Count 95 (L) 150 - 450 10e9/L    Diff Method Automated Method     % Neutrophils 37.7 %    % Lymphocytes 48.9 %    % Monocytes 7.9 %    % Eosinophils 4.9 %    % Basophils 0.3 %    % Immature Granulocytes 0.3 %    Nucleated RBCs 0 0 /100    Absolute Neutrophil 1.2 (L) 1.6 - 8.3 10e9/L    Absolute Lymphocytes 1.6 0.8 - 5.3 10e9/L    Absolute Monocytes 0.3 0.0 - 1.3 10e9/L    Absolute Eosinophils 0.2 0.0 - 0.7 10e9/L    Absolute Basophils 0.0 0.0 - 0.2 10e9/L    Abs Immature Granulocytes 0.0 0 - 0.4 10e9/L    Absolute Nucleated RBC 0.0    Comprehensive metabolic panel   Result Value Ref Range    Sodium 140 133 - 144 mmol/L    Potassium 4.3 3.4 - 5.3 mmol/L    Chloride 109 94 - 109 mmol/L    Carbon Dioxide 26 20 - 32 mmol/L    Anion Gap 5 3 - 14 mmol/L    Glucose 105 (H) 70 - 99 mg/dL    Urea Nitrogen 12 7 - 30 mg/dL    Creatinine 1.06 0.66 - 1.25 mg/dL    GFR Estimate 65 >60 mL/min/[1.73_m2]    GFR Estimate If Black 76 >60 mL/min/[1.73_m2]    Calcium 8.8 8.5 - 10.1 mg/dL    Bilirubin Total 0.3 0.2 - 1.3 mg/dL    Albumin  3.2 (L) 3.4 - 5.0 g/dL    Protein Total 6.6 (L) 6.8 - 8.8 g/dL    Alkaline Phosphatase 42 40 - 150 U/L    ALT 40 0 - 70 U/L    AST 24 0 - 45 U/L         Assessment and Plan:      This is a 79-year-old male with     pancreatic cancer  -Cancer is resectable.  Patient here to start neoadjuvant chemotherapy with gemcitabine and Abraxane  On 11/7/2019  -Today he is scheduled for cycle 1 day 15  -Due to neutropenia will hold chemotherapy today  Give Neupogen shot today and tomorrow and reschedule chemo for next week  -Because patient is getting neoadjuvant chemotherapy will not dose reduce chemotherapy.  -And to continue with the same dose of chemotherapy and schedule for Neupogen shots as needed  -Schedule with Lianna next week for chemo  -Consider checking CBC one  week after chemo next week to evaluate the need for Neupogen shots prior to next cycle of chemo      Syncope event  -happened yesterday after heavy exercise at the [facility].  I Told patient that exercise is okay but he has to slow down and take frequent rests  -Patient was in the emergency room yesterday   CT negative for pulmonary embolism, negative troponin, he responded well after hydration was given in the ER.  -I will add UA and blood cultures  -Advised to buy blood pressure machine and check blood pressure daily, proper  hydration discussed  -We will give another NS bolus hydration today    Prostate cancer patient is on surveillance now    Skin rash lower back  Secondary to chemo--mild rash  On exam no appearance of infection or shingles  Pt reports significant improvement with Claratin   -Continue Claritin  -Can use hydrocortisone cream if needed  -Advised to call clinic if worsening of rash painful rash or lesions vesicles or any changes in the rash appearance    Maintenance  Patient had flu shot    Patient is advised to call our clinic or go to ER in the event of fever chills sweats cough shortness of breath chest pain nausea vomiting diarrhea abdominal  pain bleeding or any changes in health condition      YAN Carranza CNP  [facility]     Chart documentation with Dragon Voice recognition Software. Although reviewed after completion, some words and grammatical errors may remain.

## 2019-11-20 NOTE — PROGRESS NOTES
Infusion Nursing Note:  Lowell Arredondo presents today for IV hydration/Neupogen/Blood Cultures/UA.  CHEMO HELD.    Patient seen by provider today: Yes: Gerson   present during visit today: Not Applicable.    Note: Assessment done by NP at appointment.  BCx1 drawn.  UA done, Neupogen given.  Instructed patient on the SE of Neupogen including bone pain.  Plan for Neupogen today and tomorrow and receive chemo/PA appointment Friday or Monday.    Intravenous Access:  Lab draw site port, Needle type port, Gauge 20 3/4in.  Labs drawn without difficulty.  Implanted Port.    Treatment Conditions:  Results reviewed, labs did NOT meet treatment parameters: ANC 1.2.      Post Infusion Assessment:  Patient tolerated infusion without incident.  Patient tolerated injection without incident.  Blood return noted pre and post infusion.  Site patent and intact, free from redness, edema or discomfort.  No evidence of extravasations.  Access discontinued per protocol.       Discharge Plan:   Discharge instructions reviewed with: Patient.  Patient discharged in stable condition accompanied by: wife.  Departure Mode: Ambulatory.  Neupogen scheduled for tomorrow.    NIKKI ASHLEY RN

## 2019-11-21 ENCOUNTER — HOSPITAL ENCOUNTER (OUTPATIENT)
Facility: CLINIC | Age: 79
Setting detail: SPECIMEN
End: 2019-11-21
Attending: NURSE PRACTITIONER
Payer: COMMERCIAL

## 2019-11-21 ENCOUNTER — ALLIED HEALTH/NURSE VISIT (OUTPATIENT)
Dept: INFUSION THERAPY | Facility: CLINIC | Age: 79
End: 2019-11-21
Attending: NURSE PRACTITIONER
Payer: COMMERCIAL

## 2019-11-21 VITALS
DIASTOLIC BLOOD PRESSURE: 66 MMHG | RESPIRATION RATE: 16 BRPM | HEART RATE: 70 BPM | OXYGEN SATURATION: 99 % | TEMPERATURE: 96.7 F | SYSTOLIC BLOOD PRESSURE: 113 MMHG

## 2019-11-21 DIAGNOSIS — D70.1 CHEMOTHERAPY-INDUCED NEUTROPENIA (H): Primary | ICD-10-CM

## 2019-11-21 DIAGNOSIS — T45.1X5A CHEMOTHERAPY-INDUCED NEUTROPENIA (H): Primary | ICD-10-CM

## 2019-11-21 DIAGNOSIS — C25.2 MALIGNANT NEOPLASM OF TAIL OF PANCREAS (H): ICD-10-CM

## 2019-11-21 PROCEDURE — 25000128 H RX IP 250 OP 636: Performed by: NURSE PRACTITIONER

## 2019-11-21 PROCEDURE — 96372 THER/PROPH/DIAG INJ SC/IM: CPT

## 2019-11-21 RX ORDER — HEPARIN SODIUM,PORCINE 10 UNIT/ML
5 VIAL (ML) INTRAVENOUS
Status: CANCELLED | OUTPATIENT
Start: 2019-11-21

## 2019-11-21 RX ORDER — HEPARIN SODIUM (PORCINE) LOCK FLUSH IV SOLN 100 UNIT/ML 100 UNIT/ML
5 SOLUTION INTRAVENOUS
Status: CANCELLED | OUTPATIENT
Start: 2019-11-21

## 2019-11-21 RX ADMIN — FILGRASTIM 480 MCG: 480 INJECTION, SOLUTION INTRAVENOUS; SUBCUTANEOUS at 13:12

## 2019-11-21 ASSESSMENT — PAIN SCALES - GENERAL: PAINLEVEL: NO PAIN (0)

## 2019-11-21 NOTE — PROGRESS NOTES
Infusion Nursing Note:  Lowell Arredondo presents today for Neupogen.    Patient seen by provider today: No   present during visit today: Not Applicable.    Note: No complaints from Neupogen yesterday; no ache, no new rash. He has purchased a BP cuff and has been checking BP, this morning SBP was 108.    Intravenous Access:  No Intravenous access/labs at this visit.    Treatment Conditions:  Not Applicable.    Post Infusion Assessment:  Patient tolerated injection without incident.     Discharge Plan:   Discharge instructions reviewed with: Patient.  Patient and/or family verbalized understanding of discharge instructions and all questions answered.  AVS to patient via [employer].  Patient will return 11/25 for next appointment.   Patient discharged in stable condition accompanied by: wife.  Departure Mode: Ambulatory.    Precious Lazo RN

## 2019-11-21 NOTE — TELEPHONE ENCOUNTER
Prior Authorization Approval    Authorization Effective Date: 11/7/2019  Authorization Expiration Date: 11/7/2020  Medication: lorazepam  Approved Dose/Quantity:30 for 5 days  Reference #: Key: [id number]   Insurance Company: [employer] - Phone [phone] Fax [phone]  Expected CoPay: 1.28     CoPay Card Available: No    Foundation Assistance Needed: n/a  Which Pharmacy is filling the prescription (Not needed for infusion/clinic administered): [facility] - [id number] [place] [address]  Pharmacy Notified: Yes  Patient Notified: Yes

## 2019-11-22 DIAGNOSIS — C25.2 MALIGNANT NEOPLASM OF TAIL OF PANCREAS (H): ICD-10-CM

## 2019-11-22 RX ORDER — NALOXONE HYDROCHLORIDE 0.4 MG/ML
.1-.4 INJECTION, SOLUTION INTRAMUSCULAR; INTRAVENOUS; SUBCUTANEOUS
Status: CANCELLED | OUTPATIENT
Start: 2019-11-22

## 2019-11-22 RX ORDER — EPINEPHRINE 1 MG/ML
0.3 INJECTION, SOLUTION INTRAMUSCULAR; SUBCUTANEOUS EVERY 5 MIN PRN
Status: CANCELLED | OUTPATIENT
Start: 2019-11-22

## 2019-11-22 RX ORDER — METHYLPREDNISOLONE SODIUM SUCCINATE 125 MG/2ML
125 INJECTION, POWDER, LYOPHILIZED, FOR SOLUTION INTRAMUSCULAR; INTRAVENOUS
Status: CANCELLED
Start: 2019-11-22

## 2019-11-22 RX ORDER — EPINEPHRINE 0.3 MG/.3ML
0.3 INJECTION SUBCUTANEOUS EVERY 5 MIN PRN
Status: CANCELLED | OUTPATIENT
Start: 2019-11-22

## 2019-11-22 RX ORDER — LORAZEPAM 2 MG/ML
0.5 INJECTION INTRAMUSCULAR EVERY 4 HOURS PRN
Status: CANCELLED
Start: 2019-11-22

## 2019-11-22 RX ORDER — DIPHENHYDRAMINE HYDROCHLORIDE 50 MG/ML
50 INJECTION INTRAMUSCULAR; INTRAVENOUS
Status: CANCELLED
Start: 2019-11-22

## 2019-11-22 RX ORDER — SODIUM CHLORIDE 9 MG/ML
1000 INJECTION, SOLUTION INTRAVENOUS CONTINUOUS PRN
Status: CANCELLED
Start: 2019-11-22

## 2019-11-22 RX ORDER — PACLITAXEL 100 MG/20ML
125 INJECTION, POWDER, LYOPHILIZED, FOR SUSPENSION INTRAVENOUS ONCE
Status: CANCELLED | OUTPATIENT
Start: 2019-11-22

## 2019-11-22 RX ORDER — ALBUTEROL SULFATE 90 UG/1
1-2 AEROSOL, METERED RESPIRATORY (INHALATION)
Status: CANCELLED
Start: 2019-11-22

## 2019-11-22 RX ORDER — ALBUTEROL SULFATE 0.83 MG/ML
2.5 SOLUTION RESPIRATORY (INHALATION)
Status: CANCELLED | OUTPATIENT
Start: 2019-11-22

## 2019-11-25 ENCOUNTER — HOSPITAL ENCOUNTER (OUTPATIENT)
Facility: CLINIC | Age: 79
Setting detail: SPECIMEN
Discharge: HOME | End: 2019-11-25
Attending: INTERNAL MEDICINE | Admitting: INTERNAL MEDICINE
Payer: COMMERCIAL

## 2019-11-25 ENCOUNTER — INFUSION THERAPY VISIT (OUTPATIENT)
Dept: INFUSION THERAPY | Facility: CLINIC | Age: 79
End: 2019-11-25
Attending: PHYSICIAN ASSISTANT
Payer: COMMERCIAL

## 2019-11-25 ENCOUNTER — ONCOLOGY VISIT (OUTPATIENT)
Dept: ONCOLOGY | Facility: CLINIC | Age: 79
End: 2019-11-25
Attending: PHYSICIAN ASSISTANT
Payer: COMMERCIAL

## 2019-11-25 ENCOUNTER — ALLIED HEALTH/NURSE VISIT (OUTPATIENT)
Dept: ONCOLOGY | Facility: CLINIC | Age: 79
End: 2019-11-25

## 2019-11-25 VITALS
WEIGHT: 158.2 LBS | DIASTOLIC BLOOD PRESSURE: 61 MMHG | OXYGEN SATURATION: 99 % | HEART RATE: 77 BPM | SYSTOLIC BLOOD PRESSURE: 111 MMHG | BODY MASS INDEX: 24.06 KG/M2 | TEMPERATURE: 97.4 F | RESPIRATION RATE: 16 BRPM

## 2019-11-25 DIAGNOSIS — C25.2 MALIGNANT NEOPLASM OF TAIL OF PANCREAS (H): Primary | ICD-10-CM

## 2019-11-25 DIAGNOSIS — K21.9 GASTROESOPHAGEAL REFLUX DISEASE, ESOPHAGITIS PRESENCE NOT SPECIFIED: ICD-10-CM

## 2019-11-25 DIAGNOSIS — Z71.9 COUNSELING, UNSPECIFIED: Primary | ICD-10-CM

## 2019-11-25 LAB
BASOPHILS # BLD AUTO: 0.4 10E9/L (ref 0–0.2)
BASOPHILS NFR BLD AUTO: 5 %
DIFFERENTIAL METHOD BLD: ABNORMAL
EOSINOPHIL # BLD AUTO: 0.2 10E9/L (ref 0–0.7)
EOSINOPHIL NFR BLD AUTO: 3 %
ERYTHROCYTE [DISTWIDTH] IN BLOOD BY AUTOMATED COUNT: 14.2 % (ref 10–15)
HCT VFR BLD AUTO: 35.8 % (ref 40–53)
HGB BLD-MCNC: 11.9 G/DL (ref 13.3–17.7)
LYMPHOCYTES # BLD AUTO: 3 10E9/L (ref 0.8–5.3)
LYMPHOCYTES NFR BLD AUTO: 37 %
MCH RBC QN AUTO: 31.2 PG (ref 26.5–33)
MCHC RBC AUTO-ENTMCNC: 33.2 G/DL (ref 31.5–36.5)
MCV RBC AUTO: 94 FL (ref 78–100)
METAMYELOCYTES # BLD: 0.2 10E9/L
METAMYELOCYTES NFR BLD MANUAL: 3 %
MONOCYTES # BLD AUTO: 0.6 10E9/L (ref 0–1.3)
MONOCYTES NFR BLD AUTO: 7 %
NEUTROPHILS # BLD AUTO: 3.6 10E9/L (ref 1.6–8.3)
NEUTROPHILS NFR BLD AUTO: 45 %
PLATELET # BLD AUTO: 311 10E9/L (ref 150–450)
PLATELET # BLD EST: ABNORMAL 10*3/UL
RBC # BLD AUTO: 3.81 10E12/L (ref 4.4–5.9)
RBC MORPH BLD: ABNORMAL
VARIANT LYMPHS BLD QL SMEAR: PRESENT
WBC # BLD AUTO: 8 10E9/L (ref 4–11)

## 2019-11-25 PROCEDURE — 25000125 ZZHC RX 250: Performed by: PHYSICIAN ASSISTANT

## 2019-11-25 PROCEDURE — 99214 OFFICE O/P EST MOD 30 MIN: CPT | Performed by: PHYSICIAN ASSISTANT

## 2019-11-25 PROCEDURE — 96417 CHEMO IV INFUS EACH ADDL SEQ: CPT

## 2019-11-25 PROCEDURE — 96413 CHEMO IV INFUSION 1 HR: CPT

## 2019-11-25 PROCEDURE — G0463 HOSPITAL OUTPT CLINIC VISIT: HCPCS | Mod: 25

## 2019-11-25 PROCEDURE — 85025 COMPLETE CBC W/AUTO DIFF WBC: CPT | Performed by: INTERNAL MEDICINE

## 2019-11-25 PROCEDURE — 25800030 ZZH RX IP 258 OP 636: Performed by: INTERNAL MEDICINE

## 2019-11-25 PROCEDURE — 96375 TX/PRO/DX INJ NEW DRUG ADDON: CPT

## 2019-11-25 PROCEDURE — 25000128 H RX IP 250 OP 636: Performed by: INTERNAL MEDICINE

## 2019-11-25 RX ORDER — PACLITAXEL 100 MG/20ML
125 INJECTION, POWDER, LYOPHILIZED, FOR SUSPENSION INTRAVENOUS ONCE
Status: COMPLETED | OUTPATIENT
Start: 2019-11-25 | End: 2019-11-25

## 2019-11-25 RX ADMIN — FAMOTIDINE 20 MG: 10 INJECTION, SOLUTION INTRAVENOUS at 11:05

## 2019-11-25 RX ADMIN — DEXAMETHASONE SODIUM PHOSPHATE 12 MG: 10 INJECTION, SOLUTION INTRAMUSCULAR; INTRAVENOUS at 11:07

## 2019-11-25 RX ADMIN — PACLITAXEL 231 MG: 100 INJECTION, POWDER, LYOPHILIZED, FOR SUSPENSION INTRAVENOUS at 11:42

## 2019-11-25 RX ADMIN — SODIUM CHLORIDE 1000 ML: 9 INJECTION, SOLUTION INTRAVENOUS at 11:03

## 2019-11-25 RX ADMIN — GEMCITABINE 1800 MG: 38 INJECTION, SOLUTION INTRAVENOUS at 12:20

## 2019-11-25 ASSESSMENT — PAIN SCALES - GENERAL: PAINLEVEL: NO PAIN (0)

## 2019-11-25 NOTE — PROGRESS NOTES
Oncology/Hematology Visit Note    Nov 25, 2019    Reason for visit: Follow up pancreatic cancer      Oncology HPI: Lowell Arredondo is a 79 year old male with a h/o prostate cancer and now with pancreatic cancer. Due to his prostate cancer, he had multiple surveillance scans and CT on 9/26/19 revealed a new soft tissue mesenteric mass in the pancreatic tail. EUS on 10/7/19 revealed moderately differentiated pancreatic adenocarcinoma. PET scan with no metastatic disease. He established care with Dr Segura and discussed starting curtaive intent, neoadjuvant chemotherapy and then surgery. He completed gemcitabine/Abraxane C1D1 on 11/7/19.    He was in the ED on 11/19/19 for syncope.  He was exercising at the [facility] and had a syncopal event.  ED work-up was extensive and he was discharged.    He is here today for gemcitabine/Abraxane C1D15.     Interval History: Lowell is here with his wife, June.  He has been tolerating chemotherapy fairly well.  He denies fever, chills, nausea, vomiting, diarrhea.  He admits to having some clearing of his throat and some cough but has not been on any reflux medications in the past.  No other complaints.    Review of Systems: See interval hx. Denies fevers, chills, HA, dizziness, n/t, changes in vision, sore throat, CP, SOB, abdominal pain, N/V, diarrhea, changes in urination, bleeding, bruising, rash.     PMHx and Social Hx reviewed per EPIC.      Medications:  Current Outpatient Medications   Medication Sig Dispense Refill     polyethylene glycol (MIRALAX/GLYCOLAX) packet Take 1 packet by mouth daily       psyllium (METAMUCIL) 28.3 % POWD        tamsulosin (FLOMAX) 80 mcg/mL Take 0.4 mg by mouth       LORazepam (ATIVAN) 0.5 MG tablet Take 1 tablet (0.5 mg) by mouth every 4 hours as needed (Anxiety, Nausea/Vomiting or Sleep) (Patient not taking: Reported on 11/25/2019) 30 tablet 2     prochlorperazine (COMPAZINE) 10 MG tablet Take 1 tablet (10 mg) by mouth every 6 hours as needed  (Nausea/Vomiting) (Patient not taking: Reported on 11/25/2019) 30 tablet 2       Allergies   Allergen Reactions     Demerol [Meperidine] Difficulty breathing       EXAM:    /61   Pulse 77   Temp 97.4  F (36.3  C) (Oral)   Resp 16   Wt 71.8 kg (158 lb 3.2 oz)   SpO2 99%   BMI 24.06 kg/m      GENERAL:  Male, in no acute distress.  Alert and oriented x3.   HEENT:  Normocephalic, atraumatic.  PERRL, oropharynx clear with no sores or thrush.   LYMPH NODES:  No palpable pre/post-auricular, cervical, axillary lymphadenopathy appreciated.  CV:  RRR, No murmurs, gallops, or rubs.   LUNGS:  Clear to auscultation bilaterally.   ABDOMEN:  Soft, nontender and nondistended.  Bowel sounds heard x4.  No apparent hepatosplenomegaly.   EXTREMITIES:  No clubbing, cyanosis, or edema.   SKIN: No rash  PSYCH: Mood stable      Labs:   Results for KARI YANG (MRN [id number]) as of 11/25/2019 16:02   11/25/2019 08:55   WBC 8.0   Hemoglobin 11.9 (L)   Hematocrit 35.8 (L)   Platelet Count 311   RBC Count 3.81 (L)   MCV 94   MCH 31.2   MCHC 33.2   RDW 14.2   Diff Method Manual Differential   % Neutrophils 45.0   % Lymphocytes 37.0   % Monocytes 7.0   % Eosinophils 3.0   % Basophils 5.0   % Metamyelocytes 3.0   Absolute Neutrophil 3.6   Absolute Lymphocytes 3.0   Absolute Monocytes 0.6   Absolute Eosinophils 0.2   Absolute Basophils 0.4 (H)   Absolute Metamyelocytes 0.2 (H)   RBC Morphology Consistent with reported results   Reactive Lymphs Present   Platelet Estimate Automated count confirmed.  Platelet morphology is normal.       Imaging: n/a    Impression/Plan: Kari Yang is a 79 year old male with pancreatic cancer currently on neoadjuvant chemotherapy with gemcitabine/Abraxane.    Pancreatic cancer: Started neoadjuvant chemotherapy with gemcitabine/Abraxane, C1 D1 completed on 11/7/2019.  He was neutropenic last week and received 2 doses of Neupogen.  He is doing well and labs within treatment parameters to proceed  with C1 D 15 today.  We will continue to give him full dose as much as possible and support with Neupogen as needed.  If he is doing fairly well on this regimen.  Next week will be his week off and he will return to see Dr. Segura for cycle 2.  He will call the clinic with any questions or concerns.  --12/10 Dr. Segura, gemcitabine/Abraxane C2 D1    Syncope: Syncopal episode while at the [facility] and ED work-up negative.  No symptoms since then.    GERD: Describes a cough with some irritation in throat I suspect he may have reflux.  Prescription omeprazole sent to his pharmacy today.    Chart documentation with Dragon Voice recognition Software. Although reviewed after completion, some words and grammatical errors may remain.      Lianna Vyas PA-C  Hematology/Oncology  [facility]

## 2019-11-25 NOTE — LETTER
11/25/2019         RE: Lowell Arredondo  [address] [address]        Dear Colleague,    Thank you for referring your patient, Lowell Arredondo, to the [facility]. Please see a copy of my visit note below.    Oncology/Hematology Visit Note    Nov 25, 2019    Reason for visit: Follow up pancreatic cancer      Oncology HPI: Lowell Arredondo is a 79 year old male with a h/o prostate cancer and now with pancreatic cancer. Due to his prostate cancer, he had multiple surveillance scans and CT on 9/26/19 revealed a new soft tissue mesenteric mass in the pancreatic tail. EUS on 10/7/19 revealed moderately differentiated pancreatic adenocarcinoma. PET scan with no metastatic disease. He established care with Dr Segura and discussed starting curtaive intent, neoadjuvant chemotherapy and then surgery. He completed gemcitabine/Abraxane C1D1 on 11/7/19.    He was in the ED on 11/19/19 for syncope.  He was exercising at the [facility] and had a syncopal event.  ED work-up was extensive and he was discharged.    He is here today for gemcitabine/Abraxane C1D15.     Interval History: Lowell is here with his wife, June.  He has been tolerating chemotherapy fairly well.  He denies fever, chills, nausea, vomiting, diarrhea.  He admits to having some clearing of his throat and some cough but has not been on any reflux medications in the past.  No other complaints.    Review of Systems: See interval hx. Denies fevers, chills, HA, dizziness, n/t, changes in vision, sore throat, CP, SOB, abdominal pain, N/V, diarrhea, changes in urination, bleeding, bruising, rash.     PMHx and Social Hx reviewed per EPIC.      Medications:  Current Outpatient Medications   Medication Sig Dispense Refill     polyethylene glycol (MIRALAX/GLYCOLAX) packet Take 1 packet by mouth daily       psyllium (METAMUCIL) 28.3 % POWD        tamsulosin (FLOMAX) 80 mcg/mL Take 0.4 mg by mouth       LORazepam (ATIVAN) 0.5 MG tablet Take 1 tablet  (0.5 mg) by mouth every 4 hours as needed (Anxiety, Nausea/Vomiting or Sleep) (Patient not taking: Reported on 11/25/2019) 30 tablet 2     prochlorperazine (COMPAZINE) 10 MG tablet Take 1 tablet (10 mg) by mouth every 6 hours as needed (Nausea/Vomiting) (Patient not taking: Reported on 11/25/2019) 30 tablet 2       Allergies   Allergen Reactions     Demerol [Meperidine] Difficulty breathing       EXAM:    /61   Pulse 77   Temp 97.4  F (36.3  C) (Oral)   Resp 16   Wt 71.8 kg (158 lb 3.2 oz)   SpO2 99%   BMI 24.06 kg/m       GENERAL:   Male, in no acute distress.  Alert and oriented x3.   HEENT:  Normocephalic, atraumatic.  PERRL, oropharynx clear with no sores or thrush.   LYMPH NODES:  No palpable pre/post-auricular, cervical, axillary lymphadenopathy appreciated.  CV:  RRR, No murmurs, gallops, or rubs.   LUNGS:  Clear to auscultation bilaterally.   ABDOMEN:  Soft, nontender and nondistended.  Bowel sounds heard x4.  No apparent hepatosplenomegaly.   EXTREMITIES:  No clubbing, cyanosis, or edema.   SKIN: No rash  PSYCH: Mood stable      Labs:   Results for KARI YANG (MRN [id number]) as of 11/25/2019 16:02   11/25/2019 08:55   WBC 8.0   Hemoglobin 11.9 (L)   Hematocrit 35.8 (L)   Platelet Count 311   RBC Count 3.81 (L)   MCV 94   MCH 31.2   MCHC 33.2   RDW 14.2   Diff Method Manual Differential   % Neutrophils 45.0   % Lymphocytes 37.0   % Monocytes 7.0   % Eosinophils 3.0   % Basophils 5.0   % Metamyelocytes 3.0   Absolute Neutrophil 3.6   Absolute Lymphocytes 3.0   Absolute Monocytes 0.6   Absolute Eosinophils 0.2   Absolute Basophils 0.4 (H)   Absolute Metamyelocytes 0.2 (H)   RBC Morphology Consistent with reported results   Reactive Lymphs Present   Platelet Estimate Automated count confirmed.  Platelet morphology is normal.       Imaging: n/a    Impression/Plan: Kari Yang is a 79 year old male with pancreatic cancer currently on neoadjuvant chemotherapy with  gemcitabine/Abraxane.    Pancreatic cancer: Started neoadjuvant chemotherapy with gemcitabine/Abraxane, C1 D1 completed on 11/7/2019.  He was neutropenic last week and received 2 doses of Neupogen.  He is doing well and labs within treatment parameters to proceed with C1 D 15 today.  We will continue to give him full dose as much as possible and support with Neupogen as needed.  If he is doing fairly well on this regimen.  Next week will be his week off and he will return to see Dr. Segura for cycle 2.  He will call the clinic with any questions or concerns.  --12/10 Dr. Segura, gemcitabine/Abraxane C2 D1    Syncope: Syncopal episode while at the [facility] and ED work-up negative.  No symptoms since then.    GERD: Describes a cough with some irritation in throat I suspect he may have reflux.  Prescription omeprazole sent to his pharmacy today.    Chart documentation with Dragon Voice recognition Software. Although reviewed after completion, some words and grammatical errors may remain.      Lianna Vyas PA-C  Hematology/Oncology  [facility]                  Again, thank you for allowing me to participate in the care of your patient.        Sincerely,        Lianna Vyas PA-C

## 2019-11-25 NOTE — PROGRESS NOTES
Infusion Nursing Note:  Lowell Arredondo presents today for C1D15 Abraxane, Gemzar.    Patient seen by provider today: Yes: Asael   present during visit today: Not Applicable.    Note: N/A.    Intravenous Access:  Implanted Port.    Treatment Conditions:  Lab Results   Component Value Date    HGB 11.9 11/25/2019     Lab Results   Component Value Date    WBC 8.0 11/25/2019      Lab Results   Component Value Date    ANEU 3.6 11/25/2019     Lab Results   Component Value Date     11/25/2019      Results reviewed, labs MET treatment parameters, ok to proceed with treatment.      Post Infusion Assessment:  Patient tolerated infusion without incident.  Blood return noted pre and post infusion.  Site patent and intact, free from redness, edema or discomfort.  No evidence of extravasations.  Access discontinued per protocol.       Discharge Plan:   Discharge instructions reviewed with: Patient.  Patient and/or family verbalized understanding of discharge instructions and all questions answered.  AVS to patient via [employer].  Patient will return 12/6/19 for C2D1 Abraxane/Gemzar for next appointment.   Patient discharged in stable condition accompanied by: wife.  Departure Mode: Ambulatory.    Helena Davenport RN

## 2019-11-25 NOTE — PROGRESS NOTES
Nursing Note:  Lowell Arredondo presents today for labs.    Patient seen by provider today: Yes:    present during visit today: Not Applicable.    Note: N/A.    Intravenous Access:  Labs drawn without difficulty.  Implanted Port.    Discharge Plan:   [facility]    Jo Ann Galo, RN, RN

## 2019-11-25 NOTE — NURSING NOTE
"Oncology Rooming Note    November 25, 2019 9:15 AM   Lowell Arredondo is a 79 year old male who presents for:    Chief Complaint   Patient presents with     Oncology Clinic Visit     Malignant neoplasm of tail of pancreas     Initial Vitals: /61   Pulse 77   Temp 97.4  F (36.3  C) (Oral)   Resp 16   Wt 71.8 kg (158 lb 3.2 oz)   SpO2 99%   BMI 24.06 kg/m   Estimated body mass index is 24.06 kg/m  as calculated from the following:    Height as of 11/7/19: 1.727 m (5' 7.99\").    Weight as of this encounter: 71.8 kg (158 lb 3.2 oz). Body surface area is 1.86 meters squared.  No Pain (0) Comment: Data Unavailable   No LMP for male patient.  Allergies reviewed: Yes  Medications reviewed: Yes    Medications: Medication refills not needed today.  Pharmacy name entered into [employer] [place]:    [id number] - [place], [place] - [address] [facility] [address]  [id number] [place] - [address]    Clinical concerns: f/u       Tigist Nicolas CMA              "

## 2019-11-25 NOTE — PROGRESS NOTES
Social Work Progress Note      Data/Intervention:  Patient Name:  Lowell Arredondo  /Age:  1940 (79 year old)    Reason for Follow-Up:  Lowell is an 79-year-old gentleman who comes to clinic today for C1D15 Abraxane, Gemzar for treatment of his diagnosis of pancreatic cancer. This clinician met with family with goal of introducing psychosocial services and support.     Intervention:   Lowell is a  gentleman who is accompanied by wife Billie today in clinic. Billie and Lowell report today that this has been a difficult year for the family with Billie's diagnosis of lymphoma and Lowell's diagnosis of pancreatic cancer. Lowell reports that despite these diagnoses, the two have been very actively engaged in programming that supports their positive coping. Lowell reports that he enjoys oil painting, and has continued to do this since diagnosis. Lowell reports that he also enjoys woodworking, but has not been able to do this as there is no heat in his studio, and he instead elects to participate in woodworking gatherings to support this hobby. Billie reports that she has been able to continue to enjoy engagement with quilting, and that engaging in these activities supports the couple's positive coping. Lowell and Billie report that they continue to be independent in ADLs and IADLs, and receive a great deal of assistance from their children. Lowell's 3 children live in [place], and Billie's live in [place]; family understandably grieving inability to be in [place] this winter. Family appears to be coping well from a mood perspective, remaining hopeful about their continued adjustment to treatment and Lowell's ability to cope with surgery. No psychosocial concerns or needs reported or expressed today, family appreciative of knowing about social work resources available, but denied specific needs at present.     Resources Provided:  Onc SW contact information    Plan:  Provided patient/family with writer's contact information and availability.  Family knows how to reach out if in the case of psychosocial distress or need.SW will continue to be available as needed for ongoing psychosocial support.     Please call or page if needs or concerns arise.     LINDA Mojica, [facility] Center[facility]  Direct Phone: [phone]  Pager: [phone]

## 2019-11-26 LAB
BACTERIA SPEC CULT: NO GROWTH
Lab: NORMAL
SPECIMEN SOURCE: NORMAL

## 2019-12-07 DIAGNOSIS — C25.2 MALIGNANT NEOPLASM OF TAIL OF PANCREAS (H): ICD-10-CM

## 2019-12-07 RX ORDER — PACLITAXEL 100 MG/20ML
125 INJECTION, POWDER, LYOPHILIZED, FOR SUSPENSION INTRAVENOUS ONCE
Status: CANCELLED | OUTPATIENT
Start: 2019-12-17

## 2019-12-07 RX ORDER — EPINEPHRINE 1 MG/ML
0.3 INJECTION, SOLUTION INTRAMUSCULAR; SUBCUTANEOUS EVERY 5 MIN PRN
Status: CANCELLED | OUTPATIENT
Start: 2019-12-10

## 2019-12-07 RX ORDER — ALBUTEROL SULFATE 0.83 MG/ML
2.5 SOLUTION RESPIRATORY (INHALATION)
Status: CANCELLED | OUTPATIENT
Start: 2019-12-10

## 2019-12-07 RX ORDER — SODIUM CHLORIDE 9 MG/ML
1000 INJECTION, SOLUTION INTRAVENOUS CONTINUOUS PRN
Status: CANCELLED
Start: 2019-12-17

## 2019-12-07 RX ORDER — METHYLPREDNISOLONE SODIUM SUCCINATE 125 MG/2ML
125 INJECTION, POWDER, LYOPHILIZED, FOR SOLUTION INTRAMUSCULAR; INTRAVENOUS
Status: CANCELLED
Start: 2019-12-10

## 2019-12-07 RX ORDER — PACLITAXEL 100 MG/20ML
125 INJECTION, POWDER, LYOPHILIZED, FOR SUSPENSION INTRAVENOUS ONCE
Status: CANCELLED | OUTPATIENT
Start: 2019-12-10

## 2019-12-07 RX ORDER — LORAZEPAM 2 MG/ML
0.5 INJECTION INTRAMUSCULAR EVERY 4 HOURS PRN
Status: CANCELLED
Start: 2019-12-10

## 2019-12-07 RX ORDER — LORAZEPAM 2 MG/ML
0.5 INJECTION INTRAMUSCULAR EVERY 4 HOURS PRN
Status: CANCELLED
Start: 2019-12-17

## 2019-12-07 RX ORDER — ALBUTEROL SULFATE 0.83 MG/ML
2.5 SOLUTION RESPIRATORY (INHALATION)
Status: CANCELLED | OUTPATIENT
Start: 2019-12-17

## 2019-12-07 RX ORDER — ALBUTEROL SULFATE 90 UG/1
1-2 AEROSOL, METERED RESPIRATORY (INHALATION)
Status: CANCELLED
Start: 2019-12-10

## 2019-12-07 RX ORDER — METHYLPREDNISOLONE SODIUM SUCCINATE 125 MG/2ML
125 INJECTION, POWDER, LYOPHILIZED, FOR SOLUTION INTRAMUSCULAR; INTRAVENOUS
Status: CANCELLED
Start: 2019-12-24

## 2019-12-07 RX ORDER — ALBUTEROL SULFATE 90 UG/1
1-2 AEROSOL, METERED RESPIRATORY (INHALATION)
Status: CANCELLED
Start: 2019-12-17

## 2019-12-07 RX ORDER — EPINEPHRINE 1 MG/ML
0.3 INJECTION, SOLUTION INTRAMUSCULAR; SUBCUTANEOUS EVERY 5 MIN PRN
Status: CANCELLED | OUTPATIENT
Start: 2019-12-24

## 2019-12-07 RX ORDER — NALOXONE HYDROCHLORIDE 0.4 MG/ML
.1-.4 INJECTION, SOLUTION INTRAMUSCULAR; INTRAVENOUS; SUBCUTANEOUS
Status: CANCELLED | OUTPATIENT
Start: 2019-12-17

## 2019-12-07 RX ORDER — EPINEPHRINE 0.3 MG/.3ML
0.3 INJECTION SUBCUTANEOUS EVERY 5 MIN PRN
Status: CANCELLED | OUTPATIENT
Start: 2019-12-17

## 2019-12-07 RX ORDER — EPINEPHRINE 1 MG/ML
0.3 INJECTION, SOLUTION INTRAMUSCULAR; SUBCUTANEOUS EVERY 5 MIN PRN
Status: CANCELLED | OUTPATIENT
Start: 2019-12-17

## 2019-12-07 RX ORDER — PACLITAXEL 100 MG/20ML
125 INJECTION, POWDER, LYOPHILIZED, FOR SUSPENSION INTRAVENOUS ONCE
Status: CANCELLED | OUTPATIENT
Start: 2019-12-24

## 2019-12-07 RX ORDER — EPINEPHRINE 0.3 MG/.3ML
0.3 INJECTION SUBCUTANEOUS EVERY 5 MIN PRN
Status: CANCELLED | OUTPATIENT
Start: 2019-12-10

## 2019-12-07 RX ORDER — ALBUTEROL SULFATE 0.83 MG/ML
2.5 SOLUTION RESPIRATORY (INHALATION)
Status: CANCELLED | OUTPATIENT
Start: 2019-12-24

## 2019-12-07 RX ORDER — DIPHENHYDRAMINE HYDROCHLORIDE 50 MG/ML
50 INJECTION INTRAMUSCULAR; INTRAVENOUS
Status: CANCELLED
Start: 2019-12-10

## 2019-12-07 RX ORDER — METHYLPREDNISOLONE SODIUM SUCCINATE 125 MG/2ML
125 INJECTION, POWDER, LYOPHILIZED, FOR SOLUTION INTRAMUSCULAR; INTRAVENOUS
Status: CANCELLED
Start: 2019-12-17

## 2019-12-07 RX ORDER — SODIUM CHLORIDE 9 MG/ML
1000 INJECTION, SOLUTION INTRAVENOUS CONTINUOUS PRN
Status: CANCELLED
Start: 2019-12-24

## 2019-12-07 RX ORDER — DIPHENHYDRAMINE HYDROCHLORIDE 50 MG/ML
50 INJECTION INTRAMUSCULAR; INTRAVENOUS
Status: CANCELLED
Start: 2019-12-17

## 2019-12-07 RX ORDER — DIPHENHYDRAMINE HYDROCHLORIDE 50 MG/ML
50 INJECTION INTRAMUSCULAR; INTRAVENOUS
Status: CANCELLED
Start: 2019-12-24

## 2019-12-07 RX ORDER — ALBUTEROL SULFATE 90 UG/1
1-2 AEROSOL, METERED RESPIRATORY (INHALATION)
Status: CANCELLED
Start: 2019-12-24

## 2019-12-07 RX ORDER — NALOXONE HYDROCHLORIDE 0.4 MG/ML
.1-.4 INJECTION, SOLUTION INTRAMUSCULAR; INTRAVENOUS; SUBCUTANEOUS
Status: CANCELLED | OUTPATIENT
Start: 2019-12-10

## 2019-12-07 RX ORDER — NALOXONE HYDROCHLORIDE 0.4 MG/ML
.1-.4 INJECTION, SOLUTION INTRAMUSCULAR; INTRAVENOUS; SUBCUTANEOUS
Status: CANCELLED | OUTPATIENT
Start: 2019-12-24

## 2019-12-07 RX ORDER — SODIUM CHLORIDE 9 MG/ML
1000 INJECTION, SOLUTION INTRAVENOUS CONTINUOUS PRN
Status: CANCELLED
Start: 2019-12-10

## 2019-12-07 RX ORDER — LORAZEPAM 2 MG/ML
0.5 INJECTION INTRAMUSCULAR EVERY 4 HOURS PRN
Status: CANCELLED
Start: 2019-12-24

## 2019-12-07 RX ORDER — EPINEPHRINE 0.3 MG/.3ML
0.3 INJECTION SUBCUTANEOUS EVERY 5 MIN PRN
Status: CANCELLED | OUTPATIENT
Start: 2019-12-24

## 2019-12-10 ENCOUNTER — HOSPITAL ENCOUNTER (OUTPATIENT)
Facility: CLINIC | Age: 79
Setting detail: SPECIMEN
Discharge: HOME | End: 2019-12-10
Attending: INTERNAL MEDICINE | Admitting: INTERNAL MEDICINE
Payer: COMMERCIAL

## 2019-12-10 ENCOUNTER — INFUSION THERAPY VISIT (OUTPATIENT)
Dept: INFUSION THERAPY | Facility: CLINIC | Age: 79
End: 2019-12-10
Attending: INTERNAL MEDICINE
Payer: COMMERCIAL

## 2019-12-10 ENCOUNTER — ONCOLOGY VISIT (OUTPATIENT)
Dept: ONCOLOGY | Facility: CLINIC | Age: 79
End: 2019-12-10
Attending: INTERNAL MEDICINE
Payer: COMMERCIAL

## 2019-12-10 VITALS
TEMPERATURE: 97.4 F | OXYGEN SATURATION: 99 % | RESPIRATION RATE: 18 BRPM | WEIGHT: 159.83 LBS | HEART RATE: 68 BPM | BODY MASS INDEX: 24.22 KG/M2 | SYSTOLIC BLOOD PRESSURE: 115 MMHG | DIASTOLIC BLOOD PRESSURE: 67 MMHG | HEIGHT: 68 IN

## 2019-12-10 VITALS
OXYGEN SATURATION: 99 % | SYSTOLIC BLOOD PRESSURE: 115 MMHG | HEIGHT: 68 IN | TEMPERATURE: 97.4 F | BODY MASS INDEX: 24.22 KG/M2 | DIASTOLIC BLOOD PRESSURE: 67 MMHG | RESPIRATION RATE: 18 BRPM | HEART RATE: 68 BPM | WEIGHT: 159.8 LBS

## 2019-12-10 DIAGNOSIS — C25.2 MALIGNANT NEOPLASM OF TAIL OF PANCREAS (H): Primary | ICD-10-CM

## 2019-12-10 DIAGNOSIS — D70.1 CHEMOTHERAPY-INDUCED NEUTROPENIA (H): ICD-10-CM

## 2019-12-10 DIAGNOSIS — T45.1X5A CHEMOTHERAPY-INDUCED NEUTROPENIA (H): ICD-10-CM

## 2019-12-10 DIAGNOSIS — Z95.828 PORT-A-CATH IN PLACE: ICD-10-CM

## 2019-12-10 LAB
ALBUMIN SERPL-MCNC: 3.3 G/DL (ref 3.4–5)
ALP SERPL-CCNC: 57 U/L (ref 40–150)
ALT SERPL W P-5'-P-CCNC: 37 U/L (ref 0–70)
ANION GAP SERPL CALCULATED.3IONS-SCNC: 3 MMOL/L (ref 3–14)
AST SERPL W P-5'-P-CCNC: 15 U/L (ref 0–45)
BASOPHILS # BLD AUTO: 0 10E9/L (ref 0–0.2)
BASOPHILS NFR BLD AUTO: 0.8 %
BILIRUB SERPL-MCNC: 0.4 MG/DL (ref 0.2–1.3)
BUN SERPL-MCNC: 17 MG/DL (ref 7–30)
CALCIUM SERPL-MCNC: 8.8 MG/DL (ref 8.5–10.1)
CHLORIDE SERPL-SCNC: 109 MMOL/L (ref 94–109)
CO2 SERPL-SCNC: 27 MMOL/L (ref 20–32)
CREAT SERPL-MCNC: 1.13 MG/DL (ref 0.66–1.25)
DIFFERENTIAL METHOD BLD: ABNORMAL
EOSINOPHIL # BLD AUTO: 0.2 10E9/L (ref 0–0.7)
EOSINOPHIL NFR BLD AUTO: 3.1 %
ERYTHROCYTE [DISTWIDTH] IN BLOOD BY AUTOMATED COUNT: 15 % (ref 10–15)
GFR SERPL CREATININE-BSD FRML MDRD: 60 ML/MIN/{1.73_M2}
GLUCOSE SERPL-MCNC: 125 MG/DL (ref 70–99)
HCT VFR BLD AUTO: 34 % (ref 40–53)
HGB BLD-MCNC: 11.4 G/DL (ref 13.3–17.7)
IMM GRANULOCYTES # BLD: 0 10E9/L (ref 0–0.4)
IMM GRANULOCYTES NFR BLD: 0 %
LYMPHOCYTES # BLD AUTO: 1.8 10E9/L (ref 0.8–5.3)
LYMPHOCYTES NFR BLD AUTO: 35.5 %
MCH RBC QN AUTO: 30.6 PG (ref 26.5–33)
MCHC RBC AUTO-ENTMCNC: 33.5 G/DL (ref 31.5–36.5)
MCV RBC AUTO: 91 FL (ref 78–100)
MONOCYTES # BLD AUTO: 0.7 10E9/L (ref 0–1.3)
MONOCYTES NFR BLD AUTO: 14.1 %
NEUTROPHILS # BLD AUTO: 2.4 10E9/L (ref 1.6–8.3)
NEUTROPHILS NFR BLD AUTO: 46.5 %
NRBC # BLD AUTO: 0 10*3/UL
NRBC BLD AUTO-RTO: 0 /100
PLATELET # BLD AUTO: 217 10E9/L (ref 150–450)
POTASSIUM SERPL-SCNC: 4.1 MMOL/L (ref 3.4–5.3)
PROT SERPL-MCNC: 6.4 G/DL (ref 6.8–8.8)
RBC # BLD AUTO: 3.72 10E12/L (ref 4.4–5.9)
SODIUM SERPL-SCNC: 139 MMOL/L (ref 133–144)
WBC # BLD AUTO: 5.2 10E9/L (ref 4–11)

## 2019-12-10 PROCEDURE — 85025 COMPLETE CBC W/AUTO DIFF WBC: CPT | Performed by: INTERNAL MEDICINE

## 2019-12-10 PROCEDURE — 25000128 H RX IP 250 OP 636: Performed by: NURSE PRACTITIONER

## 2019-12-10 PROCEDURE — 25800030 ZZH RX IP 258 OP 636: Performed by: INTERNAL MEDICINE

## 2019-12-10 PROCEDURE — 80053 COMPREHEN METABOLIC PANEL: CPT | Performed by: INTERNAL MEDICINE

## 2019-12-10 PROCEDURE — 86301 IMMUNOASSAY TUMOR CA 19-9: CPT | Performed by: INTERNAL MEDICINE

## 2019-12-10 PROCEDURE — 99213 OFFICE O/P EST LOW 20 MIN: CPT | Performed by: INTERNAL MEDICINE

## 2019-12-10 PROCEDURE — 25000128 H RX IP 250 OP 636: Mod: JW | Performed by: INTERNAL MEDICINE

## 2019-12-10 PROCEDURE — 96413 CHEMO IV INFUSION 1 HR: CPT

## 2019-12-10 PROCEDURE — 96417 CHEMO IV INFUS EACH ADDL SEQ: CPT

## 2019-12-10 PROCEDURE — 96367 TX/PROPH/DG ADDL SEQ IV INF: CPT

## 2019-12-10 RX ORDER — HEPARIN SODIUM (PORCINE) LOCK FLUSH IV SOLN 100 UNIT/ML 100 UNIT/ML
5 SOLUTION INTRAVENOUS
Status: CANCELLED | OUTPATIENT
Start: 2019-12-10

## 2019-12-10 RX ORDER — HEPARIN SODIUM,PORCINE 10 UNIT/ML
5 VIAL (ML) INTRAVENOUS
Status: CANCELLED | OUTPATIENT
Start: 2019-12-10

## 2019-12-10 RX ORDER — HEPARIN SODIUM (PORCINE) LOCK FLUSH IV SOLN 100 UNIT/ML 100 UNIT/ML
5 SOLUTION INTRAVENOUS
Status: DISCONTINUED | OUTPATIENT
Start: 2019-12-10 | End: 2019-12-10 | Stop reason: HOSPADM

## 2019-12-10 RX ORDER — PACLITAXEL 100 MG/20ML
125 INJECTION, POWDER, LYOPHILIZED, FOR SUSPENSION INTRAVENOUS ONCE
Status: COMPLETED | OUTPATIENT
Start: 2019-12-10 | End: 2019-12-10

## 2019-12-10 RX ADMIN — DEXAMETHASONE SODIUM PHOSPHATE 12 MG: 10 INJECTION, SOLUTION INTRAMUSCULAR; INTRAVENOUS at 10:48

## 2019-12-10 RX ADMIN — HEPARIN SODIUM (PORCINE) LOCK FLUSH IV SOLN 100 UNIT/ML 5 ML: 100 SOLUTION at 13:08

## 2019-12-10 RX ADMIN — PACLITAXEL 231 MG: 100 INJECTION, POWDER, LYOPHILIZED, FOR SUSPENSION INTRAVENOUS at 11:40

## 2019-12-10 RX ADMIN — SODIUM CHLORIDE 1000 ML: 9 INJECTION, SOLUTION INTRAVENOUS at 10:48

## 2019-12-10 RX ADMIN — GEMCITABINE 1800 MG: 38 INJECTION, SOLUTION INTRAVENOUS at 12:28

## 2019-12-10 ASSESSMENT — PAIN SCALES - GENERAL
PAINLEVEL: NO PAIN (0)
PAINLEVEL: NO PAIN (0)

## 2019-12-10 ASSESSMENT — MIFFLIN-ST. JEOR
SCORE: 1404.22
SCORE: 1404.34

## 2019-12-10 NOTE — PROGRESS NOTES
Visit Date:   12/10/2019     ONCOLOGY HISTORY: Mr. Arredondo is a gentleman with pancreatic cancer.T3 N1 M0. Stage IIB.   -Also has prostate cancer [place] 7 prostate.   1.  Prostate MRI on 07/20/2019 revealed PI-RADS 5.  No suspicious adenopathy or evidence of pelvic metastasis.   2.  Bone scan on 09/26/2019 does not reveal any bone metastasis.  There is some cardiac uptake.   3.  CT abdomen and pelvis on 09/26/2019 reveals new soft tissue mesenteric mass in the region of pancreatic tail.   4.  EUS on 10/07/2019 revealed an irregular mass in the pancreatic tail measuring 3.8 x 2.2 cm.  There was some evidence of invasion into the portal vein.  No lymphadenopathy seen.  Based on the EUS, it was T3 Nx disease.  FNA is positive for moderately differentiated pancreatic adenocarcinoma.   5. PET scan on 10/15/2019 reveals hypermetabolic 4.2 cm mass in the pancreatic tail and an adjacent 1 cm peripancreatic lymph node. No evidence of any metastatic disease. There are 2 foci of mild FDG uptake in the prostate gland from prostate cancer.   6. Neoadjuvant chemotherapy with gemcitabine and Abraxane started on 11/07/2019.      SUBJECTIVE:  Mr. Arredondo is an 79-year-old gentleman with resectable pancreatic cancer.  He is on neoadjuvant chemotherapy with gemcitabine and Abraxane, which was started on 11/07/2019.      Overall, he is tolerating treatment well.  He has some fatigue.  No headache.  No dizziness.  No chest pain or shortness of breath.  No abdominal pain, nausea or vomiting.  Appetite overall good.  No urinary or bowel complaints.  No bleeding.  No fever, chills or night sweats.  He gets intermittent neuropathy.      PHYSICAL EXAMINATION:   GENERAL:  Alert and oriented x 3.   VITAL SIGNS:  Reviewed.  ECOG PS of 1.     EYES:  No icterus.   THROAT:  No ulcer. No thrush.   NECK:  Supple. No lymphadenopathy. No thyromegaly.   AXILLAE:  No lymphadenopathy.   LUNGS:  Good air entry bilaterally.  No crackles or wheezing.    HEART:  Regular.  No murmur.   ABDOMEN:  Soft.  Nontender. No mass.   EXTREMITIES:  No pedal edema.  No calf swelling or tenderness.   SKIN:  No rash.      LABORATORY DATA:  Reviewed.      ASSESSMENT:   1.  An 79-year-old gentleman with resectable pancreatic cancer on neoadjuvant chemotherapy.   2.  [place] 7 prostate cancer, on active surveillance by urologist.      PLAN:   1.  The patient is overall tolerating gemcitabine and Abraxane well.  He will continue on it.  Side effects reviewed.  I explained to him that he may develop persistent neuropathy.  He will let us know. Plan is to give him at least 4 cycles of chemotherapy before the surgery.  He is agreeable for this.     2.  For monitoring, we will get CT chest, abdomen and pelvis in a month.  I will see him back in the clinic after that. The patient advised to see a physician sooner if he has fever, chills, recurrent vomiting, bleeding, worsening weakness, or any other concerns.         ROXY LANCASTER MD             D: 12/10/2019   T: 12/10/2019   MT: SUMAN      Name:     KARI YANG   MRN:      [id number]        Account:      [id number]   :      1940           Visit Date:   12/10/2019      Document: [id number]

## 2019-12-10 NOTE — PROGRESS NOTES
Infusion Nursing Note:  Lowell Arredondo presents today for C2D1 gemzar/abraxane  Patient seen by provider today: Yes: Dr. Segura   present during visit today: Not Applicable.    Note: N/A.    Intravenous Access:  Labs drawn without difficulty.  Implanted Port.    Treatment Conditions:  Lab Results   Component Value Date    HGB 11.4 12/10/2019     Lab Results   Component Value Date    WBC 5.2 12/10/2019      Lab Results   Component Value Date    ANEU 2.4 12/10/2019     Lab Results   Component Value Date     12/10/2019      Lab Results   Component Value Date     12/10/2019                   Lab Results   Component Value Date    POTASSIUM 4.1 12/10/2019           No results found for: MAG         Lab Results   Component Value Date    CR 1.13 12/10/2019                   Lab Results   Component Value Date    VICENTE 8.8 12/10/2019                Lab Results   Component Value Date    BILITOTAL 0.4 12/10/2019           Lab Results   Component Value Date    ALBUMIN 3.3 12/10/2019                    Lab Results   Component Value Date    ALT 37 12/10/2019           Lab Results   Component Value Date    AST 15 12/10/2019       Results reviewed, labs MET treatment parameters, ok to proceed with treatment.      Post Infusion Assessment:  Patient tolerated infusion without incident.  Blood return noted pre and post infusion.  Site patent and intact, free from redness, edema or discomfort.  No evidence of extravasations.  Access discontinued per protocol.       Discharge Plan:   Copy of AVS reviewed with patient and/or family.  Patient will return as scheduled for next appointment.  Patient discharged in stable condition accompanied by: wife.  Departure Mode: Ambulatory.    Chavez Ricci RN

## 2019-12-10 NOTE — PROGRESS NOTES
"Oncology Rooming Note    December 10, 2019 9:36 AM   Lowell Arredondo is a 79 year old male who presents for:    Chief Complaint   Patient presents with     Oncology Clinic Visit     Initial Vitals: /67   Pulse 68   Temp 97.4  F (36.3  C) (Oral)   Resp 18   Ht 1.727 m (5' 7.99\")   Wt 72.5 kg (159 lb 13.3 oz)   SpO2 99%   BMI 24.31 kg/m   Estimated body mass index is 24.31 kg/m  as calculated from the following:    Height as of this encounter: 1.727 m (5' 7.99\").    Weight as of this encounter: 72.5 kg (159 lb 13.3 oz). Body surface area is 1.86 meters squared.  No Pain (0) Comment: Data Unavailable   No LMP for male patient.  Allergies reviewed: Yes  Medications reviewed: Yes    Medications: Medication refills not needed today.  Pharmacy name entered into [employer]:      [id number] - [place] - [address]    Clinical concerns: no      Amber Gomez CMA            "

## 2019-12-10 NOTE — PATIENT INSTRUCTIONS
1. Continue chemotherapy (mostly at [facility]) .  2. CT scan in 1 month.  3. See me after CT scan.    PATIENT BACK TO [facility]

## 2019-12-11 LAB — CANCER AG19-9 SERPL-ACNC: 1645 U/ML (ref 0–37)

## 2019-12-15 ENCOUNTER — HEALTH MAINTENANCE LETTER (OUTPATIENT)
Age: 79
End: 2019-12-15

## 2019-12-17 ENCOUNTER — INFUSION THERAPY VISIT (OUTPATIENT)
Dept: INFUSION THERAPY | Facility: CLINIC | Age: 79
End: 2019-12-17
Attending: INTERNAL MEDICINE
Payer: COMMERCIAL

## 2019-12-17 ENCOUNTER — HOSPITAL ENCOUNTER (OUTPATIENT)
Facility: CLINIC | Age: 79
Setting detail: SPECIMEN
Discharge: HOME | End: 2019-12-17
Attending: INTERNAL MEDICINE | Admitting: INTERNAL MEDICINE
Payer: COMMERCIAL

## 2019-12-17 VITALS
RESPIRATION RATE: 16 BRPM | TEMPERATURE: 97.9 F | DIASTOLIC BLOOD PRESSURE: 75 MMHG | OXYGEN SATURATION: 100 % | SYSTOLIC BLOOD PRESSURE: 126 MMHG | WEIGHT: 157.8 LBS | BODY MASS INDEX: 24 KG/M2

## 2019-12-17 DIAGNOSIS — C25.2 MALIGNANT NEOPLASM OF TAIL OF PANCREAS (H): Primary | ICD-10-CM

## 2019-12-17 DIAGNOSIS — D70.1 CHEMOTHERAPY-INDUCED NEUTROPENIA (H): ICD-10-CM

## 2019-12-17 DIAGNOSIS — T45.1X5A CHEMOTHERAPY-INDUCED NEUTROPENIA (H): ICD-10-CM

## 2019-12-17 DIAGNOSIS — Z95.828 PORT-A-CATH IN PLACE: ICD-10-CM

## 2019-12-17 DIAGNOSIS — K21.9 GASTROESOPHAGEAL REFLUX DISEASE, ESOPHAGITIS PRESENCE NOT SPECIFIED: ICD-10-CM

## 2019-12-17 LAB
BASOPHILS # BLD AUTO: 0.1 10E9/L (ref 0–0.2)
BASOPHILS NFR BLD AUTO: 1.4 %
DIFFERENTIAL METHOD BLD: ABNORMAL
EOSINOPHIL # BLD AUTO: 0.2 10E9/L (ref 0–0.7)
EOSINOPHIL NFR BLD AUTO: 5 %
ERYTHROCYTE [DISTWIDTH] IN BLOOD BY AUTOMATED COUNT: 14.7 % (ref 10–15)
HCT VFR BLD AUTO: 34.3 % (ref 40–53)
HGB BLD-MCNC: 11.3 G/DL (ref 13.3–17.7)
IMM GRANULOCYTES # BLD: 0 10E9/L (ref 0–0.4)
IMM GRANULOCYTES NFR BLD: 0.3 %
LYMPHOCYTES # BLD AUTO: 2.1 10E9/L (ref 0.8–5.3)
LYMPHOCYTES NFR BLD AUTO: 57.2 %
MCH RBC QN AUTO: 31 PG (ref 26.5–33)
MCHC RBC AUTO-ENTMCNC: 32.9 G/DL (ref 31.5–36.5)
MCV RBC AUTO: 94 FL (ref 78–100)
MONOCYTES # BLD AUTO: 0.4 10E9/L (ref 0–1.3)
MONOCYTES NFR BLD AUTO: 9.9 %
NEUTROPHILS # BLD AUTO: 1 10E9/L (ref 1.6–8.3)
NEUTROPHILS NFR BLD AUTO: 26.2 %
NRBC # BLD AUTO: 0 10*3/UL
NRBC BLD AUTO-RTO: 0 /100
PLATELET # BLD AUTO: 211 10E9/L (ref 150–450)
RBC # BLD AUTO: 3.64 10E12/L (ref 4.4–5.9)
WBC # BLD AUTO: 3.6 10E9/L (ref 4–11)

## 2019-12-17 PROCEDURE — 25000128 H RX IP 250 OP 636: Performed by: INTERNAL MEDICINE

## 2019-12-17 PROCEDURE — 25000128 H RX IP 250 OP 636: Performed by: NURSE PRACTITIONER

## 2019-12-17 PROCEDURE — 96413 CHEMO IV INFUSION 1 HR: CPT

## 2019-12-17 PROCEDURE — 96375 TX/PRO/DX INJ NEW DRUG ADDON: CPT

## 2019-12-17 PROCEDURE — 96417 CHEMO IV INFUS EACH ADDL SEQ: CPT

## 2019-12-17 PROCEDURE — 85025 COMPLETE CBC W/AUTO DIFF WBC: CPT | Performed by: INTERNAL MEDICINE

## 2019-12-17 PROCEDURE — 25800030 ZZH RX IP 258 OP 636: Performed by: INTERNAL MEDICINE

## 2019-12-17 RX ORDER — HEPARIN SODIUM (PORCINE) LOCK FLUSH IV SOLN 100 UNIT/ML 100 UNIT/ML
5 SOLUTION INTRAVENOUS
Status: DISCONTINUED | OUTPATIENT
Start: 2019-12-17 | End: 2019-12-17 | Stop reason: HOSPADM

## 2019-12-17 RX ORDER — HEPARIN SODIUM,PORCINE 10 UNIT/ML
5 VIAL (ML) INTRAVENOUS
Status: CANCELLED | OUTPATIENT
Start: 2019-12-17

## 2019-12-17 RX ORDER — PACLITAXEL 100 MG/20ML
125 INJECTION, POWDER, LYOPHILIZED, FOR SUSPENSION INTRAVENOUS ONCE
Status: COMPLETED | OUTPATIENT
Start: 2019-12-17 | End: 2019-12-17

## 2019-12-17 RX ORDER — HEPARIN SODIUM (PORCINE) LOCK FLUSH IV SOLN 100 UNIT/ML 100 UNIT/ML
5 SOLUTION INTRAVENOUS
Status: CANCELLED | OUTPATIENT
Start: 2019-12-17

## 2019-12-17 RX ADMIN — SODIUM CHLORIDE 250 ML: 9 INJECTION, SOLUTION INTRAVENOUS at 11:29

## 2019-12-17 RX ADMIN — Medication 5 ML: at 13:05

## 2019-12-17 RX ADMIN — PACLITAXEL 231 MG: 100 INJECTION, POWDER, LYOPHILIZED, FOR SUSPENSION INTRAVENOUS at 11:48

## 2019-12-17 RX ADMIN — GEMCITABINE 1800 MG: 38 INJECTION, SOLUTION INTRAVENOUS at 12:30

## 2019-12-17 RX ADMIN — DEXAMETHASONE SODIUM PHOSPHATE 12 MG: 10 INJECTION, SOLUTION INTRAMUSCULAR; INTRAVENOUS at 11:29

## 2019-12-17 NOTE — PROGRESS NOTES
Infusion Nursing Note:  Lowell Arredondo presents today for C2D8 Abraxane and Gemzar.    Patient seen by provider today: No   present during visit today: Not Applicable.    Note: ANC 1.0.  TORB: Dr. Segura: okay to give chemo today.  Give neupogen daily for two days starting tomorrow and check labs and possible neupogen Friday 12/20.  We are to page Dr. Segura Friday with lab results.  Patient denies signs of infection.  Provided education on neutropenic precautions.      Patient has 1L NS ordered with chemo.  Patient declined this as he is drinking adequately.     Intravenous Access:  Labs drawn without difficulty.  Implanted Port.    Treatment Conditions:  Lab Results   Component Value Date    HGB 11.3 12/17/2019     Lab Results   Component Value Date    WBC 3.6 12/17/2019      Lab Results   Component Value Date    ANEU 1.0 12/17/2019     Lab Results   Component Value Date     12/17/2019      Results reviewed, labs did NOT meet treatment parameters: see note above.      Post Infusion Assessment:  Patient tolerated infusion without incident.  Blood return noted pre and post infusion.  Site patent and intact, free from redness, edema or discomfort.  No evidence of extravasations.  Access discontinued per protocol.       Discharge Plan:   Copy of AVS reviewed with patient and/or family.  Patient will return tomorrow for next appointment.  Patient discharged in stable condition accompanied by: self.  Departure Mode: Ambulatory.    Danay Carvajal RN

## 2019-12-17 NOTE — TELEPHONE ENCOUNTER
Writer called and spoke with Lowell Wise's spouse about the omeprazole refill we received. Per chart review the prescription was just written on 11/25/19 and he was given 30 capsules. Billie reports he has one week's worth left. Writer explained he has 1 refill ordered on the current prescription and Billie can call in to their pharmacy to have them refill it.     Writer is refusing the refill request for now.     Refused Prescriptions:                       Disp   Refills    omeprazole (PRILOSEC) 20 MG DR capsule [Ph*30 cap*1        Sig: TAKE 1 CAPSULE BY MOUTH EVERY DAY        Cate GIMENEZ RN, BSN, [facility]  Patient Care Coordinator  [facility]  [phone]

## 2019-12-18 ENCOUNTER — ALLIED HEALTH/NURSE VISIT (OUTPATIENT)
Dept: INFUSION THERAPY | Facility: CLINIC | Age: 79
End: 2019-12-18
Attending: INTERNAL MEDICINE
Payer: COMMERCIAL

## 2019-12-18 VITALS
DIASTOLIC BLOOD PRESSURE: 75 MMHG | OXYGEN SATURATION: 97 % | TEMPERATURE: 95.8 F | SYSTOLIC BLOOD PRESSURE: 129 MMHG | RESPIRATION RATE: 16 BRPM

## 2019-12-18 DIAGNOSIS — T45.1X5A CHEMOTHERAPY-INDUCED NEUTROPENIA (H): Primary | ICD-10-CM

## 2019-12-18 DIAGNOSIS — D70.1 CHEMOTHERAPY-INDUCED NEUTROPENIA (H): Primary | ICD-10-CM

## 2019-12-18 PROCEDURE — 96372 THER/PROPH/DIAG INJ SC/IM: CPT

## 2019-12-18 PROCEDURE — 25000128 H RX IP 250 OP 636: Performed by: INTERNAL MEDICINE

## 2019-12-18 RX ADMIN — FILGRASTIM 300 MCG: 300 INJECTION, SOLUTION INTRAVENOUS; SUBCUTANEOUS at 13:26

## 2019-12-18 NOTE — PROGRESS NOTES
Infusion Nursing Note:  Lowell Arredondo presents today for neupogen.    Patient seen by provider today: No   present during visit today: Not Applicable.    Note: Patient has had neupogen before, reviewed the potential for bone pain.    Intravenous Access:  No Intravenous access/labs at this visit.    Treatment Conditions:  Not Applicable.      Post Infusion Assessment:  Patient tolerated injection without incident.       Discharge Plan:   Copy of AVS reviewed with patient and/or family.  Patient will return tomorrow for neupogen for next appointment.  Patient discharged in stable condition accompanied by: self.  Departure Mode: Ambulatory.    Danay Carvajal RN

## 2019-12-19 ENCOUNTER — PATIENT OUTREACH (OUTPATIENT)
Dept: ONCOLOGY | Facility: CLINIC | Age: 79
End: 2019-12-19

## 2019-12-19 ENCOUNTER — INFUSION THERAPY VISIT (OUTPATIENT)
Dept: INFUSION THERAPY | Facility: CLINIC | Age: 79
End: 2019-12-19
Attending: INTERNAL MEDICINE
Payer: COMMERCIAL

## 2019-12-19 VITALS
OXYGEN SATURATION: 99 % | SYSTOLIC BLOOD PRESSURE: 122 MMHG | RESPIRATION RATE: 16 BRPM | TEMPERATURE: 97.5 F | DIASTOLIC BLOOD PRESSURE: 64 MMHG

## 2019-12-19 DIAGNOSIS — D70.1 CHEMOTHERAPY-INDUCED NEUTROPENIA (H): Primary | ICD-10-CM

## 2019-12-19 DIAGNOSIS — T45.1X5A CHEMOTHERAPY-INDUCED NEUTROPENIA (H): Primary | ICD-10-CM

## 2019-12-19 PROCEDURE — 96372 THER/PROPH/DIAG INJ SC/IM: CPT

## 2019-12-19 PROCEDURE — 25000128 H RX IP 250 OP 636: Performed by: INTERNAL MEDICINE

## 2019-12-19 RX ADMIN — FILGRASTIM 300 MCG: 300 INJECTION, SOLUTION INTRAVENOUS; SUBCUTANEOUS at 11:59

## 2019-12-19 NOTE — TELEPHONE ENCOUNTER
Patient received  C2D8 Abraxane and Gemzar on 12/17. Patient receiving Neupogen at [facility].    I called today to follow up on treatment side effects and possible management needs of side effects. I LVM requesting a call back if any questions or concerns.    Kathe Coles RN

## 2019-12-19 NOTE — PROGRESS NOTES
Infusion Nursing Note:  Lowell GENTILE Socrateskarla presents today for Neupogen.    Patient seen by provider today: No   present during visit today: Not Applicable.    Note: N/A.    Intravenous Access:  No Intravenous access/labs at this visit.    Treatment Conditions:  Not Applicable.      Post Infusion Assessment:  Patient tolerated injection without incident.       Discharge Plan:   Discharge instructions reviewed with: Patient.  Patient and/or family verbalized understanding of discharge instructions and all questions answered.  AVS to patient via [employer].  Patient will return 12/20/2019 for next appointment.   Patient discharged in stable condition accompanied by: daughter.  Departure Mode: Ambulatory.    Nora Campbell RN

## 2019-12-20 ENCOUNTER — INFUSION THERAPY VISIT (OUTPATIENT)
Dept: INFUSION THERAPY | Facility: CLINIC | Age: 79
End: 2019-12-20
Attending: INTERNAL MEDICINE
Payer: COMMERCIAL

## 2019-12-20 ENCOUNTER — HOSPITAL ENCOUNTER (OUTPATIENT)
Facility: CLINIC | Age: 79
Setting detail: SPECIMEN
Discharge: HOME | End: 2019-12-20
Attending: INTERNAL MEDICINE | Admitting: INTERNAL MEDICINE
Payer: COMMERCIAL

## 2019-12-20 DIAGNOSIS — D70.1 CHEMOTHERAPY-INDUCED NEUTROPENIA (H): Primary | ICD-10-CM

## 2019-12-20 DIAGNOSIS — T45.1X5A CHEMOTHERAPY-INDUCED NEUTROPENIA (H): Primary | ICD-10-CM

## 2019-12-20 DIAGNOSIS — Z95.828 PORT-A-CATH IN PLACE: ICD-10-CM

## 2019-12-20 LAB
ANISOCYTOSIS BLD QL SMEAR: SLIGHT
BASOPHILS # BLD AUTO: 0 10E9/L (ref 0–0.2)
BASOPHILS NFR BLD AUTO: 0 %
DIFFERENTIAL METHOD BLD: ABNORMAL
EOSINOPHIL # BLD AUTO: 0 10E9/L (ref 0–0.7)
EOSINOPHIL NFR BLD AUTO: 0 %
ERYTHROCYTE [DISTWIDTH] IN BLOOD BY AUTOMATED COUNT: 15 % (ref 10–15)
HCT VFR BLD AUTO: 30.2 % (ref 40–53)
HGB BLD-MCNC: 10 G/DL (ref 13.3–17.7)
LYMPHOCYTES # BLD AUTO: 1 10E9/L (ref 0.8–5.3)
LYMPHOCYTES NFR BLD AUTO: 8 %
MACROCYTES BLD QL SMEAR: PRESENT
MCH RBC QN AUTO: 31.2 PG (ref 26.5–33)
MCHC RBC AUTO-ENTMCNC: 33.1 G/DL (ref 31.5–36.5)
MCV RBC AUTO: 94 FL (ref 78–100)
METAMYELOCYTES # BLD: 1.9 10E9/L
METAMYELOCYTES NFR BLD MANUAL: 15 %
MICROCYTES BLD QL SMEAR: PRESENT
MONOCYTES # BLD AUTO: 0 10E9/L (ref 0–1.3)
MONOCYTES NFR BLD AUTO: 0 %
NEUTROPHILS # BLD AUTO: 9.7 10E9/L (ref 1.6–8.3)
NEUTROPHILS NFR BLD AUTO: 77 %
PLATELET # BLD AUTO: 125 10E9/L (ref 150–450)
PLATELET # BLD EST: ABNORMAL 10*3/UL
RBC # BLD AUTO: 3.21 10E12/L (ref 4.4–5.9)
WBC # BLD AUTO: 12.6 10E9/L (ref 4–11)

## 2019-12-20 PROCEDURE — 25000128 H RX IP 250 OP 636: Performed by: NURSE PRACTITIONER

## 2019-12-20 PROCEDURE — 85025 COMPLETE CBC W/AUTO DIFF WBC: CPT | Performed by: INTERNAL MEDICINE

## 2019-12-20 PROCEDURE — 36591 DRAW BLOOD OFF VENOUS DEVICE: CPT

## 2019-12-20 RX ORDER — HEPARIN SODIUM,PORCINE 10 UNIT/ML
5 VIAL (ML) INTRAVENOUS
Status: CANCELLED | OUTPATIENT
Start: 2019-12-20

## 2019-12-20 RX ORDER — HEPARIN SODIUM (PORCINE) LOCK FLUSH IV SOLN 100 UNIT/ML 100 UNIT/ML
5 SOLUTION INTRAVENOUS
Status: DISCONTINUED | OUTPATIENT
Start: 2019-12-20 | End: 2019-12-20 | Stop reason: HOSPADM

## 2019-12-20 RX ORDER — HEPARIN SODIUM (PORCINE) LOCK FLUSH IV SOLN 100 UNIT/ML 100 UNIT/ML
5 SOLUTION INTRAVENOUS
Status: CANCELLED | OUTPATIENT
Start: 2019-12-20

## 2019-12-20 RX ADMIN — Medication 5 ML: at 13:25

## 2019-12-20 NOTE — PROGRESS NOTES
Infusion Nursing Note:  Lowell H Socratesholgerdustin presents today for labs and possible Neupogen.    Patient seen by provider today: No   present during visit today: Not Applicable.    Note: Pt no longer neutropenic today.  ANC 9.7  No Neupogen administered  Paged Dr Segura with no return of page.  Will send him chart message.   recovered.    Intravenous Access:  Labs drawn without difficulty.  Implanted Port.    Treatment Conditions:  Lab Results   Component Value Date    HGB 10.0 12/20/2019     Lab Results   Component Value Date    WBC 12.6 12/20/2019      Lab Results   Component Value Date    ANEU 9.7 12/20/2019     Lab Results   Component Value Date     12/20/2019          Post Infusion Assessment:  Site patent and intact, free from redness, edema or discomfort.  No evidence of extravasations.  Access discontinued per protocol.       Discharge Plan:   Discharge instructions reviewed with: Patient.  Patient and/or family verbalized understanding of discharge instructions and all questions answered.  AVS to patient via [employer].  Patient will return 12/24/19 for C2D8 Gemzar/Abraxane for next appointment.   Patient discharged in stable condition accompanied by: self.  Departure Mode: Ambulatory.    Helena Davenport RN

## 2019-12-24 ENCOUNTER — HOSPITAL ENCOUNTER (OUTPATIENT)
Facility: CLINIC | Age: 79
Setting detail: SPECIMEN
End: 2019-12-24
Attending: INTERNAL MEDICINE
Payer: COMMERCIAL

## 2019-12-24 ENCOUNTER — ONCOLOGY VISIT (OUTPATIENT)
Dept: ONCOLOGY | Facility: CLINIC | Age: 79
End: 2019-12-24
Attending: PHYSICIAN ASSISTANT
Payer: COMMERCIAL

## 2019-12-24 ENCOUNTER — INFUSION THERAPY VISIT (OUTPATIENT)
Dept: INFUSION THERAPY | Facility: CLINIC | Age: 79
End: 2019-12-24
Attending: INTERNAL MEDICINE
Payer: COMMERCIAL

## 2019-12-24 ENCOUNTER — HOSPITAL ENCOUNTER (OUTPATIENT)
Facility: CLINIC | Age: 79
Setting detail: SPECIMEN
Discharge: HOME | End: 2019-12-24
Attending: INTERNAL MEDICINE | Admitting: INTERNAL MEDICINE
Payer: COMMERCIAL

## 2019-12-24 VITALS
WEIGHT: 161.08 LBS | DIASTOLIC BLOOD PRESSURE: 66 MMHG | SYSTOLIC BLOOD PRESSURE: 118 MMHG | BODY MASS INDEX: 24.5 KG/M2 | RESPIRATION RATE: 16 BRPM | HEART RATE: 82 BPM | TEMPERATURE: 97.5 F | OXYGEN SATURATION: 99 %

## 2019-12-24 VITALS
BODY MASS INDEX: 24.56 KG/M2 | DIASTOLIC BLOOD PRESSURE: 66 MMHG | RESPIRATION RATE: 16 BRPM | TEMPERATURE: 97.5 F | SYSTOLIC BLOOD PRESSURE: 118 MMHG | WEIGHT: 161.5 LBS | OXYGEN SATURATION: 99 %

## 2019-12-24 DIAGNOSIS — L27.0 DRUG-INDUCED SKIN RASH: ICD-10-CM

## 2019-12-24 DIAGNOSIS — C25.2 MALIGNANT NEOPLASM OF TAIL OF PANCREAS (H): Primary | ICD-10-CM

## 2019-12-24 DIAGNOSIS — Z95.828 PORT-A-CATH IN PLACE: ICD-10-CM

## 2019-12-24 DIAGNOSIS — D70.1 CHEMOTHERAPY-INDUCED NEUTROPENIA (H): ICD-10-CM

## 2019-12-24 DIAGNOSIS — T45.1X5A CHEMOTHERAPY-INDUCED NEUTROPENIA (H): ICD-10-CM

## 2019-12-24 LAB
BASOPHILS # BLD AUTO: 0 10E9/L (ref 0–0.2)
BASOPHILS NFR BLD AUTO: 0.9 %
DIFFERENTIAL METHOD BLD: ABNORMAL
EOSINOPHIL # BLD AUTO: 0.2 10E9/L (ref 0–0.7)
EOSINOPHIL NFR BLD AUTO: 4.5 %
ERYTHROCYTE [DISTWIDTH] IN BLOOD BY AUTOMATED COUNT: 15.1 % (ref 10–15)
HCT VFR BLD AUTO: 32.4 % (ref 40–53)
HGB BLD-MCNC: 10.5 G/DL (ref 13.3–17.7)
IMM GRANULOCYTES # BLD: 0 10E9/L (ref 0–0.4)
IMM GRANULOCYTES NFR BLD: 0.7 %
LYMPHOCYTES # BLD AUTO: 2.4 10E9/L (ref 0.8–5.3)
LYMPHOCYTES NFR BLD AUTO: 54.5 %
MCH RBC QN AUTO: 30.6 PG (ref 26.5–33)
MCHC RBC AUTO-ENTMCNC: 32.4 G/DL (ref 31.5–36.5)
MCV RBC AUTO: 95 FL (ref 78–100)
MONOCYTES # BLD AUTO: 0.6 10E9/L (ref 0–1.3)
MONOCYTES NFR BLD AUTO: 12.5 %
NEUTROPHILS # BLD AUTO: 1.2 10E9/L (ref 1.6–8.3)
NEUTROPHILS NFR BLD AUTO: 26.9 %
NRBC # BLD AUTO: 0 10*3/UL
NRBC BLD AUTO-RTO: 0 /100
PLATELET # BLD AUTO: 116 10E9/L (ref 150–450)
RBC # BLD AUTO: 3.43 10E12/L (ref 4.4–5.9)
WBC # BLD AUTO: 4.5 10E9/L (ref 4–11)

## 2019-12-24 PROCEDURE — 96417 CHEMO IV INFUS EACH ADDL SEQ: CPT

## 2019-12-24 PROCEDURE — 99214 OFFICE O/P EST MOD 30 MIN: CPT | Performed by: PHYSICIAN ASSISTANT

## 2019-12-24 PROCEDURE — 25000128 H RX IP 250 OP 636: Performed by: NURSE PRACTITIONER

## 2019-12-24 PROCEDURE — 96375 TX/PRO/DX INJ NEW DRUG ADDON: CPT

## 2019-12-24 PROCEDURE — 85025 COMPLETE CBC W/AUTO DIFF WBC: CPT | Performed by: INTERNAL MEDICINE

## 2019-12-24 PROCEDURE — 25000128 H RX IP 250 OP 636: Performed by: INTERNAL MEDICINE

## 2019-12-24 PROCEDURE — G0463 HOSPITAL OUTPT CLINIC VISIT: HCPCS | Mod: 25

## 2019-12-24 PROCEDURE — 96413 CHEMO IV INFUSION 1 HR: CPT

## 2019-12-24 PROCEDURE — 25800030 ZZH RX IP 258 OP 636: Performed by: INTERNAL MEDICINE

## 2019-12-24 RX ORDER — HEPARIN SODIUM,PORCINE 10 UNIT/ML
5 VIAL (ML) INTRAVENOUS
Status: CANCELLED | OUTPATIENT
Start: 2019-12-24

## 2019-12-24 RX ORDER — PACLITAXEL 100 MG/20ML
125 INJECTION, POWDER, LYOPHILIZED, FOR SUSPENSION INTRAVENOUS ONCE
Status: COMPLETED | OUTPATIENT
Start: 2019-12-24 | End: 2019-12-24

## 2019-12-24 RX ORDER — HEPARIN SODIUM (PORCINE) LOCK FLUSH IV SOLN 100 UNIT/ML 100 UNIT/ML
5 SOLUTION INTRAVENOUS
Status: CANCELLED | OUTPATIENT
Start: 2019-12-24

## 2019-12-24 RX ORDER — HEPARIN SODIUM (PORCINE) LOCK FLUSH IV SOLN 100 UNIT/ML 100 UNIT/ML
5 SOLUTION INTRAVENOUS
Status: DISCONTINUED | OUTPATIENT
Start: 2019-12-24 | End: 2019-12-24 | Stop reason: HOSPADM

## 2019-12-24 RX ORDER — LORATADINE 10 MG/1
10 TABLET ORAL DAILY PRN
COMMUNITY

## 2019-12-24 RX ADMIN — SODIUM CHLORIDE 250 ML: 9 INJECTION, SOLUTION INTRAVENOUS at 09:29

## 2019-12-24 RX ADMIN — PACLITAXEL 231 MG: 100 INJECTION, POWDER, LYOPHILIZED, FOR SUSPENSION INTRAVENOUS at 10:06

## 2019-12-24 RX ADMIN — DEXAMETHASONE SODIUM PHOSPHATE 12 MG: 10 INJECTION, SOLUTION INTRAMUSCULAR; INTRAVENOUS at 09:29

## 2019-12-24 RX ADMIN — SODIUM CHLORIDE, PRESERVATIVE FREE 5 ML: 5 INJECTION INTRAVENOUS at 11:30

## 2019-12-24 RX ADMIN — GEMCITABINE 1800 MG: 38 INJECTION, SOLUTION INTRAVENOUS at 10:52

## 2019-12-24 ASSESSMENT — PAIN SCALES - GENERAL: PAINLEVEL: NO PAIN (0)

## 2019-12-24 NOTE — PROGRESS NOTES
Oncology/Hematology Visit Note    Dec 24, 2019    Reason for visit: Follow up pancreatic cancer, add-on for rash    Oncology HPI: Lowell Arredondo is a 79 year old male with a h/o prostate cancer and now with pancreatic cancer. Due to his prostate cancer, he had multiple surveillance scans and CT on 9/26/19 revealed a new soft tissue mesenteric mass in the pancreatic tail. EUS on 10/7/19 revealed moderately differentiated pancreatic adenocarcinoma. PET scan with no metastatic disease. He established care with Dr Segura and discussed starting curtaive intent, neoadjuvant chemotherapy and then surgery. He completed gemcitabine/Abraxane C1D1 on 11/7/19.     He was in the ED on 11/19/19 for syncope.  He was exercising at the [facility] and had a syncopal event.  ED work-up was extensive and he was discharged.    He was scheduled in infusion today for cycle 2, day 15, but is seen as an add-on for rash.     Interval History: Lowell is here with his wife, June.  He developed a rash a little over a week ago and infusion and it was very minimal into the left forearm.  Over the last week, he has noticed a patchy rash to the chest bilateral arms and has noted a spot to the left forearm that is greater than before.  He has noticed a little area of pus underneath that, however has not been warm or tender and has not had a fever.  He otherwise has been tolerating gemcitabine fairly well.  No other complaints today.    Review of Systems: See interval hx. Denies fevers, chills, bleeding, bruising.     PMHx and Social Hx reviewed per EPIC.      Medications:  Current Outpatient Medications   Medication Sig Dispense Refill     loratadine (CLARITIN) 10 MG tablet Take 10 mg by mouth daily       LORazepam (ATIVAN) 0.5 MG tablet Take 1 tablet (0.5 mg) by mouth every 4 hours as needed (Anxiety, Nausea/Vomiting or Sleep) (Patient not taking: Reported on 11/25/2019) 30 tablet 2     omeprazole (PRILOSEC) 20 MG DR capsule Take 1 capsule (20 mg) by mouth  daily 30 capsule 1     polyethylene glycol (MIRALAX/GLYCOLAX) packet Take 1 packet by mouth daily       prochlorperazine (COMPAZINE) 10 MG tablet Take 1 tablet (10 mg) by mouth every 6 hours as needed (Nausea/Vomiting) (Patient not taking: Reported on 11/25/2019) 30 tablet 2     psyllium (METAMUCIL) 28.3 % POWD        tamsulosin (FLOMAX) 80 mcg/mL Take 0.4 mg by mouth         Allergies   Allergen Reactions     Demerol [Meperidine] Difficulty breathing       EXAM:    /66   Pulse 82   Temp 97.5  F (36.4  C) (Oral)   Resp 16   Wt 73.1 kg (161 lb 1.3 oz)   SpO2 99%   BMI 24.50 kg/m      GENERAL:  Male, in no acute distress.  Alert and oriented x3.   HEENT:  Normocephalic, atraumatic.  PERRL, oropharynx clear with no sores or thrush.   LYMPH NODES:  No palpable pre/post-auricular, cervical, axillary lymphadenopathy appreciated.  CV:  RRR, No murmurs, gallops, or rubs.   LUNGS:  Clear to auscultation bilaterally.   ABDOMEN:  Soft, nontender and nondistended.  Bowel sounds heard x4.  No apparent hepatosplenomegaly.   EXTREMITIES:  No clubbing, cyanosis, or edema.   SKIN: Patchy, scaly rash to bilateral forearms and chest and back.  Small circular area to the dorsal left forearm approximately 1 cm by half centimeter with very small white spots, nontender, no warmth or oozing discharge.  PSYCH: Mood stable      Labs:   Results for KARI YANG (MRN [id number]) as of 12/24/2019 11:27   12/24/2019 08:17   WBC 4.5   Hemoglobin 10.5 (L)   Hematocrit 32.4 (L)   Platelet Count 116 (L)   RBC Count 3.43 (L)   MCV 95   MCH 30.6   MCHC 32.4   RDW 15.1 (H)   Diff Method Automated Method   % Neutrophils 26.9   % Lymphocytes 54.5   % Monocytes 12.5   % Eosinophils 4.5   % Basophils 0.9   % Immature Granulocytes 0.7   Nucleated RBCs 0   Absolute Neutrophil 1.2 (L)   Absolute Lymphocytes 2.4   Absolute Monocytes 0.6   Absolute Eosinophils 0.2   Absolute Basophils 0.0   Abs Immature Granulocytes 0.0   Absolute Nucleated RBC  0.0       Imaging: n/a    Impression/Plan: Lowell Arredondo is a 79 year old male with pancreatic cancer currently on neoadjuvant chemotherapy with gemcitabine/Abraxane.    Rash: Patchy, scaly rash and patient states it is extremely itchy.  I suspect this is likely from gemcitabine and he was already receiving his infusion once I saw the patient today.  Does not appear to be cellulitis and he has been afebrile and WBC is normal at 4.5.  I do not feel that antibiotics are indicated, but I would like to watch it closely.  We took a picture today on his wife's iPad and I will see him back for cycle 3, day 1 on 1/7/2020 for reassessment.  He is to call the clinic if any warmth, fever, increased erythema or purulent drainage and we will consider antibiotics.  He will continue Claritin and hydrocortisone topical cream.    Pancreatic cancer: Started neoadjuvant chemotherapy with gemcitabine/Abraxane, C1 D1 completed on 11/7/2019.  He was neutropenic for C1 D 15, therefore deferred a week and he received 2 doses of Neupogen.  We will continue Neupogen for this reason.  Patient was in infusion receiving gemcitabine when I was asked to see the patient for rash, see above.  Labs are within treatment parameters.  I will see him again prior to cycle 3 to reassess his symptoms and rash.  He will call the clinic with any questions or concerns.  --Neupogen x 2 this week  --1/7/20 Lianna, gemcitabine/Abraxane C3D1    Syncope: Syncopal episode while at the [facility] and ED work-up negative.  No symptoms since then.    GERD: Currently on omeprazole.       Chart documentation with Dragon Voice recognition Software. Although reviewed after completion, some words and grammatical errors may remain.      Lianna Vyas PA-C  Hematology/Oncology  [facility]

## 2019-12-24 NOTE — PROGRESS NOTES
Infusion Nursing Note:  Lowell Arredondo presents today for C2D15 Abraxane and Gemzar.    Patient seen by provider today: No   present during visit today: Not Applicable.    Note: ANC 1.2, parameter is 1.5.  Notified Dr. Segura.  TORB:  Okay to give chemo today, give neupogen this week on Thursday and Friday.  Patient aware of neutropenic precautions.    Patient has a diffuse red rash on face, chest, back and arms.  Also has a quarter size red sore with pus on left forearm.  Lianna Vyas assessed this.  No antibiotics needed.  Patient will continue hydrocortisone cream and will put an antibiotic cream on left forearm.  Lianna will see patient on 1/7/20 (scheduling is still working on this)  Patient will continue to monitor rash and sore on arm and will call clinic if symptoms worsen.      Intravenous Access:  Labs drawn without difficulty.  Implanted Port.    Treatment Conditions:  Lab Results   Component Value Date    HGB 10.5 12/24/2019     Lab Results   Component Value Date    WBC 4.5 12/24/2019      Lab Results   Component Value Date    ANEU 1.2 12/24/2019     Lab Results   Component Value Date     12/24/2019      Results reviewed, labs did NOT meet treatment parameters: see above note.      Post Infusion Assessment:  Patient tolerated infusion without incident.  Blood return noted pre and post infusion.  Site patent and intact, free from redness, edema or discomfort.  No evidence of extravasations.  Access discontinued per protocol.       Discharge Plan:   Copy of AVS reviewed with patient and/or family.  Patient will return 12/26/19 for neupogen for next appointment.  Patient discharged in stable condition accompanied by: wife.  Departure Mode: Ambulatory.    Danay Carvajal RN

## 2019-12-24 NOTE — LETTER
12/24/2019         RE: Lowell Arredondo  [address] [address]        Dear Colleague,    Thank you for referring your patient, Lowell Arredondo, to the [facility]. Please see a copy of my visit note below.    Oncology/Hematology Visit Note    Dec 24, 2019    Reason for visit: Follow up pancreatic cancer, add-on for rash    Oncology HPI: Lowell Arredondo is a 79 year old male with a h/o prostate cancer and now with pancreatic cancer. Due to his prostate cancer, he had multiple surveillance scans and CT on 9/26/19 revealed a new soft tissue mesenteric mass in the pancreatic tail. EUS on 10/7/19 revealed moderately differentiated pancreatic adenocarcinoma. PET scan with no metastatic disease. He established care with Dr Segura and discussed starting curtaive intent, neoadjuvant chemotherapy and then surgery. He completed gemcitabine/Abraxane C1D1 on 11/7/19.     He was in the ED on 11/19/19 for syncope.  He was exercising at the [facility] and had a syncopal event.  ED work-up was extensive and he was discharged.    He was scheduled in infusion today for cycle 2, day 15, but is seen as an add-on for rash.     Interval History: Lowell is here with his wife, June.  He developed a rash a little over a week ago and infusion and it was very minimal into the left forearm.  Over the last week, he has noticed a patchy rash to the chest bilateral arms and has noted a spot to the left forearm that is greater than before.  He has noticed a little area of pus underneath that, however has not been warm or tender and has not had a fever.  He otherwise has been tolerating gemcitabine fairly well.  No other complaints today.    Review of Systems: See interval hx. Denies fevers, chills, bleeding, bruising.     PMHx and Social Hx reviewed per EPIC.      Medications:  Current Outpatient Medications   Medication Sig Dispense Refill     loratadine (CLARITIN) 10 MG tablet Take 10 mg by mouth daily       LORazepam  (ATIVAN) 0.5 MG tablet Take 1 tablet (0.5 mg) by mouth every 4 hours as needed (Anxiety, Nausea/Vomiting or Sleep) (Patient not taking: Reported on 11/25/2019) 30 tablet 2     omeprazole (PRILOSEC) 20 MG DR capsule Take 1 capsule (20 mg) by mouth daily 30 capsule 1     polyethylene glycol (MIRALAX/GLYCOLAX) packet Take 1 packet by mouth daily       prochlorperazine (COMPAZINE) 10 MG tablet Take 1 tablet (10 mg) by mouth every 6 hours as needed (Nausea/Vomiting) (Patient not taking: Reported on 11/25/2019) 30 tablet 2     psyllium (METAMUCIL) 28.3 % POWD        tamsulosin (FLOMAX) 80 mcg/mL Take 0.4 mg by mouth         Allergies   Allergen Reactions     Demerol [Meperidine] Difficulty breathing       EXAM:    /66   Pulse 82   Temp 97.5  F (36.4  C) (Oral)   Resp 16   Wt 73.1 kg (161 lb 1.3 oz)   SpO2 99%   BMI 24.50 kg/m       GENERAL:  Male, in no acute distress.  Alert and oriented x3.   HEENT:  Normocephalic, atraumatic.  PERRL, oropharynx clear with no sores or thrush.   LYMPH NODES:  No palpable pre/post-auricular, cervical, axillary lymphadenopathy appreciated.  CV:  RRR, No murmurs, gallops, or rubs.   LUNGS:  Clear to auscultation bilaterally.   ABDOMEN:  Soft, nontender and nondistended.  Bowel sounds heard x4.  No apparent hepatosplenomegaly.   EXTREMITIES:  No clubbing, cyanosis, or edema.   SKIN: Patchy, scaly rash to bilateral forearms and chest and back.  Small circular area to the dorsal left forearm approximately 1 cm by half centimeter with very small white spots, nontender, no warmth or oozing discharge.  PSYCH: Mood stable      Labs:   Results for KARI YANG (MRN [id number]) as of 12/24/2019 11:27   12/24/2019 08:17   WBC 4.5   Hemoglobin 10.5 (L)   Hematocrit 32.4 (L)   Platelet Count 116 (L)   RBC Count 3.43 (L)   MCV 95   MCH 30.6   MCHC 32.4   RDW 15.1 (H)   Diff Method Automated Method   % Neutrophils 26.9   % Lymphocytes 54.5   % Monocytes 12.5   % Eosinophils 4.5   %  Basophils 0.9   % Immature Granulocytes 0.7   Nucleated RBCs 0   Absolute Neutrophil 1.2 (L)   Absolute Lymphocytes 2.4   Absolute Monocytes 0.6   Absolute Eosinophils 0.2   Absolute Basophils 0.0   Abs Immature Granulocytes 0.0   Absolute Nucleated RBC 0.0       Imaging: n/a    Impression/Plan: Lowell Arredondo is a 79 year old male with pancreatic cancer currently on neoadjuvant chemotherapy with gemcitabine/Abraxane.    Rash: Patchy, scaly rash and patient states it is extremely itchy.  I suspect this is likely from gemcitabine and he was already receiving his infusion once I saw the patient today.  Does not appear to be cellulitis and he has been afebrile and WBC is normal at 4.5.  I do not feel that antibiotics are indicated, but I would like to watch it closely.  We took a picture today on his wife's iPad and I will see him back for cycle 3, day 1 on 1/7/2020 for reassessment.  He is to call the clinic if any warmth, fever, increased erythema or purulent drainage and we will consider antibiotics.  He will continue Claritin and hydrocortisone topical cream.    Pancreatic cancer: Started neoadjuvant chemotherapy with gemcitabine/Abraxane, C1 D1 completed on 11/7/2019.  He was neutropenic for C1 D 15, therefore deferred a week and he received 2 doses of Neupogen.  We will continue Neupogen for this reason.  Patient was in infusion receiving gemcitabine when I was asked to see the patient for rash, see above.  Labs are within treatment parameters.  I will see him again prior to cycle 3 to reassess his symptoms and rash.  He will call the clinic with any questions or concerns.  --Neupogen x 2 this week  --1/7/20 Lianna gemcitabine/Abraxane C3D1    Syncope: Syncopal episode while at the [facility] and ED work-up negative.  No symptoms since then.    GERD: Currently on omeprazole.       Chart documentation with Dragon Voice recognition Software. Although reviewed after completion, some words and grammatical errors may  remain.      Lianna Vyas PA-C  Hematology/Oncology  [facility]                  Again, thank you for allowing me to participate in the care of your patient.        Sincerely,        Lianna Vyas PA-C

## 2019-12-26 ENCOUNTER — ALLIED HEALTH/NURSE VISIT (OUTPATIENT)
Dept: INFUSION THERAPY | Facility: CLINIC | Age: 79
End: 2019-12-26
Attending: PHYSICIAN ASSISTANT
Payer: COMMERCIAL

## 2019-12-26 VITALS
TEMPERATURE: 96.9 F | RESPIRATION RATE: 16 BRPM | HEART RATE: 80 BPM | SYSTOLIC BLOOD PRESSURE: 126 MMHG | DIASTOLIC BLOOD PRESSURE: 74 MMHG | OXYGEN SATURATION: 98 %

## 2019-12-26 DIAGNOSIS — K21.9 GASTROESOPHAGEAL REFLUX DISEASE, ESOPHAGITIS PRESENCE NOT SPECIFIED: ICD-10-CM

## 2019-12-26 DIAGNOSIS — T45.1X5A CHEMOTHERAPY-INDUCED NEUTROPENIA (H): Primary | ICD-10-CM

## 2019-12-26 DIAGNOSIS — D70.1 CHEMOTHERAPY-INDUCED NEUTROPENIA (H): Primary | ICD-10-CM

## 2019-12-26 PROCEDURE — 96372 THER/PROPH/DIAG INJ SC/IM: CPT

## 2019-12-26 PROCEDURE — 25000128 H RX IP 250 OP 636

## 2019-12-26 RX ADMIN — FILGRASTIM 300 MCG: 300 INJECTION, SOLUTION INTRAVENOUS; SUBCUTANEOUS at 12:50

## 2019-12-26 ASSESSMENT — PAIN SCALES - GENERAL: PAINLEVEL: NO PAIN (0)

## 2019-12-26 NOTE — PROGRESS NOTES
Infusion Nursing Note:   Lowell Arredondo presents today for Neupogen.    Patient seen by provider today: No   present during visit today: Not Applicable.    Note: N/A.    Intravenous Access:  No Intravenous access/labs at this visit.    Treatment Conditions:  Not Applicable.    Post Infusion Assessment:  Patient tolerated injection without incident.     Discharge Plan:   Discharge instructions reviewed with: Patient.  Patient and/or family verbalized understanding of discharge instructions and all questions answered.  AVS to patient via [employer].  Patient will return 12/27 for next appointment.   Patient discharged in stable condition accompanied by: self.  Departure Mode: Ambulatory.    Precious Lazo RN

## 2019-12-26 NOTE — TELEPHONE ENCOUNTER
Pending Prescriptions:                       Disp   Refills    omeprazole (PRILOSEC) 20 MG DR capsule    30 cap*1            Sig: Take 1 capsule (20 mg) by mouth daily          Last Written Prescription Date:  11/25/2019  Last Fill Quantity: 30,   # refills: 1  Last Office Visit: 12/24/2019  Future Office visit:    Next 5 appointments (look out 90 days)    Jan 07, 2020  9:00 AM CST  Return Visit with Lianna Bonner PA-C  [facility] ([facility]) [facility]  [address]  [address]  [facility] [address]  [phone]   Jan 16, 2020  1:00 PM CST  Return Visit with Diego Segura MD  [facility] ([facility]) [facility]  [address]  [facility] [address]  [phone]           Routing refill request to Gerson Massey NP for approval.    Allyson Martin RN, BSN, OCN

## 2019-12-27 ENCOUNTER — ALLIED HEALTH/NURSE VISIT (OUTPATIENT)
Dept: INFUSION THERAPY | Facility: CLINIC | Age: 79
End: 2019-12-27
Attending: PHYSICIAN ASSISTANT
Payer: COMMERCIAL

## 2019-12-27 VITALS
RESPIRATION RATE: 16 BRPM | OXYGEN SATURATION: 97 % | TEMPERATURE: 97.3 F | SYSTOLIC BLOOD PRESSURE: 112 MMHG | HEART RATE: 64 BPM | DIASTOLIC BLOOD PRESSURE: 64 MMHG

## 2019-12-27 DIAGNOSIS — D70.1 CHEMOTHERAPY-INDUCED NEUTROPENIA (H): Primary | ICD-10-CM

## 2019-12-27 DIAGNOSIS — T45.1X5A CHEMOTHERAPY-INDUCED NEUTROPENIA (H): Primary | ICD-10-CM

## 2019-12-27 PROCEDURE — 25000128 H RX IP 250 OP 636

## 2019-12-27 PROCEDURE — 96372 THER/PROPH/DIAG INJ SC/IM: CPT

## 2019-12-27 RX ADMIN — FILGRASTIM 300 MCG: 300 INJECTION, SOLUTION INTRAVENOUS; SUBCUTANEOUS at 12:46

## 2019-12-27 NOTE — PROGRESS NOTES
Infusion Nursing Note:  Lowell SEFERINO Maria Elena presents today for Neupogen.    Patient seen by provider today: No   present during visit today: Not Applicable.    Note: N/A.    Intravenous Access:  No Intravenous access/labs at this visit.    Treatment Conditions:  Not Applicable.      Post Infusion Assessment:  Patient tolerated injection without incident.       Discharge Plan:   Discharge instructions reviewed with: Patient.  Patient and/or family verbalized understanding of discharge instructions and all questions answered.  AVS to patient via [employer].  Patient will return 1/7/2020 for next appointment.   Patient discharged in stable condition accompanied by: self.  Departure Mode: Ambulatory.    Nora Campbell RN

## 2020-01-06 NOTE — PROGRESS NOTES
Oncology/Hematology Visit Note    Jan 7, 2020    Reason for visit: Follow up pancreatic cancer    Oncology HPI: Lowell Arredondo is a 79 year old male with a h/o prostate cancer and now with pancreatic cancer. Due to his prostate cancer, he had multiple surveillance scans and CT on 9/26/19 revealed a new soft tissue mesenteric mass in the pancreatic tail. EUS on 10/7/19 revealed moderately differentiated pancreatic adenocarcinoma. PET scan with no metastatic disease. He established care with Dr Segura and discussed starting curtaive intent, neoadjuvant chemotherapy and then surgery. He completed gemcitabine/Abraxane C1D1 on 11/7/19.     He was in the ED on 11/19/19 for syncope.  He was exercising at the [facility] and had a syncopal event.  ED work-up was extensive and he was discharged.  He was seen in infusion for a rash when he received cycle 2  and likely from gemcitabine, started on cortisone.     He is here today for cycle 3.    Interval History: Lowell is here with his wife, June.  His rash has improved and he has been applying cortisone cream daily to his arms and occasionally to his back.  Itchiness has also improved.  He is starting to lose more hair now, but is otherwise doing well today. He admits to persistently clearing his throat and has been on omeprazole for 6 weeks now with no improvement.  He has been taking Claritin daily for his Neupogen injections with no improvement of his congestion either.  Denies fever, chills, nausea, vomiting, diarrhea, bleeding.    Review of Systems: See interval hx. Denies fevers, chills, HA, dizziness, changes in vision, sore throat, CP, SOB, abdominal pain, N/V, diarrhea, changes in urination, bleeding, bruising, rash.      PMHx and Social Hx reviewed per EPIC.      Medications:  Current Outpatient Medications   Medication Sig Dispense Refill     loratadine (CLARITIN) 10 MG tablet Take 10 mg by mouth daily       omeprazole (PRILOSEC) 20 MG DR capsule Take 1 capsule (20 mg) by  mouth daily 30 capsule 1     polyethylene glycol (MIRALAX/GLYCOLAX) packet Take 1 packet by mouth daily       psyllium (METAMUCIL) 28.3 % POWD        tamsulosin (FLOMAX) 80 mcg/mL Take 0.4 mg by mouth       LORazepam (ATIVAN) 0.5 MG tablet Take 1 tablet (0.5 mg) by mouth every 4 hours as needed (Anxiety, Nausea/Vomiting or Sleep) (Patient not taking: Reported on 11/25/2019) 30 tablet 2     prochlorperazine (COMPAZINE) 10 MG tablet Take 1 tablet (10 mg) by mouth every 6 hours as needed (Nausea/Vomiting) (Patient not taking: Reported on 11/25/2019) 30 tablet 2       Allergies   Allergen Reactions     Demerol [Meperidine] Difficulty breathing       EXAM:    /61   Pulse 81   Temp 98.1  F (36.7  C) (Oral)   Resp 16   Wt 73.5 kg (162 lb)   SpO2 99%   BMI 24.64 kg/m      GENERAL:  Male, in no acute distress.  Alert and oriented x3.   HEENT:  Normocephalic, atraumatic.  PERRL, oropharynx clear with no sores or thrush.   LYMPH NODES:  No palpable pre/post-auricular, cervical, axillary lymphadenopathy appreciated.  CV:  RRR, No murmurs, gallops, or rubs.   LUNGS:  Clear to auscultation bilaterally.   ABDOMEN:  Soft, nontender and nondistended.  Bowel sounds heard x4.  No apparent hepatosplenomegaly.   EXTREMITIES:  No clubbing, cyanosis, or edema.   SKIN: Patchy, scaly rash to bilateral forearms and chest and back.  Small circular area to the dorsal left forearm approximately 1 cm by half centimeter with very small white spots, nontender, no warmth or oozing discharge.  PSYCH: Mood stable      Labs:   Results for KARI YANG (MRN [id number]) as of 1/7/2020 15:08   1/7/2020 08:20   Sodium 141   Potassium 4.0   Chloride 110 (H)   Carbon Dioxide 24   Urea Nitrogen 13   Creatinine 0.92   GFR Estimate 77   GFR Estimate If Black 90   Calcium 9.0   Anion Gap 7   Albumin 3.1 (L)   Protein Total 6.4 (L)   Bilirubin Total 0.4   Alkaline Phosphatase 64   ALT 52   AST 19   Glucose 136 (H)   WBC 6.3   Hemoglobin 10.4 (L)    Hematocrit 32.2 (L)   Platelet Count 396   RBC Count 3.37 (L)   MCV 96   MCH 30.9   MCHC 32.3   RDW 17.1 (H)   Diff Method Automated Method   % Neutrophils 49.9   % Lymphocytes 30.6   % Monocytes 12.5   % Eosinophils 6.0   % Basophils 0.8   % Immature Granulocytes 0.2   Nucleated RBCs 0   Absolute Neutrophil 3.1   Absolute Lymphocytes 1.9   Absolute Monocytes 0.8   Absolute Eosinophils 0.4   Absolute Basophils 0.1   Abs Immature Granulocytes 0.0   Absolute Nucleated RBC 0.0       Imaging: n/a    Impression/Plan: Lowell Arredondo is a 79 year old male with pancreatic cancer currently on neoadjuvant chemotherapy with gemcitabine/Abraxane.    Pancreatic cancer: Started neoadjuvant chemotherapy with gemcitabine/Abraxane, C1 D1 completed on 11/7/2019.  He was neutropenic for C1D15, therefore deferred a week and he received 2 doses of Neupogen.  We will continue Neupogen for this reason.  His rash has improved, see below.  He continues to do well on this regimen.  Labs are within treatment parameters to proceed with cycle 3 today.  He will call the clinic with any questions or concerns.  --1/9/20 CT  --1/14/20 Dr. Segura, C3D8    Rash: Significant improvement since applying cortisone.  He will continue this, but recommended alternating lotion 1 day and cortisone the other day to see if this also continues the skin moisturized.    Syncope: Syncopal episode while at the [facility] and ED work-up negative.  No symptoms since then.    Congestion: He admits to clearing his throat frequently initially had thought it was reflux disease.  He has been on omeprazole for 6 weeks with no improvement.  I have asked him to discontinue omeprazole and try Sudafed or Coricidin.  I have asked him to discontinue Claritin for now since he is not receiving Neupogen.      Chart documentation with Dragon Voice recognition Software. Although reviewed after completion, some words and grammatical errors may remain.      Lianna Vyas  PAGREGORY  Hematology/Oncology  [facility]

## 2020-01-07 ENCOUNTER — HOSPITAL ENCOUNTER (OUTPATIENT)
Facility: CLINIC | Age: 80
Setting detail: SPECIMEN
Discharge: HOME | End: 2020-01-07
Attending: PHYSICIAN ASSISTANT | Admitting: PHYSICIAN ASSISTANT
Payer: COMMERCIAL

## 2020-01-07 ENCOUNTER — ONCOLOGY VISIT (OUTPATIENT)
Dept: ONCOLOGY | Facility: CLINIC | Age: 80
End: 2020-01-07
Attending: PHYSICIAN ASSISTANT
Payer: COMMERCIAL

## 2020-01-07 ENCOUNTER — INFUSION THERAPY VISIT (OUTPATIENT)
Dept: INFUSION THERAPY | Facility: CLINIC | Age: 80
End: 2020-01-07
Attending: INTERNAL MEDICINE
Payer: COMMERCIAL

## 2020-01-07 ENCOUNTER — INFUSION THERAPY VISIT (OUTPATIENT)
Dept: INFUSION THERAPY | Facility: CLINIC | Age: 80
End: 2020-01-07
Attending: PHYSICIAN ASSISTANT
Payer: COMMERCIAL

## 2020-01-07 VITALS
RESPIRATION RATE: 16 BRPM | OXYGEN SATURATION: 99 % | BODY MASS INDEX: 24.64 KG/M2 | SYSTOLIC BLOOD PRESSURE: 113 MMHG | TEMPERATURE: 98.1 F | DIASTOLIC BLOOD PRESSURE: 61 MMHG | HEART RATE: 81 BPM | WEIGHT: 162 LBS

## 2020-01-07 DIAGNOSIS — C25.2 MALIGNANT NEOPLASM OF TAIL OF PANCREAS (H): Primary | ICD-10-CM

## 2020-01-07 DIAGNOSIS — C25.2 MALIGNANT NEOPLASM OF TAIL OF PANCREAS (H): ICD-10-CM

## 2020-01-07 LAB
ALBUMIN SERPL-MCNC: 3.1 G/DL (ref 3.4–5)
ALP SERPL-CCNC: 64 U/L (ref 40–150)
ALT SERPL W P-5'-P-CCNC: 52 U/L (ref 0–70)
ANION GAP SERPL CALCULATED.3IONS-SCNC: 7 MMOL/L (ref 3–14)
AST SERPL W P-5'-P-CCNC: 19 U/L (ref 0–45)
BASOPHILS # BLD AUTO: 0.1 10E9/L (ref 0–0.2)
BASOPHILS NFR BLD AUTO: 0.8 %
BILIRUB SERPL-MCNC: 0.4 MG/DL (ref 0.2–1.3)
BUN SERPL-MCNC: 13 MG/DL (ref 7–30)
CALCIUM SERPL-MCNC: 9 MG/DL (ref 8.5–10.1)
CHLORIDE SERPL-SCNC: 110 MMOL/L (ref 94–109)
CO2 SERPL-SCNC: 24 MMOL/L (ref 20–32)
CREAT SERPL-MCNC: 0.92 MG/DL (ref 0.66–1.25)
DIFFERENTIAL METHOD BLD: ABNORMAL
EOSINOPHIL # BLD AUTO: 0.4 10E9/L (ref 0–0.7)
EOSINOPHIL NFR BLD AUTO: 6 %
ERYTHROCYTE [DISTWIDTH] IN BLOOD BY AUTOMATED COUNT: 17.1 % (ref 10–15)
GFR SERPL CREATININE-BSD FRML MDRD: 77 ML/MIN/{1.73_M2}
GLUCOSE SERPL-MCNC: 136 MG/DL (ref 70–99)
HCT VFR BLD AUTO: 32.2 % (ref 40–53)
HGB BLD-MCNC: 10.4 G/DL (ref 13.3–17.7)
IMM GRANULOCYTES # BLD: 0 10E9/L (ref 0–0.4)
IMM GRANULOCYTES NFR BLD: 0.2 %
LYMPHOCYTES # BLD AUTO: 1.9 10E9/L (ref 0.8–5.3)
LYMPHOCYTES NFR BLD AUTO: 30.6 %
MCH RBC QN AUTO: 30.9 PG (ref 26.5–33)
MCHC RBC AUTO-ENTMCNC: 32.3 G/DL (ref 31.5–36.5)
MCV RBC AUTO: 96 FL (ref 78–100)
MONOCYTES # BLD AUTO: 0.8 10E9/L (ref 0–1.3)
MONOCYTES NFR BLD AUTO: 12.5 %
NEUTROPHILS # BLD AUTO: 3.1 10E9/L (ref 1.6–8.3)
NEUTROPHILS NFR BLD AUTO: 49.9 %
NRBC # BLD AUTO: 0 10*3/UL
NRBC BLD AUTO-RTO: 0 /100
PLATELET # BLD AUTO: 396 10E9/L (ref 150–450)
POTASSIUM SERPL-SCNC: 4 MMOL/L (ref 3.4–5.3)
PROT SERPL-MCNC: 6.4 G/DL (ref 6.8–8.8)
RBC # BLD AUTO: 3.37 10E12/L (ref 4.4–5.9)
SODIUM SERPL-SCNC: 141 MMOL/L (ref 133–144)
WBC # BLD AUTO: 6.3 10E9/L (ref 4–11)

## 2020-01-07 PROCEDURE — 25800030 ZZH RX IP 258 OP 636: Performed by: PHYSICIAN ASSISTANT

## 2020-01-07 PROCEDURE — 86301 IMMUNOASSAY TUMOR CA 19-9: CPT | Performed by: PHYSICIAN ASSISTANT

## 2020-01-07 PROCEDURE — G0463 HOSPITAL OUTPT CLINIC VISIT: HCPCS | Mod: 25

## 2020-01-07 PROCEDURE — 96375 TX/PRO/DX INJ NEW DRUG ADDON: CPT

## 2020-01-07 PROCEDURE — 96413 CHEMO IV INFUSION 1 HR: CPT

## 2020-01-07 PROCEDURE — 99214 OFFICE O/P EST MOD 30 MIN: CPT | Performed by: PHYSICIAN ASSISTANT

## 2020-01-07 PROCEDURE — 85025 COMPLETE CBC W/AUTO DIFF WBC: CPT | Performed by: PHYSICIAN ASSISTANT

## 2020-01-07 PROCEDURE — 80053 COMPREHEN METABOLIC PANEL: CPT | Performed by: PHYSICIAN ASSISTANT

## 2020-01-07 PROCEDURE — 25000128 H RX IP 250 OP 636: Performed by: INTERNAL MEDICINE

## 2020-01-07 PROCEDURE — 96417 CHEMO IV INFUS EACH ADDL SEQ: CPT

## 2020-01-07 PROCEDURE — 25000128 H RX IP 250 OP 636: Performed by: PHYSICIAN ASSISTANT

## 2020-01-07 RX ORDER — SODIUM CHLORIDE 9 MG/ML
1000 INJECTION, SOLUTION INTRAVENOUS CONTINUOUS PRN
Status: CANCELLED
Start: 2020-01-07

## 2020-01-07 RX ORDER — METHYLPREDNISOLONE SODIUM SUCCINATE 125 MG/2ML
125 INJECTION, POWDER, LYOPHILIZED, FOR SOLUTION INTRAMUSCULAR; INTRAVENOUS
Status: CANCELLED
Start: 2020-01-21

## 2020-01-07 RX ORDER — PACLITAXEL 100 MG/20ML
125 INJECTION, POWDER, LYOPHILIZED, FOR SUSPENSION INTRAVENOUS ONCE
Status: CANCELLED | OUTPATIENT
Start: 2020-01-21

## 2020-01-07 RX ORDER — DIPHENHYDRAMINE HYDROCHLORIDE 50 MG/ML
50 INJECTION INTRAMUSCULAR; INTRAVENOUS
Status: CANCELLED
Start: 2020-01-14

## 2020-01-07 RX ORDER — NALOXONE HYDROCHLORIDE 0.4 MG/ML
.1-.4 INJECTION, SOLUTION INTRAMUSCULAR; INTRAVENOUS; SUBCUTANEOUS
Status: CANCELLED | OUTPATIENT
Start: 2020-01-14

## 2020-01-07 RX ORDER — EPINEPHRINE 0.3 MG/.3ML
0.3 INJECTION SUBCUTANEOUS EVERY 5 MIN PRN
Status: CANCELLED | OUTPATIENT
Start: 2020-01-07

## 2020-01-07 RX ORDER — PACLITAXEL 100 MG/20ML
125 INJECTION, POWDER, LYOPHILIZED, FOR SUSPENSION INTRAVENOUS ONCE
Status: CANCELLED | OUTPATIENT
Start: 2020-01-14

## 2020-01-07 RX ORDER — ALBUTEROL SULFATE 90 UG/1
1-2 AEROSOL, METERED RESPIRATORY (INHALATION)
Status: CANCELLED
Start: 2020-01-14

## 2020-01-07 RX ORDER — NALOXONE HYDROCHLORIDE 0.4 MG/ML
.1-.4 INJECTION, SOLUTION INTRAMUSCULAR; INTRAVENOUS; SUBCUTANEOUS
Status: CANCELLED | OUTPATIENT
Start: 2020-01-07

## 2020-01-07 RX ORDER — ALBUTEROL SULFATE 0.83 MG/ML
2.5 SOLUTION RESPIRATORY (INHALATION)
Status: CANCELLED | OUTPATIENT
Start: 2020-01-07

## 2020-01-07 RX ORDER — PACLITAXEL 100 MG/20ML
125 INJECTION, POWDER, LYOPHILIZED, FOR SUSPENSION INTRAVENOUS ONCE
Status: COMPLETED | OUTPATIENT
Start: 2020-01-07 | End: 2020-01-07

## 2020-01-07 RX ORDER — ALBUTEROL SULFATE 90 UG/1
1-2 AEROSOL, METERED RESPIRATORY (INHALATION)
Status: CANCELLED
Start: 2020-01-07

## 2020-01-07 RX ORDER — EPINEPHRINE 1 MG/ML
0.3 INJECTION, SOLUTION INTRAMUSCULAR; SUBCUTANEOUS EVERY 5 MIN PRN
Status: CANCELLED | OUTPATIENT
Start: 2020-01-07

## 2020-01-07 RX ORDER — PACLITAXEL 100 MG/20ML
125 INJECTION, POWDER, LYOPHILIZED, FOR SUSPENSION INTRAVENOUS ONCE
Status: CANCELLED | OUTPATIENT
Start: 2020-01-07

## 2020-01-07 RX ORDER — DIPHENHYDRAMINE HYDROCHLORIDE 50 MG/ML
50 INJECTION INTRAMUSCULAR; INTRAVENOUS
Status: CANCELLED
Start: 2020-01-21

## 2020-01-07 RX ORDER — METHYLPREDNISOLONE SODIUM SUCCINATE 125 MG/2ML
125 INJECTION, POWDER, LYOPHILIZED, FOR SOLUTION INTRAMUSCULAR; INTRAVENOUS
Status: CANCELLED
Start: 2020-01-14

## 2020-01-07 RX ORDER — EPINEPHRINE 1 MG/ML
0.3 INJECTION, SOLUTION INTRAMUSCULAR; SUBCUTANEOUS EVERY 5 MIN PRN
Status: CANCELLED | OUTPATIENT
Start: 2020-01-21

## 2020-01-07 RX ORDER — ALBUTEROL SULFATE 0.83 MG/ML
2.5 SOLUTION RESPIRATORY (INHALATION)
Status: CANCELLED | OUTPATIENT
Start: 2020-01-14

## 2020-01-07 RX ORDER — SODIUM CHLORIDE 9 MG/ML
1000 INJECTION, SOLUTION INTRAVENOUS CONTINUOUS PRN
Status: CANCELLED
Start: 2020-01-14

## 2020-01-07 RX ORDER — DIPHENHYDRAMINE HYDROCHLORIDE 50 MG/ML
50 INJECTION INTRAMUSCULAR; INTRAVENOUS
Status: CANCELLED
Start: 2020-01-07

## 2020-01-07 RX ORDER — LORAZEPAM 2 MG/ML
0.5 INJECTION INTRAMUSCULAR EVERY 4 HOURS PRN
Status: CANCELLED
Start: 2020-01-21

## 2020-01-07 RX ORDER — METHYLPREDNISOLONE SODIUM SUCCINATE 125 MG/2ML
125 INJECTION, POWDER, LYOPHILIZED, FOR SOLUTION INTRAMUSCULAR; INTRAVENOUS
Status: CANCELLED
Start: 2020-01-07

## 2020-01-07 RX ORDER — ALBUTEROL SULFATE 0.83 MG/ML
2.5 SOLUTION RESPIRATORY (INHALATION)
Status: CANCELLED | OUTPATIENT
Start: 2020-01-21

## 2020-01-07 RX ORDER — LORAZEPAM 2 MG/ML
0.5 INJECTION INTRAMUSCULAR EVERY 4 HOURS PRN
Status: CANCELLED
Start: 2020-01-14

## 2020-01-07 RX ORDER — EPINEPHRINE 1 MG/ML
0.3 INJECTION, SOLUTION INTRAMUSCULAR; SUBCUTANEOUS EVERY 5 MIN PRN
Status: CANCELLED | OUTPATIENT
Start: 2020-01-14

## 2020-01-07 RX ORDER — SODIUM CHLORIDE 9 MG/ML
1000 INJECTION, SOLUTION INTRAVENOUS CONTINUOUS PRN
Status: CANCELLED
Start: 2020-01-21

## 2020-01-07 RX ORDER — LORAZEPAM 2 MG/ML
0.5 INJECTION INTRAMUSCULAR EVERY 4 HOURS PRN
Status: CANCELLED
Start: 2020-01-07

## 2020-01-07 RX ORDER — HEPARIN SODIUM (PORCINE) LOCK FLUSH IV SOLN 100 UNIT/ML 100 UNIT/ML
5 SOLUTION INTRAVENOUS EVERY 8 HOURS
Status: DISCONTINUED | OUTPATIENT
Start: 2020-01-07 | End: 2020-01-07 | Stop reason: HOSPADM

## 2020-01-07 RX ORDER — EPINEPHRINE 0.3 MG/.3ML
0.3 INJECTION SUBCUTANEOUS EVERY 5 MIN PRN
Status: CANCELLED | OUTPATIENT
Start: 2020-01-21

## 2020-01-07 RX ORDER — NALOXONE HYDROCHLORIDE 0.4 MG/ML
.1-.4 INJECTION, SOLUTION INTRAMUSCULAR; INTRAVENOUS; SUBCUTANEOUS
Status: CANCELLED | OUTPATIENT
Start: 2020-01-21

## 2020-01-07 RX ORDER — EPINEPHRINE 0.3 MG/.3ML
0.3 INJECTION SUBCUTANEOUS EVERY 5 MIN PRN
Status: CANCELLED | OUTPATIENT
Start: 2020-01-14

## 2020-01-07 RX ORDER — ALBUTEROL SULFATE 90 UG/1
1-2 AEROSOL, METERED RESPIRATORY (INHALATION)
Status: CANCELLED
Start: 2020-01-21

## 2020-01-07 RX ADMIN — SODIUM CHLORIDE, PRESERVATIVE FREE 5 ML: 5 INJECTION INTRAVENOUS at 12:12

## 2020-01-07 RX ADMIN — PACLITAXEL 231 MG: 100 INJECTION, POWDER, LYOPHILIZED, FOR SUSPENSION INTRAVENOUS at 10:45

## 2020-01-07 RX ADMIN — SODIUM CHLORIDE 250 ML: 9 INJECTION, SOLUTION INTRAVENOUS at 10:08

## 2020-01-07 RX ADMIN — DEXAMETHASONE SODIUM PHOSPHATE 12 MG: 10 INJECTION, SOLUTION INTRAMUSCULAR; INTRAVENOUS at 10:08

## 2020-01-07 RX ADMIN — GEMCITABINE 1800 MG: 38 INJECTION, SOLUTION INTRAVENOUS at 11:33

## 2020-01-07 ASSESSMENT — PAIN SCALES - GENERAL: PAINLEVEL: NO PAIN (0)

## 2020-01-07 NOTE — PROGRESS NOTES
Infusion Nursing Note:  Lowell Arredondo presents today for Abraxane/Gemzar.    Patient seen by provider today: Yes: Lianna   present during visit today: Not Applicable.    Note: Assessment done by PA at appointment.    Intravenous Access:  Implanted Port accessed in fast track.    Treatment Conditions:  Lab Results   Component Value Date    HGB 10.4 01/07/2020     Lab Results   Component Value Date    WBC 6.3 01/07/2020      Lab Results   Component Value Date    ANEU 3.1 01/07/2020     Lab Results   Component Value Date     01/07/2020      Results reviewed, labs MET treatment parameters, ok to proceed with treatment.      Post Infusion Assessment:  Patient tolerated infusion without incident.  Blood return noted pre and post infusion.  Site patent and intact, free from redness, edema or discomfort.  No evidence of extravasations.  Access discontinued per protocol.       Discharge Plan:   Discharge instructions reviewed with: Patient.  Patient discharged in stable condition accompanied by: wife.  Departure Mode: Ambulatory.  Next infusion scheduled for 1/14/2020.    NIKKI ASHLEY RN

## 2020-01-07 NOTE — LETTER
1/7/2020         RE: Lowell Arredondo  [address] [facility] [address]        Dear Colleague,    Thank you for referring your patient, Lowell Arredondo, to the [facility]. Please see a copy of my visit note below.    Oncology/Hematology Visit Note    Jan 7, 2020    Reason for visit: Follow up pancreatic cancer    Oncology HPI: Lowell Arredondo is a 79 year old male with a h/o prostate cancer and now with pancreatic cancer. Due to his prostate cancer, he had multiple surveillance scans and CT on 9/26/19 revealed a new soft tissue mesenteric mass in the pancreatic tail. EUS on 10/7/19 revealed moderately differentiated pancreatic adenocarcinoma. PET scan with no metastatic disease. He established care with Dr Segura and discussed starting curtaive intent, neoadjuvant chemotherapy and then surgery. He completed gemcitabine/Abraxane C1D1 on 11/7/19.     He was in the ED on 11/19/19 for syncope.  He was exercising at the [facility] and had a syncopal event.  ED work-up was extensive and he was discharged.  He was seen in infusion for a rash when he received cycle 2  and likely from gemcitabine, started on cortisone.     He is here today for cycle 3.    Interval History: Lowell is here with his wife, June.  His rash has improved and he has been applying cortisone cream daily to his arms and occasionally to his back.  Itchiness has also improved.  He is starting to lose more hair now, but is otherwise doing well today. He admits to persistently clearing his throat and has been on omeprazole for 6 weeks now with no improvement.  He has been taking Claritin daily for his Neupogen injections with no improvement of his congestion either.  Denies fever, chills, nausea, vomiting, diarrhea, bleeding.    Review of Systems: See interval hx. Denies fevers, chills, HA, dizziness, changes in vision, sore throat, CP, SOB, abdominal pain, N/V, diarrhea, changes in urination, bleeding, bruising, rash.      PMHx and Social  Hx reviewed per EPIC.      Medications:  Current Outpatient Medications   Medication Sig Dispense Refill     loratadine (CLARITIN) 10 MG tablet Take 10 mg by mouth daily       omeprazole (PRILOSEC) 20 MG DR capsule Take 1 capsule (20 mg) by mouth daily 30 capsule 1     polyethylene glycol (MIRALAX/GLYCOLAX) packet Take 1 packet by mouth daily       psyllium (METAMUCIL) 28.3 % POWD        tamsulosin (FLOMAX) 80 mcg/mL Take 0.4 mg by mouth       LORazepam (ATIVAN) 0.5 MG tablet Take 1 tablet (0.5 mg) by mouth every 4 hours as needed (Anxiety, Nausea/Vomiting or Sleep) (Patient not taking: Reported on 11/25/2019) 30 tablet 2     prochlorperazine (COMPAZINE) 10 MG tablet Take 1 tablet (10 mg) by mouth every 6 hours as needed (Nausea/Vomiting) (Patient not taking: Reported on 11/25/2019) 30 tablet 2       Allergies   Allergen Reactions     Demerol [Meperidine] Difficulty breathing       EXAM:    /61   Pulse 81   Temp 98.1  F (36.7  C) (Oral)   Resp 16   Wt 73.5 kg (162 lb)   SpO2 99%   BMI 24.64 kg/m       GENERAL:  Male, in no acute distress.  Alert and oriented x3.   HEENT:  Normocephalic, atraumatic.  PERRL, oropharynx clear with no sores or thrush.   LYMPH NODES:  No palpable pre/post-auricular, cervical, axillary lymphadenopathy appreciated.  CV:  RRR, No murmurs, gallops, or rubs.   LUNGS:  Clear to auscultation bilaterally.   ABDOMEN:  Soft, nontender and nondistended.  Bowel sounds heard x4.  No apparent hepatosplenomegaly.   EXTREMITIES:  No clubbing, cyanosis, or edema.   SKIN: Patchy, scaly rash to bilateral forearms and chest and back.  Small circular area to the dorsal left forearm approximately 1 cm by half centimeter with very small white spots, nontender, no warmth or oozing discharge.  PSYCH: Mood stable      Labs:   Results for KARI YANG (MRN [id number]) as of 1/7/2020 15:08   1/7/2020 08:20   Sodium 141   Potassium 4.0   Chloride 110 (H)   Carbon Dioxide 24   Urea Nitrogen 13    Creatinine 0.92   GFR Estimate 77   GFR Estimate If Black 90   Calcium 9.0   Anion Gap 7   Albumin 3.1 (L)   Protein Total 6.4 (L)   Bilirubin Total 0.4   Alkaline Phosphatase 64   ALT 52   AST 19   Glucose 136 (H)   WBC 6.3   Hemoglobin 10.4 (L)   Hematocrit 32.2 (L)   Platelet Count 396   RBC Count 3.37 (L)   MCV 96   MCH 30.9   MCHC 32.3   RDW 17.1 (H)   Diff Method Automated Method   % Neutrophils 49.9   % Lymphocytes 30.6   % Monocytes 12.5   % Eosinophils 6.0   % Basophils 0.8   % Immature Granulocytes 0.2   Nucleated RBCs 0   Absolute Neutrophil 3.1   Absolute Lymphocytes 1.9   Absolute Monocytes 0.8   Absolute Eosinophils 0.4   Absolute Basophils 0.1   Abs Immature Granulocytes 0.0   Absolute Nucleated RBC 0.0       Imaging: n/a    Impression/Plan: Lowell Arredondo is a 79 year old male with pancreatic cancer currently on neoadjuvant chemotherapy with gemcitabine/Abraxane.    Pancreatic cancer: Started neoadjuvant chemotherapy with gemcitabine/Abraxane, C1 D1 completed on 11/7/2019.  He was neutropenic for C1D15, therefore deferred a week and he received 2 doses of Neupogen.  We will continue Neupogen for this reason.  His rash has improved, see below.  He continues to do well on this regimen.  Labs are within treatment parameters to proceed with cycle 3 today.  He will call the clinic with any questions or concerns.  --1/9/20 CT  --1/14/20 Dr. Segura, C3D8    Rash: Significant improvement since applying cortisone.  He will continue this, but recommended alternating lotion 1 day and cortisone the other day to see if this also continues the skin moisturized.    Syncope: Syncopal episode while at the [facility] and ED work-up negative.  No symptoms since then.    Congestion: He admits to clearing his throat frequently initially had thought it was reflux disease.  He has been on omeprazole for 6 weeks with no improvement.  I have asked him to discontinue omeprazole and try Sudafed or Coricidin.  I have asked him to  discontinue Claritin for now since he is not receiving Neupogen.      Chart documentation with Dragon Voice recognition Software. Although reviewed after completion, some words and grammatical errors may remain.      Lianna Vyas PA-C  Hematology/Oncology  [facility]                  Again, thank you for allowing me to participate in the care of your patient.        Sincerely,        Linana Vyas PA-C

## 2020-01-07 NOTE — NURSING NOTE
"Oncology Rooming Note    January 7, 2020 8:49 AM   Lowell Arredondo is a 79 year old male who presents for:    Chief Complaint   Patient presents with     Oncology Clinic Visit     Malignant neoplasm of tail of pancreas     Initial Vitals: /61   Pulse 81   Temp 98.1  F (36.7  C) (Oral)   Resp 16   Wt 73.5 kg (162 lb)   SpO2 99%   BMI 24.64 kg/m   Estimated body mass index is 24.64 kg/m  as calculated from the following:    Height as of 12/10/19: 1.727 m (5' 7.99\").    Weight as of this encounter: 73.5 kg (162 lb). Body surface area is 1.88 meters squared.  No Pain (0) Comment: Data Unavailable   No LMP for male patient.  Allergies reviewed: Yes  Medications reviewed: Yes    Medications: Medication refills not needed today.  Pharmacy name entered into [employer]: [id number] - [place], [place] - [address]    Clinical concerns: f/u       Tigist Nicolas CMA              "

## 2020-01-07 NOTE — PROGRESS NOTES
Infusion Nursing Note:  Lowell H Socrateskarla presents today for port labs.     present during visit today: Not Applicable.    Note: N/A.    Intravenous Access:  Lab draw site R chest, Needle type barreto, Gauge 20.  Labs drawn without difficulty.  Implanted Port.    Treatment Conditions:  NA    Post Lab Assessment:  Patient tolerated blood collection   Site patent and intact, free from redness, edema or discomfort.  Access (remains for infusion use today)     Discharge Plan:   Patient and/or family verbalized understanding of  instructions and all questions answered.  Patient  to lobby in stable condition accompanied by: self and wife.  Patient to see provider today: Yes: marcellus  Departure Mode: Ambulatory.  Roya Hawthorne, RN, RN

## 2020-01-08 LAB — CANCER AG19-9 SERPL-ACNC: 798 U/ML (ref 0–37)

## 2020-01-09 ENCOUNTER — HOSPITAL ENCOUNTER (OUTPATIENT)
Dept: CT IMAGING | Facility: CLINIC | Age: 80
Discharge: HOME | End: 2020-01-09
Attending: INTERNAL MEDICINE | Admitting: INTERNAL MEDICINE
Payer: COMMERCIAL

## 2020-01-09 DIAGNOSIS — C25.2 MALIGNANT NEOPLASM OF TAIL OF PANCREAS (H): ICD-10-CM

## 2020-01-09 PROCEDURE — 25000128 H RX IP 250 OP 636: Performed by: INTERNAL MEDICINE

## 2020-01-09 PROCEDURE — 25000128 H RX IP 250 OP 636

## 2020-01-09 PROCEDURE — 71260 CT THORAX DX C+: CPT

## 2020-01-09 PROCEDURE — 25000125 ZZHC RX 250: Performed by: INTERNAL MEDICINE

## 2020-01-09 RX ORDER — HEPARIN SODIUM (PORCINE) LOCK FLUSH IV SOLN 100 UNIT/ML 100 UNIT/ML
SOLUTION INTRAVENOUS
Status: COMPLETED
Start: 2020-01-09 | End: 2020-01-09

## 2020-01-09 RX ORDER — IOPAMIDOL 755 MG/ML
500 INJECTION, SOLUTION INTRAVASCULAR ONCE
Status: COMPLETED | OUTPATIENT
Start: 2020-01-09 | End: 2020-01-09

## 2020-01-09 RX ORDER — HEPARIN SODIUM (PORCINE) LOCK FLUSH IV SOLN 100 UNIT/ML 100 UNIT/ML
5 SOLUTION INTRAVENOUS ONCE
Status: COMPLETED | OUTPATIENT
Start: 2020-01-09 | End: 2020-01-09

## 2020-01-09 RX ADMIN — IOPAMIDOL 79 ML: 755 INJECTION, SOLUTION INTRAVENOUS at 10:13

## 2020-01-09 RX ADMIN — SODIUM CHLORIDE 60 ML: 9 INJECTION, SOLUTION INTRAVENOUS at 10:13

## 2020-01-09 RX ADMIN — HEPARIN 5 ML: 100 SYRINGE at 10:17

## 2020-01-09 RX ADMIN — HEPARIN SODIUM (PORCINE) LOCK FLUSH IV SOLN 100 UNIT/ML 5 ML: 100 SOLUTION at 10:17

## 2020-01-10 ENCOUNTER — PREP FOR PROCEDURE (OUTPATIENT)
Dept: SURGERY | Facility: CLINIC | Age: 80
End: 2020-01-10

## 2020-01-10 ENCOUNTER — PATIENT OUTREACH (OUTPATIENT)
Dept: SURGERY | Facility: CLINIC | Age: 80
End: 2020-01-10

## 2020-01-10 DIAGNOSIS — C25.2 MALIGNANT NEOPLASM OF TAIL OF PANCREAS (H): Primary | ICD-10-CM

## 2020-01-10 NOTE — TELEPHONE ENCOUNTER
Surgical Oncology RN Care Coordination Note:     Called and left a message for patient and wife to call back and discuss surgery follow up and timing. Provided my direct call back.     Tentative plan for patient based on chemo schedule. Patient is scheduled to complete neoadjuvant chemotherapy on 1/21/2020 (Cycle 3, D15.)     Pending no delays in remainder of treatment we will plan to see patient on 2/14 with new CT scan with pancreas protocol, visit with Dr. Ryder and PAC visit for pre operative clearance.     Tentative surgery date for patient will be 3/10/2020 for a Robotic Distal pancreatectomy with splenectomy.     Rosa Machado RN, BSN  Care Coordinator   [phone]

## 2020-01-10 NOTE — RESULT ENCOUNTER NOTE
Dear Mr. Arredondo,    Your CT scan is better.  Pancreatic cancer is decreasing in size.  This is good.    Please, call me with any questions.    Diego Segura MD

## 2020-01-10 NOTE — TELEPHONE ENCOUNTER
Surgical Oncology RN Care Coordination Note:     Called and spoke with patient's wife regarding tentative plan. She reports that there was discussion of him possibly getting full 6 months of treatment first before surgery. Informed her that is fine and that we have no issue with this but that I would check with the oncology team to confirm first before rescheduling and changing things on our end. Informed her I would reach out to the oncology team and then follow up with her after I hear from them. She agreed with plan and has no further questions at this time.     Rosa Machado, RN, BSN  Care Coordinator   [phone]

## 2020-01-13 ENCOUNTER — PATIENT OUTREACH (OUTPATIENT)
Dept: SURGERY | Facility: CLINIC | Age: 80
End: 2020-01-13

## 2020-01-13 NOTE — TELEPHONE ENCOUNTER
FUTURE VISIT INFORMATION      SURGERY INFORMATION:    Date: 3/10/20    Location: [facility] OR    Surgeon:  Sanjay Ryder    Anesthesia Type:  General    Procedure: Robotic distal pancreatectomy with splenectomy    Consult: Patient outreach 1/10/20- [facility]    RECORDS REQUESTED FROM:       Primary Care Provider: Diego Segura MD- [place]    Most recent EKG+ Tracin19

## 2020-01-13 NOTE — TELEPHONE ENCOUNTER
Surgical Oncology RN Care Coordination Note:     Received a call from patients wife wondering if we heard back from Dr. Segura as of yet. Informed her that I have not heard back from him as of yet but that I did send a message last week. Informed her that if I don't hear back from him by the end of the day today I will follow up with her next week when I am back in the office.     Informed her that if he feels that Lowell should proceed with full six months of chemotherapy we fully support that and that we are actually doing that more often for patients with pancreas cancer and would support that decision. Informed her that if that does happen we would plan to cancel the follow up he has scheduled in February with our office and plan to see him after he completes the 6 months of chemo and arrange for the follow up and new surgery date then.     She verbalized understanding and will wait to discuss with Dr. Segura at the patients visit his week and we will follow up next week when I am back in the office.     Rosa Machado, RN, BSN  Care Coordinator   [phone]

## 2020-01-14 ENCOUNTER — INFUSION THERAPY VISIT (OUTPATIENT)
Dept: INFUSION THERAPY | Facility: CLINIC | Age: 80
End: 2020-01-14
Attending: INTERNAL MEDICINE
Payer: COMMERCIAL

## 2020-01-14 ENCOUNTER — HOSPITAL ENCOUNTER (OUTPATIENT)
Facility: CLINIC | Age: 80
Setting detail: SPECIMEN
Discharge: HOME | End: 2020-01-14
Attending: INTERNAL MEDICINE | Admitting: PHYSICIAN ASSISTANT
Payer: COMMERCIAL

## 2020-01-14 VITALS
WEIGHT: 160.9 LBS | OXYGEN SATURATION: 99 % | RESPIRATION RATE: 16 BRPM | HEART RATE: 88 BPM | SYSTOLIC BLOOD PRESSURE: 110 MMHG | BODY MASS INDEX: 24.47 KG/M2 | TEMPERATURE: 97.8 F | DIASTOLIC BLOOD PRESSURE: 75 MMHG

## 2020-01-14 DIAGNOSIS — K21.9 GASTROESOPHAGEAL REFLUX DISEASE, ESOPHAGITIS PRESENCE NOT SPECIFIED: ICD-10-CM

## 2020-01-14 DIAGNOSIS — C25.2 MALIGNANT NEOPLASM OF TAIL OF PANCREAS (H): Primary | ICD-10-CM

## 2020-01-14 LAB
BASOPHILS # BLD AUTO: 0.1 10E9/L (ref 0–0.2)
BASOPHILS NFR BLD AUTO: 2 %
DIFFERENTIAL METHOD BLD: ABNORMAL
EOSINOPHIL # BLD AUTO: 0.3 10E9/L (ref 0–0.7)
EOSINOPHIL NFR BLD AUTO: 7.9 %
ERYTHROCYTE [DISTWIDTH] IN BLOOD BY AUTOMATED COUNT: 16.5 % (ref 10–15)
HCT VFR BLD AUTO: 32.4 % (ref 40–53)
HGB BLD-MCNC: 10.3 G/DL (ref 13.3–17.7)
IMM GRANULOCYTES # BLD: 0 10E9/L (ref 0–0.4)
IMM GRANULOCYTES NFR BLD: 0.3 %
LYMPHOCYTES # BLD AUTO: 2 10E9/L (ref 0.8–5.3)
LYMPHOCYTES NFR BLD AUTO: 50 %
MCH RBC QN AUTO: 30.7 PG (ref 26.5–33)
MCHC RBC AUTO-ENTMCNC: 31.8 G/DL (ref 31.5–36.5)
MCV RBC AUTO: 97 FL (ref 78–100)
MONOCYTES # BLD AUTO: 0.5 10E9/L (ref 0–1.3)
MONOCYTES NFR BLD AUTO: 12.2 %
NEUTROPHILS # BLD AUTO: 1.1 10E9/L (ref 1.6–8.3)
NEUTROPHILS NFR BLD AUTO: 27.6 %
NRBC # BLD AUTO: 0 10*3/UL
NRBC BLD AUTO-RTO: 0 /100
PLATELET # BLD AUTO: 332 10E9/L (ref 150–450)
RBC # BLD AUTO: 3.35 10E12/L (ref 4.4–5.9)
WBC # BLD AUTO: 3.9 10E9/L (ref 4–11)

## 2020-01-14 PROCEDURE — 25000128 H RX IP 250 OP 636: Performed by: INTERNAL MEDICINE

## 2020-01-14 PROCEDURE — 25000128 H RX IP 250 OP 636: Performed by: PHYSICIAN ASSISTANT

## 2020-01-14 PROCEDURE — 96375 TX/PRO/DX INJ NEW DRUG ADDON: CPT

## 2020-01-14 PROCEDURE — 85025 COMPLETE CBC W/AUTO DIFF WBC: CPT | Performed by: PHYSICIAN ASSISTANT

## 2020-01-14 PROCEDURE — 25800030 ZZH RX IP 258 OP 636: Performed by: PHYSICIAN ASSISTANT

## 2020-01-14 PROCEDURE — 96413 CHEMO IV INFUSION 1 HR: CPT

## 2020-01-14 PROCEDURE — 96417 CHEMO IV INFUS EACH ADDL SEQ: CPT

## 2020-01-14 RX ORDER — PACLITAXEL 100 MG/20ML
125 INJECTION, POWDER, LYOPHILIZED, FOR SUSPENSION INTRAVENOUS ONCE
Status: COMPLETED | OUTPATIENT
Start: 2020-01-14 | End: 2020-01-14

## 2020-01-14 RX ORDER — HEPARIN SODIUM (PORCINE) LOCK FLUSH IV SOLN 100 UNIT/ML 100 UNIT/ML
5 SOLUTION INTRAVENOUS EVERY 8 HOURS
Status: DISCONTINUED | OUTPATIENT
Start: 2020-01-14 | End: 2020-01-14 | Stop reason: HOSPADM

## 2020-01-14 RX ADMIN — SODIUM CHLORIDE, PRESERVATIVE FREE 5 ML: 5 INJECTION INTRAVENOUS at 12:38

## 2020-01-14 RX ADMIN — GEMCITABINE 1800 MG: 38 INJECTION, SOLUTION INTRAVENOUS at 11:57

## 2020-01-14 RX ADMIN — SODIUM CHLORIDE 250 ML: 9 INJECTION, SOLUTION INTRAVENOUS at 10:25

## 2020-01-14 RX ADMIN — DEXAMETHASONE SODIUM PHOSPHATE 12 MG: 10 INJECTION, SOLUTION INTRAMUSCULAR; INTRAVENOUS at 10:25

## 2020-01-14 RX ADMIN — PACLITAXEL 231 MG: 100 INJECTION, POWDER, LYOPHILIZED, FOR SUSPENSION INTRAVENOUS at 11:05

## 2020-01-14 ASSESSMENT — PAIN SCALES - GENERAL: PAINLEVEL: MILD PAIN (2)

## 2020-01-14 NOTE — PROGRESS NOTES
Infusion Nursing Note:  Lowell Arredondo presents today for Abraxane/Gemzar.    Patient seen by provider today: No   present during visit today: Not Applicable.    Note: Has c/o dizziness with exertion/climbing stairs as well as fatigue.  Labs from today, ANC from today, reviewed with Lianna.  OK for treatment today per Lianna.    Intravenous Access:  Lab draw site port, Needle type port, Gauge 20 3/4in.  Labs drawn without difficulty.  Implanted Port.    Treatment Conditions:  Lab Results   Component Value Date    HGB 10.3 01/14/2020     Lab Results   Component Value Date    WBC 3.9 01/14/2020      Lab Results   Component Value Date    ANEU 1.1 01/14/2020     Lab Results   Component Value Date     01/14/2020      Results reviewed, labs did NOT meet treatment parameters: OK to treat today per Lianna.      Post Infusion Assessment:  Patient tolerated infusion without incident.  Blood return noted pre and post infusion.  Site patent and intact, free from redness, edema or discomfort.  No evidence of extravasations.  Access discontinued per protocol.       Discharge Plan:   Discharge instructions reviewed with: Patient.  Patient discharged in stable condition accompanied by: self.  Departure Mode: Ambulatory.  Scheduled for [facility] tomorrow.    NIKKI ASHLEY RN

## 2020-01-14 NOTE — TELEPHONE ENCOUNTER
Writer called [facility] pharmacy in [place] and they report they still have the prescription from 12/26 and one refill left. Per pharmacy, ok to disregard refill request for omeprazole.     RN is refusing refill request.    Refused Prescriptions:                       Disp   Refills    omeprazole (PRILOSEC) 20 MG DR capsule [Ph*30 cap*1        Sig: TAKE 1 CAPSULE BY MOUTH EVERY DAY        Cate Florentino RN, BSN, MAOM  Patient Care Coordinator  [facility]  [phone]

## 2020-01-15 ENCOUNTER — ALLIED HEALTH/NURSE VISIT (OUTPATIENT)
Dept: INFUSION THERAPY | Facility: CLINIC | Age: 80
End: 2020-01-15
Attending: PHYSICIAN ASSISTANT
Payer: COMMERCIAL

## 2020-01-15 VITALS
HEART RATE: 100 BPM | RESPIRATION RATE: 16 BRPM | DIASTOLIC BLOOD PRESSURE: 71 MMHG | TEMPERATURE: 97.5 F | OXYGEN SATURATION: 96 % | SYSTOLIC BLOOD PRESSURE: 120 MMHG

## 2020-01-15 DIAGNOSIS — C25.2 MALIGNANT NEOPLASM OF TAIL OF PANCREAS (H): Primary | ICD-10-CM

## 2020-01-15 PROCEDURE — 25000128 H RX IP 250 OP 636: Performed by: PHYSICIAN ASSISTANT

## 2020-01-15 PROCEDURE — 96372 THER/PROPH/DIAG INJ SC/IM: CPT

## 2020-01-15 RX ADMIN — FILGRASTIM 300 MCG: 300 INJECTION, SOLUTION INTRAVENOUS; SUBCUTANEOUS at 13:15

## 2020-01-15 NOTE — PROGRESS NOTES
Infusion Nursing Note:  Lowell Arredondo presents today for Neupogen.    Patient seen by provider today: No   present during visit today: Not Applicable.    Note: N/A.    Intravenous Access:  No Intravenous access/labs at this visit.    Treatment Conditions:  Not Applicable.      Post Infusion Assessment:  Patient tolerated injection without incident.       Discharge Plan:   Copy of AVS reviewed with patient and/or family.  Patient will return tomorrow at [facility] with Dr. Segura and neil for next appointment.  Patient discharged in stable condition accompanied by: self.  Departure Mode: Ambulatory.    Danay Carvajal RN

## 2020-01-16 ENCOUNTER — HOSPITAL ENCOUNTER (EMERGENCY)
Facility: CLINIC | Age: 80
Discharge: HOME | End: 2020-01-16
Attending: EMERGENCY MEDICINE | Admitting: EMERGENCY MEDICINE
Payer: COMMERCIAL

## 2020-01-16 ENCOUNTER — ONCOLOGY VISIT (OUTPATIENT)
Dept: ONCOLOGY | Facility: CLINIC | Age: 80
End: 2020-01-16
Attending: INTERNAL MEDICINE
Payer: COMMERCIAL

## 2020-01-16 VITALS
HEART RATE: 79 BPM | WEIGHT: 160 LBS | OXYGEN SATURATION: 99 % | TEMPERATURE: 97.5 F | RESPIRATION RATE: 16 BRPM | BODY MASS INDEX: 24.34 KG/M2 | SYSTOLIC BLOOD PRESSURE: 110 MMHG | DIASTOLIC BLOOD PRESSURE: 67 MMHG

## 2020-01-16 VITALS
SYSTOLIC BLOOD PRESSURE: 116 MMHG | RESPIRATION RATE: 14 BRPM | HEART RATE: 88 BPM | HEIGHT: 68 IN | DIASTOLIC BLOOD PRESSURE: 66 MMHG | TEMPERATURE: 97.7 F | WEIGHT: 160 LBS | BODY MASS INDEX: 24.25 KG/M2 | OXYGEN SATURATION: 100 %

## 2020-01-16 DIAGNOSIS — C25.2 MALIGNANT NEOPLASM OF TAIL OF PANCREAS (H): Primary | ICD-10-CM

## 2020-01-16 DIAGNOSIS — R55 SYNCOPE, UNSPECIFIED SYNCOPE TYPE: ICD-10-CM

## 2020-01-16 LAB
ALBUMIN SERPL-MCNC: 3.1 G/DL (ref 3.4–5)
ALP SERPL-CCNC: 54 U/L (ref 40–150)
ALT SERPL W P-5'-P-CCNC: 74 U/L (ref 0–70)
ANION GAP SERPL CALCULATED.3IONS-SCNC: 4 MMOL/L (ref 3–14)
AST SERPL W P-5'-P-CCNC: 55 U/L (ref 0–45)
BASOPHILS # BLD AUTO: 0 10E9/L (ref 0–0.2)
BASOPHILS NFR BLD AUTO: 0.2 %
BILIRUB SERPL-MCNC: 0.4 MG/DL (ref 0.2–1.3)
BUN SERPL-MCNC: 20 MG/DL (ref 7–30)
CALCIUM SERPL-MCNC: 8.5 MG/DL (ref 8.5–10.1)
CHLORIDE SERPL-SCNC: 111 MMOL/L (ref 94–109)
CO2 SERPL-SCNC: 27 MMOL/L (ref 20–32)
CREAT SERPL-MCNC: 0.93 MG/DL (ref 0.66–1.25)
DIFFERENTIAL METHOD BLD: ABNORMAL
EOSINOPHIL # BLD AUTO: 0.2 10E9/L (ref 0–0.7)
EOSINOPHIL NFR BLD AUTO: 1.7 %
ERYTHROCYTE [DISTWIDTH] IN BLOOD BY AUTOMATED COUNT: 16.8 % (ref 10–15)
GFR SERPL CREATININE-BSD FRML MDRD: 76 ML/MIN/{1.73_M2}
GLUCOSE SERPL-MCNC: 101 MG/DL (ref 70–99)
HCT VFR BLD AUTO: 28 % (ref 40–53)
HGB BLD-MCNC: 9.3 G/DL (ref 13.3–17.7)
IMM GRANULOCYTES # BLD: 0.1 10E9/L (ref 0–0.4)
IMM GRANULOCYTES NFR BLD: 1.2 %
INTERPRETATION ECG - MUSE: NORMAL
LYMPHOCYTES # BLD AUTO: 2.3 10E9/L (ref 0.8–5.3)
LYMPHOCYTES NFR BLD AUTO: 19.9 %
MCH RBC QN AUTO: 30.9 PG (ref 26.5–33)
MCHC RBC AUTO-ENTMCNC: 33.2 G/DL (ref 31.5–36.5)
MCV RBC AUTO: 93 FL (ref 78–100)
MONOCYTES # BLD AUTO: 0.2 10E9/L (ref 0–1.3)
MONOCYTES NFR BLD AUTO: 1.7 %
NEUTROPHILS # BLD AUTO: 8.6 10E9/L (ref 1.6–8.3)
NEUTROPHILS NFR BLD AUTO: 75.3 %
NRBC # BLD AUTO: 0 10*3/UL
NRBC BLD AUTO-RTO: 0 /100
PLATELET # BLD AUTO: 202 10E9/L (ref 150–450)
POTASSIUM SERPL-SCNC: 4.2 MMOL/L (ref 3.4–5.3)
PROT SERPL-MCNC: 5.9 G/DL (ref 6.8–8.8)
RBC # BLD AUTO: 3.01 10E12/L (ref 4.4–5.9)
SODIUM SERPL-SCNC: 142 MMOL/L (ref 133–144)
TROPONIN I SERPL-MCNC: <0.015 UG/L (ref 0–0.04)
WBC # BLD AUTO: 11.4 10E9/L (ref 4–11)

## 2020-01-16 PROCEDURE — 99215 OFFICE O/P EST HI 40 MIN: CPT | Performed by: INTERNAL MEDICINE

## 2020-01-16 PROCEDURE — 93005 ELECTROCARDIOGRAM TRACING: CPT

## 2020-01-16 PROCEDURE — 99284 EMERGENCY DEPT VISIT MOD MDM: CPT

## 2020-01-16 PROCEDURE — 85025 COMPLETE CBC W/AUTO DIFF WBC: CPT | Performed by: EMERGENCY MEDICINE

## 2020-01-16 PROCEDURE — G0463 HOSPITAL OUTPT CLINIC VISIT: HCPCS

## 2020-01-16 PROCEDURE — 84484 ASSAY OF TROPONIN QUANT: CPT | Performed by: EMERGENCY MEDICINE

## 2020-01-16 PROCEDURE — 00000146 ZZHCL STATISTIC GLUCOSE BY METER IP

## 2020-01-16 PROCEDURE — 80053 COMPREHEN METABOLIC PANEL: CPT | Performed by: EMERGENCY MEDICINE

## 2020-01-16 ASSESSMENT — ENCOUNTER SYMPTOMS
BLOOD IN STOOL: 0
PALPITATIONS: 0
FEVER: 0
FREQUENCY: 0

## 2020-01-16 ASSESSMENT — PAIN SCALES - GENERAL: PAINLEVEL: MILD PAIN (3)

## 2020-01-16 ASSESSMENT — MIFFLIN-ST. JEOR: SCORE: 1405.1

## 2020-01-16 NOTE — ED NOTES
Bed: ED11  Expected date:   Expected time:   Means of arrival:   Comments:  E1  79 M syncope/low bp  1407

## 2020-01-16 NOTE — ED TRIAGE NOTES
Pt at oncology clinic for followup appt. He had a syncopal episode and was found to be hypotensive and hypoglycemic. They gave d50 at clinic. Pt denies any pain

## 2020-01-16 NOTE — PROGRESS NOTES
RN Called in patient room from [place] by MD.   Mr. Etienne stated he was lightheaded and was leaning towards the right.    Blood sugar 86.  Unable to obtain blood pressure .    Oxygen in 90s on room air . Oxygen by nc Placed on him - oxygen level was 100%.    Oral glucose gel given to him x3.   He did respond verbally to person, place and was able to state that he remembered feeling lightheaded.    911 was called.  Report given to paramedics by MD.

## 2020-01-16 NOTE — ED PROVIDER NOTES
History     Chief Complaint:  Loss of Consciousness     The history is provided by the patient and the spouse.      Lowell Arredondo is a 79 year old male who is currently undergoing chemotherapy for pancreatic cancer diagnosed in October who presents with loss of consciousness. His wife reports while at the clinic next to their physician, the patient started shaking, feeling light-headed, and then his eyes rolled back and he fainted in his chair for less than a minute without any head trauma. He reports feeling fine now. He ate breakfast this morning. The patient denies palpitations, chest pain, leg swelling, black or bloody stools, fever, changes in urination, or past episodes of low blood sugar. He had a previous [facility] when [facility] and needed EMS.      Allergies:  Meperidine    Medications:    Ativan  Prilosec  Compazine  Flomax    Past Medical History:    Pancreatic cancer  Chemotherapy-induced neutropenia  Hyperlipidemia  Prostate cancer  BPH    Past Surgical History:    Hernia repair  ORIF, heel, left  Prostate biopsy  Chest port placement    Family History:    Brother: prostate cancer  Father: prostate cancer    Social History:  The patient was accompanied to the ED by his wife.  Smoking Status: Never Smoker  Smokeless Tobacco: Never Used  Alcohol Use: Positive  Drug Use: Negative  PCP: Diego Segura  Marital Status:      Review of Systems   Constitutional: Negative for fever.   Cardiovascular: Negative for chest pain, palpitations and leg swelling.   Gastrointestinal: Negative for blood in stool.   Genitourinary: Negative for frequency.   Neurological: Positive for syncope.   All other systems reviewed and are negative.      Physical Exam     Patient Vitals for the past 24 hrs:   BP Temp Temp src Pulse Heart Rate Resp SpO2 Weight   01/16/20 1710 110/67 -- -- 79 -- 16 99 % --   01/16/20 1702 -- -- -- -- -- -- 99 % --   01/16/20 1700 110/67 -- -- 79 -- -- --  --   01/16/20 1630 -- -- -- -- 82 11 100 % --   01/16/20 1615 109/65 -- -- 71 71 12 100 % --   01/16/20 1600 110/65 -- -- 73 71 13 99 % --   01/16/20 1515 114/68 -- -- 80 84 11 100 % --   01/16/20 1500 103/57 -- -- 64 60 12 99 % --   01/16/20 1445 104/60 -- -- 61 63 16 100 % --   01/16/20 1430 100/60 -- -- 58 60 13 99 % --   01/16/20 1419 (!) 110/100 97.5  F (36.4  C) Oral 61 61 16 98 % 72.6 kg (160 lb)   01/16/20 1415 (!) 110/101 -- -- 61 60 13 99 % --       Physical Exam  Constitutional: Well developed, nontox appearance  Head: Atraumatic.   Mouth/Throat: Oropharynx is clear and moist.   Neck:  no stridor  Eyes: no scleral icterus  Cardiovascular: RRR, 2+ bilat radial pulses  Pulmonary/Chest: nml resp effort, Clear BS bilat, port R chest  Abdominal: ND, +BS, soft, NT, no rebound or guarding   Ext: Warm, well perfused, no edema  Neurological: A&O, symmetric facies, moves ext x4  Skin: Skin is warm and dry.   Psychiatric: Behavior is normal. Thought content normal.   Nursing note and vitals reviewed.    Emergency Department Course     ECG:  ECG taken at 1418, ECG read at 1430  Sinus bradycardia  Low voltage QRS  Borderline ECG  No significant change from ECG dated 11/19/19.   Rate 59 bpm. ID interval 176 ms. QRS duration 82 ms. QT/QTc 426/421 ms. P-R-T axes 59 12 35.    Laboratory:  Laboratory findings were communicated with the patient who voiced understanding of the findings.    CBC: WBC 11.4(H), HGB 9.3(L),   CMP: Chloride 111(H), Glucose 101(H), Albumin 3.1(L), Protein total 5.9(L), ALT 74(H), AST 55(H) o/w WNL (Creatinine 0.93)  Troponin (Collected 1423): <0.015    Emergency Department Course:    1418    EKG obtained in the ED, see results above.     1423    IV was inserted and blood was drawn for laboratory testing, results above.    1424 Nursing notes and vitals reviewed.    1444 I performed an exam of the patient as documented above.     1625 Patient rechecked and updated.     1637 Patient rechecked  and updated.     1711 Findings and plan explained to the patient. Patient discharged home with instructions regarding supportive care, medications, and reasons to return. The importance of close follow-up was reviewed.     Impression & Plan      Medical Decision Makin year old male presenting w/ syncope    DDx includes vasovagal syncope, orthostatic syncope, syncope NOS, dehydration, electrolyte abnormality, arrhythmia.  EKG interpretation as noted above.  No acute signs of ischemia or dysrhythmia.  Unknown if the patient was truly hypoglycemic given limited history.  Labs as noted above and reassuring.  The patient reports feeling back to baseline upon my initial interview but episodes or decompensation while in the ED.  He ambulated without difficulty.  Given the patient has had previous similar syncopal episodes, reassuring work-up, I think it is unlikely that this was a dangerous cause of syncope such as life-threatening dysrhythmia.  Doubt CVA given history and exam.  Given reassuring work-up, at this time I feel the pt is safe for discharge.  Recommendations given regarding follow up with primary oncologist and return to the emergency department as needed for new or worsening symptoms.  Patient and wife counseled on all results, disposition and diagnosis.  They are understanding and agreeable to plan. Patient discharged in stable condition.      Diagnosis:    ICD-10-CM    1. Syncope, unspecified syncope type R55      Disposition:   Patient was discharged home.    Scribe Disclosure:  MICHI, Allen Luna, am serving as a scribe at 2:44 PM on 2020 to document services personally performed by Beau Dillard MD based on my observations and the provider's statements to me.     EMERGENCY DEPARTMENT       Beau Dillard MD  [phone]

## 2020-01-16 NOTE — ED AVS SNAPSHOT
Emergency Department  [address] [place] [id number]  Phone:  [phone]  Fax:  [phone]                                    Lowell Arredondo   MRN: [id number]    Department:   Emergency Department   Date of Visit:  1/16/2020           After Visit Summary Signature Page    I have received my discharge instructions, and my questions have been answered. I have discussed any challenges I see with this plan with the nurse or doctor.    ..........................................................................................................................................  Patient/Patient Representative Signature      ..........................................................................................................................................  Patient Representative Print Name and Relationship to Patient    ..................................................               ................................................  Date                                   Time    ..........................................................................................................................................  Reviewed by Signature/Title    ...................................................              ..............................................  Date                                               Time          22EPIC Rev 08/18

## 2020-01-17 ENCOUNTER — ALLIED HEALTH/NURSE VISIT (OUTPATIENT)
Dept: INFUSION THERAPY | Facility: CLINIC | Age: 80
End: 2020-01-17
Attending: INTERNAL MEDICINE
Payer: COMMERCIAL

## 2020-01-17 ENCOUNTER — TELEPHONE (OUTPATIENT)
Dept: ONCOLOGY | Facility: CLINIC | Age: 80
End: 2020-01-17

## 2020-01-17 VITALS — SYSTOLIC BLOOD PRESSURE: 112 MMHG | RESPIRATION RATE: 16 BRPM | DIASTOLIC BLOOD PRESSURE: 66 MMHG | HEART RATE: 78 BPM

## 2020-01-17 DIAGNOSIS — C25.2 MALIGNANT NEOPLASM OF TAIL OF PANCREAS (H): Primary | ICD-10-CM

## 2020-01-17 LAB — GLUCOSE BLDC GLUCOMTR-MCNC: 86 MG/DL (ref 70–99)

## 2020-01-17 PROCEDURE — 96372 THER/PROPH/DIAG INJ SC/IM: CPT

## 2020-01-17 PROCEDURE — 25000128 H RX IP 250 OP 636: Performed by: PHYSICIAN ASSISTANT

## 2020-01-17 RX ADMIN — FILGRASTIM 300 MCG: 300 INJECTION, SOLUTION INTRAVENOUS; SUBCUTANEOUS at 11:32

## 2020-01-17 NOTE — TELEPHONE ENCOUNTER
Patient wife Billie called stated that patient above supposed to get Neupogen shot yesterday but patient ended at the ER yesterday for headache. Billie is wondering if patient should come today and get the shot preferably [place] location. # [phone]

## 2020-01-17 NOTE — PROGRESS NOTES
Nursing Note:  Lowell Arredondo presents today for neupogen.    Patient seen by provider today: No   present during visit today: Not Applicable.    Note: N/A.    Intravenous Access:  No Intravenous access/labs at this visit.    Discharge Plan:   Patient was sent home    Jo Ann Galo RN

## 2020-01-17 NOTE — TELEPHONE ENCOUNTER
Verified with charge RN, [place]  could come in today ar 0200 PM to have his Neupogen shot. She will call back to see if [facility] has an opening otherwise he will come in at that time. Tiara Ramirez RN,BSN,OCN

## 2020-01-17 NOTE — PROGRESS NOTES
Visit Date:   01/16/2020     ONCOLOGY HISTORY: Mr. Arredondo is a gentleman with pancreatic cancer.T3 N1 M0. Stage IIB.   -Also has prostate cancer Swapnil 7 prostate.   1.  Prostate MRI on 07/20/2019 revealed PI-RADS 5.  No suspicious adenopathy or evidence of pelvic metastasis.   2.  Bone scan on 09/26/2019 does not reveal any bone metastasis.  There is some cardiac uptake.   3.  CT abdomen and pelvis on 09/26/2019 reveals new soft tissue mesenteric mass in the region of pancreatic tail.   4.  EUS on 10/07/2019 revealed an irregular mass in the pancreatic tail measuring 3.8 x 2.2 cm.  There was some evidence of invasion into the portal vein.  No lymphadenopathy seen.  Based on the EUS, it was T3 Nx disease.  FNA is positive for moderately differentiated pancreatic adenocarcinoma.   5. PET scan on 10/15/2019 reveals hypermetabolic 4.2 cm mass in the pancreatic tail and an adjacent 1 cm peripancreatic lymph node. No evidence of any metastatic disease. There are 2 foci of mild FDG uptake in the prostate gland from prostate cancer.   6. Neoadjuvant chemotherapy with gemcitabine and Abraxane started on 11/07/2019.      SUBJECTIVE:  Mr. Arredondo is an 79-year-old gentleman with resectable pancreatic cancer, on neoadjuvant chemotherapy with gemcitabine and Abraxane.  He is on cycle 3.  CT chest, abdomen and pelvis on 01/09/2020 revealed decrease in the pancreatic tail mass and decreased peripancreatic lymphadenopathy.      The patient presents to the clinic with his wife.  When I initially saw him, he was feeling good.  He has some fatigue.  No headache.  No dizziness.  No chest pain or shortness of breath.  No nausea or vomiting.  No bleeding.  No fever or chills.      PHYSICAL EXAMINATION:   GENERAL:  He is alert, oriented x 3.   VITAL SIGNS:  Reviewed.   The rest of the systems not examined.      LABORATORY DATA:  Reviewed.      ASSESSMENT:   1.  An 79-year-old gentleman with resectable pancreatic cancer on neoadjuvant  chemotherapy:  Pancreatic cancer is responding.   2.  Davey 7 prostate cancer on active surveillance.      PLAN:   1.  I explained to the patient that his tumor is responding.  I would recommend he complete 6 months of neoadjuvant chemotherapy with gemcitabine and Abraxane.  After that, I would recommend surgery.  He is agreeable for this plan.      The patient is thinking of going to [place].  He can continue with gemcitabine and Abraxane in [place].  He will let us know when he plans to go and also let us know the name of the physician he will see.        2.  He and his wife had multiple questions, which were all answered.      ADDENDUM:  In the exam room, the patient started to complain of lightheadedness.  Then he had loss of consciousness lasting about 20-30 seconds.  During that time he was leaning more towards the right.  He also urinated.  No seizure-like activity.  We checked his blood sugar which was normal at 86.  We still gave him some glucose.  EMS was called.  By the time EMS arrived, the patient was more awake and alert.  He was transferred to the emergency room for further evaluation.      TOTAL FACE TO FACE TIME SPENT:  40 minutes, more than 50% of the time spent in counseling and coordination of care.         ROXY LANCASTER MD             D: 2020   T: 2020   MT:       Name:     KARI YANG   MRN:      [id number]        Account:      [id number]   :      1940           Visit Date:   2020      Document: [id number]

## 2020-01-20 ENCOUNTER — PATIENT OUTREACH (OUTPATIENT)
Dept: SURGERY | Facility: CLINIC | Age: 80
End: 2020-01-20

## 2020-01-20 NOTE — TELEPHONE ENCOUNTER
Surgical Oncology RN Care Coordination Note:     Called and left a message for patient informing both patient and wife that we will cancel the appointments with surgery at this time and plan to reschedule when he completes the full 6 months of chemotherapy. Informed them I will plan to check in on timing and arrange for appropriate follow up after treatment.     Left my direct number for patient or wife to call back if there are any questions.     Rosa Machado RN, BSN  Care Coordinator   [phone]

## 2020-01-21 ENCOUNTER — INFUSION THERAPY VISIT (OUTPATIENT)
Dept: INFUSION THERAPY | Facility: CLINIC | Age: 80
End: 2020-01-21
Attending: INTERNAL MEDICINE
Payer: COMMERCIAL

## 2020-01-21 ENCOUNTER — ONCOLOGY VISIT (OUTPATIENT)
Dept: ONCOLOGY | Facility: CLINIC | Age: 80
End: 2020-01-21
Attending: PHYSICIAN ASSISTANT
Payer: COMMERCIAL

## 2020-01-21 ENCOUNTER — HOSPITAL ENCOUNTER (OUTPATIENT)
Facility: CLINIC | Age: 80
Setting detail: SPECIMEN
Discharge: HOME | End: 2020-01-21
Attending: INTERNAL MEDICINE | Admitting: PHYSICIAN ASSISTANT
Payer: COMMERCIAL

## 2020-01-21 VITALS
DIASTOLIC BLOOD PRESSURE: 79 MMHG | BODY MASS INDEX: 23.99 KG/M2 | SYSTOLIC BLOOD PRESSURE: 121 MMHG | OXYGEN SATURATION: 100 % | RESPIRATION RATE: 16 BRPM | TEMPERATURE: 98 F | HEART RATE: 73 BPM | WEIGHT: 157.7 LBS

## 2020-01-21 VITALS
RESPIRATION RATE: 16 BRPM | BODY MASS INDEX: 23.99 KG/M2 | OXYGEN SATURATION: 100 % | DIASTOLIC BLOOD PRESSURE: 79 MMHG | TEMPERATURE: 98 F | SYSTOLIC BLOOD PRESSURE: 121 MMHG | WEIGHT: 157.7 LBS

## 2020-01-21 DIAGNOSIS — C25.2 MALIGNANT NEOPLASM OF TAIL OF PANCREAS (H): Primary | ICD-10-CM

## 2020-01-21 DIAGNOSIS — Z95.828 PORT-A-CATH IN PLACE: ICD-10-CM

## 2020-01-21 DIAGNOSIS — T45.1X5A CHEMOTHERAPY-INDUCED NEUTROPENIA (H): ICD-10-CM

## 2020-01-21 DIAGNOSIS — D70.1 CHEMOTHERAPY-INDUCED NEUTROPENIA (H): ICD-10-CM

## 2020-01-21 LAB
BASOPHILS # BLD AUTO: 0.1 10E9/L (ref 0–0.2)
BASOPHILS NFR BLD AUTO: 1 %
DIFFERENTIAL METHOD BLD: ABNORMAL
EOSINOPHIL # BLD AUTO: 0.4 10E9/L (ref 0–0.7)
EOSINOPHIL NFR BLD AUTO: 6 %
ERYTHROCYTE [DISTWIDTH] IN BLOOD BY AUTOMATED COUNT: 16.5 % (ref 10–15)
HCT VFR BLD AUTO: 30.4 % (ref 40–53)
HGB BLD-MCNC: 10.1 G/DL (ref 13.3–17.7)
IMM GRANULOCYTES # BLD: 0.1 10E9/L (ref 0–0.4)
IMM GRANULOCYTES NFR BLD: 1.5 %
LYMPHOCYTES # BLD AUTO: 2.1 10E9/L (ref 0.8–5.3)
LYMPHOCYTES NFR BLD AUTO: 33.9 %
MCH RBC QN AUTO: 31.7 PG (ref 26.5–33)
MCHC RBC AUTO-ENTMCNC: 33.2 G/DL (ref 31.5–36.5)
MCV RBC AUTO: 95 FL (ref 78–100)
MONOCYTES # BLD AUTO: 1.1 10E9/L (ref 0–1.3)
MONOCYTES NFR BLD AUTO: 17.9 %
NEUTROPHILS # BLD AUTO: 2.4 10E9/L (ref 1.6–8.3)
NEUTROPHILS NFR BLD AUTO: 39.7 %
NRBC # BLD AUTO: 0 10*3/UL
NRBC BLD AUTO-RTO: 0 /100
PLATELET # BLD AUTO: 142 10E9/L (ref 150–450)
RBC # BLD AUTO: 3.19 10E12/L (ref 4.4–5.9)
WBC # BLD AUTO: 6.1 10E9/L (ref 4–11)

## 2020-01-21 PROCEDURE — 25000128 H RX IP 250 OP 636: Performed by: NURSE PRACTITIONER

## 2020-01-21 PROCEDURE — 99214 OFFICE O/P EST MOD 30 MIN: CPT | Performed by: PHYSICIAN ASSISTANT

## 2020-01-21 PROCEDURE — 96375 TX/PRO/DX INJ NEW DRUG ADDON: CPT

## 2020-01-21 PROCEDURE — 85025 COMPLETE CBC W/AUTO DIFF WBC: CPT | Performed by: PHYSICIAN ASSISTANT

## 2020-01-21 PROCEDURE — 96417 CHEMO IV INFUS EACH ADDL SEQ: CPT

## 2020-01-21 PROCEDURE — 25000128 H RX IP 250 OP 636: Performed by: PHYSICIAN ASSISTANT

## 2020-01-21 PROCEDURE — 96413 CHEMO IV INFUSION 1 HR: CPT

## 2020-01-21 PROCEDURE — 25800030 ZZH RX IP 258 OP 636: Performed by: PHYSICIAN ASSISTANT

## 2020-01-21 RX ORDER — HEPARIN SODIUM (PORCINE) LOCK FLUSH IV SOLN 100 UNIT/ML 100 UNIT/ML
5 SOLUTION INTRAVENOUS
Status: DISCONTINUED | OUTPATIENT
Start: 2020-01-21 | End: 2020-01-21 | Stop reason: HOSPADM

## 2020-01-21 RX ORDER — HEPARIN SODIUM,PORCINE 10 UNIT/ML
5 VIAL (ML) INTRAVENOUS
Status: CANCELLED | OUTPATIENT
Start: 2020-01-21

## 2020-01-21 RX ORDER — PACLITAXEL 100 MG/20ML
125 INJECTION, POWDER, LYOPHILIZED, FOR SUSPENSION INTRAVENOUS ONCE
Status: COMPLETED | OUTPATIENT
Start: 2020-01-21 | End: 2020-01-21

## 2020-01-21 RX ORDER — HEPARIN SODIUM (PORCINE) LOCK FLUSH IV SOLN 100 UNIT/ML 100 UNIT/ML
5 SOLUTION INTRAVENOUS
Status: CANCELLED | OUTPATIENT
Start: 2020-01-21

## 2020-01-21 RX ADMIN — SODIUM CHLORIDE, PRESERVATIVE FREE 5 ML: 5 INJECTION INTRAVENOUS at 13:45

## 2020-01-21 RX ADMIN — PACLITAXEL 231 MG: 100 INJECTION, POWDER, LYOPHILIZED, FOR SUSPENSION INTRAVENOUS at 12:30

## 2020-01-21 RX ADMIN — SODIUM CHLORIDE 250 ML: 9 INJECTION, SOLUTION INTRAVENOUS at 11:39

## 2020-01-21 RX ADMIN — GEMCITABINE 1800 MG: 38 INJECTION, SOLUTION INTRAVENOUS at 13:08

## 2020-01-21 RX ADMIN — DEXAMETHASONE SODIUM PHOSPHATE 12 MG: 10 INJECTION, SOLUTION INTRAMUSCULAR; INTRAVENOUS at 11:40

## 2020-01-21 NOTE — PROGRESS NOTES
Oncology/Hematology Visit Note    Jan 21, 2020    Reason for visit: Follow up pancreatic cancer    Oncology HPI: Lowell Arredondo is a 79 year old male with a h/o prostate cancer and now with pancreatic cancer. Due to his prostate cancer, he had multiple surveillance scans and CT on 9/26/19 revealed a new soft tissue mesenteric mass in the pancreatic tail. EUS on 10/7/19 revealed moderately differentiated pancreatic adenocarcinoma. PET scan with no metastatic disease. He established care with Dr Segura and discussed starting curtaive intent, neoadjuvant chemotherapy and then surgery. He completed gemcitabine/Abraxane C1D1 on 11/7/19.     He was in the ED on 11/19/19 for syncope.  He was exercising at the [facility] and had a syncopal event.  ED work-up was extensive and he was discharged.  He was seen in infusion for a rash when he received cycle 2 and likely from gemcitabine, started on cortisone. He has been getting Neupogen regularly due to persistent neutropenia. He had another syncopal event in the oncology clinic last week and chemo was deferred.     He is here today for deferred C3D15.    Interval History: Lowell is here with his wife, June. He feels well today. He admits that he gets lightheaded the evening and day after getting neupogen. He denies CP, SOB, palpitations, resp changes, urinary changes, bleeding, n/v/d.     Review of Systems: See interval hx. Denies fevers, chills, HA, dizziness, n/t, changes in vision, cough, sore throat, CP, SOB, abdominal pain, N/V, diarrhea, changes in urination, bleeding, bruising, rash.      PMHx and Social Hx reviewed per EPIC.      Medications:  Current Outpatient Medications   Medication Sig Dispense Refill     loratadine (CLARITIN) 10 MG tablet Take 10 mg by mouth daily       LORazepam (ATIVAN) 0.5 MG tablet Take 1 tablet (0.5 mg) by mouth every 4 hours as needed (Anxiety, Nausea/Vomiting or Sleep) 30 tablet 2     omeprazole (PRILOSEC) 20 MG DR capsule Take 1 capsule (20 mg)  by mouth daily (Patient not taking: Reported on 1/21/2020) 30 capsule 1     polyethylene glycol (MIRALAX/GLYCOLAX) packet Take 1 packet by mouth daily       prochlorperazine (COMPAZINE) 10 MG tablet Take 1 tablet (10 mg) by mouth every 6 hours as needed (Nausea/Vomiting) (Patient not taking: Reported on 1/21/2020) 30 tablet 2     psyllium (METAMUCIL) 28.3 % POWD        tamsulosin (FLOMAX) 80 mcg/mL Take 0.4 mg by mouth         Allergies   Allergen Reactions     Demerol [Meperidine] Difficulty breathing       EXAM:    /79   Pulse 73   Temp 98  F (36.7  C)   Resp 16   Wt 71.5 kg (157 lb 11.2 oz)   SpO2 100%   BMI 23.99 kg/m      GENERAL:  Male, in no acute distress.  Alert and oriented x3.   HEENT:  Normocephalic, atraumatic.  PERRL, oropharynx clear with no sores or thrush.   LYMPH NODES:  No palpable pre/post-auricular, cervical, axillary lymphadenopathy appreciated.  CV:  RRR, No murmurs, gallops, or rubs.   LUNGS:  Clear to auscultation bilaterally.   ABDOMEN:  Soft, nontender and nondistended.  Bowel sounds heard x4.  No apparent hepatosplenomegaly.   EXTREMITIES:  No clubbing, cyanosis, or edema.   SKIN: No rash on exposed skin.  PSYCH: Mood stable      Labs:   Results for KARI YANG (MRN [id number]) as of 1/21/2020 11:07   1/21/2020 09:57   WBC 6.1   Hemoglobin 10.1 (L)   Hematocrit 30.4 (L)   Platelet Count 142 (L)   RBC Count 3.19 (L)   MCV 95   MCH 31.7   MCHC 33.2   RDW 16.5 (H)   Diff Method Automated Method   % Neutrophils 39.7   % Lymphocytes 33.9   % Monocytes 17.9   % Eosinophils 6.0   % Basophils 1.0   % Immature Granulocytes 1.5   Nucleated RBCs 0   Absolute Neutrophil 2.4   Absolute Lymphocytes 2.1   Absolute Monocytes 1.1   Absolute Eosinophils 0.4   Absolute Basophils 0.1   Abs Immature Granulocytes 0.1   Absolute Nucleated RBC 0.0       Imaging: n/a    Impression/Plan: Kari Yang is a 79 year old male with pancreatic cancer currently on neoadjuvant chemotherapy with  gemcitabine/Abraxane.    Pancreatic cancer: Started neoadjuvant chemotherapy with gemcitabine/Abraxane, C1 D1 completed on 11/7/2019.  He was neutropenic for C1D15, therefore deferred a week and he received 2 doses of Neupogen.  We will continue Neupogen for this reason.  His rash has improved, see below.  He continues to do well on this regimen, but has had 2 episodes of syncope, see below. He feels well today and will proceed with chemo, but see plan below. He will have next week off and we will see him again in about 2 weeks. He will call if any questions.  --2/3/20 Dr Segura C4D1  --2/18/20 Lianna C4D15    Addendum: I spoke to Dr. Segura and he recommended restaging CT after 6 cycles.  I will get that scheduled today.    Rash: Significant improvement since applying cortisone and lotion.    Syncope: Syncopal episode while at the [facility] on 11/19/19 and ED work-up negative. Another episode on 1/16/20 and workup neg in ED again. No sx since then. I recommended he contact his clinic and see if a holter monitor would be indicated for 2 syncopal events now.       Chart documentation with Dragon Voice recognition Software. Although reviewed after completion, some words and grammatical errors may remain.      Lianna Vyas PA-C  Hematology/Oncology  [facility]

## 2020-01-21 NOTE — LETTER
1/21/2020         RE: Lowell Arredondo  [address] [id number]        Dear Colleague,    Thank you for referring your patient, Lowell Arredondo, to the [facility]. Please see a copy of my visit note below.    Oncology/Hematology Visit Note    Jan 21, 2020    Reason for visit: Follow up pancreatic cancer    Oncology HPI: Lowell Arredondo is a 79 year old male with a h/o prostate cancer and now with pancreatic cancer. Due to his prostate cancer, he had multiple surveillance scans and CT on 9/26/19 revealed a new soft tissue mesenteric mass in the pancreatic tail. EUS on 10/7/19 revealed moderately differentiated pancreatic adenocarcinoma. PET scan with no metastatic disease. He established care with Dr Segura and discussed starting curtaive intent, neoadjuvant chemotherapy and then surgery. He completed gemcitabine/Abraxane C1D1 on 11/7/19.     He was in the ED on 11/19/19 for syncope.  He was exercising at the [facility] and had a syncopal event.  ED work-up was extensive and he was discharged.  He was seen in infusion for a rash when he received cycle 2 and likely from gemcitabine, started on cortisone. He has been getting Neupogen regularly due to persistent neutropenia. He had another syncopal event in the oncology clinic last week and chemo was deferred.     He is here today for deferred C3D15.    Interval History: Lowell is here with his wife, June. He feels well today. He admits that he gets lightheaded the evening and day after getting neupogen. He denies CP, SOB, palpitations, resp changes, urinary changes, bleeding, n/v/d.     Review of Systems: See interval hx. Denies fevers, chills, HA, dizziness, n/t, changes in vision, cough, sore throat, CP, SOB, abdominal pain, N/V, diarrhea, changes in urination, bleeding, bruising, rash.      PMHx and Social Hx reviewed per EPIC.      Medications:  Current Outpatient Medications   Medication Sig Dispense Refill     loratadine (CLARITIN) 10 MG  tablet Take 10 mg by mouth daily       LORazepam (ATIVAN) 0.5 MG tablet Take 1 tablet (0.5 mg) by mouth every 4 hours as needed (Anxiety, Nausea/Vomiting or Sleep) 30 tablet 2     omeprazole (PRILOSEC) 20 MG DR capsule Take 1 capsule (20 mg) by mouth daily (Patient not taking: Reported on 1/21/2020) 30 capsule 1     polyethylene glycol (MIRALAX/GLYCOLAX) packet Take 1 packet by mouth daily       prochlorperazine (COMPAZINE) 10 MG tablet Take 1 tablet (10 mg) by mouth every 6 hours as needed (Nausea/Vomiting) (Patient not taking: Reported on 1/21/2020) 30 tablet 2     psyllium (METAMUCIL) 28.3 % POWD        tamsulosin (FLOMAX) 80 mcg/mL Take 0.4 mg by mouth         Allergies   Allergen Reactions     Demerol [Meperidine] Difficulty breathing       EXAM:    /79   Pulse 73   Temp 98  F (36.7  C)   Resp 16   Wt 71.5 kg (157 lb 11.2 oz)   SpO2 100%   BMI 23.99 kg/m       GENERAL:  Male, in no acute distress.  Alert and oriented x3.   HEENT:  Normocephalic, atraumatic.  PERRL, oropharynx clear with no sores or thrush.   LYMPH NODES:  No palpable pre/post-auricular, cervical, axillary lymphadenopathy appreciated.  CV:  RRR, No murmurs, gallops, or rubs.   LUNGS:  Clear to auscultation bilaterally.   ABDOMEN:  Soft, nontender and nondistended.  Bowel sounds heard x4.  No apparent hepatosplenomegaly.   EXTREMITIES:  No clubbing, cyanosis, or edema.   SKIN: No rash on exposed skin.  PSYCH: Mood stable      Labs:   Results for KARI YANG (MRN [id number]) as of 1/21/2020 11:07   1/21/2020 09:57   WBC 6.1   Hemoglobin 10.1 (L)   Hematocrit 30.4 (L)   Platelet Count 142 (L)   RBC Count 3.19 (L)   MCV 95   MCH 31.7   MCHC 33.2   RDW 16.5 (H)   Diff Method Automated Method   % Neutrophils 39.7   % Lymphocytes 33.9   % Monocytes 17.9   % Eosinophils 6.0   % Basophils 1.0   % Immature Granulocytes 1.5   Nucleated RBCs 0   Absolute Neutrophil 2.4   Absolute Lymphocytes 2.1   Absolute Monocytes 1.1   Absolute  Eosinophils 0.4   Absolute Basophils 0.1   Abs Immature Granulocytes 0.1   Absolute Nucleated RBC 0.0       Imaging: n/a    Impression/Plan: Lowell Arredondo is a 79 year old male with pancreatic cancer currently on neoadjuvant chemotherapy with gemcitabine/Abraxane.    Pancreatic cancer: Started neoadjuvant chemotherapy with gemcitabine/Abraxane, C1 D1 completed on 11/7/2019.  He was neutropenic for C1D15, therefore deferred a week and he received 2 doses of Neupogen.  We will continue Neupogen for this reason.  His rash has improved, see below.  He continues to do well on this regimen, but has had 2 episodes of syncope, see below. He feels well today and will proceed with chemo, but see plan below. He will have next week off and we will see him again in about 2 weeks. He will call if any questions.  --2/3/20 Dr Segura C4D1  --2/18/20 Lianna C4D15    Rash: Significant improvement since applying cortisone and lotion.    Syncope: Syncopal episode while at the [facility] on 11/19/19 and ED work-up negative. Another episode on 1/16/20 and workup neg in ED again. No sx since then. I recommended he contact his clinic and see if a holter monitor would be indicated for 2 syncopal events now.       Chart documentation with Dragon Voice recognition Software. Although reviewed after completion, some words and grammatical errors may remain.      Lianna Vyas PA-C  Hematology/Oncology  [facility]                  Again, thank you for allowing me to participate in the care of your patient.        Sincerely,        Lianna Vyas PA-C

## 2020-01-21 NOTE — PROGRESS NOTES
Infusion Nursing Note:  Lowell Arredondo presents today for C3D15 abraxane, gemzar.    Patient seen by provider today: Yes: Lianna Vyas.   present during visit today: Not Applicable.    Note: Patient had a syncopal episode last week while seeing Dr. Segura, went to the ED, did not find a cause for this.  He has not had a f/u appointment since then.  Notified Dr. Segura.  TORB: have Lianna see patient today.  No episodes since.  Patient continues to have pain from the knees down bilaterally, much worse after neupogen.  It sometimes keeps him up at night.  He has been using tylenol and ativan at bedtime which helps.  He has not been using claritin but will start now.    Intravenous Access:  Labs drawn without difficulty.  Implanted Port.    Treatment Conditions:  Lab Results   Component Value Date    HGB 10.1 01/21/2020     Lab Results   Component Value Date    WBC 6.1 01/21/2020      Lab Results   Component Value Date    ANEU 2.4 01/21/2020     Lab Results   Component Value Date     01/21/2020      Results reviewed, labs MET treatment parameters, ok to proceed with treatment.      Post Infusion Assessment:  Patient tolerated infusion without incident.  Blood return noted pre and post infusion.  Site patent and intact, free from redness, edema or discomfort.  No evidence of extravasations.  Access discontinued per protocol.       Discharge Plan:   Copy of AVS reviewed with patient and/or family.  Patient will return tomorrow for neupogen for next appointment.  Patient discharged in stable condition accompanied by: wife.  Departure Mode: Ambulatory.    Danay Carvajal RN

## 2020-01-22 ENCOUNTER — ALLIED HEALTH/NURSE VISIT (OUTPATIENT)
Dept: INFUSION THERAPY | Facility: CLINIC | Age: 80
End: 2020-01-22
Attending: INTERNAL MEDICINE
Payer: COMMERCIAL

## 2020-01-22 VITALS
RESPIRATION RATE: 16 BRPM | DIASTOLIC BLOOD PRESSURE: 73 MMHG | SYSTOLIC BLOOD PRESSURE: 132 MMHG | OXYGEN SATURATION: 99 % | TEMPERATURE: 96.6 F | HEART RATE: 90 BPM

## 2020-01-22 DIAGNOSIS — D70.1 CHEMOTHERAPY-INDUCED NEUTROPENIA (H): ICD-10-CM

## 2020-01-22 DIAGNOSIS — T45.1X5A CHEMOTHERAPY-INDUCED NEUTROPENIA (H): ICD-10-CM

## 2020-01-22 DIAGNOSIS — C25.2 MALIGNANT NEOPLASM OF TAIL OF PANCREAS (H): Primary | ICD-10-CM

## 2020-01-22 PROCEDURE — 25000128 H RX IP 250 OP 636: Performed by: PHYSICIAN ASSISTANT

## 2020-01-22 PROCEDURE — 96372 THER/PROPH/DIAG INJ SC/IM: CPT

## 2020-01-22 RX ADMIN — FILGRASTIM 300 MCG: 300 INJECTION, SOLUTION INTRAVENOUS; SUBCUTANEOUS at 12:40

## 2020-01-22 NOTE — PROGRESS NOTES
Infusion Nursing Note:  Lowell SEFERINO Socratesholgerdustin presents today for Neupogen.    Patient seen by provider today: No   present during visit today: Not Applicable.    Note: N/A.    Intravenous Access:  No Intravenous access/labs at this visit.    Treatment Conditions:  Not Applicable.      Post Infusion Assessment:  Patient tolerated injection without incident.       Discharge Plan:   Copy of AVS reviewed with patient and/or family.  Patient will return tomorrow for next appointment.  Patient discharged in stable condition accompanied by: wife.  Departure Mode: Ambulatory.    Danay Carvajal RN

## 2020-01-23 ENCOUNTER — INFUSION THERAPY VISIT (OUTPATIENT)
Dept: INFUSION THERAPY | Facility: CLINIC | Age: 80
End: 2020-01-23
Attending: INTERNAL MEDICINE
Payer: COMMERCIAL

## 2020-01-23 VITALS
RESPIRATION RATE: 16 BRPM | DIASTOLIC BLOOD PRESSURE: 76 MMHG | OXYGEN SATURATION: 99 % | HEART RATE: 89 BPM | TEMPERATURE: 97.4 F | SYSTOLIC BLOOD PRESSURE: 126 MMHG

## 2020-01-23 DIAGNOSIS — C25.2 MALIGNANT NEOPLASM OF TAIL OF PANCREAS (H): Primary | ICD-10-CM

## 2020-01-23 DIAGNOSIS — T45.1X5A CHEMOTHERAPY-INDUCED NEUTROPENIA (H): ICD-10-CM

## 2020-01-23 DIAGNOSIS — D70.1 CHEMOTHERAPY-INDUCED NEUTROPENIA (H): ICD-10-CM

## 2020-01-23 PROCEDURE — 96372 THER/PROPH/DIAG INJ SC/IM: CPT

## 2020-01-23 PROCEDURE — 25000128 H RX IP 250 OP 636: Performed by: PHYSICIAN ASSISTANT

## 2020-01-23 RX ADMIN — FILGRASTIM 300 MCG: 300 INJECTION, SOLUTION INTRAVENOUS; SUBCUTANEOUS at 12:34

## 2020-01-23 ASSESSMENT — PAIN SCALES - GENERAL: PAINLEVEL: MODERATE PAIN (5)

## 2020-01-23 NOTE — PROGRESS NOTES
Infusion Nursing Note:  Lowell Arredondo presents today for Neupogen.    Patient seen by provider today: No   present during visit today: Not Applicable.    Note: Has c/o feeling chilled today but denies shaking chills.  Afebrile.  Has c/o continued bilateral knee pain.  Denies syncopal episodes.  Denies s/sx of infection.    Intravenous Access:  No Intravenous access/labs at this visit.    Treatment Conditions:  Not Applicable.      Post Infusion Assessment:  Patient tolerated injection without incident.       Discharge Plan:   Discharge instructions reviewed with: Patient.  Patient discharged in stable condition accompanied by: self.  Departure Mode: Ambulatory.  Scheduled for labs, MD and infusion for 2/3/2020.    NIKKI ASHLEY RN

## 2020-01-25 ENCOUNTER — HOSPITAL ENCOUNTER (EMERGENCY)
Facility: CLINIC | Age: 80
Discharge: HOME | End: 2020-01-25
Attending: EMERGENCY MEDICINE | Admitting: EMERGENCY MEDICINE
Payer: COMMERCIAL

## 2020-01-25 VITALS
RESPIRATION RATE: 18 BRPM | HEART RATE: 85 BPM | DIASTOLIC BLOOD PRESSURE: 70 MMHG | OXYGEN SATURATION: 100 % | WEIGHT: 155.65 LBS | SYSTOLIC BLOOD PRESSURE: 118 MMHG | BODY MASS INDEX: 23.67 KG/M2 | TEMPERATURE: 98.6 F

## 2020-01-25 DIAGNOSIS — I95.1 ORTHOSTATIC HYPOTENSION: ICD-10-CM

## 2020-01-25 DIAGNOSIS — Z85.07 HISTORY OF PANCREATIC CANCER: ICD-10-CM

## 2020-01-25 DIAGNOSIS — D64.9 ANEMIA, UNSPECIFIED TYPE: ICD-10-CM

## 2020-01-25 LAB
ALBUMIN SERPL-MCNC: 3 G/DL (ref 3.4–5)
ALBUMIN UR-MCNC: 10 MG/DL
ALP SERPL-CCNC: 77 U/L (ref 40–150)
ALT SERPL W P-5'-P-CCNC: 38 U/L (ref 0–70)
ANION GAP SERPL CALCULATED.3IONS-SCNC: 5 MMOL/L (ref 3–14)
APPEARANCE UR: CLEAR
AST SERPL W P-5'-P-CCNC: 24 U/L (ref 0–45)
BASOPHILS # BLD AUTO: 0 10E9/L (ref 0–0.2)
BASOPHILS NFR BLD AUTO: 0.3 %
BILIRUB SERPL-MCNC: 0.3 MG/DL (ref 0.2–1.3)
BILIRUB UR QL STRIP: NEGATIVE
BUN SERPL-MCNC: 16 MG/DL (ref 7–30)
CALCIUM SERPL-MCNC: 8.5 MG/DL (ref 8.5–10.1)
CHLORIDE SERPL-SCNC: 106 MMOL/L (ref 94–109)
CO2 SERPL-SCNC: 26 MMOL/L (ref 20–32)
COLOR UR AUTO: YELLOW
CREAT SERPL-MCNC: 0.92 MG/DL (ref 0.66–1.25)
DIFFERENTIAL METHOD BLD: ABNORMAL
EOSINOPHIL # BLD AUTO: 0.3 10E9/L (ref 0–0.7)
EOSINOPHIL NFR BLD AUTO: 2.8 %
ERYTHROCYTE [DISTWIDTH] IN BLOOD BY AUTOMATED COUNT: 16.9 % (ref 10–15)
FLUAV+FLUBV AG SPEC QL: NEGATIVE
FLUAV+FLUBV AG SPEC QL: NEGATIVE
GFR SERPL CREATININE-BSD FRML MDRD: 78 ML/MIN/{1.73_M2}
GLUCOSE BLDC GLUCOMTR-MCNC: 116 MG/DL (ref 70–99)
GLUCOSE SERPL-MCNC: 125 MG/DL (ref 70–99)
GLUCOSE UR STRIP-MCNC: NEGATIVE MG/DL
HCT VFR BLD AUTO: 28 % (ref 40–53)
HGB BLD-MCNC: 9.3 G/DL (ref 13.3–17.7)
HGB UR QL STRIP: NEGATIVE
HYALINE CASTS #/AREA URNS LPF: 4 /LPF (ref 0–2)
IMM GRANULOCYTES # BLD: 0.1 10E9/L (ref 0–0.4)
IMM GRANULOCYTES NFR BLD: 0.6 %
KETONES UR STRIP-MCNC: NEGATIVE MG/DL
LEUKOCYTE ESTERASE UR QL STRIP: NEGATIVE
LIPASE SERPL-CCNC: 19 U/L (ref 73–393)
LYMPHOCYTES # BLD AUTO: 1.1 10E9/L (ref 0.8–5.3)
LYMPHOCYTES NFR BLD AUTO: 11.7 %
MAGNESIUM SERPL-MCNC: 2 MG/DL (ref 1.6–2.3)
MCH RBC QN AUTO: 31.5 PG (ref 26.5–33)
MCHC RBC AUTO-ENTMCNC: 33.2 G/DL (ref 31.5–36.5)
MCV RBC AUTO: 95 FL (ref 78–100)
MONOCYTES # BLD AUTO: 0.3 10E9/L (ref 0–1.3)
MONOCYTES NFR BLD AUTO: 2.7 %
MUCOUS THREADS #/AREA URNS LPF: PRESENT /LPF
NEUTROPHILS # BLD AUTO: 7.7 10E9/L (ref 1.6–8.3)
NEUTROPHILS NFR BLD AUTO: 81.9 %
NITRATE UR QL: NEGATIVE
NRBC # BLD AUTO: 0 10*3/UL
NRBC BLD AUTO-RTO: 0 /100
NT-PROBNP SERPL-MCNC: 218 PG/ML (ref 0–1800)
PH UR STRIP: 5.5 PH (ref 5–7)
PLATELET # BLD AUTO: 148 10E9/L (ref 150–450)
POTASSIUM SERPL-SCNC: 3.9 MMOL/L (ref 3.4–5.3)
PROT SERPL-MCNC: 6.3 G/DL (ref 6.8–8.8)
RBC # BLD AUTO: 2.95 10E12/L (ref 4.4–5.9)
RBC #/AREA URNS AUTO: 1 /HPF (ref 0–2)
SODIUM SERPL-SCNC: 137 MMOL/L (ref 133–144)
SOURCE: ABNORMAL
SP GR UR STRIP: 1.03 (ref 1–1.03)
SPECIMEN SOURCE: NORMAL
TROPONIN I SERPL-MCNC: <0.015 UG/L (ref 0–0.04)
UROBILINOGEN UR STRIP-MCNC: NORMAL MG/DL (ref 0–2)
WBC # BLD AUTO: 9.4 10E9/L (ref 4–11)
WBC #/AREA URNS AUTO: 1 /HPF (ref 0–5)

## 2020-01-25 PROCEDURE — 83880 ASSAY OF NATRIURETIC PEPTIDE: CPT | Performed by: EMERGENCY MEDICINE

## 2020-01-25 PROCEDURE — 96360 HYDRATION IV INFUSION INIT: CPT

## 2020-01-25 PROCEDURE — 83735 ASSAY OF MAGNESIUM: CPT | Performed by: EMERGENCY MEDICINE

## 2020-01-25 PROCEDURE — 93005 ELECTROCARDIOGRAM TRACING: CPT

## 2020-01-25 PROCEDURE — 00000146 ZZHCL STATISTIC GLUCOSE BY METER IP

## 2020-01-25 PROCEDURE — 85025 COMPLETE CBC W/AUTO DIFF WBC: CPT | Performed by: EMERGENCY MEDICINE

## 2020-01-25 PROCEDURE — 99284 EMERGENCY DEPT VISIT MOD MDM: CPT | Mod: 25

## 2020-01-25 PROCEDURE — 83690 ASSAY OF LIPASE: CPT | Performed by: EMERGENCY MEDICINE

## 2020-01-25 PROCEDURE — 80053 COMPREHEN METABOLIC PANEL: CPT | Performed by: EMERGENCY MEDICINE

## 2020-01-25 PROCEDURE — 25800030 ZZH RX IP 258 OP 636: Performed by: EMERGENCY MEDICINE

## 2020-01-25 PROCEDURE — 84484 ASSAY OF TROPONIN QUANT: CPT | Performed by: EMERGENCY MEDICINE

## 2020-01-25 PROCEDURE — 87804 INFLUENZA ASSAY W/OPTIC: CPT | Performed by: EMERGENCY MEDICINE

## 2020-01-25 PROCEDURE — 81001 URINALYSIS AUTO W/SCOPE: CPT | Performed by: EMERGENCY MEDICINE

## 2020-01-25 RX ORDER — HEPARIN SODIUM (PORCINE) LOCK FLUSH IV SOLN 100 UNIT/ML 100 UNIT/ML
100 SOLUTION INTRAVENOUS ONCE
Status: DISCONTINUED | OUTPATIENT
Start: 2020-01-25 | End: 2020-01-25 | Stop reason: HOSPADM

## 2020-01-25 RX ADMIN — SODIUM CHLORIDE 1000 ML: 9 INJECTION, SOLUTION INTRAVENOUS at 17:22

## 2020-01-25 ASSESSMENT — ENCOUNTER SYMPTOMS
NAUSEA: 1
BLOOD IN STOOL: 0
WEAKNESS: 1
FATIGUE: 1
FEVER: 0
COUGH: 0
WOUND: 0
VOMITING: 0
DIARRHEA: 1

## 2020-01-25 NOTE — ED TRIAGE NOTES
Pt currently on chemo. Pt feeling very run down since his last treatment last week. Pt BP checked at home, 84 systolic. No fevers at home. Pt has a stomach ache earlier, pt had diarrhea today and states his stomach feels better now.

## 2020-01-25 NOTE — ED AVS SNAPSHOT
[facility] Emergency Department  [address]  [facility] [address]  Phone:  [phone]  Fax:  [phone]                                    Lowell Arredondo   MRN: [id number]    Department:  [facility] Emergency Department   Date of Visit:  1/25/2020           After Visit Summary Signature Page    I have received my discharge instructions, and my questions have been answered. I have discussed any challenges I see with this plan with the nurse or doctor.    ..........................................................................................................................................  Patient/Patient Representative Signature      ..........................................................................................................................................  Patient Representative Print Name and Relationship to Patient    ..................................................               ................................................  Date                                   Time    ..........................................................................................................................................  Reviewed by Signature/Title    ...................................................              ..............................................  Date                                               Time          22EPIC Rev 08/18

## 2020-01-25 NOTE — PROGRESS NOTES
Visit Date:   01/16/2020     ONCOLOGY HISTORY: Mr. Arredondo is a gentleman with pancreatic cancer.T3 N1 M0. Stage IIB.   -Also has prostate cancer [place] 7 prostate.   1.  Prostate MRI on 07/20/2019 revealed PI-RADS 5.  No suspicious adenopathy or evidence of pelvic metastasis.   2.  Bone scan on 09/26/2019 does not reveal any bone metastasis.  There is some cardiac uptake.   3.  CT abdomen and pelvis on 09/26/2019 reveals new soft tissue mesenteric mass in the region of pancreatic tail.   4.  EUS on 10/07/2019 revealed an irregular mass in the pancreatic tail measuring 3.8 x 2.2 cm.  There was some evidence of invasion into the portal vein.  No lymphadenopathy seen.  Based on the EUS, it was T3 Nx disease.  FNA is positive for moderately differentiated pancreatic adenocarcinoma.   5. PET scan on 10/15/2019 reveals hypermetabolic 4.2 cm mass in the pancreatic tail and an adjacent 1 cm peripancreatic lymph node. No evidence of any metastatic disease. There are 2 foci of mild FDG uptake in the prostate gland from prostate cancer.   6. Neoadjuvant chemotherapy with gemcitabine and Abraxane started on 11/07/2019.      SUBJECTIVE:  Mr. Arredondo is an 79-year-old gentleman with resectable pancreatic cancer, on neoadjuvant chemotherapy with gemcitabine and Abraxane.  He is on cycle 3.  CT chest, abdomen and pelvis on 01/09/2020 revealed decrease in the pancreatic tail mass and decreased peripancreatic lymphadenopathy.      The patient presents to the clinic with his wife.  When I initially saw him, he was feeling good.  He has some fatigue.  No headache.  No dizziness.  No chest pain or shortness of breath.  No nausea or vomiting.  No bleeding.  No fever or chills.      PHYSICAL EXAMINATION:   GENERAL:  He is alert, oriented x 3.   VITAL SIGNS:  Reviewed.   The rest of the systems not examined.      LABORATORY DATA:  Reviewed.      ASSESSMENT:   1.  An 79-year-old gentleman with resectable pancreatic cancer on neoadjuvant  chemotherapy:  Pancreatic cancer is responding.   2.  [place] 7 prostate cancer on active surveillance.      PLAN:   1.  I explained to the patient that his tumor is responding.  I would recommend he complete 6 months of neoadjuvant chemotherapy with gemcitabine and Abraxane.  After that, I would recommend surgery.  He is agreeable for this plan.      The patient is thinking of going to [place].  He can continue with gemcitabine and Abraxane in [place].  He will let us know when he plans to go and also let us know the name of the physician he will see.        2.  He and his wife had multiple questions, which were all answered.      ADDENDUM:  In the exam room, the patient started to complain of lightheadedness.  Then he had loss of consciousness lasting about 20-30 seconds.  During that time he was leaning more towards the right.  He also urinated.  No seizure-like activity.  We checked his blood sugar which was normal at 86.  We still gave him some glucose.  EMS was called.  By the time EMS arrived, the patient was more awake and alert.  He was transferred to the emergency room for further evaluation.      TOTAL FACE TO FACE TIME SPENT:  40 minutes, more than 50% of the time spent in counseling and coordination of care.         ROXY LANCASTER MD             D: 2020   T: 2020   MT:       Name:     KARI YANG   MRN:      [id number]        Account:      [id number]   :      1940           Visit Date:   2020      Document: [id number]

## 2020-01-25 NOTE — ED PROVIDER NOTES
History     Chief Complaint:  Generalized Weakness    HPI   Lowell Arredondo is a 79 year old male currently undergoing chemotherapy for pancreatic cancer who presents with his wife for evaluation of generalized weakness in the setting of low blood pressure readings at home. Four days ago, the patient underwent a round of chemotherapy and he received Neupogen infusions the next day. Since receiving the Neupogen, he has been weaker and more fatigued than usual. Today, in addition to the generalized weakness, he reports having a couple episodes of non-bloody diarrhea with associated nausea, but no vomiting. Given his symptoms, his wife took his blood pressure at home where the systolic pressure was found to be low at 89. On recheck, it was even lower to 84, so his wife presented him to the ED with concerns for hypotension. Currently, he reports mild nausea. He denies any fevers, significant cough, chest pain, abdominal pain or open wounds. He also denies any recent sick contacts or hospitalizations. At baseline, the patient ambulates independently. In regards to his cancer, he follows with Dr. Segura of [facility]; his wife reports the patient fainted at one of their appointments with him last week.     Allergies:  Demerol    Medications:    Claritin  Ativan   Flomax  Neupogen infusions     Past Medical History:    Prostate cancer  Malignant neoplasm, pancreas tail  BPH  Hyperlipidemia   Chemotherapy induced neutropenia     Past Surgical History:    Prostate surgery  Chest port placement   Left foot surgery    Family History:    No past pertinent family history.     Social History:  Marital Status:   [2]  Presents with wife  Negative for tobacco use.  Negative for current alcohol use.   Negative for drug use.     Review of Systems   Constitutional: Positive for fatigue. Negative for fever.   Respiratory: Negative for cough.    Gastrointestinal: Positive for diarrhea and nausea. Negative for blood in stool and  vomiting.   Skin: Negative for wound.   Neurological: Positive for weakness (generalized).   All other systems reviewed and are negative.      Physical Exam     Patient Vitals for the past 24 hrs:   BP Temp Temp src Pulse Resp SpO2 Weight   01/25/20 1815 118/70 -- -- 85 -- 100 % --   01/25/20 1800 120/71 -- -- 88 -- 99 % --   01/25/20 1745 120/70 -- -- 87 -- 100 % --   01/25/20 1730 114/65 -- -- 88 -- 99 % --   01/25/20 1715 107/67 -- -- 86 -- 100 % --   01/25/20 1700 -- -- -- 108 -- 100 % --   01/25/20 1645 113/69 -- -- 86 -- 99 % --   01/25/20 1628 120/74 98.6  F (37  C) Temporal 93 18 99 % 70.6 kg (155 lb 10.3 oz)      Physical Exam  Nursing note and vitals reviewed.  Constitutional: Well nourished. Resting comfortably.   Eyes: Conjunctiva normal.  Pupils are equal, round, and reactive to light. No posterior oropharyngeal erythema/exudate  ENT: Nose normal. Mucous membranes pink and moist.    Neck: Normal range of motion.  CVS: Normal rate, regular rhythm.  Normal heart sounds.  No murmur.  Pulmonary: Lungs clear to auscultation bilaterally. No wheezes/rales/rhonchi.  GI: Abdomen soft. Nontender, nondistended. No rigidity or guarding.    MSK: No calf tenderness or swelling.  Neuro: Alert. Follows simple commands.  Skin: Skin is warm and dry. No rash noted. Generalized pallor. R. Chest wall port, no overlying warmth/erythema  Psychiatric: Normal affect.       Emergency Department Course   ECG:  Indication: Generalized Weakness  Time: 1711  Vent. Rate 87 bpm. CA interval 186. QRS duration 82. QT/QTc 352/423. P-R-T axis 49 2 47.    Normal sinus rhythm  Normal ECG. Read time: 1711.    Laboratory:  CBC: WBC: 9.4, HGB: 9.3 (L), PLT: 148 (L)  CMP: Glucose 125 (H), Albumin: 3.09 (L), Protein total: 6.3 (L), o/w WNL (Creatinine: 0.92)  Magnesium: 2.0  Lipase: 19 (L)  1705 Troponin: <0.015   BNP: 218  1659 Glucose by meter: 116 (H)     Influenza A/B: Negative    UA with Microscopic: Albumin: 10 (A), Mucous: Present (A),  Hyaline casts: 4 (H), o/w WNL     Procedures:  None    Interventions:  1722 NS 1L IV     Emergency Department Course:  Nursing notes and vitals reviewed.   1653: I performed an exam of the patient as documented above.      IV was inserted and blood was drawn for laboratory testing, results above.   EKG obtained in the ED, see results above.    The patient's nose was swabbed and this sample was sent for influenza antigen, findings above.   The patient provided a urine sample here in the emergency department. This was sent for laboratory testing, findings above.     1800: orthostatic vitals were obtained in the ED by the ED tech. These were positive, as noted below.   Lying Orthostatic BP: 118/67 Lying Orthostatic Pulse: 86 bpm  Sitting Orthostatic BP: 102/65 Sitting Orthostatic Pulse: 96 bpm  Standing Orthostatic BP: 81/51 Standing Orthostatic Pulse: 106 bpm     1830: I rechecked the patient and discussed the results of his workup thus far.     Findings and plan explained to the Patient and spouse. Patient discharged home with instructions regarding supportive care, medications, and reasons to return. The importance of close follow-up was reviewed.     I personally reviewed the laboratory results with the Patient and answered all related questions prior to discharge.    Impression & Plan      Medical Decision Making  Patient is a 79-year-old male with pancreatic cancer, currently on chemo presenting with generalized weakness and reported hypotension.  On arrival patient normotensive, he is without significant complaints.  Screening EKG without focal ischemia or underlying arrhythmia.  Screening troponin negative, he denies any active chest pain and lower suspicion for ACS.  He does report mild fatigue over the past few days.  Labs reviewed.  There is no evidence to suggest sepsis.  No evidence of neutropenia.  Patient does appear to have chronic anemia, near baseline.  UA without infection.  He has no reproducible  abdominal tenderness and I doubt intra-abdominal source of infection.  Lungs clear to auscultation, no hypoxia, doubt pneumonia.  He was orthostatic positive during his time in the ED.  Patient received gentle IV fluids.  He was requesting discharge home.  I counseled patient on the importance of oral hydration as well as slowly rising to standing.  He feels comfortable with close outpatient follow-up.  Return precautions given.    Diagnosis:    ICD-10-CM    1. Orthostatic hypotension I95.1    2. Anemia, unspecified type D64.9    3. History of pancreatic cancer Z85.07        Disposition:  discharged to home    Scribe Disclosure:  I, Emelyn Armen, am serving as a scribe on 1/25/2020 at 4:53 PM to personally document services performed by Sydnee Rodriguez DO based on my observations and the provider's statements to me.     1/25/2020   [facility] EMERGENCY DEPARTMENT       Sydnee Rodriguez DO  01/25/20 1906

## 2020-01-26 LAB — INTERPRETATION ECG - MUSE: NORMAL

## 2020-02-02 DIAGNOSIS — C25.2 MALIGNANT NEOPLASM OF TAIL OF PANCREAS (H): ICD-10-CM

## 2020-02-02 RX ORDER — DIPHENHYDRAMINE HYDROCHLORIDE 50 MG/ML
50 INJECTION INTRAMUSCULAR; INTRAVENOUS
Status: CANCELLED
Start: 2020-02-18

## 2020-02-02 RX ORDER — LORAZEPAM 2 MG/ML
0.5 INJECTION INTRAMUSCULAR EVERY 4 HOURS PRN
Status: CANCELLED
Start: 2020-02-18

## 2020-02-02 RX ORDER — NALOXONE HYDROCHLORIDE 0.4 MG/ML
.1-.4 INJECTION, SOLUTION INTRAMUSCULAR; INTRAVENOUS; SUBCUTANEOUS
Status: CANCELLED | OUTPATIENT
Start: 2020-02-03

## 2020-02-02 RX ORDER — EPINEPHRINE 1 MG/ML
0.3 INJECTION, SOLUTION, CONCENTRATE INTRAVENOUS EVERY 5 MIN PRN
Status: CANCELLED | OUTPATIENT
Start: 2020-02-18

## 2020-02-02 RX ORDER — SODIUM CHLORIDE 9 MG/ML
1000 INJECTION, SOLUTION INTRAVENOUS CONTINUOUS PRN
Status: CANCELLED
Start: 2020-02-03

## 2020-02-02 RX ORDER — PACLITAXEL 100 MG/20ML
125 INJECTION, POWDER, LYOPHILIZED, FOR SUSPENSION INTRAVENOUS ONCE
Status: CANCELLED | OUTPATIENT
Start: 2020-02-03

## 2020-02-02 RX ORDER — ALBUTEROL SULFATE 90 UG/1
1-2 AEROSOL, METERED RESPIRATORY (INHALATION)
Status: CANCELLED
Start: 2020-02-10

## 2020-02-02 RX ORDER — PACLITAXEL 100 MG/20ML
125 INJECTION, POWDER, LYOPHILIZED, FOR SUSPENSION INTRAVENOUS ONCE
Status: CANCELLED | OUTPATIENT
Start: 2020-02-18

## 2020-02-02 RX ORDER — EPINEPHRINE 0.3 MG/.3ML
0.3 INJECTION SUBCUTANEOUS EVERY 5 MIN PRN
Status: CANCELLED | OUTPATIENT
Start: 2020-02-18

## 2020-02-02 RX ORDER — NALOXONE HYDROCHLORIDE 0.4 MG/ML
.1-.4 INJECTION, SOLUTION INTRAMUSCULAR; INTRAVENOUS; SUBCUTANEOUS
Status: CANCELLED | OUTPATIENT
Start: 2020-02-18

## 2020-02-02 RX ORDER — ALBUTEROL SULFATE 90 UG/1
1-2 AEROSOL, METERED RESPIRATORY (INHALATION)
Status: CANCELLED
Start: 2020-02-18

## 2020-02-02 RX ORDER — DIPHENHYDRAMINE HYDROCHLORIDE 50 MG/ML
50 INJECTION INTRAMUSCULAR; INTRAVENOUS
Status: CANCELLED
Start: 2020-02-10

## 2020-02-02 RX ORDER — ALBUTEROL SULFATE 0.83 MG/ML
2.5 SOLUTION RESPIRATORY (INHALATION)
Status: CANCELLED | OUTPATIENT
Start: 2020-02-18

## 2020-02-02 RX ORDER — SODIUM CHLORIDE 9 MG/ML
1000 INJECTION, SOLUTION INTRAVENOUS CONTINUOUS PRN
Status: CANCELLED
Start: 2020-02-18

## 2020-02-02 RX ORDER — EPINEPHRINE 0.3 MG/.3ML
0.3 INJECTION SUBCUTANEOUS EVERY 5 MIN PRN
Status: CANCELLED | OUTPATIENT
Start: 2020-02-03

## 2020-02-02 RX ORDER — ALBUTEROL SULFATE 0.83 MG/ML
2.5 SOLUTION RESPIRATORY (INHALATION)
Status: CANCELLED | OUTPATIENT
Start: 2020-02-10

## 2020-02-02 RX ORDER — NALOXONE HYDROCHLORIDE 0.4 MG/ML
.1-.4 INJECTION, SOLUTION INTRAMUSCULAR; INTRAVENOUS; SUBCUTANEOUS
Status: CANCELLED | OUTPATIENT
Start: 2020-02-10

## 2020-02-02 RX ORDER — LORAZEPAM 2 MG/ML
0.5 INJECTION INTRAMUSCULAR EVERY 4 HOURS PRN
Status: CANCELLED
Start: 2020-02-10

## 2020-02-02 RX ORDER — SODIUM CHLORIDE 9 MG/ML
1000 INJECTION, SOLUTION INTRAVENOUS CONTINUOUS PRN
Status: CANCELLED
Start: 2020-02-10

## 2020-02-02 RX ORDER — EPINEPHRINE 1 MG/ML
0.3 INJECTION, SOLUTION, CONCENTRATE INTRAVENOUS EVERY 5 MIN PRN
Status: CANCELLED | OUTPATIENT
Start: 2020-02-03

## 2020-02-02 RX ORDER — DIPHENHYDRAMINE HYDROCHLORIDE 50 MG/ML
50 INJECTION INTRAMUSCULAR; INTRAVENOUS
Status: CANCELLED
Start: 2020-02-03

## 2020-02-02 RX ORDER — EPINEPHRINE 0.3 MG/.3ML
0.3 INJECTION SUBCUTANEOUS EVERY 5 MIN PRN
Status: CANCELLED | OUTPATIENT
Start: 2020-02-10

## 2020-02-02 RX ORDER — LORAZEPAM 2 MG/ML
0.5 INJECTION INTRAMUSCULAR EVERY 4 HOURS PRN
Status: CANCELLED
Start: 2020-02-03

## 2020-02-02 RX ORDER — EPINEPHRINE 1 MG/ML
0.3 INJECTION, SOLUTION, CONCENTRATE INTRAVENOUS EVERY 5 MIN PRN
Status: CANCELLED | OUTPATIENT
Start: 2020-02-10

## 2020-02-02 RX ORDER — ALBUTEROL SULFATE 0.83 MG/ML
2.5 SOLUTION RESPIRATORY (INHALATION)
Status: CANCELLED | OUTPATIENT
Start: 2020-02-03

## 2020-02-02 RX ORDER — ALBUTEROL SULFATE 90 UG/1
1-2 AEROSOL, METERED RESPIRATORY (INHALATION)
Status: CANCELLED
Start: 2020-02-03

## 2020-02-02 RX ORDER — PACLITAXEL 100 MG/20ML
125 INJECTION, POWDER, LYOPHILIZED, FOR SUSPENSION INTRAVENOUS ONCE
Status: CANCELLED | OUTPATIENT
Start: 2020-02-10

## 2020-02-03 ENCOUNTER — INFUSION THERAPY VISIT (OUTPATIENT)
Dept: INFUSION THERAPY | Facility: CLINIC | Age: 80
End: 2020-02-03
Attending: INTERNAL MEDICINE
Payer: COMMERCIAL

## 2020-02-03 ENCOUNTER — ONCOLOGY VISIT (OUTPATIENT)
Dept: ONCOLOGY | Facility: CLINIC | Age: 80
End: 2020-02-03
Attending: INTERNAL MEDICINE
Payer: COMMERCIAL

## 2020-02-03 ENCOUNTER — HOSPITAL ENCOUNTER (OUTPATIENT)
Facility: CLINIC | Age: 80
Setting detail: SPECIMEN
Discharge: HOME | End: 2020-02-03
Attending: INTERNAL MEDICINE | Admitting: INTERNAL MEDICINE
Payer: COMMERCIAL

## 2020-02-03 VITALS
DIASTOLIC BLOOD PRESSURE: 68 MMHG | WEIGHT: 155.8 LBS | BODY MASS INDEX: 23.7 KG/M2 | RESPIRATION RATE: 18 BRPM | HEART RATE: 92 BPM | SYSTOLIC BLOOD PRESSURE: 115 MMHG | TEMPERATURE: 97.4 F | OXYGEN SATURATION: 97 %

## 2020-02-03 VITALS
OXYGEN SATURATION: 97 % | SYSTOLIC BLOOD PRESSURE: 115 MMHG | BODY MASS INDEX: 23.57 KG/M2 | HEART RATE: 92 BPM | DIASTOLIC BLOOD PRESSURE: 68 MMHG | TEMPERATURE: 97.4 F | RESPIRATION RATE: 18 BRPM | WEIGHT: 155 LBS

## 2020-02-03 DIAGNOSIS — Z95.828 PORT-A-CATH IN PLACE: ICD-10-CM

## 2020-02-03 DIAGNOSIS — C25.2 MALIGNANT NEOPLASM OF TAIL OF PANCREAS (H): Primary | ICD-10-CM

## 2020-02-03 DIAGNOSIS — T45.1X5A CHEMOTHERAPY-INDUCED NEUTROPENIA (H): ICD-10-CM

## 2020-02-03 DIAGNOSIS — D70.1 CHEMOTHERAPY-INDUCED NEUTROPENIA (H): ICD-10-CM

## 2020-02-03 LAB
ALBUMIN SERPL-MCNC: 3 G/DL (ref 3.4–5)
ALP SERPL-CCNC: 65 U/L (ref 40–150)
ALT SERPL W P-5'-P-CCNC: 25 U/L (ref 0–70)
ANION GAP SERPL CALCULATED.3IONS-SCNC: 4 MMOL/L (ref 3–14)
AST SERPL W P-5'-P-CCNC: 19 U/L (ref 0–45)
BASOPHILS # BLD AUTO: 0.1 10E9/L (ref 0–0.2)
BASOPHILS NFR BLD AUTO: 1.3 %
BILIRUB SERPL-MCNC: 0.3 MG/DL (ref 0.2–1.3)
BUN SERPL-MCNC: 22 MG/DL (ref 7–30)
CALCIUM SERPL-MCNC: 8.6 MG/DL (ref 8.5–10.1)
CHLORIDE SERPL-SCNC: 106 MMOL/L (ref 94–109)
CO2 SERPL-SCNC: 24 MMOL/L (ref 20–32)
CREAT SERPL-MCNC: 0.95 MG/DL (ref 0.66–1.25)
DIFFERENTIAL METHOD BLD: ABNORMAL
EOSINOPHIL # BLD AUTO: 0.3 10E9/L (ref 0–0.7)
EOSINOPHIL NFR BLD AUTO: 6 %
ERYTHROCYTE [DISTWIDTH] IN BLOOD BY AUTOMATED COUNT: 17.5 % (ref 10–15)
GFR SERPL CREATININE-BSD FRML MDRD: 74 ML/MIN/{1.73_M2}
GLUCOSE SERPL-MCNC: 119 MG/DL (ref 70–99)
HCT VFR BLD AUTO: 29.7 % (ref 40–53)
HGB BLD-MCNC: 9.8 G/DL (ref 13.3–17.7)
IMM GRANULOCYTES # BLD: 0 10E9/L (ref 0–0.4)
IMM GRANULOCYTES NFR BLD: 0 %
LYMPHOCYTES # BLD AUTO: 1.3 10E9/L (ref 0.8–5.3)
LYMPHOCYTES NFR BLD AUTO: 24 %
MCH RBC QN AUTO: 31.1 PG (ref 26.5–33)
MCHC RBC AUTO-ENTMCNC: 33 G/DL (ref 31.5–36.5)
MCV RBC AUTO: 94 FL (ref 78–100)
MONOCYTES # BLD AUTO: 1 10E9/L (ref 0–1.3)
MONOCYTES NFR BLD AUTO: 18.6 %
NEUTROPHILS # BLD AUTO: 2.8 10E9/L (ref 1.6–8.3)
NEUTROPHILS NFR BLD AUTO: 50.1 %
PLATELET # BLD AUTO: 410 10E9/L (ref 150–450)
POTASSIUM SERPL-SCNC: 3.9 MMOL/L (ref 3.4–5.3)
PROT SERPL-MCNC: 6.2 G/DL (ref 6.8–8.8)
RBC # BLD AUTO: 3.15 10E12/L (ref 4.4–5.9)
SODIUM SERPL-SCNC: 134 MMOL/L (ref 133–144)
WBC # BLD AUTO: 5.5 10E9/L (ref 4–11)

## 2020-02-03 PROCEDURE — 25000128 H RX IP 250 OP 636: Performed by: INTERNAL MEDICINE

## 2020-02-03 PROCEDURE — 86301 IMMUNOASSAY TUMOR CA 19-9: CPT | Performed by: INTERNAL MEDICINE

## 2020-02-03 PROCEDURE — 96367 TX/PROPH/DG ADDL SEQ IV INF: CPT

## 2020-02-03 PROCEDURE — 96417 CHEMO IV INFUS EACH ADDL SEQ: CPT

## 2020-02-03 PROCEDURE — 99214 OFFICE O/P EST MOD 30 MIN: CPT | Performed by: INTERNAL MEDICINE

## 2020-02-03 PROCEDURE — 96413 CHEMO IV INFUSION 1 HR: CPT

## 2020-02-03 PROCEDURE — 80053 COMPREHEN METABOLIC PANEL: CPT | Performed by: INTERNAL MEDICINE

## 2020-02-03 PROCEDURE — 85025 COMPLETE CBC W/AUTO DIFF WBC: CPT | Performed by: INTERNAL MEDICINE

## 2020-02-03 PROCEDURE — 25800030 ZZH RX IP 258 OP 636: Performed by: INTERNAL MEDICINE

## 2020-02-03 PROCEDURE — G0463 HOSPITAL OUTPT CLINIC VISIT: HCPCS | Mod: 25

## 2020-02-03 PROCEDURE — 25000128 H RX IP 250 OP 636: Performed by: NURSE PRACTITIONER

## 2020-02-03 RX ORDER — HEPARIN SODIUM,PORCINE 10 UNIT/ML
5 VIAL (ML) INTRAVENOUS
Status: CANCELLED | OUTPATIENT
Start: 2020-02-03

## 2020-02-03 RX ORDER — HEPARIN SODIUM (PORCINE) LOCK FLUSH IV SOLN 100 UNIT/ML 100 UNIT/ML
5 SOLUTION INTRAVENOUS
Status: CANCELLED | OUTPATIENT
Start: 2020-02-03

## 2020-02-03 RX ORDER — PACLITAXEL 100 MG/20ML
125 INJECTION, POWDER, LYOPHILIZED, FOR SUSPENSION INTRAVENOUS ONCE
Status: COMPLETED | OUTPATIENT
Start: 2020-02-03 | End: 2020-02-03

## 2020-02-03 RX ORDER — HEPARIN SODIUM (PORCINE) LOCK FLUSH IV SOLN 100 UNIT/ML 100 UNIT/ML
5 SOLUTION INTRAVENOUS
Status: DISCONTINUED | OUTPATIENT
Start: 2020-02-03 | End: 2020-02-03 | Stop reason: HOSPADM

## 2020-02-03 RX ADMIN — PACLITAXEL 231 MG: 100 INJECTION, POWDER, LYOPHILIZED, FOR SUSPENSION INTRAVENOUS at 09:06

## 2020-02-03 RX ADMIN — DEXAMETHASONE SODIUM PHOSPHATE 12 MG: 10 INJECTION, SOLUTION INTRAMUSCULAR; INTRAVENOUS at 08:30

## 2020-02-03 RX ADMIN — SODIUM CHLORIDE 250 ML: 9 INJECTION, SOLUTION INTRAVENOUS at 08:30

## 2020-02-03 RX ADMIN — GEMCITABINE 1800 MG: 38 INJECTION, SOLUTION INTRAVENOUS at 09:56

## 2020-02-03 RX ADMIN — Medication 5 ML: at 10:31

## 2020-02-03 ASSESSMENT — PAIN SCALES - GENERAL
PAINLEVEL: NO PAIN (0)
PAINLEVEL: MODERATE PAIN (5)

## 2020-02-03 NOTE — PROGRESS NOTES
"Oncology Rooming Note    February 3, 2020 7:59 AM   Lowell Arredondo is a 79 year old male who presents for:    Chief Complaint   Patient presents with     Oncology Clinic Visit     Initial Vitals: /68   Pulse 92   Temp 97.4  F (36.3  C)   Resp 18   Wt 70.3 kg (155 lb)   SpO2 97%   BMI 23.57 kg/m   Estimated body mass index is 23.57 kg/m  as calculated from the following:    Height as of 1/16/20: 1.727 m (5' 7.99\").    Weight as of this encounter: 70.3 kg (155 lb). Body surface area is 1.84 meters squared.  No Pain (0) Comment: Data Unavailable   No LMP for male patient.  Allergies reviewed: Yes  Medications reviewed: Yes    Medications: Medication refills not needed today.  Pharmacy name entered into [employer]: [id number] - [place], [place] - [address]    Clinical concerns:  doctor was notified.      Alisa Persaud MA            "

## 2020-02-03 NOTE — PROGRESS NOTES
Visit Date:   02/03/2020     ONCOLOGY HISTORY: Mr. Arredondo is a gentleman with pancreatic cancer.T3 N1 M0. Stage IIB.   -Also has prostate cancer [place] 7 prostate.   1.  Prostate MRI on 07/20/2019 revealed PI-RADS 5.  No suspicious adenopathy or evidence of pelvic metastasis.   2.  Bone scan on 09/26/2019 does not reveal any bone metastasis.  There is some cardiac uptake.   3.  CT abdomen and pelvis on 09/26/2019 reveals new soft tissue mesenteric mass in the region of pancreatic tail.   4.  EUS on 10/07/2019 revealed an irregular mass in the pancreatic tail measuring 3.8 x 2.2 cm.  There was some evidence of invasion into the portal vein.  No lymphadenopathy seen.  Based on the EUS, it was T3 Nx disease.  FNA is positive for moderately differentiated pancreatic adenocarcinoma.   5. PET scan on 10/15/2019 reveals hypermetabolic 4.2 cm mass in the pancreatic tail and an adjacent 1 cm peripancreatic lymph node. No evidence of any metastatic disease. There are 2 foci of mild FDG uptake in the prostate gland from prostate cancer.   6. Neoadjuvant chemotherapy with gemcitabine and Abraxane started on 11/07/2019.       SUBJECTIVE:  Mr. Arredondo is an 79-year-old gentleman with resectable pancreatic cancer on neoadjuvant treatment with gemcitabine and Abraxane.  He will be starting cycle #4 today.  Disease is responding to treatment.      Because of leukopenia/neutropenia, he gets Neupogen.  The patient said that after Neupogen, he does not feel well.  He gets weaker.  He gets dizzy.  Sometimes he passes out.  Recently he had a syncopal attack and was evaluated in the emergency room.      He is here to start cycle 4.  He is feeling stronger.  No headache.  No dizziness.  No visual problem.  No chest pain.  No shortness of breath.  Appetite not good.  Food does not taste well.  No abdominal pain, nausea or vomiting.  No urinary complaint.  No diarrhea.  No abnormal bleeding.      PHYSICAL EXAMINATION:   GENERAL:  Alert and  oriented x 3.   VITAL SIGNS:  Reviewed.  ECOG PS of 1.     EYES:  No icterus.   THROAT:  No ulcer. No thrush.   NECK:  Supple. No lymphadenopathy. No thyromegaly.   AXILLAE:  No lymphadenopathy.   LUNGS:  Good air entry bilaterally.  No crackles or wheezing.   HEART:  Regular.  No murmur.   GI: Abdomen is soft.  Nontender. No mass.   EXTREMITIES:  No pedal edema.  No calf swelling or tenderness.   SKIN:  No rash.      LABORATORY DATA:  Reviewed.      ASSESSMENT:   1.  An 79-year-old gentleman with resectable pancreatic cancer on neoadjuvant gemcitabine and Abraxane.   2.  Side effects from Neupogen including weakness and dizziness.  3. [place] 7 prostate cancer on active surveillance.      PLAN:   1.  The patient is overall doing well.  We discussed regarding treatment for pancreatic cancer.  He will continue on gemcitabine and Abraxane.  He has been tolerating it well.   2.  The patient is worried about Neupogen.  He seems to get side effects after each Neupogen injection.  I am going to discontinue the Neupogen.  It will be only given as needed.  I told the patient that his chemotherapy may be delayed because of that, but he is okay with that.  We will see how he does without Neupogen.  We can also consider dose reduction of chemotherapy.  I am avoiding dose reduction of this neoadjuvant treatment.   3.  The patient is on Flomax.  I told him that also brings the blood pressure down.  Without Flomax he has urinary problems.  He will continue on Flomax.   4.  I advised the patient to increase his diet.  I told him not losing any weight.   5.  The patient is anemic because of chemotherapy.  We will continue to monitor his CBC.  With next lab, we will check some iron studies.   6.  He had a few questions, which were all answered.  I will see him in a month.  In between, he will see our nurse practitioner.  If for some reason he is not able to tolerate chemotherapy, then we will proceed with surgery.  The patient and  wife had multiple questions, which were all answered.         ROXY LANCASTER MD             D: 2020   T: 2020   MT: CORRINE      Name:     KARI YANG   MRN:      [id number]        Account:      [id number]   :      1940           Visit Date:   2020      Document: [id number]

## 2020-02-03 NOTE — PROGRESS NOTES
Infusion Nursing Note:  Lowell Arredondo presents today for C4D1 gemzar and abraxane  Patient seen by provider today: Yes: Dr. Segura   present during visit today: Not Applicable.    Note: Has been experiencing increased pain in the knees and feet, using a heating pad and tylenol daily. He's assuming it's from the neupogen shots.     Intravenous Access:  Labs drawn without difficulty.  Implanted Port.    Treatment Conditions:  Lab Results   Component Value Date    HGB 9.8 02/03/2020     Lab Results   Component Value Date    WBC 5.5 02/03/2020      Lab Results   Component Value Date    ANEU 2.8 02/03/2020     Lab Results   Component Value Date     02/03/2020      Lab Results   Component Value Date     02/03/2020                   Lab Results   Component Value Date    POTASSIUM 3.9 02/03/2020           Lab Results   Component Value Date    MAG 2.0 01/25/2020            Lab Results   Component Value Date    CR 0.95 02/03/2020                   Lab Results   Component Value Date    VICENTE 8.6 02/03/2020                Lab Results   Component Value Date    BILITOTAL 0.3 02/03/2020           Lab Results   Component Value Date    ALBUMIN 3.0 02/03/2020                    Lab Results   Component Value Date    ALT 25 02/03/2020           Lab Results   Component Value Date    AST 19 02/03/2020       Results reviewed, labs MET treatment parameters, ok to proceed with treatment.      Post Infusion Assessment:  Patient tolerated infusion without incident.  Blood return noted pre and post infusion.  Site patent and intact, free from redness, edema or discomfort.  No evidence of extravasations.  Access discontinued per protocol.       Discharge Plan:   Copy of AVS reviewed with patient and/or family.  Patient will return 2/10 for next appointment.  Patient discharged in stable condition accompanied by: wife.  Departure Mode: Ambulatory.    Chavez Ricci, RN

## 2020-02-03 NOTE — PATIENT INSTRUCTIONS
1. Continue chemotherapy.  Scheduled/ Katherine   2. See NP with next chemotherapy.  Scheduled/ Katherine   3. See me in 1 month.  Scheduled/Katherine     AVS printed and given to patient/ Katherine

## 2020-02-04 DIAGNOSIS — K21.9 GASTROESOPHAGEAL REFLUX DISEASE, ESOPHAGITIS PRESENCE NOT SPECIFIED: ICD-10-CM

## 2020-02-04 LAB — CANCER AG19-9 SERPL-ACNC: 568 U/ML (ref 0–37)

## 2020-02-06 NOTE — PROGRESS NOTES
Oncology/Hematology Visit Note    Feb 10, 2020    Reason for visit: Follow up pancreatic cancer    Oncology HPI: Lowell Arredondo is a 79 year old male with a h/o prostate cancer and now with pancreatic cancer. Due to his prostate cancer, he had multiple surveillance scans and CT on 9/26/19 revealed a new soft tissue mesenteric mass in the pancreatic tail. EUS on 10/7/19 revealed moderately differentiated pancreatic adenocarcinoma. PET scan with no metastatic disease. He established care with Dr Segura and discussed starting curative intent, neoadjuvant chemotherapy and then surgery. He completed gemcitabine/Abraxane C1D1 on 11/7/19.     He was in the ED on 11/19/19 for syncope.  He was exercising at the [facility] and had a syncopal event.  ED work-up was extensive and he was discharged. He was seen in infusion for a rash when he received cycle 2 and likely from gemcitabine, started on cortisone. He has been getting Neupogen regularly due to persistent neutropenia.  Restaging CT on 1/9/2020 with treatment response.  He followed up with Dr. Segura on 2/3/2020 and Neupogen has been discontinued due to side effects.    He is here today for gemcitabine/Abraxane C4D8.     Interval History: Lowell is here with his wife, June.  He continues to have bilateral knee pain and ankle pain that is been ongoing since starting chemotherapy.  He was started on Neupogen for frequent low counts, and this was felt to be the cause of his pain.  Neupogen has been discontinued, but his pain continues.  He has been active at the [facility] prior to chemotherapy and is concerned to go back to the [facility] as he does not want to have a syncopal event.  This is caused him to have low activity at home.  He denies leg swelling, erythema or recent injury.  He admits to BOOTH while walking stairs.  Denies chest pain, abdominal pain, nausea, vomiting.  Constipation noted and currently on MiraLAX, but is only been using a spoon at home, not the measurable.  Denies  bleeding, skin changes.  No other complaints.    Review of Systems: See interval hx. Denies fevers, chills, HA, dizziness, n/t, changes in vision, cough, sore throat, CP, SOB, abdominal pain, N/V, diarrhea, changes in urination, bleeding, bruising, rash.      PMHx and Social Hx reviewed per EPIC.      Medications:  Current Outpatient Medications   Medication Sig Dispense Refill     loratadine (CLARITIN) 10 MG tablet Take 10 mg by mouth daily       LORazepam (ATIVAN) 0.5 MG tablet Take 1 tablet (0.5 mg) by mouth every 4 hours as needed (Anxiety, Nausea/Vomiting or Sleep) 30 tablet 2     omeprazole (PRILOSEC) 20 MG DR capsule Take 1 capsule (20 mg) by mouth daily 90 capsule 1     polyethylene glycol (MIRALAX/GLYCOLAX) packet Take 1 packet by mouth daily       prochlorperazine (COMPAZINE) 10 MG tablet Take 1 tablet (10 mg) by mouth every 6 hours as needed (Nausea/Vomiting) (Patient not taking: Reported on 1/21/2020) 30 tablet 2     psyllium (METAMUCIL) 28.3 % POWD        tamsulosin (FLOMAX) 80 mcg/mL Take 0.4 mg by mouth         Allergies   Allergen Reactions     Demerol [Meperidine] Difficulty breathing       EXAM:    /64   Pulse 93   Temp 97.9  F (36.6  C) (Tympanic)   Resp 16   Wt 70.6 kg (155 lb 9.6 oz)   SpO2 99%   BMI 23.67 kg/m       GENERAL:  Male, in no acute distress.  Alert and oriented x3.   HEENT:  Normocephalic, atraumatic.  PERRL, oropharynx clear with no sores or thrush.   LYMPH NODES:  No palpable pre/post-auricular, cervical, axillary lymphadenopathy appreciated.  CV:  RRR, No murmurs, gallops, or rubs.   LUNGS:  Clear to auscultation bilaterally.   ABDOMEN:  Soft, nontender and nondistended.  Bowel sounds heard x4.  No apparent hepatosplenomegaly.   EXTREMITIES:  No clubbing, cyanosis, or edema.   SKIN: No rash on exposed skin.  PSYCH: Mood stable      Labs:   Results for KARI YANG (MRN [id number]) as of 2/10/2020 09:54   2/10/2020 08:39   WBC 4.1   Hemoglobin 9.8 (L)   Hematocrit  29.9 (L)   Platelet Count 328   RBC Count 3.12 (L)   MCV 96   MCH 31.4   MCHC 32.8   RDW 17.2 (H)   Diff Method Automated Method   % Neutrophils 36.1   % Lymphocytes 37.9   % Monocytes 13.1   % Eosinophils 11.7   % Basophils 1.0   % Immature Granulocytes 0.2   Nucleated RBCs 0   Absolute Neutrophil 1.5 (L)   Absolute Lymphocytes 1.6   Absolute Monocytes 0.5   Absolute Eosinophils 0.5   Absolute Basophils 0.0   Abs Immature Granulocytes 0.0   Absolute Nucleated RBC 0.0       Imaging: n/a    Impression/Plan: Lowell Arredondo is a 79 year old male with pancreatic cancer currently on neoadjuvant chemotherapy with gemcitabine/Abraxane.    Pancreatic cancer: Started neoadjuvant chemotherapy with gemcitabine/Abraxane, C1 D1 completed on 11/7/2019.  He was neutropenic for C1D15, therefore deferred a week and he received 2 doses of Neupogen.  He continued on Neupogen, but felt he was having strong side effects and Dr. Segura removed this from the plan.  Labs are within treatment parameters to proceed with C2D8 today.  We had a long discussion about his toxicities and it certainly may not be Neupogen related, but if he is neutropenic next week, we may need to add Neupogen again to the plan, see below.  --218 Lianna, C2D15  --3/2 Dr. Segura, C3 D1    MSK: Patient complains of bilateral knee and ankle pain that is been ongoing since starting chemotherapy.  Initially thought the side effects/toxicities are from Neupogen and therefore this was discontinued from the plan.  Last dose was 1/23/2020, but he has persistent pains in the joints.  He was very active at the [facility] prior to chemotherapy and I wonder if he is has osteoarthritis and benefits from more activity.  His fatigue has been pretty high since her chemotherapy and we discussed cancer rehab, which I placed a referral today.    Rash: Significant improvement since applying cortisone and lotion.    Syncope: Syncopal episode while at the [facility] on 11/19/19 and ED work-up  negative. Another episode on 1/16/20 and workup neg in ED again. No sx since then. I recommended he contact his clinic and see if a holter monitor would be indicated for 2 syncopal events now.       Chart documentation with Dragon Voice recognition Software. Although reviewed after completion, some words and grammatical errors may remain.      Lianna Vyas PA-C  Hematology/Oncology  [facility]

## 2020-02-10 ENCOUNTER — HOSPITAL ENCOUNTER (OUTPATIENT)
Facility: CLINIC | Age: 80
Setting detail: SPECIMEN
Discharge: HOME | End: 2020-02-10
Attending: INTERNAL MEDICINE | Admitting: INTERNAL MEDICINE
Payer: COMMERCIAL

## 2020-02-10 ENCOUNTER — INFUSION THERAPY VISIT (OUTPATIENT)
Dept: INFUSION THERAPY | Facility: CLINIC | Age: 80
End: 2020-02-10
Attending: INTERNAL MEDICINE
Payer: COMMERCIAL

## 2020-02-10 ENCOUNTER — ONCOLOGY VISIT (OUTPATIENT)
Dept: ONCOLOGY | Facility: CLINIC | Age: 80
End: 2020-02-10
Attending: PHYSICIAN ASSISTANT
Payer: COMMERCIAL

## 2020-02-10 VITALS
HEART RATE: 93 BPM | DIASTOLIC BLOOD PRESSURE: 64 MMHG | SYSTOLIC BLOOD PRESSURE: 103 MMHG | OXYGEN SATURATION: 99 % | WEIGHT: 155.6 LBS | BODY MASS INDEX: 23.67 KG/M2 | RESPIRATION RATE: 16 BRPM | TEMPERATURE: 97.9 F

## 2020-02-10 VITALS
HEART RATE: 93 BPM | BODY MASS INDEX: 23.67 KG/M2 | RESPIRATION RATE: 16 BRPM | DIASTOLIC BLOOD PRESSURE: 64 MMHG | OXYGEN SATURATION: 99 % | SYSTOLIC BLOOD PRESSURE: 103 MMHG | TEMPERATURE: 97.9 F | WEIGHT: 155.6 LBS

## 2020-02-10 DIAGNOSIS — C25.2 MALIGNANT NEOPLASM OF TAIL OF PANCREAS (H): Primary | ICD-10-CM

## 2020-02-10 LAB
BASOPHILS # BLD AUTO: 0 10E9/L (ref 0–0.2)
BASOPHILS NFR BLD AUTO: 1 %
DIFFERENTIAL METHOD BLD: ABNORMAL
EOSINOPHIL # BLD AUTO: 0.5 10E9/L (ref 0–0.7)
EOSINOPHIL NFR BLD AUTO: 11.7 %
ERYTHROCYTE [DISTWIDTH] IN BLOOD BY AUTOMATED COUNT: 17.2 % (ref 10–15)
FERRITIN SERPL-MCNC: 842 NG/ML (ref 26–388)
FOLATE SERPL-MCNC: 10.9 NG/ML
HCT VFR BLD AUTO: 29.9 % (ref 40–53)
HGB BLD-MCNC: 9.8 G/DL (ref 13.3–17.7)
IMM GRANULOCYTES # BLD: 0 10E9/L (ref 0–0.4)
IMM GRANULOCYTES NFR BLD: 0.2 %
IRON SATN MFR SERPL: 10 % (ref 15–46)
IRON SERPL-MCNC: 24 UG/DL (ref 35–180)
LYMPHOCYTES # BLD AUTO: 1.6 10E9/L (ref 0.8–5.3)
LYMPHOCYTES NFR BLD AUTO: 37.9 %
MCH RBC QN AUTO: 31.4 PG (ref 26.5–33)
MCHC RBC AUTO-ENTMCNC: 32.8 G/DL (ref 31.5–36.5)
MCV RBC AUTO: 96 FL (ref 78–100)
MONOCYTES # BLD AUTO: 0.5 10E9/L (ref 0–1.3)
MONOCYTES NFR BLD AUTO: 13.1 %
NEUTROPHILS # BLD AUTO: 1.5 10E9/L (ref 1.6–8.3)
NEUTROPHILS NFR BLD AUTO: 36.1 %
NRBC # BLD AUTO: 0 10*3/UL
NRBC BLD AUTO-RTO: 0 /100
PLATELET # BLD AUTO: 328 10E9/L (ref 150–450)
RBC # BLD AUTO: 3.12 10E12/L (ref 4.4–5.9)
TIBC SERPL-MCNC: 241 UG/DL (ref 240–430)
VIT B12 SERPL-MCNC: 781 PG/ML (ref 193–986)
WBC # BLD AUTO: 4.1 10E9/L (ref 4–11)

## 2020-02-10 PROCEDURE — 82607 VITAMIN B-12: CPT | Performed by: INTERNAL MEDICINE

## 2020-02-10 PROCEDURE — 83550 IRON BINDING TEST: CPT | Performed by: INTERNAL MEDICINE

## 2020-02-10 PROCEDURE — 99214 OFFICE O/P EST MOD 30 MIN: CPT | Performed by: PHYSICIAN ASSISTANT

## 2020-02-10 PROCEDURE — 25000128 H RX IP 250 OP 636: Mod: JW | Performed by: INTERNAL MEDICINE

## 2020-02-10 PROCEDURE — 96375 TX/PRO/DX INJ NEW DRUG ADDON: CPT

## 2020-02-10 PROCEDURE — 82746 ASSAY OF FOLIC ACID SERUM: CPT | Performed by: INTERNAL MEDICINE

## 2020-02-10 PROCEDURE — 96413 CHEMO IV INFUSION 1 HR: CPT

## 2020-02-10 PROCEDURE — 82728 ASSAY OF FERRITIN: CPT | Performed by: INTERNAL MEDICINE

## 2020-02-10 PROCEDURE — 83540 ASSAY OF IRON: CPT | Performed by: INTERNAL MEDICINE

## 2020-02-10 PROCEDURE — 25800030 ZZH RX IP 258 OP 636: Performed by: INTERNAL MEDICINE

## 2020-02-10 PROCEDURE — 85025 COMPLETE CBC W/AUTO DIFF WBC: CPT | Performed by: INTERNAL MEDICINE

## 2020-02-10 PROCEDURE — 96417 CHEMO IV INFUS EACH ADDL SEQ: CPT

## 2020-02-10 RX ORDER — PACLITAXEL 100 MG/20ML
125 INJECTION, POWDER, LYOPHILIZED, FOR SUSPENSION INTRAVENOUS ONCE
Status: COMPLETED | OUTPATIENT
Start: 2020-02-10 | End: 2020-02-10

## 2020-02-10 RX ORDER — HEPARIN SODIUM (PORCINE) LOCK FLUSH IV SOLN 100 UNIT/ML 100 UNIT/ML
5 SOLUTION INTRAVENOUS EVERY 8 HOURS
Status: DISCONTINUED | OUTPATIENT
Start: 2020-02-10 | End: 2020-02-10 | Stop reason: HOSPADM

## 2020-02-10 RX ADMIN — Medication 5 ML: at 11:48

## 2020-02-10 RX ADMIN — DEXAMETHASONE SODIUM PHOSPHATE 12 MG: 10 INJECTION, SOLUTION INTRAMUSCULAR; INTRAVENOUS at 09:42

## 2020-02-10 RX ADMIN — GEMCITABINE 1800 MG: 38 INJECTION, SOLUTION INTRAVENOUS at 11:09

## 2020-02-10 RX ADMIN — PACLITAXEL 231 MG: 100 INJECTION, POWDER, LYOPHILIZED, FOR SUSPENSION INTRAVENOUS at 10:21

## 2020-02-10 RX ADMIN — SODIUM CHLORIDE 250 ML: 9 INJECTION, SOLUTION INTRAVENOUS at 09:41

## 2020-02-10 ASSESSMENT — PAIN SCALES - GENERAL
PAINLEVEL: MILD PAIN (2)
PAINLEVEL: MILD PAIN (2)

## 2020-02-10 NOTE — PATIENT INSTRUCTIONS
The the cancer rehab will call patient to schedule som   No other appointments needed at this time

## 2020-02-10 NOTE — PROGRESS NOTES
Infusion Nursing Note:  Lowell Arredondo presents today for Abraxane/Gemzar.    Patient seen by provider today: Yes: Lianna   present during visit today: Not Applicable.    Note: Assessment done by PA at appointment.    Intravenous Access:  Lab draw site port, Needle type port, Gauge 20 3/4in.  Labs drawn without difficulty.  Implanted Port.    Treatment Conditions:  Lab Results   Component Value Date    HGB 9.8 02/10/2020     Lab Results   Component Value Date    WBC 4.1 02/10/2020      Lab Results   Component Value Date    ANEU 1.5 02/10/2020     Lab Results   Component Value Date     02/10/2020      Results reviewed, labs MET treatment parameters, ok to proceed with treatment.      Post Infusion Assessment:  Patient tolerated infusion without incident.  Blood return noted pre and post infusion.  Site patent and intact, free from redness, edema or discomfort.  No evidence of extravasations.  Access discontinued per protocol.       Discharge Plan:   Discharge instructions reviewed with: Patient.  Patient discharged in stable condition accompanied by: wife.  Departure Mode: Ambulatory.  Next lab/PA and treatment scheduled for 2/18/2020.    NIKKI ASHLEY RN

## 2020-02-10 NOTE — LETTER
2/10/2020         RE: Lowell Arredondo  [address]  [place] [address]        Dear Colleague,    Thank you for referring your patient, Lowell Arredondo, to the [facility]. Please see a copy of my visit note below.    Oncology/Hematology Visit Note    Feb 10, 2020    Reason for visit: Follow up pancreatic cancer    Oncology HPI: Lowell Arredondo is a 79 year old male with a h/o prostate cancer and now with pancreatic cancer. Due to his prostate cancer, he had multiple surveillance scans and CT on 9/26/19 revealed a new soft tissue mesenteric mass in the pancreatic tail. EUS on 10/7/19 revealed moderately differentiated pancreatic adenocarcinoma. PET scan with no metastatic disease. He established care with Dr Segura and discussed starting curative intent, neoadjuvant chemotherapy and then surgery. He completed gemcitabine/Abraxane C1D1 on 11/7/19.     He was in the ED on 11/19/19 for syncope.  He was exercising at the [facility] and had a syncopal event.  ED work-up was extensive and he was discharged. He was seen in infusion for a rash when he received cycle 2 and likely from gemcitabine, started on cortisone. He has been getting Neupogen regularly due to persistent neutropenia.  Restaging CT on 1/9/2020 with treatment response.  He followed up with Dr. Segura on 2/3/2020 and Neupogen has been discontinued due to side effects.    He is here today for gemcitabine/Abraxane C4D8.     Interval History: Lowell is here with his wife, June.  He continues to have bilateral knee pain and ankle pain that is been ongoing since starting chemotherapy.  He was started on Neupogen for frequent low counts, and this was felt to be the cause of his pain.  Neupogen has been discontinued, but his pain continues.  He has been active at the [facility] prior to chemotherapy and is concerned to go back to the [facility] as he does not want to have a syncopal event.  This is caused him to have low activity at home.  He denies leg  swelling, erythema or recent injury.  He admits to BOOTH while walking stairs.  Denies chest pain, abdominal pain, nausea, vomiting.  Constipation noted and currently on MiraLAX, but is only been using a spoon at home, not the measurable.  Denies bleeding, skin changes.  No other complaints.    Review of Systems: See interval hx. Denies fevers, chills, HA, dizziness, n/t, changes in vision, cough, sore throat, CP, SOB, abdominal pain, N/V, diarrhea, changes in urination, bleeding, bruising, rash.      PMHx and Social Hx reviewed per EPIC.      Medications:  Current Outpatient Medications   Medication Sig Dispense Refill     loratadine (CLARITIN) 10 MG tablet Take 10 mg by mouth daily       LORazepam (ATIVAN) 0.5 MG tablet Take 1 tablet (0.5 mg) by mouth every 4 hours as needed (Anxiety, Nausea/Vomiting or Sleep) 30 tablet 2     omeprazole (PRILOSEC) 20 MG DR capsule Take 1 capsule (20 mg) by mouth daily 90 capsule 1     polyethylene glycol (MIRALAX/GLYCOLAX) packet Take 1 packet by mouth daily       prochlorperazine (COMPAZINE) 10 MG tablet Take 1 tablet (10 mg) by mouth every 6 hours as needed (Nausea/Vomiting) (Patient not taking: Reported on 1/21/2020) 30 tablet 2     psyllium (METAMUCIL) 28.3 % POWD        tamsulosin (FLOMAX) 80 mcg/mL Take 0.4 mg by mouth         Allergies   Allergen Reactions     Demerol [Meperidine] Difficulty breathing       EXAM:    /64   Pulse 93   Temp 97.9  F (36.6  C) (Tympanic)   Resp 16   Wt 70.6 kg (155 lb 9.6 oz)   SpO2 99%   BMI 23.67 kg/m        GENERAL:  Male, in no acute distress.  Alert and oriented x3.   HEENT:  Normocephalic, atraumatic.  PERRL, oropharynx clear with no sores or thrush.   LYMPH NODES:  No palpable pre/post-auricular, cervical, axillary lymphadenopathy appreciated.  CV:  RRR, No murmurs, gallops, or rubs.   LUNGS:  Clear to auscultation bilaterally.   ABDOMEN:  Soft, nontender and nondistended.  Bowel sounds heard x4.  No apparent hepatosplenomegaly.    EXTREMITIES:  No clubbing, cyanosis, or edema.   SKIN: No rash on exposed skin.  PSYCH: Mood stable      Labs:   Results for KARI YANG (MRN [id number]) as of 2/10/2020 09:54   2/10/2020 08:39   WBC 4.1   Hemoglobin 9.8 (L)   Hematocrit 29.9 (L)   Platelet Count 328   RBC Count 3.12 (L)   MCV 96   MCH 31.4   MCHC 32.8   RDW 17.2 (H)   Diff Method Automated Method   % Neutrophils 36.1   % Lymphocytes 37.9   % Monocytes 13.1   % Eosinophils 11.7   % Basophils 1.0   % Immature Granulocytes 0.2   Nucleated RBCs 0   Absolute Neutrophil 1.5 (L)   Absolute Lymphocytes 1.6   Absolute Monocytes 0.5   Absolute Eosinophils 0.5   Absolute Basophils 0.0   Abs Immature Granulocytes 0.0   Absolute Nucleated RBC 0.0       Imaging: n/a    Impression/Plan: Kari Yang is a 79 year old male with pancreatic cancer currently on neoadjuvant chemotherapy with gemcitabine/Abraxane.    Pancreatic cancer: Started neoadjuvant chemotherapy with gemcitabine/Abraxane, C1 D1 completed on 11/7/2019.  He was neutropenic for C1D15, therefore deferred a week and he received 2 doses of Neupogen.  He continued on Neupogen, but felt he was having strong side effects and Dr. Segura removed this from the plan.  Labs are within treatment parameters to proceed with C2D8 today.  We had a long discussion about his toxicities and it certainly may not be Neupogen related, but if he is neutropenic next week, we may need to add Neupogen again to the plan, see below.  --218 Lianna, C2D15  --3/2 Dr. Segura, C3 D1    MSK: Patient complains of bilateral knee and ankle pain that is been ongoing since starting chemotherapy.  Initially thought the side effects/toxicities are from Neupogen and therefore this was discontinued from the plan.  Last dose was 1/23/2020, but he has persistent pains in the joints.  He was very active at the [facility] prior to chemotherapy and I wonder if he is has osteoarthritis and benefits from more activity.  His fatigue has been pretty  high since her chemotherapy and we discussed cancer rehab, which I placed a referral today.    Rash: Significant improvement since applying cortisone and lotion.    Syncope: Syncopal episode while at the [facility] on 11/19/19 and ED work-up negative. Another episode on 1/16/20 and workup neg in ED again. No sx since then. I recommended he contact his clinic and see if a holter monitor would be indicated for 2 syncopal events now.       Chart documentation with Dragon Voice recognition Software. Although reviewed after completion, some words and grammatical errors may remain.      Lianna Vyas PA-C  Hematology/Oncology  [facility]                  Again, thank you for allowing me to participate in the care of your patient.        Sincerely,        Lianna Vyas PA-C

## 2020-02-11 DIAGNOSIS — K90.9 IRON MALABSORPTION: ICD-10-CM

## 2020-02-11 DIAGNOSIS — D50.9 IRON DEFICIENCY ANEMIA, UNSPECIFIED IRON DEFICIENCY ANEMIA TYPE: ICD-10-CM

## 2020-02-11 RX ORDER — HEPARIN SODIUM (PORCINE) LOCK FLUSH IV SOLN 100 UNIT/ML 100 UNIT/ML
5 SOLUTION INTRAVENOUS
Status: CANCELLED | OUTPATIENT
Start: 2020-02-18

## 2020-02-11 RX ORDER — HEPARIN SODIUM,PORCINE 10 UNIT/ML
5 VIAL (ML) INTRAVENOUS
Status: CANCELLED | OUTPATIENT
Start: 2020-02-18

## 2020-02-11 NOTE — PROGRESS NOTES
Labs reveal anemia and iron deficiency.  Patient is on chemotherapy.  We will give him 2 doses of IV Injectafer.

## 2020-02-14 ENCOUNTER — PRE VISIT (OUTPATIENT)
Dept: SURGERY | Facility: CLINIC | Age: 80
End: 2020-02-14

## 2020-02-16 NOTE — PROGRESS NOTES
Visit Date:   02/03/2020     ONCOLOGY HISTORY: Mr. Arredondo is a gentleman with pancreatic cancer.T3 N1 M0. Stage IIB.   -Also has prostate cancer [place] 7 prostate.   1.  Prostate MRI on 07/20/2019 revealed PI-RADS 5.  No suspicious adenopathy or evidence of pelvic metastasis.   2.  Bone scan on 09/26/2019 does not reveal any bone metastasis.  There is some cardiac uptake.   3.  CT abdomen and pelvis on 09/26/2019 reveals new soft tissue mesenteric mass in the region of pancreatic tail.   4.  EUS on 10/07/2019 revealed an irregular mass in the pancreatic tail measuring 3.8 x 2.2 cm.  There was some evidence of invasion into the portal vein.  No lymphadenopathy seen.  Based on the EUS, it was T3 Nx disease.  FNA is positive for moderately differentiated pancreatic adenocarcinoma.   5. PET scan on 10/15/2019 reveals hypermetabolic 4.2 cm mass in the pancreatic tail and an adjacent 1 cm peripancreatic lymph node. No evidence of any metastatic disease. There are 2 foci of mild FDG uptake in the prostate gland from prostate cancer.   6. Neoadjuvant chemotherapy with gemcitabine and Abraxane started on 11/07/2019.       SUBJECTIVE:  Mr. Arredondo is an 79-year-old gentleman with resectable pancreatic cancer on neoadjuvant treatment with gemcitabine and Abraxane.  He will be starting cycle #4 today.  Disease is responding to treatment.      Because of leukopenia/neutropenia, he gets Neupogen.  The patient said that after Neupogen, he does not feel well.  He gets weaker.  He gets dizzy.  Sometimes he passes out.  Recently he had a syncopal attack and was evaluated in the emergency room.      He is here to start cycle 4.  He is feeling stronger.  No headache.  No dizziness.  No visual problem.  No chest pain.  No shortness of breath.  Appetite not good.  Food does not taste well.  No abdominal pain, nausea or vomiting.  No urinary complaint.  No diarrhea.  No abnormal bleeding.      PHYSICAL EXAMINATION:   GENERAL:  Alert and  oriented x 3.   VITAL SIGNS:  Reviewed.  ECOG PS of 1.     EYES:  No icterus.   THROAT:  No ulcer. No thrush.   NECK:  Supple. No lymphadenopathy. No thyromegaly.   AXILLAE:  No lymphadenopathy.   LUNGS:  Good air entry bilaterally.  No crackles or wheezing.   HEART:  Regular.  No murmur.   GI: Abdomen is soft.  Nontender. No mass.   EXTREMITIES:  No pedal edema.  No calf swelling or tenderness.   SKIN:  No rash.      LABORATORY DATA:  Reviewed.      ASSESSMENT:   1.  An 79-year-old gentleman with resectable pancreatic cancer on neoadjuvant gemcitabine and Abraxane.   2.  Side effects from Neupogen including weakness and dizziness.  3. [place] 7 prostate cancer on active surveillance.      PLAN:   1.  The patient is overall doing well.  We discussed regarding treatment for pancreatic cancer.  He will continue on gemcitabine and Abraxane.  He has been tolerating it well.   2.  The patient is worried about Neupogen.  He seems to get side effects after each Neupogen injection.  I am going to discontinue the Neupogen.  It will be only given as needed.  I told the patient that his chemotherapy may be delayed because of that, but he is okay with that.  We will see how he does without Neupogen.  We can also consider dose reduction of chemotherapy.  I am avoiding dose reduction of this neoadjuvant treatment.   3.  The patient is on Flomax.  I told him that also brings the blood pressure down.  Without Flomax he has urinary problems.  He will continue on Flomax.   4.  I advised the patient to increase his diet.  I told him not losing any weight.   5.  The patient is anemic because of chemotherapy.  We will continue to monitor his CBC.  With next lab, we will check some iron studies.   6.  He had a few questions, which were all answered.  I will see him in a month.  In between, he will see our nurse practitioner.  If for some reason he is not able to tolerate chemotherapy, then we will proceed with surgery.  The patient and  wife had multiple questions, which were all answered.         ROXY LANCASTER MD             D: 2020   T: 2020   MT: CORRINE      Name:     KARI YANG   MRN:      [id number]        Account:      [id number]   :      1940           Visit Date:   2020      Document: [id number]

## 2020-02-17 NOTE — PROGRESS NOTES
Oncology/Hematology Visit Note    Feb 18, 2020    Reason for visit: Follow up pancreatic cancer    Oncology HPI: Lowell Arredondo is a 79 year old male with a h/o prostate cancer and now with pancreatic cancer. Due to his prostate cancer, he had multiple surveillance scans and CT on 9/26/19 revealed a new soft tissue mesenteric mass in the pancreatic tail. EUS on 10/7/19 revealed moderately differentiated pancreatic adenocarcinoma. PET scan with no metastatic disease. He established care with Dr Segura and discussed starting curative intent, neoadjuvant chemotherapy and then surgery. He completed gemcitabine/Abraxane C1D1 on 11/7/19.     He was in the ED on 11/19/19 for syncope.  He was exercising at the [facility] and had a syncopal event.  ED work-up was extensive and he was discharged. He was seen in infusion for a rash when he received cycle 2 and likely from gemcitabine, started on cortisone. He has been getting Neupogen regularly due to persistent neutropenia.  Restaging CT on 1/9/2020 with treatment response.  He followed up with Dr. Segura on 2/3/2020 and Neupogen has been discontinued due to side effects.    He is here today for gemcitabine/Abraxane C4D8 and Injectafer.     Interval History: Lowell is here with his wife, June.  He has had a hard time recovering from this last treatment of chemotherapy with persistent fatigue, more than usual.  He has been sleeping quite a bit throughout the day. He has been drinking about 50 ounces of fluids per day.  He also has been having pain in his right great toe and feels as though his socks are bunched up on the balls of the feet, right greater than left.  He denies any swelling to the feet or ankles.  He has been trying some Tylenol for the pain with some improvement.  He denies nausea, vomiting, diarrhea, bleeding, fever or chills.  He is interested in reducing the dose of chemotherapy due to the fatigue.  No other new complaints.    Review of Systems: See interval hx.  Denies fevers, chills, HA, dizziness, changes in vision, cough, sore throat, CP, SOB, abdominal pain, N/V, diarrhea, changes in urination, bleeding, bruising, rash.      PMHx and Social Hx reviewed per EPIC.      Medications:  Current Outpatient Medications   Medication Sig Dispense Refill     gabapentin (NEURONTIN) 100 MG capsule Take 1 capsule (100 mg) by mouth At Bedtime 60 capsule 0     loratadine (CLARITIN) 10 MG tablet Take 10 mg by mouth daily       LORazepam (ATIVAN) 0.5 MG tablet Take 1 tablet (0.5 mg) by mouth every 4 hours as needed (Anxiety, Nausea/Vomiting or Sleep) 30 tablet 2     omeprazole (PRILOSEC) 20 MG DR capsule Take 1 capsule (20 mg) by mouth daily 90 capsule 1     polyethylene glycol (MIRALAX/GLYCOLAX) packet Take 1 packet by mouth daily       prochlorperazine (COMPAZINE) 10 MG tablet Take 1 tablet (10 mg) by mouth every 6 hours as needed (Nausea/Vomiting) 30 tablet 2     psyllium (METAMUCIL) 28.3 % POWD        tamsulosin (FLOMAX) 80 mcg/mL Take 0.4 mg by mouth         Allergies   Allergen Reactions     Demerol [Meperidine] Difficulty breathing       EXAM:    /56   Pulse 83   Temp 97.3  F (36.3  C) (Tympanic)   Resp 16   Wt 68.9 kg (152 lb)   SpO2 98%   BMI 23.12 kg/m       GENERAL:  Male, in no acute distress.  Alert and oriented x3.   HEENT:  Normocephalic, atraumatic.  PERRL, oropharynx clear with no sores or thrush.   LYMPH NODES:  No palpable pre/post-auricular, cervical, axillary lymphadenopathy appreciated.  CV:  RRR, No murmurs, gallops, or rubs.   LUNGS:  Clear to auscultation bilaterally.   ABDOMEN:  Soft, nontender and nondistended.  Bowel sounds heard x4.  No apparent hepatosplenomegaly.   EXTREMITIES:  No clubbing, cyanosis, or edema.   SKIN: No rash on exposed skin.  PSYCH: Mood stable      Labs:   Results for KARI YANG (MRN [id number]) as of 2/18/2020 09:46   2/10/2020 08:39   Ferritin 842 (H)   Folate 10.9   Iron 24 (L)   Iron Binding Cap 241   Iron Saturation  Index 10 (L)     Results for KARI YANG (MRN [id number]) as of 2/18/2020 09:46   2/18/2020 07:54   WBC 4.3   Hemoglobin 9.5 (L)   Hematocrit 29.2 (L)   Platelet Count 138 (L)   RBC Count 3.00 (L)   MCV 97   MCH 31.7   MCHC 32.5   RDW 17.0 (H)   Diff Method Automated Method   % Neutrophils 42.0   % Lymphocytes 32.0   % Monocytes 15.0   % Eosinophils 9.8   % Basophils 0.7   % Immature Granulocytes 0.5   Nucleated RBCs 0   Absolute Neutrophil 1.8   Absolute Lymphocytes 1.4   Absolute Monocytes 0.6   Absolute Eosinophils 0.4   Absolute Basophils 0.0   Abs Immature Granulocytes 0.0   Absolute Nucleated RBC 0.0   RBC Morphology Consistent with reported results   Reactive Lymphs Present   Platelet Estimate Automated count confirmed.  Platelet morphology is normal.       Imaging: n/a    Impression/Plan: Kari Yang is a 79 year old male with pancreatic cancer currently on neoadjuvant chemotherapy with gemcitabine/Abraxane.    Pancreatic cancer: Started neoadjuvant chemotherapy with gemcitabine/Abraxane, C1 D1 completed on 11/7/2019.  He was neutropenic for C1D15, therefore deferred a week and he received 2 doses of Neupogen.  He continued on Neupogen, but felt he was having strong side effects and Dr. Segura removed this from the plan.  He is developed some pain in his feet, see below and anemia persisted, see below.  Labs within treatment parameters to proceed with C2 D 15 today.  Neupogen has been discontinued and were glad that his counts are stable.  --3/2 Dr. Segura, C3 D1    Heme:   --Anemia: Hemoglobin has dropped to 9-10 over the last several weeks, today 9.5, and further work-up revealed that he is iron deficient.  Iron was 24 and a saturation index was 10.  Plan for dose of Injectafer today and again next week for total of 2 doses.   --Thrombocytopenia: Platelets 138 and secondary to chemotherapy, no active bleeding and we will monitor.  Transfuse with platelets <20.    Fatigue: Multifactorial from  chemotherapy, iron deficiency anemia.  He was requesting dose reduction of his chemotherapy, but he may have improvement of his fatigue once he receives Injectafer and IV fluids.  He agreed to this and we will reassess again in 2 weeks.  If no improvement with IV Injectafer, we will consider cancer rehab as well.  We will plan for IV fluids on a weekly basis.    PN: New for the patient, grade 1 on bilateral feet, more on the right great toe.  No evidence of infection.  We discussed gabapentin and we will start gabapentin 100 mg at bedtime and he can increase to 200 mg if needed.  We also discussed acupuncture, however he will hold off for now.    Rash: Significant improvement since applying cortisone and lotion.    Syncope: Syncopal episode while at the [facility] on 11/19/19 and ED work-up negative. Another episode on 1/16/20 and workup neg in ED again. No sx since then. I recommended he contact his clinic and see if a holter monitor would be indicated for 2 syncopal events now.       Chart documentation with Dragon Voice recognition Software. Although reviewed after completion, some words and grammatical errors may remain.      Lianna Vyas PA-C  Hematology/Oncology  [facility]

## 2020-02-18 ENCOUNTER — ONCOLOGY VISIT (OUTPATIENT)
Dept: ONCOLOGY | Facility: CLINIC | Age: 80
End: 2020-02-18
Attending: PHYSICIAN ASSISTANT
Payer: COMMERCIAL

## 2020-02-18 ENCOUNTER — INFUSION THERAPY VISIT (OUTPATIENT)
Dept: INFUSION THERAPY | Facility: CLINIC | Age: 80
End: 2020-02-18
Attending: PHYSICIAN ASSISTANT
Payer: COMMERCIAL

## 2020-02-18 ENCOUNTER — HOSPITAL ENCOUNTER (OUTPATIENT)
Facility: CLINIC | Age: 80
Setting detail: SPECIMEN
Discharge: HOME | End: 2020-02-18
Attending: PHYSICIAN ASSISTANT | Admitting: INTERNAL MEDICINE
Payer: COMMERCIAL

## 2020-02-18 VITALS
DIASTOLIC BLOOD PRESSURE: 56 MMHG | OXYGEN SATURATION: 98 % | SYSTOLIC BLOOD PRESSURE: 106 MMHG | BODY MASS INDEX: 23.12 KG/M2 | HEART RATE: 83 BPM | RESPIRATION RATE: 16 BRPM | WEIGHT: 152 LBS | TEMPERATURE: 97.3 F

## 2020-02-18 DIAGNOSIS — T45.1X5A CHEMOTHERAPY-INDUCED NEUTROPENIA (H): ICD-10-CM

## 2020-02-18 DIAGNOSIS — C61 PROSTATE CANCER (H): ICD-10-CM

## 2020-02-18 DIAGNOSIS — D50.9 IRON DEFICIENCY ANEMIA, UNSPECIFIED IRON DEFICIENCY ANEMIA TYPE: ICD-10-CM

## 2020-02-18 DIAGNOSIS — C25.2 MALIGNANT NEOPLASM OF TAIL OF PANCREAS (H): Primary | ICD-10-CM

## 2020-02-18 DIAGNOSIS — G62.0 POLYNEUROPATHY FOLLOWING CHEMOTHERAPY (H): ICD-10-CM

## 2020-02-18 DIAGNOSIS — Z95.828 PORT-A-CATH IN PLACE: ICD-10-CM

## 2020-02-18 DIAGNOSIS — K90.9 IRON MALABSORPTION: ICD-10-CM

## 2020-02-18 DIAGNOSIS — T45.1X5A POLYNEUROPATHY FOLLOWING CHEMOTHERAPY (H): ICD-10-CM

## 2020-02-18 DIAGNOSIS — D70.1 CHEMOTHERAPY-INDUCED NEUTROPENIA (H): ICD-10-CM

## 2020-02-18 LAB
BASOPHILS # BLD AUTO: 0 10E9/L (ref 0–0.2)
BASOPHILS NFR BLD AUTO: 0.7 %
DIFFERENTIAL METHOD BLD: ABNORMAL
EOSINOPHIL # BLD AUTO: 0.4 10E9/L (ref 0–0.7)
EOSINOPHIL NFR BLD AUTO: 9.8 %
ERYTHROCYTE [DISTWIDTH] IN BLOOD BY AUTOMATED COUNT: 17 % (ref 10–15)
HCT VFR BLD AUTO: 29.2 % (ref 40–53)
HGB BLD-MCNC: 9.5 G/DL (ref 13.3–17.7)
IMM GRANULOCYTES # BLD: 0 10E9/L (ref 0–0.4)
IMM GRANULOCYTES NFR BLD: 0.5 %
LYMPHOCYTES # BLD AUTO: 1.4 10E9/L (ref 0.8–5.3)
LYMPHOCYTES NFR BLD AUTO: 32 %
MCH RBC QN AUTO: 31.7 PG (ref 26.5–33)
MCHC RBC AUTO-ENTMCNC: 32.5 G/DL (ref 31.5–36.5)
MCV RBC AUTO: 97 FL (ref 78–100)
MONOCYTES # BLD AUTO: 0.6 10E9/L (ref 0–1.3)
MONOCYTES NFR BLD AUTO: 15 %
NEUTROPHILS # BLD AUTO: 1.8 10E9/L (ref 1.6–8.3)
NEUTROPHILS NFR BLD AUTO: 42 %
NRBC # BLD AUTO: 0 10*3/UL
NRBC BLD AUTO-RTO: 0 /100
PLATELET # BLD AUTO: 138 10E9/L (ref 150–450)
PLATELET # BLD EST: ABNORMAL 10*3/UL
RBC # BLD AUTO: 3 10E12/L (ref 4.4–5.9)
RBC MORPH BLD: ABNORMAL
VARIANT LYMPHS BLD QL SMEAR: PRESENT
WBC # BLD AUTO: 4.3 10E9/L (ref 4–11)

## 2020-02-18 PROCEDURE — 85025 COMPLETE CBC W/AUTO DIFF WBC: CPT | Performed by: INTERNAL MEDICINE

## 2020-02-18 PROCEDURE — 25000128 H RX IP 250 OP 636: Performed by: NURSE PRACTITIONER

## 2020-02-18 PROCEDURE — 25800030 ZZH RX IP 258 OP 636: Performed by: INTERNAL MEDICINE

## 2020-02-18 PROCEDURE — 99214 OFFICE O/P EST MOD 30 MIN: CPT | Performed by: PHYSICIAN ASSISTANT

## 2020-02-18 PROCEDURE — 96417 CHEMO IV INFUS EACH ADDL SEQ: CPT

## 2020-02-18 PROCEDURE — 25800030 ZZH RX IP 258 OP 636: Performed by: NURSE PRACTITIONER

## 2020-02-18 PROCEDURE — 96375 TX/PRO/DX INJ NEW DRUG ADDON: CPT

## 2020-02-18 PROCEDURE — 25000128 H RX IP 250 OP 636: Mod: JW | Performed by: INTERNAL MEDICINE

## 2020-02-18 PROCEDURE — G0463 HOSPITAL OUTPT CLINIC VISIT: HCPCS | Mod: 25

## 2020-02-18 PROCEDURE — 96413 CHEMO IV INFUSION 1 HR: CPT

## 2020-02-18 RX ORDER — PACLITAXEL 100 MG/20ML
125 INJECTION, POWDER, LYOPHILIZED, FOR SUSPENSION INTRAVENOUS ONCE
Status: COMPLETED | OUTPATIENT
Start: 2020-02-18 | End: 2020-02-18

## 2020-02-18 RX ORDER — HEPARIN SODIUM,PORCINE 10 UNIT/ML
5 VIAL (ML) INTRAVENOUS
Status: CANCELLED | OUTPATIENT
Start: 2020-02-25

## 2020-02-18 RX ORDER — HEPARIN SODIUM (PORCINE) LOCK FLUSH IV SOLN 100 UNIT/ML 100 UNIT/ML
5 SOLUTION INTRAVENOUS
Status: DISCONTINUED | OUTPATIENT
Start: 2020-02-18 | End: 2020-02-18 | Stop reason: HOSPADM

## 2020-02-18 RX ORDER — HEPARIN SODIUM (PORCINE) LOCK FLUSH IV SOLN 100 UNIT/ML 100 UNIT/ML
5 SOLUTION INTRAVENOUS
Status: CANCELLED | OUTPATIENT
Start: 2020-02-18

## 2020-02-18 RX ORDER — HEPARIN SODIUM,PORCINE 10 UNIT/ML
5 VIAL (ML) INTRAVENOUS
Status: CANCELLED | OUTPATIENT
Start: 2020-02-18

## 2020-02-18 RX ORDER — GABAPENTIN 100 MG/1
100 CAPSULE ORAL AT BEDTIME
Qty: 60 CAPSULE | Refills: 0 | Status: SHIPPED | OUTPATIENT
Start: 2020-02-18 | End: 2020-03-02

## 2020-02-18 RX ORDER — HEPARIN SODIUM (PORCINE) LOCK FLUSH IV SOLN 100 UNIT/ML 100 UNIT/ML
5 SOLUTION INTRAVENOUS
Status: CANCELLED | OUTPATIENT
Start: 2020-02-25

## 2020-02-18 RX ADMIN — Medication 5 ML: at 12:05

## 2020-02-18 RX ADMIN — PACLITAXEL 231 MG: 100 INJECTION, POWDER, LYOPHILIZED, FOR SUSPENSION INTRAVENOUS at 10:46

## 2020-02-18 RX ADMIN — DEXAMETHASONE SODIUM PHOSPHATE 12 MG: 10 INJECTION, SOLUTION INTRAMUSCULAR; INTRAVENOUS at 09:41

## 2020-02-18 RX ADMIN — SODIUM CHLORIDE 1000 ML: 9 INJECTION, SOLUTION INTRAVENOUS at 09:02

## 2020-02-18 RX ADMIN — GEMCITABINE 1800 MG: 38 INJECTION, SOLUTION INTRAVENOUS at 11:28

## 2020-02-18 RX ADMIN — FERRIC CARBOXYMALTOSE INJECTION 750 MG: 50 INJECTION, SOLUTION INTRAVENOUS at 10:03

## 2020-02-18 ASSESSMENT — PAIN SCALES - GENERAL: PAINLEVEL: MODERATE PAIN (5)

## 2020-02-18 NOTE — PROGRESS NOTES
Infusion Nursing Note:  Lowell Arredondo presents today for C4D15 Abraxane, Gemzar, Injectafer, IVF.    Patient seen by provider today: Yes: Lianna MARIE   present during visit today: Not Applicable.    Note: First dose of Injectafer given today. Discussed administration and potential side effects before beginning, and provided with [employer] medication information sheet.     Intravenous Access:  Implanted Port.    Treatment Conditions:  Lab Results   Component Value Date    HGB 9.5 02/18/2020     Lab Results   Component Value Date    WBC 4.3 02/18/2020      Lab Results   Component Value Date    ANEU 1.8 02/18/2020     Lab Results   Component Value Date     02/18/2020      Results reviewed, labs MET treatment parameters, ok to proceed with treatment.    Post Infusion Assessment:  Patient tolerated infusion without incident.  Patient observed for 30 minutes post Injectafer per protocol.  Blood return noted pre and post infusion.  Site patent and intact, free from redness, edema or discomfort.  No evidence of extravasations.  Access discontinued per protocol.     Discharge Plan:   Discharge instructions reviewed with: Patient.  Patient and/or family verbalized understanding of discharge instructions and all questions answered.  AVS to patient via SilverpopT.  Patient will return 2/27 for next appointment.   Patient discharged in stable condition accompanied by: wife.  Departure Mode: Ambulatory.    Precious Lazo RN

## 2020-02-18 NOTE — LETTER
2/18/2020         RE: Lowell Arredondo  [address] [address]        Dear Colleague,    Thank you for referring your patient, Lowell Arredondo, to the [facility]. Please see a copy of my visit note below.    Oncology/Hematology Visit Note    Feb 18, 2020    Reason for visit: Follow up pancreatic cancer    Oncology HPI: Lowell Arredondo is a 79 year old male with a h/o prostate cancer and now with pancreatic cancer. Due to his prostate cancer, he had multiple surveillance scans and CT on 9/26/19 revealed a new soft tissue mesenteric mass in the pancreatic tail. EUS on 10/7/19 revealed moderately differentiated pancreatic adenocarcinoma. PET scan with no metastatic disease. He established care with Dr Segura and discussed starting curative intent, neoadjuvant chemotherapy and then surgery. He completed gemcitabine/Abraxane C1D1 on 11/7/19.     He was in the ED on 11/19/19 for syncope.  He was exercising at the [facility] and had a syncopal event.  ED work-up was extensive and he was discharged. He was seen in infusion for a rash when he received cycle 2 and likely from gemcitabine, started on cortisone. He has been getting Neupogen regularly due to persistent neutropenia.  Restaging CT on 1/9/2020 with treatment response.  He followed up with Dr. Segura on 2/3/2020 and Neupogen has been discontinued due to side effects.    He is here today for gemcitabine/Abraxane C4D8 and Injectafer.     Interval History: Lowell is here with his wife, June.  He has had a hard time recovering from this last treatment of chemotherapy with persistent fatigue, more than usual.  He has been sleeping quite a bit throughout the day. He has been drinking about 50 ounces of fluids per day.  He also has been having pain in his right great toe and feels as though his socks are bunched up on the balls of the feet, right greater than left.  He denies any swelling to the feet or ankles.  He has been trying some Tylenol for  the pain with some improvement.  He denies nausea, vomiting, diarrhea, bleeding, fever or chills.  He is interested in reducing the dose of chemotherapy due to the fatigue.  No other new complaints.    Review of Systems: See interval hx. Denies fevers, chills, HA, dizziness, changes in vision, cough, sore throat, CP, SOB, abdominal pain, N/V, diarrhea, changes in urination, bleeding, bruising, rash.      PMHx and Social Hx reviewed per EPIC.      Medications:  Current Outpatient Medications   Medication Sig Dispense Refill     gabapentin (NEURONTIN) 100 MG capsule Take 1 capsule (100 mg) by mouth At Bedtime 60 capsule 0     loratadine (CLARITIN) 10 MG tablet Take 10 mg by mouth daily       LORazepam (ATIVAN) 0.5 MG tablet Take 1 tablet (0.5 mg) by mouth every 4 hours as needed (Anxiety, Nausea/Vomiting or Sleep) 30 tablet 2     omeprazole (PRILOSEC) 20 MG DR capsule Take 1 capsule (20 mg) by mouth daily 90 capsule 1     polyethylene glycol (MIRALAX/GLYCOLAX) packet Take 1 packet by mouth daily       prochlorperazine (COMPAZINE) 10 MG tablet Take 1 tablet (10 mg) by mouth every 6 hours as needed (Nausea/Vomiting) 30 tablet 2     psyllium (METAMUCIL) 28.3 % POWD        tamsulosin (FLOMAX) 80 mcg/mL Take 0.4 mg by mouth         Allergies   Allergen Reactions     Demerol [Meperidine] Difficulty breathing       EXAM:    /56   Pulse 83   Temp 97.3  F (36.3  C) (Tympanic)   Resp 16   Wt 68.9 kg (152 lb)   SpO2 98%   BMI 23.12 kg/m        GENERAL:  Male, in no acute distress.  Alert and oriented x3.   HEENT:  Normocephalic, atraumatic.  PERRL, oropharynx clear with no sores or thrush.   LYMPH NODES:  No palpable pre/post-auricular, cervical, axillary lymphadenopathy appreciated.  CV:  RRR, No murmurs, gallops, or rubs.   LUNGS:  Clear to auscultation bilaterally.   ABDOMEN:  Soft, nontender and nondistended.  Bowel sounds heard x4.  No apparent hepatosplenomegaly.   EXTREMITIES:  No clubbing, cyanosis, or edema.    SKIN: No rash on exposed skin.  PSYCH: Mood stable      Labs:   Results for KARI YANG (MRN [id number]) as of 2/18/2020 09:46   2/10/2020 08:39   Ferritin 842 (H)   Folate 10.9   Iron 24 (L)   Iron Binding Cap 241   Iron Saturation Index 10 (L)     Results for KARI YANG (MRN [id number]) as of 2/18/2020 09:46   2/18/2020 07:54   WBC 4.3   Hemoglobin 9.5 (L)   Hematocrit 29.2 (L)   Platelet Count 138 (L)   RBC Count 3.00 (L)   MCV 97   MCH 31.7   MCHC 32.5   RDW 17.0 (H)   Diff Method Automated Method   % Neutrophils 42.0   % Lymphocytes 32.0   % Monocytes 15.0   % Eosinophils 9.8   % Basophils 0.7   % Immature Granulocytes 0.5   Nucleated RBCs 0   Absolute Neutrophil 1.8   Absolute Lymphocytes 1.4   Absolute Monocytes 0.6   Absolute Eosinophils 0.4   Absolute Basophils 0.0   Abs Immature Granulocytes 0.0   Absolute Nucleated RBC 0.0   RBC Morphology Consistent with reported results   Reactive Lymphs Present   Platelet Estimate Automated count confirmed.  Platelet morphology is normal.       Imaging: n/a    Impression/Plan: Kari Yang is a 79 year old male with pancreatic cancer currently on neoadjuvant chemotherapy with gemcitabine/Abraxane.    Pancreatic cancer: Started neoadjuvant chemotherapy with gemcitabine/Abraxane, C1 D1 completed on 11/7/2019.  He was neutropenic for C1D15, therefore deferred a week and he received 2 doses of Neupogen.  He continued on Neupogen, but felt he was having strong side effects and Dr. Segura removed this from the plan.  He is developed some pain in his feet, see below and anemia persisted, see below.  Labs within treatment parameters to proceed with C2 D 15 today.  Neupogen has been discontinued and were glad that his counts are stable.  --3/2 Dr. Segura, C3 D1    Heme:   --Anemia: Hemoglobin has dropped to 9-10 over the last several weeks, today 9.5, and further work-up revealed that he is iron deficient.  Iron was 24 and a saturation index was 10.  Plan for dose of  Injectafer today and again next week for total of 2 doses.   --Thrombocytopenia: Platelets 138 and secondary to chemotherapy, no active bleeding and we will monitor.  Transfuse with platelets <20.    Fatigue: Multifactorial from chemotherapy, iron deficiency anemia.  He was requesting dose reduction of his chemotherapy, but he may have improvement of his fatigue once he receives Injectafer and IV fluids.  He agreed to this and we will reassess again in 2 weeks.  If no improvement with IV Injectafer, we will consider cancer rehab as well.  We will plan for IV fluids on a weekly basis.    PN: New for the patient, grade 1 on bilateral feet, more on the right great toe.  No evidence of infection.  We discussed gabapentin and we will start gabapentin 100 mg at bedtime and he can increase to 200 mg if needed.  We also discussed acupuncture, however he will hold off for now.    Rash: Significant improvement since applying cortisone and lotion.    Syncope: Syncopal episode while at the [facility] on 11/19/19 and ED work-up negative. Another episode on 1/16/20 and workup neg in ED again. No sx since then. I recommended he contact his clinic and see if a holter monitor would be indicated for 2 syncopal events now.       Chart documentation with Dragon Voice recognition Software. Although reviewed after completion, some words and grammatical errors may remain.      Lianna Vyas PA-C  Hematology/Oncology  [facility]                  Again, thank you for allowing me to participate in the care of your patient.        Sincerely,        Lianna Vyas PA-C

## 2020-02-18 NOTE — NURSING NOTE
"Oncology Rooming Note    February 18, 2020 8:03 AM   Lowell Arredondo is a 79 year old male who presents for:    Chief Complaint   Patient presents with     Oncology Clinic Visit     Malignant neoplasm of tail of pancreas      Initial Vitals: /56   Pulse 83   Temp 97.3  F (36.3  C) (Tympanic)   Resp 16   Wt 68.9 kg (152 lb)   SpO2 98%   BMI 23.12 kg/m   Estimated body mass index is 23.12 kg/m  as calculated from the following:    Height as of 1/16/20: 1.727 m (5' 7.99\").    Weight as of this encounter: 68.9 kg (152 lb). Body surface area is 1.82 meters squared.  Moderate Pain (5) Comment: Data Unavailable   No LMP for male patient.  Allergies reviewed: Yes  Medications reviewed: Yes    Medications: Medication refills not needed today.  Pharmacy name entered into [employer]: [id number] - [place], [place] - [address]    Clinical concerns: follow up       Felicitas Julio CMA              "

## 2020-02-18 NOTE — PROGRESS NOTES
Nursing Note:  Lowell Arredondo presents today for port labs.    Patient seen by provider today: Yes: Lianna MARIE   present during visit today: Not Applicable.    Note: N/A.    Intravenous Access:  Labs drawn without difficulty.  Implanted Port.    Discharge Plan:   Patient was sent to lobby to wait for PA appointment.    Precious Lazo RN

## 2020-02-24 ENCOUNTER — DOCUMENTATION ONLY (OUTPATIENT)
Dept: SURGERY | Facility: CLINIC | Age: 80
End: 2020-02-24

## 2020-02-24 ENCOUNTER — PATIENT OUTREACH (OUTPATIENT)
Dept: SURGERY | Facility: CLINIC | Age: 80
End: 2020-02-24

## 2020-02-24 ENCOUNTER — HOSPITAL ENCOUNTER (INPATIENT)
Facility: CLINIC | Age: 80
Setting detail: SURGERY ADMIT
End: 2020-02-24
Attending: SURGERY | Admitting: SURGERY
Payer: COMMERCIAL

## 2020-02-24 ENCOUNTER — PREP FOR PROCEDURE (OUTPATIENT)
Dept: SURGERY | Facility: CLINIC | Age: 80
End: 2020-02-24

## 2020-02-24 DIAGNOSIS — C25.9 PANCREAS CANCER (H): ICD-10-CM

## 2020-02-24 DIAGNOSIS — C25.9 PANCREAS CANCER (H): Primary | ICD-10-CM

## 2020-02-24 NOTE — TELEPHONE ENCOUNTER
Surgical Oncology RN Care Coordination Note:     Called and spoke with patient's wife regarding tentative date for surgery with Dr. Ryder. Informed her that based on current chemotherapy schedule we will plan for 5/21 and that closer to the time of surgery we will call to confirm the other pre surgery appointments. Informed her that we will continue to monitor his chemo schedule and if there are a delays we will make adjustments as needed. She verbalized understanding and agreed with plan.     Rosa Machado RN, BSN  Care Coordinator   [phone]

## 2020-02-24 NOTE — PROGRESS NOTES
I scheduled surgery for this patient with Dr. Ryder/Richard on 5/21 at [facility]. Scheduled per MD orders.    The RN has completed the education regarding the surgery, and they were provided a surgery packet with instructions and a map.     They are aware that they will get their finalized times at their appointment with PAC.    Per communication - I did not need to call the patient to provide any extra additional information. I will follow up if anything changes.

## 2020-02-27 ENCOUNTER — INFUSION THERAPY VISIT (OUTPATIENT)
Dept: INFUSION THERAPY | Facility: CLINIC | Age: 80
End: 2020-02-27
Attending: INTERNAL MEDICINE
Payer: COMMERCIAL

## 2020-02-27 VITALS
HEART RATE: 87 BPM | DIASTOLIC BLOOD PRESSURE: 61 MMHG | SYSTOLIC BLOOD PRESSURE: 106 MMHG | TEMPERATURE: 97.4 F | OXYGEN SATURATION: 99 % | RESPIRATION RATE: 16 BRPM

## 2020-02-27 DIAGNOSIS — K90.9 IRON MALABSORPTION: ICD-10-CM

## 2020-02-27 DIAGNOSIS — C61 PROSTATE CANCER (H): ICD-10-CM

## 2020-02-27 DIAGNOSIS — C25.2 MALIGNANT NEOPLASM OF TAIL OF PANCREAS (H): ICD-10-CM

## 2020-02-27 DIAGNOSIS — D50.9 IRON DEFICIENCY ANEMIA, UNSPECIFIED IRON DEFICIENCY ANEMIA TYPE: Primary | ICD-10-CM

## 2020-02-27 PROCEDURE — 25800030 ZZH RX IP 258 OP 636: Performed by: PHYSICIAN ASSISTANT

## 2020-02-27 PROCEDURE — 96374 THER/PROPH/DIAG INJ IV PUSH: CPT

## 2020-02-27 PROCEDURE — 25800030 ZZH RX IP 258 OP 636: Performed by: INTERNAL MEDICINE

## 2020-02-27 PROCEDURE — 96361 HYDRATE IV INFUSION ADD-ON: CPT

## 2020-02-27 PROCEDURE — 25000128 H RX IP 250 OP 636: Performed by: INTERNAL MEDICINE

## 2020-02-27 RX ORDER — HEPARIN SODIUM (PORCINE) LOCK FLUSH IV SOLN 100 UNIT/ML 100 UNIT/ML
5 SOLUTION INTRAVENOUS
Status: CANCELLED | OUTPATIENT
Start: 2020-03-03

## 2020-02-27 RX ORDER — HEPARIN SODIUM,PORCINE 10 UNIT/ML
5 VIAL (ML) INTRAVENOUS
Status: CANCELLED | OUTPATIENT
Start: 2020-03-03

## 2020-02-27 RX ORDER — HEPARIN SODIUM (PORCINE) LOCK FLUSH IV SOLN 100 UNIT/ML 100 UNIT/ML
5 SOLUTION INTRAVENOUS
Status: DISCONTINUED | OUTPATIENT
Start: 2020-02-27 | End: 2020-02-27 | Stop reason: HOSPADM

## 2020-02-27 RX ADMIN — SODIUM CHLORIDE 1000 ML: 9 INJECTION, SOLUTION INTRAVENOUS at 13:43

## 2020-02-27 RX ADMIN — FERRIC CARBOXYMALTOSE INJECTION 750 MG: 50 INJECTION, SOLUTION INTRAVENOUS at 13:43

## 2020-02-27 NOTE — PROGRESS NOTES
Infusion Nursing Note:  Lowell Arredondo presents today for IVF, injectafer.    Patient seen by provider today: No   present during visit today: Not Applicable.    Note: N/A.    Intravenous Access:  Implanted Port.    Treatment Conditions:  Not Applicable.      Post Infusion Assessment:  Patient tolerated infusion without incident.  Patient observed for 30 minutes post injectafer per protocol.  Site patent and intact, free from redness, edema or discomfort.  No evidence of extravasations.  Access discontinued per protocol.       Discharge Plan:   AVS to patient via MYCHART.    Patient discharged in stable condition accompanied by: wife.  Departure Mode: Ambulatory.    Danay Bautista RN

## 2020-03-02 ENCOUNTER — HOSPITAL ENCOUNTER (OUTPATIENT)
Facility: CLINIC | Age: 80
Setting detail: SPECIMEN
Discharge: HOME | End: 2020-03-02
Attending: INTERNAL MEDICINE | Admitting: INTERNAL MEDICINE
Payer: COMMERCIAL

## 2020-03-02 ENCOUNTER — ONCOLOGY VISIT (OUTPATIENT)
Dept: ONCOLOGY | Facility: CLINIC | Age: 80
End: 2020-03-02
Attending: INTERNAL MEDICINE
Payer: COMMERCIAL

## 2020-03-02 ENCOUNTER — INFUSION THERAPY VISIT (OUTPATIENT)
Dept: INFUSION THERAPY | Facility: CLINIC | Age: 80
End: 2020-03-02
Attending: INTERNAL MEDICINE
Payer: COMMERCIAL

## 2020-03-02 VITALS
SYSTOLIC BLOOD PRESSURE: 110 MMHG | TEMPERATURE: 97.7 F | DIASTOLIC BLOOD PRESSURE: 70 MMHG | OXYGEN SATURATION: 96 % | HEART RATE: 88 BPM | RESPIRATION RATE: 18 BRPM

## 2020-03-02 VITALS
BODY MASS INDEX: 23.46 KG/M2 | DIASTOLIC BLOOD PRESSURE: 61 MMHG | TEMPERATURE: 97.7 F | SYSTOLIC BLOOD PRESSURE: 95 MMHG | HEART RATE: 93 BPM | WEIGHT: 154.8 LBS | HEIGHT: 68 IN | RESPIRATION RATE: 16 BRPM | OXYGEN SATURATION: 96 %

## 2020-03-02 DIAGNOSIS — C25.2 MALIGNANT NEOPLASM OF TAIL OF PANCREAS (H): ICD-10-CM

## 2020-03-02 DIAGNOSIS — T45.1X5A CHEMOTHERAPY-INDUCED NEUTROPENIA (H): ICD-10-CM

## 2020-03-02 DIAGNOSIS — T45.1X5A POLYNEUROPATHY FOLLOWING CHEMOTHERAPY (H): ICD-10-CM

## 2020-03-02 DIAGNOSIS — G62.0 POLYNEUROPATHY FOLLOWING CHEMOTHERAPY (H): ICD-10-CM

## 2020-03-02 DIAGNOSIS — D70.1 CHEMOTHERAPY-INDUCED NEUTROPENIA (H): ICD-10-CM

## 2020-03-02 DIAGNOSIS — C25.2 MALIGNANT NEOPLASM OF TAIL OF PANCREAS (H): Primary | ICD-10-CM

## 2020-03-02 DIAGNOSIS — Z95.828 PORT-A-CATH IN PLACE: ICD-10-CM

## 2020-03-02 LAB
ALBUMIN SERPL-MCNC: 3 G/DL (ref 3.4–5)
ALP SERPL-CCNC: 67 U/L (ref 40–150)
ALT SERPL W P-5'-P-CCNC: 28 U/L (ref 0–70)
ANION GAP SERPL CALCULATED.3IONS-SCNC: 6 MMOL/L (ref 3–14)
AST SERPL W P-5'-P-CCNC: 21 U/L (ref 0–45)
BASOPHILS # BLD AUTO: 0 10E9/L (ref 0–0.2)
BASOPHILS NFR BLD AUTO: 0.8 %
BILIRUB SERPL-MCNC: 0.3 MG/DL (ref 0.2–1.3)
BUN SERPL-MCNC: 13 MG/DL (ref 7–30)
CALCIUM SERPL-MCNC: 8.7 MG/DL (ref 8.5–10.1)
CHLORIDE SERPL-SCNC: 110 MMOL/L (ref 94–109)
CO2 SERPL-SCNC: 25 MMOL/L (ref 20–32)
CREAT SERPL-MCNC: 1.03 MG/DL (ref 0.66–1.25)
DIFFERENTIAL METHOD BLD: ABNORMAL
EOSINOPHIL # BLD AUTO: 0.3 10E9/L (ref 0–0.7)
EOSINOPHIL NFR BLD AUTO: 5.7 %
ERYTHROCYTE [DISTWIDTH] IN BLOOD BY AUTOMATED COUNT: 18.1 % (ref 10–15)
GFR SERPL CREATININE-BSD FRML MDRD: 67 ML/MIN/{1.73_M2}
GLUCOSE SERPL-MCNC: 135 MG/DL (ref 70–99)
HCT VFR BLD AUTO: 30.1 % (ref 40–53)
HGB BLD-MCNC: 10 G/DL (ref 13.3–17.7)
IMM GRANULOCYTES # BLD: 0 10E9/L (ref 0–0.4)
IMM GRANULOCYTES NFR BLD: 0.2 %
LYMPHOCYTES # BLD AUTO: 1.9 10E9/L (ref 0.8–5.3)
LYMPHOCYTES NFR BLD AUTO: 40.3 %
MCH RBC QN AUTO: 31.6 PG (ref 26.5–33)
MCHC RBC AUTO-ENTMCNC: 33.2 G/DL (ref 31.5–36.5)
MCV RBC AUTO: 95 FL (ref 78–100)
MONOCYTES # BLD AUTO: 1.1 10E9/L (ref 0–1.3)
MONOCYTES NFR BLD AUTO: 23.5 %
NEUTROPHILS # BLD AUTO: 1.4 10E9/L (ref 1.6–8.3)
NEUTROPHILS NFR BLD AUTO: 29.5 %
NRBC # BLD AUTO: 0 10*3/UL
NRBC BLD AUTO-RTO: 0 /100
PLATELET # BLD AUTO: 273 10E9/L (ref 150–450)
PLATELET # BLD EST: ABNORMAL 10*3/UL
POTASSIUM SERPL-SCNC: 3.7 MMOL/L (ref 3.4–5.3)
PROT SERPL-MCNC: 6 G/DL (ref 6.8–8.8)
RBC # BLD AUTO: 3.16 10E12/L (ref 4.4–5.9)
RBC MORPH BLD: ABNORMAL
SODIUM SERPL-SCNC: 141 MMOL/L (ref 133–144)
WBC # BLD AUTO: 4.8 10E9/L (ref 4–11)

## 2020-03-02 PROCEDURE — 25000128 H RX IP 250 OP 636: Performed by: INTERNAL MEDICINE

## 2020-03-02 PROCEDURE — 96367 TX/PROPH/DG ADDL SEQ IV INF: CPT

## 2020-03-02 PROCEDURE — 86301 IMMUNOASSAY TUMOR CA 19-9: CPT | Performed by: INTERNAL MEDICINE

## 2020-03-02 PROCEDURE — 85025 COMPLETE CBC W/AUTO DIFF WBC: CPT | Performed by: INTERNAL MEDICINE

## 2020-03-02 PROCEDURE — 99214 OFFICE O/P EST MOD 30 MIN: CPT | Performed by: INTERNAL MEDICINE

## 2020-03-02 PROCEDURE — 25800030 ZZH RX IP 258 OP 636: Performed by: INTERNAL MEDICINE

## 2020-03-02 PROCEDURE — 25000128 H RX IP 250 OP 636: Performed by: NURSE PRACTITIONER

## 2020-03-02 PROCEDURE — 25800030 ZZH RX IP 258 OP 636: Performed by: NURSE PRACTITIONER

## 2020-03-02 PROCEDURE — 80053 COMPREHEN METABOLIC PANEL: CPT | Performed by: INTERNAL MEDICINE

## 2020-03-02 PROCEDURE — G0463 HOSPITAL OUTPT CLINIC VISIT: HCPCS | Mod: 25

## 2020-03-02 PROCEDURE — 96413 CHEMO IV INFUSION 1 HR: CPT

## 2020-03-02 RX ORDER — GABAPENTIN 300 MG/1
300 CAPSULE ORAL AT BEDTIME
Qty: 30 CAPSULE | Refills: 3 | Status: SHIPPED | OUTPATIENT
Start: 2020-03-02 | End: 2020-04-06

## 2020-03-02 RX ORDER — NALOXONE HYDROCHLORIDE 0.4 MG/ML
.1-.4 INJECTION, SOLUTION INTRAMUSCULAR; INTRAVENOUS; SUBCUTANEOUS
Status: CANCELLED | OUTPATIENT
Start: 2020-03-24

## 2020-03-02 RX ORDER — METHYLPREDNISOLONE SODIUM SUCCINATE 125 MG/2ML
125 INJECTION, POWDER, LYOPHILIZED, FOR SOLUTION INTRAMUSCULAR; INTRAVENOUS
Status: CANCELLED
Start: 2020-03-09

## 2020-03-02 RX ORDER — EPINEPHRINE 0.3 MG/.3ML
0.3 INJECTION SUBCUTANEOUS EVERY 5 MIN PRN
Status: CANCELLED | OUTPATIENT
Start: 2020-03-09

## 2020-03-02 RX ORDER — ALBUTEROL SULFATE 90 UG/1
1-2 AEROSOL, METERED RESPIRATORY (INHALATION)
Status: CANCELLED
Start: 2020-03-24

## 2020-03-02 RX ORDER — LORAZEPAM 2 MG/ML
0.5 INJECTION INTRAMUSCULAR EVERY 4 HOURS PRN
Status: CANCELLED
Start: 2020-03-02

## 2020-03-02 RX ORDER — NALOXONE HYDROCHLORIDE 0.4 MG/ML
.1-.4 INJECTION, SOLUTION INTRAMUSCULAR; INTRAVENOUS; SUBCUTANEOUS
Status: CANCELLED | OUTPATIENT
Start: 2020-03-02

## 2020-03-02 RX ORDER — SODIUM CHLORIDE 9 MG/ML
1000 INJECTION, SOLUTION INTRAVENOUS CONTINUOUS PRN
Status: CANCELLED
Start: 2020-03-09

## 2020-03-02 RX ORDER — NALOXONE HYDROCHLORIDE 0.4 MG/ML
.1-.4 INJECTION, SOLUTION INTRAMUSCULAR; INTRAVENOUS; SUBCUTANEOUS
Status: CANCELLED | OUTPATIENT
Start: 2020-03-09

## 2020-03-02 RX ORDER — LORAZEPAM 2 MG/ML
0.5 INJECTION INTRAMUSCULAR EVERY 4 HOURS PRN
Status: CANCELLED
Start: 2020-03-09

## 2020-03-02 RX ORDER — EPINEPHRINE 1 MG/ML
0.3 INJECTION, SOLUTION INTRAMUSCULAR; SUBCUTANEOUS EVERY 5 MIN PRN
Status: CANCELLED | OUTPATIENT
Start: 2020-03-02

## 2020-03-02 RX ORDER — DIPHENHYDRAMINE HYDROCHLORIDE 50 MG/ML
50 INJECTION INTRAMUSCULAR; INTRAVENOUS
Status: CANCELLED
Start: 2020-03-02

## 2020-03-02 RX ORDER — METHYLPREDNISOLONE SODIUM SUCCINATE 125 MG/2ML
125 INJECTION, POWDER, LYOPHILIZED, FOR SOLUTION INTRAMUSCULAR; INTRAVENOUS
Status: CANCELLED
Start: 2020-03-24

## 2020-03-02 RX ORDER — SODIUM CHLORIDE 9 MG/ML
1000 INJECTION, SOLUTION INTRAVENOUS CONTINUOUS PRN
Status: CANCELLED
Start: 2020-03-24

## 2020-03-02 RX ORDER — EPINEPHRINE 0.3 MG/.3ML
0.3 INJECTION SUBCUTANEOUS EVERY 5 MIN PRN
Status: CANCELLED | OUTPATIENT
Start: 2020-03-02

## 2020-03-02 RX ORDER — ALBUTEROL SULFATE 0.83 MG/ML
2.5 SOLUTION RESPIRATORY (INHALATION)
Status: CANCELLED | OUTPATIENT
Start: 2020-03-09

## 2020-03-02 RX ORDER — EPINEPHRINE 1 MG/ML
0.3 INJECTION, SOLUTION INTRAMUSCULAR; SUBCUTANEOUS EVERY 5 MIN PRN
Status: CANCELLED | OUTPATIENT
Start: 2020-03-09

## 2020-03-02 RX ORDER — ALBUTEROL SULFATE 90 UG/1
1-2 AEROSOL, METERED RESPIRATORY (INHALATION)
Status: CANCELLED
Start: 2020-03-09

## 2020-03-02 RX ORDER — ALBUTEROL SULFATE 90 UG/1
1-2 AEROSOL, METERED RESPIRATORY (INHALATION)
Status: CANCELLED
Start: 2020-03-02

## 2020-03-02 RX ORDER — ALBUTEROL SULFATE 0.83 MG/ML
2.5 SOLUTION RESPIRATORY (INHALATION)
Status: CANCELLED | OUTPATIENT
Start: 2020-03-02

## 2020-03-02 RX ORDER — METHYLPREDNISOLONE SODIUM SUCCINATE 125 MG/2ML
125 INJECTION, POWDER, LYOPHILIZED, FOR SOLUTION INTRAMUSCULAR; INTRAVENOUS
Status: CANCELLED
Start: 2020-03-02

## 2020-03-02 RX ORDER — EPINEPHRINE 1 MG/ML
0.3 INJECTION, SOLUTION INTRAMUSCULAR; SUBCUTANEOUS EVERY 5 MIN PRN
Status: CANCELLED | OUTPATIENT
Start: 2020-03-24

## 2020-03-02 RX ORDER — DIPHENHYDRAMINE HYDROCHLORIDE 50 MG/ML
50 INJECTION INTRAMUSCULAR; INTRAVENOUS
Status: CANCELLED
Start: 2020-03-24

## 2020-03-02 RX ORDER — HEPARIN SODIUM (PORCINE) LOCK FLUSH IV SOLN 100 UNIT/ML 100 UNIT/ML
5 SOLUTION INTRAVENOUS
Status: CANCELLED | OUTPATIENT
Start: 2020-03-02

## 2020-03-02 RX ORDER — DIPHENHYDRAMINE HYDROCHLORIDE 50 MG/ML
50 INJECTION INTRAMUSCULAR; INTRAVENOUS
Status: CANCELLED
Start: 2020-03-09

## 2020-03-02 RX ORDER — HEPARIN SODIUM,PORCINE 10 UNIT/ML
5 VIAL (ML) INTRAVENOUS
Status: CANCELLED | OUTPATIENT
Start: 2020-03-02

## 2020-03-02 RX ORDER — EPINEPHRINE 0.3 MG/.3ML
0.3 INJECTION SUBCUTANEOUS EVERY 5 MIN PRN
Status: CANCELLED | OUTPATIENT
Start: 2020-03-24

## 2020-03-02 RX ORDER — SODIUM CHLORIDE 9 MG/ML
1000 INJECTION, SOLUTION INTRAVENOUS CONTINUOUS PRN
Status: CANCELLED
Start: 2020-03-02

## 2020-03-02 RX ORDER — HEPARIN SODIUM (PORCINE) LOCK FLUSH IV SOLN 100 UNIT/ML 100 UNIT/ML
5 SOLUTION INTRAVENOUS
Status: DISCONTINUED | OUTPATIENT
Start: 2020-03-02 | End: 2020-03-02 | Stop reason: HOSPADM

## 2020-03-02 RX ORDER — ALBUTEROL SULFATE 0.83 MG/ML
2.5 SOLUTION RESPIRATORY (INHALATION)
Status: CANCELLED | OUTPATIENT
Start: 2020-03-24

## 2020-03-02 RX ORDER — LORAZEPAM 2 MG/ML
0.5 INJECTION INTRAMUSCULAR EVERY 4 HOURS PRN
Status: CANCELLED
Start: 2020-03-24

## 2020-03-02 RX ADMIN — GEMCITABINE 1800 MG: 38 INJECTION, SOLUTION INTRAVENOUS at 10:15

## 2020-03-02 RX ADMIN — DEXAMETHASONE SODIUM PHOSPHATE 12 MG: 10 INJECTION, SOLUTION INTRAMUSCULAR; INTRAVENOUS at 09:32

## 2020-03-02 RX ADMIN — SODIUM CHLORIDE 250 ML: 9 INJECTION, SOLUTION INTRAVENOUS at 09:32

## 2020-03-02 RX ADMIN — HEPARIN SODIUM (PORCINE) LOCK FLUSH IV SOLN 100 UNIT/ML 5 ML: 100 SOLUTION at 10:58

## 2020-03-02 RX ADMIN — SODIUM CHLORIDE 1000 ML: 9 INJECTION, SOLUTION INTRAVENOUS at 08:02

## 2020-03-02 ASSESSMENT — PAIN SCALES - GENERAL
PAINLEVEL: NO PAIN (0)
PAINLEVEL: NO PAIN (0)

## 2020-03-02 ASSESSMENT — MIFFLIN-ST. JEOR: SCORE: 1381.51

## 2020-03-02 NOTE — PROGRESS NOTES
"Oncology Rooming Note    March 2, 2020 8:21 AM   Lowell Arredondo is a 79 year old male who presents for:    Chief Complaint   Patient presents with     Oncology Clinic Visit     Initial Vitals: BP 95/61   Pulse 93   Temp 97.7  F (36.5  C) (Oral)   Resp 16   Wt 70.2 kg (154 lb 12.8 oz)   SpO2 96%   BMI 23.54 kg/m   Estimated body mass index is 23.54 kg/m  as calculated from the following:    Height as of 1/16/20: 1.727 m (5' 7.99\").    Weight as of this encounter: 70.2 kg (154 lb 12.8 oz). Body surface area is 1.84 meters squared.  No Pain (0) Comment: Data Unavailable   No LMP for male patient.  Allergies reviewed: Yes  Medications reviewed: Yes    Medications: Medication refills not needed today.  Pharmacy name entered into [employer]: [id number] - [place], [place] - [address]    Clinical concerns: no      Amber Gomez CMA            "

## 2020-03-02 NOTE — PROGRESS NOTES
Infusion Nursing Note:  Lowell Arredondo presents today for Gemzar.    Patient seen by provider today: Yes: Dr. Segura   present during visit today: Not Applicable.    Note: Abraxane dc'd secondary to neuropathy-gabapentin dose increaased.    Intravenous Access:  Implanted Port.    Treatment Conditions:  Lab Results   Component Value Date    HGB 10.0 03/02/2020     Lab Results   Component Value Date    WBC 4.8 03/02/2020      Lab Results   Component Value Date    ANEU 1.4 03/02/2020     Lab Results   Component Value Date     03/02/2020      Lab Results   Component Value Date     03/02/2020                   Lab Results   Component Value Date    POTASSIUM 3.7 03/02/2020           Lab Results   Component Value Date    MAG 2.0 01/25/2020            Lab Results   Component Value Date    CR 1.03 03/02/2020                   Lab Results   Component Value Date    VICENTE 8.7 03/02/2020                Lab Results   Component Value Date    BILITOTAL 0.3 03/02/2020           Lab Results   Component Value Date    ALBUMIN 3.0 03/02/2020                    Lab Results   Component Value Date    ALT 28 03/02/2020           Lab Results   Component Value Date    AST 21 03/02/2020       Results reviewed, labs MET treatment parameters, ok to proceed with treatment.      Post Infusion Assessment:  Patient tolerated infusion without incident.  Blood return noted pre and post infusion.  Site patent and intact, free from redness, edema or discomfort.  No evidence of extravasations.  Access discontinued per protocol.       Discharge Plan:   Discharge instructions reviewed with: Patient and family.  Patient and/or family verbalized understanding of discharge instructions and all questions answered.  AVS to patient via CadigoT.  Patient will return 3/9/20 at [facility] for next appointment.   Patient discharged in stable condition accompanied by: wife.  Departure Mode: Ambulatory.    Padmini Romo RN

## 2020-03-02 NOTE — PROGRESS NOTES
Visit Date:   03/02/2020     ONCOLOGY HISTORY: Mr. Arredondo is a gentleman with pancreatic cancer.T3 N1 M0. Stage IIB.   -Also has prostate cancer [place] 7 prostate.   1.  Prostate MRI on 07/20/2019 revealed PI-RADS 5.  No suspicious adenopathy or evidence of pelvic metastasis.   2.  Bone scan on 09/26/2019 does not reveal any bone metastasis.  There is some cardiac uptake.   3.  CT abdomen and pelvis on 09/26/2019 reveals new soft tissue mesenteric mass in the region of pancreatic tail.   4.  EUS on 10/07/2019 revealed an irregular mass in the pancreatic tail measuring 3.8 x 2.2 cm.  There was some evidence of invasion into the portal vein.  No lymphadenopathy seen.  Based on the EUS, it was T3 Nx disease.  FNA is positive for moderately differentiated pancreatic adenocarcinoma.   5. PET scan on 10/15/2019 reveals hypermetabolic 4.2 cm mass in the pancreatic tail and an adjacent 1 cm peripancreatic lymph node. No evidence of any metastatic disease. There are 2 foci of mild FDG uptake in the prostate gland from prostate cancer.   6. Neoadjuvant chemotherapy with gemcitabine and Abraxane started on 11/07/2019.       SUBJECTIVE:  Mr. Arredondo is an 79-year-old gentleman with resectable pancreatic cancer.  He is on neoadjuvant gemcitabine and Abraxane.  He has completed 4 cycles.      The patient is having neuropathy.  The patient says that he has pain in his feet.  He feels like he is walking on marbles.  Neuropathy has been getting worse.  As per the wife, his walking is not very stable.  He has mild neuropathy in the hands.  Things are not falling out of hands.      No headache.  No dizziness.  At times, he gets some blurring of vision.  No neck pain.  No chest pain.  No shortness of breath.  No abdominal pain, nausea or vomiting.  Appetite is fair.  No urinary or bowel complaints.  No bleeding.      PHYSICAL EXAMINATION:   GENERAL:  Alert and oriented x 3.   VITAL SIGNS:  Reviewed.  ECOG PS of 1.     EYES:  No  icterus.   THROAT:  No ulcer. No thrush.   NECK:  Supple. No lymphadenopathy. No thyromegaly.   AXILLAE:  No lymphadenopathy.   LUNGS:  Good air entry bilaterally.  No crackles or wheezing.   HEART:  Regular.  No murmur.   GI: Abdomen is soft.  Nontender. No mass.   EXTREMITIES:  No pedal edema.  No calf swelling or tenderness.   SKIN:  No rash.      LABORATORY DATA:  Reviewed.      ASSESSMENT:   1.  An 79-year-old gentleman with pancreatic cancer on neoadjuvant chemotherapy.   2.  Peripheral neuropathy.   3.  Prostate cancer on active surveillance.   4.  Fatigue from pancreatic cancer, chemotherapy and anemia.      PLAN:   1.  I discussed regarding pancreatic cancer.  The patient clinically is stable.      The patient is getting neuropathy.  Neuropathy is getting worse.  It is due to Abraxane.      He is also weaker.  Again, this is both due to pancreatic cancer and chemotherapy.      Discussed regarding further treatment.  I would recommend stopping the Abraxane.  I told him Abraxane is causing the neuropathy.  He is agreeable for it.  Abraxane will be stopped.  He will continue on gemcitabine.      2.  Discussed regarding neuropathy.  I told him neuropathy will take many weeks to improve. It should start to improve as Abraxane is being discontinued.      He is on Neurontin 100 mg at night.  We will slowly increase to 300 mg at night.  If he tolerates it, we will go to b.i.d. dosing.      3.  Fatigue is due to his chemotherapy and pancreatic cancer.  We are stopping Abraxane.  Hopefully, it will not get worse.      4.  He and his wife had a few questions, which were all answered.  I will see him in a month.  In between, he will see our nurse practitioner.         ROXY LANCASTER MD             D: 2020   T: 2020   MT: WEN      Name:     KARI YANG   MRN:      [id number]        Account:      [id number]   :      1940           Visit Date:   2020      Document: [id number]

## 2020-03-02 NOTE — PATIENT INSTRUCTIONS
1. Continue chemotherapy. Abraxane discontinued.  2. See NP with next chemotherapy. Arrange for next few chemotherapy at [facility].  3. See me in 1 month.   4. Increase gabapentin to 300 mg a day.    Patient in [facility]

## 2020-03-03 LAB — CANCER AG19-9 SERPL-ACNC: 755 U/ML (ref 0–37)

## 2020-03-07 NOTE — PROGRESS NOTES
Visit Date:   03/02/2020     ONCOLOGY HISTORY: Mr. Arredondo is a gentleman with pancreatic cancer.T3 N1 M0. Stage IIB.   -Also has prostate cancer [place] 7 prostate.   1.  Prostate MRI on 07/20/2019 revealed PI-RADS 5.  No suspicious adenopathy or evidence of pelvic metastasis.   2.  Bone scan on 09/26/2019 does not reveal any bone metastasis.  There is some cardiac uptake.   3.  CT abdomen and pelvis on 09/26/2019 reveals new soft tissue mesenteric mass in the region of pancreatic tail.   4.  EUS on 10/07/2019 revealed an irregular mass in the pancreatic tail measuring 3.8 x 2.2 cm.  There was some evidence of invasion into the portal vein.  No lymphadenopathy seen.  Based on the EUS, it was T3 Nx disease.  FNA is positive for moderately differentiated pancreatic adenocarcinoma.   5. PET scan on 10/15/2019 reveals hypermetabolic 4.2 cm mass in the pancreatic tail and an adjacent 1 cm peripancreatic lymph node. No evidence of any metastatic disease. There are 2 foci of mild FDG uptake in the prostate gland from prostate cancer.   6. Neoadjuvant chemotherapy with gemcitabine and Abraxane started on 11/07/2019.       SUBJECTIVE:  Mr. Arredondo is an 79-year-old gentleman with resectable pancreatic cancer.  He is on neoadjuvant gemcitabine and Abraxane.  He has completed 4 cycles.      The patient is having neuropathy.  The patient says that he has pain in his feet.  He feels like he is walking on marbles.  Neuropathy has been getting worse.  As per the wife, his walking is not very stable.  He has mild neuropathy in the hands.  Things are not falling out of hands.      No headache.  No dizziness.  At times, he gets some blurring of vision.  No neck pain.  No chest pain.  No shortness of breath.  No abdominal pain, nausea or vomiting.  Appetite is fair.  No urinary or bowel complaints.  No bleeding.      PHYSICAL EXAMINATION:   GENERAL:  Alert and oriented x 3.   VITAL SIGNS:  Reviewed.  ECOG PS of 1.     EYES:  No  icterus.   THROAT:  No ulcer. No thrush.   NECK:  Supple. No lymphadenopathy. No thyromegaly.   AXILLAE:  No lymphadenopathy.   LUNGS:  Good air entry bilaterally.  No crackles or wheezing.   HEART:  Regular.  No murmur.   GI: Abdomen is soft.  Nontender. No mass.   EXTREMITIES:  No pedal edema.  No calf swelling or tenderness.   SKIN:  No rash.      LABORATORY DATA:  Reviewed.      ASSESSMENT:   1.  An 79-year-old gentleman with pancreatic cancer on neoadjuvant chemotherapy.   2.  Peripheral neuropathy.   3.  Prostate cancer on active surveillance.   4.  Fatigue from pancreatic cancer, chemotherapy and anemia.      PLAN:   1.  I discussed regarding pancreatic cancer.  The patient clinically is stable.      The patient is getting neuropathy.  Neuropathy is getting worse.  It is due to Abraxane.      He is also weaker.  Again, this is both due to pancreatic cancer and chemotherapy.      Discussed regarding further treatment.  I would recommend stopping the Abraxane.  I told him Abraxane is causing the neuropathy.  He is agreeable for it.  Abraxane will be stopped.  He will continue on gemcitabine.      2.  Discussed regarding neuropathy.  I told him neuropathy will take many weeks to improve. It should start to improve as Abraxane is being discontinued.      He is on Neurontin 100 mg at night.  We will slowly increase to 300 mg at night.  If he tolerates it, we will go to b.i.d. dosing.      3.  Fatigue is due to his chemotherapy and pancreatic cancer.  We are stopping Abraxane.  Hopefully, it will not get worse.      4.  He and his wife had a few questions, which were all answered.  I will see him in a month.  In between, he will see our nurse practitioner.         ROXY LANCASTER MD             D: 2020   T: 2020   MT: WEN      Name:     KARI YANG   MRN:      [id number]        Account:      [id number]   :      1940           Visit Date:   2020      Document: [id number]

## 2020-03-09 ENCOUNTER — INFUSION THERAPY VISIT (OUTPATIENT)
Dept: INFUSION THERAPY | Facility: CLINIC | Age: 80
End: 2020-03-09
Attending: PHYSICIAN ASSISTANT
Payer: COMMERCIAL

## 2020-03-09 ENCOUNTER — HOSPITAL ENCOUNTER (OUTPATIENT)
Facility: CLINIC | Age: 80
Setting detail: SPECIMEN
Discharge: HOME | End: 2020-03-09
Attending: PHYSICIAN ASSISTANT | Admitting: INTERNAL MEDICINE
Payer: COMMERCIAL

## 2020-03-09 ENCOUNTER — ONCOLOGY VISIT (OUTPATIENT)
Dept: ONCOLOGY | Facility: CLINIC | Age: 80
End: 2020-03-09
Attending: PHYSICIAN ASSISTANT
Payer: COMMERCIAL

## 2020-03-09 VITALS
DIASTOLIC BLOOD PRESSURE: 63 MMHG | RESPIRATION RATE: 16 BRPM | SYSTOLIC BLOOD PRESSURE: 101 MMHG | TEMPERATURE: 96.9 F | HEART RATE: 83 BPM | OXYGEN SATURATION: 99 % | BODY MASS INDEX: 22.81 KG/M2 | WEIGHT: 150 LBS

## 2020-03-09 DIAGNOSIS — T45.1X5A POLYNEUROPATHY FOLLOWING CHEMOTHERAPY (H): ICD-10-CM

## 2020-03-09 DIAGNOSIS — C25.2 MALIGNANT NEOPLASM OF TAIL OF PANCREAS (H): Primary | ICD-10-CM

## 2020-03-09 DIAGNOSIS — C25.2 MALIGNANT NEOPLASM OF TAIL OF PANCREAS (H): ICD-10-CM

## 2020-03-09 DIAGNOSIS — T45.1X5A CHEMOTHERAPY-INDUCED NEUTROPENIA (H): Primary | ICD-10-CM

## 2020-03-09 DIAGNOSIS — G62.0 POLYNEUROPATHY FOLLOWING CHEMOTHERAPY (H): ICD-10-CM

## 2020-03-09 DIAGNOSIS — D70.1 CHEMOTHERAPY-INDUCED NEUTROPENIA (H): Primary | ICD-10-CM

## 2020-03-09 LAB
BASOPHILS # BLD AUTO: 0.1 10E9/L (ref 0–0.2)
BASOPHILS NFR BLD AUTO: 1.7 %
DIFFERENTIAL METHOD BLD: ABNORMAL
EOSINOPHIL # BLD AUTO: 0.1 10E9/L (ref 0–0.7)
EOSINOPHIL NFR BLD AUTO: 1.7 %
ERYTHROCYTE [DISTWIDTH] IN BLOOD BY AUTOMATED COUNT: 18.1 % (ref 10–15)
HCT VFR BLD AUTO: 31.9 % (ref 40–53)
HGB BLD-MCNC: 10.1 G/DL (ref 13.3–17.7)
IMM GRANULOCYTES # BLD: 0 10E9/L (ref 0–0.4)
IMM GRANULOCYTES NFR BLD: 0 %
LYMPHOCYTES # BLD AUTO: 2.4 10E9/L (ref 0.8–5.3)
LYMPHOCYTES NFR BLD AUTO: 66 %
MCH RBC QN AUTO: 31.7 PG (ref 26.5–33)
MCHC RBC AUTO-ENTMCNC: 31.7 G/DL (ref 31.5–36.5)
MCV RBC AUTO: 100 FL (ref 78–100)
MONOCYTES # BLD AUTO: 0.6 10E9/L (ref 0–1.3)
MONOCYTES NFR BLD AUTO: 15.3 %
NEUTROPHILS # BLD AUTO: 0.6 10E9/L (ref 1.6–8.3)
NEUTROPHILS NFR BLD AUTO: 15.3 %
NRBC # BLD AUTO: 0 10*3/UL
NRBC BLD AUTO-RTO: 0 /100
PLATELET # BLD AUTO: 307 10E9/L (ref 150–450)
RBC # BLD AUTO: 3.19 10E12/L (ref 4.4–5.9)
WBC # BLD AUTO: 3.6 10E9/L (ref 4–11)

## 2020-03-09 PROCEDURE — 25000128 H RX IP 250 OP 636: Performed by: PHYSICIAN ASSISTANT

## 2020-03-09 PROCEDURE — 99214 OFFICE O/P EST MOD 30 MIN: CPT | Performed by: PHYSICIAN ASSISTANT

## 2020-03-09 PROCEDURE — G0463 HOSPITAL OUTPT CLINIC VISIT: HCPCS | Mod: 25

## 2020-03-09 PROCEDURE — 85025 COMPLETE CBC W/AUTO DIFF WBC: CPT | Performed by: INTERNAL MEDICINE

## 2020-03-09 PROCEDURE — 96372 THER/PROPH/DIAG INJ SC/IM: CPT

## 2020-03-09 RX ORDER — HEPARIN SODIUM (PORCINE) LOCK FLUSH IV SOLN 100 UNIT/ML 100 UNIT/ML
5 SOLUTION INTRAVENOUS EVERY 8 HOURS
Status: DISCONTINUED | OUTPATIENT
Start: 2020-03-09 | End: 2020-03-09 | Stop reason: HOSPADM

## 2020-03-09 RX ADMIN — FILGRASTIM 300 MCG: 300 INJECTION, SOLUTION INTRAVENOUS; SUBCUTANEOUS at 10:24

## 2020-03-09 RX ADMIN — SODIUM CHLORIDE, PRESERVATIVE FREE 5 ML: 5 INJECTION INTRAVENOUS at 10:20

## 2020-03-09 ASSESSMENT — PAIN SCALES - GENERAL: PAINLEVEL: MODERATE PAIN (5)

## 2020-03-09 NOTE — NURSING NOTE
"Oncology Rooming Note    March 9, 2020 9:14 AM   Lowell Arredondo is a 79 year old male who presents for:    Chief Complaint   Patient presents with     Oncology Clinic Visit     Pancreas cancer, Prostate cancer     Initial Vitals: /63   Pulse 83   Temp 96.9  F (36.1  C) (Oral)   Resp 16   Wt 68 kg (150 lb)   SpO2 99%   BMI 22.81 kg/m   Estimated body mass index is 22.81 kg/m  as calculated from the following:    Height as of 3/2/20: 1.727 m (5' 7.99\").    Weight as of this encounter: 68 kg (150 lb). Body surface area is 1.81 meters squared.  Moderate Pain (5) Comment: Data Unavailable   No LMP for male patient.  Allergies reviewed: Yes  Medications reviewed: Yes    Medications: Medication refills not needed today.  Pharmacy name entered into [employer]: [id number] - [place], [place] - [id number]    Clinical concerns: f/u       Tigist Nicolas CMA              "

## 2020-03-09 NOTE — PROGRESS NOTES
Oncology/Hematology Visit Note    Mar 9, 2020    Reason for visit: Follow up pancreatic cancer    Oncology HPI: Lowell Arredondo is a 79 year old male with a h/o prostate cancer and now with pancreatic cancer. Due to his prostate cancer, he had multiple surveillance scans and CT on 9/26/19 revealed a new soft tissue mesenteric mass in the pancreatic tail. EUS on 10/7/19 revealed moderately differentiated pancreatic adenocarcinoma. PET scan with no metastatic disease. He established care with Dr Segura and discussed starting curative intent, neoadjuvant chemotherapy and then surgery. He completed gemcitabine/Abraxane C1D1 on 11/7/19.     He was in the ED on 11/19/19 for syncope.  He was exercising at the [facility] and had a syncopal event.  ED work-up was extensive and he was discharged. He was seen in infusion for a rash when he received cycle 2 and likely from gemcitabine, started on cortisone. He has been getting Neupogen regularly due to persistent neutropenia.  Restaging CT on 1/9/2020 with treatment response.  He followed up with Dr. Segura on 2/3/2020 and Neupogen has been discontinued due to side effects.    He completed 4 cycles of gemcitabine/Abraxane, but his neuropathy was getting worse, therefore Dr Segura discontinued Abraxane with the start of cycle 5.     He is here today for gemcitabine C5D8.     Interval History: Lowell is here with his wife, June. He is doing well today. His neuropathy is persistent and he started gabapentin 300 mg last night.  He feels a little different than usual this morning, but he feels he tolerated it okay.  He has noticed a little improvement with his neuropathy since discontinuing the Abraxane.  He is otherwise doing okay.  Denies nausea, vomiting, fever, chills, bleeding.  No other new complaints.    Review of Systems: See interval hx. Denies fevers, chills, HA, dizziness, changes in vision, cough, sore throat, CP, SOB, abdominal pain, N/V, diarrhea, changes in urination, bleeding,  bruising, rash.      PMHx and Social Hx reviewed per EPIC.      Medications:  Current Outpatient Medications   Medication Sig Dispense Refill     gabapentin (NEURONTIN) 300 MG capsule Take 1 capsule (300 mg) by mouth At Bedtime 30 capsule 3     loratadine (CLARITIN) 10 MG tablet Take 10 mg by mouth daily       LORazepam (ATIVAN) 0.5 MG tablet Take 1 tablet (0.5 mg) by mouth every 4 hours as needed (Anxiety, Nausea/Vomiting or Sleep) 30 tablet 2     polyethylene glycol (MIRALAX/GLYCOLAX) packet Take 1 packet by mouth daily       psyllium (METAMUCIL) 28.3 % POWD        tamsulosin (FLOMAX) 80 mcg/mL Take 0.4 mg by mouth       omeprazole (PRILOSEC) 20 MG DR capsule Take 1 capsule (20 mg) by mouth daily (Patient not taking: Reported on 3/9/2020) 90 capsule 1     prochlorperazine (COMPAZINE) 10 MG tablet Take 1 tablet (10 mg) by mouth every 6 hours as needed (Nausea/Vomiting) (Patient not taking: Reported on 3/9/2020) 30 tablet 2       Allergies   Allergen Reactions     Demerol [Meperidine] Difficulty breathing       EXAM:    /63   Pulse 83   Temp 96.9  F (36.1  C) (Oral)   Resp 16   Wt 68 kg (150 lb)   SpO2 99%   BMI 22.81 kg/m       GENERAL:  Male, in no acute distress.  Alert and oriented x3.   HEENT:  Normocephalic, atraumatic.  PERRL, oropharynx clear with no sores or thrush.   LYMPH NODES:  No palpable pre/post-auricular, cervical, axillary lymphadenopathy appreciated.  CV:  RRR, No murmurs, gallops, or rubs.   LUNGS:  Clear to auscultation bilaterally.   ABDOMEN:  Soft, nontender and nondistended.  Bowel sounds heard x4.  No apparent hepatosplenomegaly.   EXTREMITIES:  No clubbing, cyanosis, or edema.   SKIN: No rash on exposed skin.  PSYCH: Mood stable      Labs:   Results for KARI YANG (MRN [id number]) as of 3/9/2020 15:46   3/9/2020 08:55   WBC 3.6 (L)   Hemoglobin 10.1 (L)   Hematocrit 31.9 (L)   Platelet Count 307   RBC Count 3.19 (L)      MCH 31.7   MCHC 31.7   RDW 18.1 (H)   Diff  Method Automated Method   % Neutrophils 15.3   % Lymphocytes 66.0   % Monocytes 15.3   % Eosinophils 1.7   % Basophils 1.7   % Immature Granulocytes 0.0   Nucleated RBCs 0   Absolute Neutrophil 0.6 (L)   Absolute Lymphocytes 2.4   Absolute Monocytes 0.6   Absolute Eosinophils 0.1   Absolute Basophils 0.1   Abs Immature Granulocytes 0.0   Absolute Nucleated RBC 0.0       Imaging: n/a    Impression/Plan: Loewll Arredondo is a 79 year old male with pancreatic cancer currently on neoadjuvant chemotherapy with gemcitabine/Abraxane.    Pancreatic cancer: Started neoadjuvant chemotherapy with gemcitabine/Abraxane, C1 D1 completed on 11/7/2019.  He was neutropenic for C1D15, therefore deferred a week and he received 2 doses of Neupogen.  He continued on Neupogen, but felt he was having strong side effects and Dr. Segura removed this from the plan.  Neuropathy worsened and Abraxane was removed from the chemo plan.  His ANC is low at 0.6, therefore chemotherapy will need to be deferred today.  I spoke with Dr. Segura and plan for Neupogen today and tomorrow and we will defer C3 D8 next week. We will continue neupogen after each treatment and monitor his symptoms.   --3/16 deferred C3D8  --3/25 Lianna, C3D15    Heme:   --Leukopenia/neutropenia: WBC 3.6, but ANC 0.6.  We will need to hold chemotherapy, see above.  Plan for Neupogen today and tomorrow and again next week after chemo.  He is afebrile today and we discussed neutropenic precautions.  --Anemia: Hemoglobin has been fluctuating and stable at 10.1 today.  Iron deficiency anemia and he received Injectafer x2 on 2/27 and 2/18/2020.  --Thrombocytopenia: Platelets normal at 307.  No active bleeding.  Transfuse with platelets <20.    Fatigue: Multifactorial from chemotherapy, iron deficiency anemia.  Overall improvement since removing Abraxane and receiving Injectafer.    PN: Worsening and Abraxane was removed from the chemotherapy plan.  He is currently on gabapentin 300 mg  and started his first dose last night.  We discussed taking this slowly and if he continues to have neuropathy during the day, he may add a late afternoon dose of gabapentin 100 mg.    Rash: Significant improvement since applying cortisone and lotion.    Syncope: Syncopal episode while at the [facility] on 11/19/19 and ED work-up negative. Another episode on 1/16/20 and workup neg in ED again. No sx since then. I recommended he contact his clinic and see if a holter monitor would be indicated for 2 syncopal events now.       Chart documentation with Dragon Voice recognition Software. Although reviewed after completion, some words and grammatical errors may remain.      Lianna Vyas PA-C  Hematology/Oncology  [facility]

## 2020-03-09 NOTE — PATIENT INSTRUCTIONS
Rescheduled appts and added neupogen som      Gemzar held on 3/9/20.  Neupogen on 3/9/20 and 3/10/20.  Labs + C3D8 Gemzar scheduled on 3/16/20 at [facility].  LabsDr. Segura + C3D15 Gemzar scheduled on 3/24/20 at [facility].  Neupogen scheduled on 3/25/20 and 3/26/20.  LabsDr. Segura + C4D1 Gemzar scheduled on 4/6/20 at [facility].  aHnane Weaver, RN, BSN, OCN on 3/10/2020 at 11:59 AM

## 2020-03-09 NOTE — PROGRESS NOTES
Infusion Nursing Note:  Lowell GENTILE Socrateskarla presents today for Neupogen/C5D8 Gemzar HELD.    Patient seen by provider today: Yes: Lianna   present during visit today: Not Applicable.    Note: Assessment done by PA at appointment.  Gemzar Held.  Neupogen given.  Patient aware of how to take Claritin at home.      Intravenous Access:  Implanted Port accessed in fast track.    Treatment Conditions:  Lab Results   Component Value Date    HGB 10.1 03/09/2020     Lab Results   Component Value Date    WBC 3.6 03/09/2020      Lab Results   Component Value Date    ANEU 0.6 03/09/2020     Lab Results   Component Value Date     03/09/2020      Results reviewed, labs did NOT meet treatment parameters: chemo HELD.  Neupogen given      Post Infusion Assessment:  Patient tolerated injection without incident.  Access discontinued per protocol.       Discharge Plan:   Discharge instructions reviewed with: Patient.  Patient discharged in stable condition accompanied by: wife.  Departure Mode: Ambulatory.  Next injection scheduled for 3/10/2020.    NIKKI ASHLEY RN

## 2020-03-09 NOTE — LETTER
3/9/2020         RE: Lowell Arredondo  [address]  [place] [address]        Dear Colleague,    Thank you for referring your patient, Lowell Arredondo, to the [facility]. Please see a copy of my visit note below.    Oncology/Hematology Visit Note    Mar 9, 2020    Reason for visit: Follow up pancreatic cancer    Oncology HPI: Lowell Arredondo is a 79 year old male with a h/o prostate cancer and now with pancreatic cancer. Due to his prostate cancer, he had multiple surveillance scans and CT on 9/26/19 revealed a new soft tissue mesenteric mass in the pancreatic tail. EUS on 10/7/19 revealed moderately differentiated pancreatic adenocarcinoma. PET scan with no metastatic disease. He established care with Dr Segura and discussed starting curative intent, neoadjuvant chemotherapy and then surgery. He completed gemcitabine/Abraxane C1D1 on 11/7/19.     He was in the ED on 11/19/19 for syncope.  He was exercising at the [facility] and had a syncopal event.  ED work-up was extensive and he was discharged. He was seen in infusion for a rash when he received cycle 2 and likely from gemcitabine, started on cortisone. He has been getting Neupogen regularly due to persistent neutropenia.  Restaging CT on 1/9/2020 with treatment response.  He followed up with Dr. Segura on 2/3/2020 and Neupogen has been discontinued due to side effects.    He completed 4 cycles of gemcitabine/Abraxane, but his neuropathy was getting worse, therefore Dr Segura discontinued Abraxane with the start of cycle 5.     He is here today for gemcitabine C5D8.     Interval History: Lowell is here with his wife, June. He is doing well today. His neuropathy is persistent and he started gabapentin 300 mg last night.  He feels a little different than usual this morning, but he feels he tolerated it okay.  He has noticed a little improvement with his neuropathy since discontinuing the Abraxane.  He is otherwise doing okay.  Denies nausea,  vomiting, fever, chills, bleeding.  No other new complaints.    Review of Systems: See interval hx. Denies fevers, chills, HA, dizziness, changes in vision, cough, sore throat, CP, SOB, abdominal pain, N/V, diarrhea, changes in urination, bleeding, bruising, rash.      PMHx and Social Hx reviewed per EPIC.      Medications:  Current Outpatient Medications   Medication Sig Dispense Refill     gabapentin (NEURONTIN) 300 MG capsule Take 1 capsule (300 mg) by mouth At Bedtime 30 capsule 3     loratadine (CLARITIN) 10 MG tablet Take 10 mg by mouth daily       LORazepam (ATIVAN) 0.5 MG tablet Take 1 tablet (0.5 mg) by mouth every 4 hours as needed (Anxiety, Nausea/Vomiting or Sleep) 30 tablet 2     polyethylene glycol (MIRALAX/GLYCOLAX) packet Take 1 packet by mouth daily       psyllium (METAMUCIL) 28.3 % POWD        tamsulosin (FLOMAX) 80 mcg/mL Take 0.4 mg by mouth       omeprazole (PRILOSEC) 20 MG DR capsule Take 1 capsule (20 mg) by mouth daily (Patient not taking: Reported on 3/9/2020) 90 capsule 1     prochlorperazine (COMPAZINE) 10 MG tablet Take 1 tablet (10 mg) by mouth every 6 hours as needed (Nausea/Vomiting) (Patient not taking: Reported on 3/9/2020) 30 tablet 2       Allergies   Allergen Reactions     Demerol [Meperidine] Difficulty breathing       EXAM:    /63   Pulse 83   Temp 96.9  F (36.1  C) (Oral)   Resp 16   Wt 68 kg (150 lb)   SpO2 99%   BMI 22.81 kg/m       GENERAL:  Male, in no acute distress.  Alert and oriented x3.   HEENT:  Normocephalic, atraumatic.  PERRL, oropharynx clear with no sores or thrush.   LYMPH NODES:  No palpable pre/post-auricular, cervical, axillary lymphadenopathy appreciated.  CV:  RRR, No murmurs, gallops, or rubs.   LUNGS:  Clear to auscultation bilaterally.   ABDOMEN:  Soft, nontender and nondistended.  Bowel sounds heard x4.  No apparent hepatosplenomegaly.   EXTREMITIES:  No clubbing, cyanosis, or edema.   SKIN: No rash on exposed skin.  PSYCH: Mood  stable      Labs:   Results for KARI YANG (MRN [id number]) as of 3/9/2020 15:46   3/9/2020 08:55   WBC 3.6 (L)   Hemoglobin 10.1 (L)   Hematocrit 31.9 (L)   Platelet Count 307   RBC Count 3.19 (L)      MCH 31.7   MCHC 31.7   RDW 18.1 (H)   Diff Method Automated Method   % Neutrophils 15.3   % Lymphocytes 66.0   % Monocytes 15.3   % Eosinophils 1.7   % Basophils 1.7   % Immature Granulocytes 0.0   Nucleated RBCs 0   Absolute Neutrophil 0.6 (L)   Absolute Lymphocytes 2.4   Absolute Monocytes 0.6   Absolute Eosinophils 0.1   Absolute Basophils 0.1   Abs Immature Granulocytes 0.0   Absolute Nucleated RBC 0.0       Imaging: n/a    Impression/Plan: Kari Yang is a 79 year old male with pancreatic cancer currently on neoadjuvant chemotherapy with gemcitabine/Abraxane.    Pancreatic cancer: Started neoadjuvant chemotherapy with gemcitabine/Abraxane, C1 D1 completed on 11/7/2019.  He was neutropenic for C1D15, therefore deferred a week and he received 2 doses of Neupogen.  He continued on Neupogen, but felt he was having strong side effects and Dr. Segura removed this from the plan.  Neuropathy worsened and Abraxane was removed from the chemo plan.  His ANC is low at 0.6, therefore chemotherapy will need to be deferred today.  I spoke with Dr. Segura and plan for Neupogen today and tomorrow and we will defer C3 D8 next week. We will continue neupogen after each treatment and monitor his symptoms.   --3/16 deferred C3D8  --3/25 Lianna C3D15    Heme:   --Leukopenia/neutropenia: WBC 3.6, but ANC 0.6.  We will need to hold chemotherapy, see above.  Plan for Neupogen today and tomorrow and again next week after chemo.  He is afebrile today and we discussed neutropenic precautions.  --Anemia: Hemoglobin has been fluctuating and stable at 10.1 today.  Iron deficiency anemia and he received Injectafer x2 on 2/27 and 2/18/2020.  --Thrombocytopenia: Platelets normal at 307.  No active bleeding.  Transfuse with  platelets <20.    Fatigue: Multifactorial from chemotherapy, iron deficiency anemia.  Overall improvement since removing Abraxane and receiving Injectafer.    PN: Worsening and Abraxane was removed from the chemotherapy plan.  He is currently on gabapentin 300 mg and started his first dose last night.  We discussed taking this slowly and if he continues to have neuropathy during the day, he may add a late afternoon dose of gabapentin 100 mg.    Rash: Significant improvement since applying cortisone and lotion.    Syncope: Syncopal episode while at the [facility] on 11/19/19 and ED work-up negative. Another episode on 1/16/20 and workup neg in ED again. No sx since then. I recommended he contact his clinic and see if a holter monitor would be indicated for 2 syncopal events now.       Chart documentation with Dragon Voice recognition Software. Although reviewed after completion, some words and grammatical errors may remain.      Lianna Vyas PA-C  Hematology/Oncology  [facility]                  Again, thank you for allowing me to participate in the care of your patient.        Sincerely,        Lianna Vyas PA-C

## 2020-03-10 ENCOUNTER — HOSPITAL ENCOUNTER (OUTPATIENT)
Facility: CLINIC | Age: 80
Setting detail: SPECIMEN
End: 2020-03-10
Attending: PHYSICIAN ASSISTANT
Payer: COMMERCIAL

## 2020-03-10 ENCOUNTER — ALLIED HEALTH/NURSE VISIT (OUTPATIENT)
Dept: INFUSION THERAPY | Facility: CLINIC | Age: 80
End: 2020-03-10
Attending: PHYSICIAN ASSISTANT
Payer: COMMERCIAL

## 2020-03-10 VITALS — TEMPERATURE: 97.4 F | DIASTOLIC BLOOD PRESSURE: 62 MMHG | HEART RATE: 60 BPM | SYSTOLIC BLOOD PRESSURE: 101 MMHG

## 2020-03-10 DIAGNOSIS — C25.2 MALIGNANT NEOPLASM OF TAIL OF PANCREAS (H): ICD-10-CM

## 2020-03-10 DIAGNOSIS — D70.1 CHEMOTHERAPY-INDUCED NEUTROPENIA (H): Primary | ICD-10-CM

## 2020-03-10 DIAGNOSIS — T45.1X5A CHEMOTHERAPY-INDUCED NEUTROPENIA (H): Primary | ICD-10-CM

## 2020-03-10 PROCEDURE — 25000128 H RX IP 250 OP 636: Performed by: PHYSICIAN ASSISTANT

## 2020-03-10 PROCEDURE — 96372 THER/PROPH/DIAG INJ SC/IM: CPT

## 2020-03-10 RX ADMIN — FILGRASTIM 300 MCG: 300 INJECTION, SOLUTION INTRAVENOUS; SUBCUTANEOUS at 10:59

## 2020-03-16 ENCOUNTER — INFUSION THERAPY VISIT (OUTPATIENT)
Dept: INFUSION THERAPY | Facility: CLINIC | Age: 80
End: 2020-03-16
Attending: INTERNAL MEDICINE
Payer: COMMERCIAL

## 2020-03-16 ENCOUNTER — HOSPITAL ENCOUNTER (OUTPATIENT)
Facility: CLINIC | Age: 80
Setting detail: SPECIMEN
Discharge: HOME | End: 2020-03-16
Attending: INTERNAL MEDICINE | Admitting: INTERNAL MEDICINE
Payer: COMMERCIAL

## 2020-03-16 VITALS
SYSTOLIC BLOOD PRESSURE: 106 MMHG | TEMPERATURE: 97.3 F | HEIGHT: 68 IN | WEIGHT: 153 LBS | HEART RATE: 80 BPM | BODY MASS INDEX: 23.19 KG/M2 | RESPIRATION RATE: 16 BRPM | DIASTOLIC BLOOD PRESSURE: 66 MMHG

## 2020-03-16 DIAGNOSIS — T45.1X5A CHEMOTHERAPY-INDUCED NEUTROPENIA (H): ICD-10-CM

## 2020-03-16 DIAGNOSIS — C25.2 MALIGNANT NEOPLASM OF TAIL OF PANCREAS (H): Primary | ICD-10-CM

## 2020-03-16 DIAGNOSIS — D70.1 CHEMOTHERAPY-INDUCED NEUTROPENIA (H): ICD-10-CM

## 2020-03-16 DIAGNOSIS — Z95.828 PORT-A-CATH IN PLACE: ICD-10-CM

## 2020-03-16 LAB
BASOPHILS # BLD AUTO: 0 10E9/L (ref 0–0.2)
BASOPHILS NFR BLD AUTO: 0.7 %
DIFFERENTIAL METHOD BLD: ABNORMAL
EOSINOPHIL # BLD AUTO: 0.3 10E9/L (ref 0–0.7)
EOSINOPHIL NFR BLD AUTO: 4.9 %
ERYTHROCYTE [DISTWIDTH] IN BLOOD BY AUTOMATED COUNT: 18.7 % (ref 10–15)
HCT VFR BLD AUTO: 35.8 % (ref 40–53)
HGB BLD-MCNC: 11.5 G/DL (ref 13.3–17.7)
IMM GRANULOCYTES # BLD: 0 10E9/L (ref 0–0.4)
IMM GRANULOCYTES NFR BLD: 0.7 %
LYMPHOCYTES # BLD AUTO: 2.7 10E9/L (ref 0.8–5.3)
LYMPHOCYTES NFR BLD AUTO: 47.5 %
MCH RBC QN AUTO: 31.4 PG (ref 26.5–33)
MCHC RBC AUTO-ENTMCNC: 32.1 G/DL (ref 31.5–36.5)
MCV RBC AUTO: 98 FL (ref 78–100)
MONOCYTES # BLD AUTO: 0.9 10E9/L (ref 0–1.3)
MONOCYTES NFR BLD AUTO: 16.3 %
NEUTROPHILS # BLD AUTO: 1.7 10E9/L (ref 1.6–8.3)
NEUTROPHILS NFR BLD AUTO: 29.9 %
PLATELET # BLD AUTO: 205 10E9/L (ref 150–450)
RBC # BLD AUTO: 3.66 10E12/L (ref 4.4–5.9)
WBC # BLD AUTO: 5.7 10E9/L (ref 4–11)

## 2020-03-16 PROCEDURE — 25000128 H RX IP 250 OP 636: Performed by: INTERNAL MEDICINE

## 2020-03-16 PROCEDURE — 25000128 H RX IP 250 OP 636: Performed by: NURSE PRACTITIONER

## 2020-03-16 PROCEDURE — 85025 COMPLETE CBC W/AUTO DIFF WBC: CPT | Performed by: INTERNAL MEDICINE

## 2020-03-16 PROCEDURE — 96367 TX/PROPH/DG ADDL SEQ IV INF: CPT

## 2020-03-16 PROCEDURE — 25800030 ZZH RX IP 258 OP 636: Performed by: INTERNAL MEDICINE

## 2020-03-16 PROCEDURE — 96413 CHEMO IV INFUSION 1 HR: CPT

## 2020-03-16 RX ORDER — EPINEPHRINE 1 MG/ML
0.3 INJECTION, SOLUTION INTRAMUSCULAR; SUBCUTANEOUS EVERY 5 MIN PRN
Status: CANCELLED | OUTPATIENT
Start: 2020-03-16

## 2020-03-16 RX ORDER — SODIUM CHLORIDE 9 MG/ML
1000 INJECTION, SOLUTION INTRAVENOUS CONTINUOUS PRN
Status: CANCELLED
Start: 2020-03-16

## 2020-03-16 RX ORDER — METHYLPREDNISOLONE SODIUM SUCCINATE 125 MG/2ML
125 INJECTION, POWDER, LYOPHILIZED, FOR SOLUTION INTRAMUSCULAR; INTRAVENOUS
Status: CANCELLED
Start: 2020-03-16

## 2020-03-16 RX ORDER — HEPARIN SODIUM (PORCINE) LOCK FLUSH IV SOLN 100 UNIT/ML 100 UNIT/ML
5 SOLUTION INTRAVENOUS
Status: DISCONTINUED | OUTPATIENT
Start: 2020-03-16 | End: 2020-03-16 | Stop reason: HOSPADM

## 2020-03-16 RX ORDER — LORAZEPAM 2 MG/ML
0.5 INJECTION INTRAMUSCULAR EVERY 4 HOURS PRN
Status: CANCELLED
Start: 2020-03-16

## 2020-03-16 RX ORDER — ALBUTEROL SULFATE 0.83 MG/ML
2.5 SOLUTION RESPIRATORY (INHALATION)
Status: CANCELLED | OUTPATIENT
Start: 2020-03-16

## 2020-03-16 RX ORDER — EPINEPHRINE 0.3 MG/.3ML
0.3 INJECTION SUBCUTANEOUS EVERY 5 MIN PRN
Status: CANCELLED | OUTPATIENT
Start: 2020-03-16

## 2020-03-16 RX ORDER — NALOXONE HYDROCHLORIDE 0.4 MG/ML
.1-.4 INJECTION, SOLUTION INTRAMUSCULAR; INTRAVENOUS; SUBCUTANEOUS
Status: CANCELLED | OUTPATIENT
Start: 2020-03-16

## 2020-03-16 RX ORDER — ALBUTEROL SULFATE 90 UG/1
1-2 AEROSOL, METERED RESPIRATORY (INHALATION)
Status: CANCELLED
Start: 2020-03-16

## 2020-03-16 RX ORDER — DIPHENHYDRAMINE HYDROCHLORIDE 50 MG/ML
50 INJECTION INTRAMUSCULAR; INTRAVENOUS
Status: CANCELLED
Start: 2020-03-16

## 2020-03-16 RX ORDER — HEPARIN SODIUM,PORCINE 10 UNIT/ML
5 VIAL (ML) INTRAVENOUS
Status: CANCELLED | OUTPATIENT
Start: 2020-03-16

## 2020-03-16 RX ORDER — HEPARIN SODIUM (PORCINE) LOCK FLUSH IV SOLN 100 UNIT/ML 100 UNIT/ML
5 SOLUTION INTRAVENOUS
Status: CANCELLED | OUTPATIENT
Start: 2020-03-16

## 2020-03-16 RX ADMIN — SODIUM CHLORIDE 250 ML: 9 INJECTION, SOLUTION INTRAVENOUS at 10:02

## 2020-03-16 RX ADMIN — GEMCITABINE 1800 MG: 38 INJECTION, SOLUTION INTRAVENOUS at 10:21

## 2020-03-16 RX ADMIN — Medication 5 ML: at 10:54

## 2020-03-16 RX ADMIN — DEXAMETHASONE SODIUM PHOSPHATE 12 MG: 10 INJECTION, SOLUTION INTRAMUSCULAR; INTRAVENOUS at 10:02

## 2020-03-16 ASSESSMENT — MIFFLIN-ST. JEOR: SCORE: 1368.37

## 2020-03-16 NOTE — PROGRESS NOTES
Infusion Nursing Note:  Lowell Arredondo presents today for C5D8 Gemzar.    Patient seen by provider today: No   present during visit today: Not Applicable.    Note: Patient returns today after being held last week d/t low counts.  Labs WNL today and ok to proceed.  Patient reports no changes or new concerns today.    Intravenous Access:  Labs drawn without difficulty.  Implanted Port.    Treatment Conditions:  Lab Results   Component Value Date    HGB 11.5 03/16/2020     Lab Results   Component Value Date    WBC 5.7 03/16/2020      Lab Results   Component Value Date    ANEU 1.7 03/16/2020     Lab Results   Component Value Date     03/16/2020      Results reviewed, labs MET treatment parameters, ok to proceed with treatment.      Post Infusion Assessment:  Patient tolerated infusion without incident.  Blood return noted pre and post infusion.  Site patent and intact, free from redness, edema or discomfort.  No evidence of extravasations.  Access discontinued per protocol.       Discharge Plan:   Discharge instructions reviewed with: Patient.  Patient and/or family verbalized understanding of discharge instructions and all questions answered.  Copy of AVS reviewed with patient and/or family.  Patient will return 3/17/20 at [facility] for next appointment.  Patient discharged in stable condition accompanied by: self.  Departure Mode: Ambulatory.    Lj Gold RN      .

## 2020-03-17 ENCOUNTER — TELEPHONE (OUTPATIENT)
Dept: INFUSION THERAPY | Facility: CLINIC | Age: 80
End: 2020-03-17

## 2020-03-17 NOTE — TELEPHONE ENCOUNTER
Patient was scheduled for Neupogen today and tomorrow, 3/17 and 3/18, but no orders were in. Paged Dr Segura to clarify:    No neupogen needed 3/17 or 3/17. Next appointment should be his next chemo day.  DIONI Segura/ Fidelina NAYLOR    Called Lowell to discuss; spoke with wife Billie who verbalized understanding. Next appointment will be 3/24 at [facility].    Precious Lazo RN

## 2020-03-24 ENCOUNTER — INFUSION THERAPY VISIT (OUTPATIENT)
Dept: INFUSION THERAPY | Facility: CLINIC | Age: 80
End: 2020-03-24
Attending: INTERNAL MEDICINE
Payer: COMMERCIAL

## 2020-03-24 ENCOUNTER — ONCOLOGY VISIT (OUTPATIENT)
Dept: ONCOLOGY | Facility: CLINIC | Age: 80
End: 2020-03-24
Attending: INTERNAL MEDICINE
Payer: COMMERCIAL

## 2020-03-24 ENCOUNTER — HOSPITAL ENCOUNTER (OUTPATIENT)
Facility: CLINIC | Age: 80
Setting detail: SPECIMEN
Discharge: HOME | End: 2020-03-24
Attending: INTERNAL MEDICINE | Admitting: INTERNAL MEDICINE
Payer: COMMERCIAL

## 2020-03-24 VITALS
BODY MASS INDEX: 24.48 KG/M2 | RESPIRATION RATE: 16 BRPM | SYSTOLIC BLOOD PRESSURE: 127 MMHG | WEIGHT: 156 LBS | DIASTOLIC BLOOD PRESSURE: 77 MMHG | HEIGHT: 67 IN | OXYGEN SATURATION: 98 % | HEART RATE: 83 BPM | TEMPERATURE: 97.5 F

## 2020-03-24 VITALS — WEIGHT: 156.4 LBS | BODY MASS INDEX: 23.79 KG/M2

## 2020-03-24 DIAGNOSIS — Z95.828 PORT-A-CATH IN PLACE: ICD-10-CM

## 2020-03-24 DIAGNOSIS — C25.2 MALIGNANT NEOPLASM OF TAIL OF PANCREAS (H): Primary | ICD-10-CM

## 2020-03-24 DIAGNOSIS — D70.1 CHEMOTHERAPY-INDUCED NEUTROPENIA (H): ICD-10-CM

## 2020-03-24 DIAGNOSIS — T45.1X5A CHEMOTHERAPY-INDUCED NEUTROPENIA (H): ICD-10-CM

## 2020-03-24 PROBLEM — K90.9 IRON MALABSORPTION: Status: RESOLVED | Noted: 2020-02-11 | Resolved: 2020-03-24

## 2020-03-24 PROBLEM — D50.9 IRON DEFICIENCY ANEMIA: Status: RESOLVED | Noted: 2020-02-11 | Resolved: 2020-03-24

## 2020-03-24 LAB
BASOPHILS # BLD AUTO: 0 10E9/L (ref 0–0.2)
BASOPHILS NFR BLD AUTO: 1 %
DIFFERENTIAL METHOD BLD: ABNORMAL
EOSINOPHIL # BLD AUTO: 0.2 10E9/L (ref 0–0.7)
EOSINOPHIL NFR BLD AUTO: 3.8 %
ERYTHROCYTE [DISTWIDTH] IN BLOOD BY AUTOMATED COUNT: 17.9 % (ref 10–15)
HCT VFR BLD AUTO: 32.2 % (ref 40–53)
HGB BLD-MCNC: 10.6 G/DL (ref 13.3–17.7)
IMM GRANULOCYTES # BLD: 0 10E9/L (ref 0–0.4)
IMM GRANULOCYTES NFR BLD: 0 %
LYMPHOCYTES # BLD AUTO: 2 10E9/L (ref 0.8–5.3)
LYMPHOCYTES NFR BLD AUTO: 50.4 %
MCH RBC QN AUTO: 32.3 PG (ref 26.5–33)
MCHC RBC AUTO-ENTMCNC: 32.9 G/DL (ref 31.5–36.5)
MCV RBC AUTO: 98 FL (ref 78–100)
MONOCYTES # BLD AUTO: 0.5 10E9/L (ref 0–1.3)
MONOCYTES NFR BLD AUTO: 13.4 %
NEUTROPHILS # BLD AUTO: 1.2 10E9/L (ref 1.6–8.3)
NEUTROPHILS NFR BLD AUTO: 31.4 %
NRBC # BLD AUTO: 0 10*3/UL
NRBC BLD AUTO-RTO: 0 /100
PLATELET # BLD AUTO: 182 10E9/L (ref 150–450)
RBC # BLD AUTO: 3.28 10E12/L (ref 4.4–5.9)
WBC # BLD AUTO: 4 10E9/L (ref 4–11)

## 2020-03-24 PROCEDURE — G0463 HOSPITAL OUTPT CLINIC VISIT: HCPCS | Mod: 25

## 2020-03-24 PROCEDURE — 96375 TX/PRO/DX INJ NEW DRUG ADDON: CPT

## 2020-03-24 PROCEDURE — 96413 CHEMO IV INFUSION 1 HR: CPT

## 2020-03-24 PROCEDURE — 25000128 H RX IP 250 OP 636: Performed by: INTERNAL MEDICINE

## 2020-03-24 PROCEDURE — 25800030 ZZH RX IP 258 OP 636: Performed by: INTERNAL MEDICINE

## 2020-03-24 PROCEDURE — 99214 OFFICE O/P EST MOD 30 MIN: CPT | Performed by: INTERNAL MEDICINE

## 2020-03-24 PROCEDURE — 85025 COMPLETE CBC W/AUTO DIFF WBC: CPT | Performed by: INTERNAL MEDICINE

## 2020-03-24 PROCEDURE — 25000128 H RX IP 250 OP 636: Performed by: NURSE PRACTITIONER

## 2020-03-24 RX ORDER — HEPARIN SODIUM (PORCINE) LOCK FLUSH IV SOLN 100 UNIT/ML 100 UNIT/ML
5 SOLUTION INTRAVENOUS
Status: DISCONTINUED | OUTPATIENT
Start: 2020-03-24 | End: 2020-03-24 | Stop reason: HOSPADM

## 2020-03-24 RX ORDER — HEPARIN SODIUM (PORCINE) LOCK FLUSH IV SOLN 100 UNIT/ML 100 UNIT/ML
5 SOLUTION INTRAVENOUS
Status: CANCELLED | OUTPATIENT
Start: 2020-03-24

## 2020-03-24 RX ORDER — HEPARIN SODIUM,PORCINE 10 UNIT/ML
5 VIAL (ML) INTRAVENOUS
Status: CANCELLED | OUTPATIENT
Start: 2020-03-24

## 2020-03-24 RX ADMIN — GEMCITABINE 1800 MG: 38 INJECTION, SOLUTION INTRAVENOUS at 11:21

## 2020-03-24 RX ADMIN — HEPARIN SODIUM (PORCINE) LOCK FLUSH IV SOLN 100 UNIT/ML 5 ML: 100 SOLUTION at 11:57

## 2020-03-24 RX ADMIN — DEXAMETHASONE SODIUM PHOSPHATE 12 MG: 10 INJECTION, SOLUTION INTRAMUSCULAR; INTRAVENOUS at 11:03

## 2020-03-24 RX ADMIN — SODIUM CHLORIDE 250 ML: 9 INJECTION, SOLUTION INTRAVENOUS at 11:03

## 2020-03-24 ASSESSMENT — PAIN SCALES - GENERAL: PAINLEVEL: NO PAIN (0)

## 2020-03-24 ASSESSMENT — MIFFLIN-ST. JEOR: SCORE: 1366.24

## 2020-03-24 NOTE — PROGRESS NOTES
Infusion Nursing Note:  Lowell Arredondo presents today for C5D15 Gemzar.    Patient seen by provider today: Yes: Dr. Segura   present during visit today: Not Applicable.    Note: Per Dr. Segura, ok to proceed with chemo today with ANC 1.2. Patient will receive neupogen 3/25 and 3/26 as ordered, already scheduled at [facility]. Reviewed neutropenic precautions on patient, verbalized understanding.    Intravenous Access:  Labs drawn without difficulty.  Implanted Port.    Treatment Conditions:  Lab Results   Component Value Date    HGB 10.6 03/24/2020     Lab Results   Component Value Date    WBC 4.0 03/24/2020      Lab Results   Component Value Date    ANEU 1.2 03/24/2020     Lab Results   Component Value Date     03/24/2020      Results reviewed, labs did NOT meet treatment parameters, see note above.      Post Infusion Assessment:  Patient tolerated infusion without incident.  Blood return noted pre and post infusion.  Site patent and intact, free from redness, edema or discomfort.  No evidence of extravasations.  Access discontinued per protocol.       Discharge Plan:   Discharge instructions reviewed with: Patient.  Patient and/or family verbalized understanding of discharge instructions and all questions answered.  Copy of AVS reviewed with patient and/or family.  Patient will return to [facility] 3/25/20 for next appointment.  Patient discharged in stable condition accompanied by: self.  Departure Mode: Ambulatory.  Nursing face to face time: 10 minutes    Danni Grayson RN

## 2020-03-24 NOTE — PROGRESS NOTES
"Oncology Rooming Note    March 24, 2020 10:06 AM   Lowell Arredondo is a 80 year old male who presents for:    Chief Complaint   Patient presents with     Oncology Clinic Visit     Initial Vitals: There were no vitals taken for this visit. Estimated body mass index is 23.79 kg/m  as calculated from the following:    Height as of 3/16/20: 1.727 m (5' 7.99\").    Weight as of an earlier encounter on 3/24/20: 70.9 kg (156 lb 6.4 oz). There is no height or weight on file to calculate BSA.  Data Unavailable Comment: Data Unavailable   No LMP for male patient.  Allergies reviewed: Yes  Medications reviewed: Yes    Medications: Medication refills not needed today.  Pharmacy name entered into [employer]: [id number] - [place] - [phone]    Clinical concerns:  doctor was notified.      Alisa Persaud MA            "

## 2020-03-24 NOTE — PATIENT INSTRUCTIONS
1. Chemotherapy today.  2. Neupogen as scheduled.  3. CT scan in 1 week.  4. See me in 2 weeks.    Patient in [facility]

## 2020-03-24 NOTE — PROGRESS NOTES
Visit Date:   03/24/2020     ONCOLOGY HISTORY: Mr. Arredondo is a gentleman with pancreatic cancer.T3 N1 M0. Stage IIB.   -Also has prostate cancer [place] 7 prostate.   1.  Prostate MRI on 07/20/2019 revealed PI-RADS 5.  No suspicious adenopathy or evidence of pelvic metastasis.   2.  Bone scan on 09/26/2019 does not reveal any bone metastasis.  There is some cardiac uptake.   3.  CT abdomen and pelvis on 09/26/2019 reveals new soft tissue mesenteric mass in the region of pancreatic tail.   4.  EUS on 10/07/2019 revealed an irregular mass in the pancreatic tail measuring 3.8 x 2.2 cm.  There was some evidence of invasion into the portal vein.  No lymphadenopathy seen.  Based on the EUS, it was T3 Nx disease.  FNA is positive for moderately differentiated pancreatic adenocarcinoma.   5. PET scan on 10/15/2019 reveals hypermetabolic 4.2 cm mass in the pancreatic tail and an adjacent 1 cm peripancreatic lymph node. No evidence of any metastatic disease. There are 2 foci of mild FDG uptake in the prostate gland from prostate cancer.   6. Neoadjuvant chemotherapy with gemcitabine and Abraxane started on 11/07/2019.       SUBJECTIVE:  Mr. Arredondo is an 80-year-old gentleman with resectable pancreatic cancer on neoadjuvant gemcitabine and Abraxane.  He will be completing cycle 5 today.      Overall, he is tolerating it well.  He has some fatigue.  No worsening of it.  No headache.  No dizziness.  No chest pain.  No shortness of breath.  No abdominal pain, nausea or vomiting.  Appetite is fair.  No urinary or bowel complaints.  No bleeding.      PHYSICAL EXAMINATION:   GENERAL:  Alert and oriented x 3.   VITAL SIGNS:  Reviewed.  ECOG PS of 1.     EYES:  No icterus.   THROAT:  No ulcer. No thrush.   NECK:  Supple. No lymphadenopathy. No thyromegaly.   AXILLAE:  No lymphadenopathy.   LUNGS:  Good air entry bilaterally.  No crackles or wheezing.   HEART:  Regular.  No murmur.   GI: Abdomen is soft.  Nontender. No mass.    EXTREMITIES:  No pedal edema.  No calf swelling or tenderness.   SKIN:  No rash.      LABORATORY DATA:  Reviewed.      ASSESSMENT:   1.  An 80-year-old gentleman with resectable pancreatic cancer on neoadjuvant chemotherapy.   2.  Mild neutropenia.   3.  Normocytic anemia from chemotherapy. Stable.   4.  Peripheral neuropathy, stable.   5.  Fatigue from chemotherapy, pancreatic cancer and anemia.      PLAN:     1.  The patient clinically is stable.  He will continue on gemcitabine and Abraxane.  His ANC is 1.2.  It will likely go down.  We will give him Neupogen tomorrow and day after tomorrow.  The patient agreeable for it. Side effects of chemotherapy discussed.  Advised him to see a physician if he has fever, chills, worsening weakness, shortness of breath, diarrhea, or any other concerns.   2.  His neuropathy is stable.  He will continue on gabapentin/Neurontin.   3.  We will get CT chest, abdomen and pelvis in the next week.  I will see him back after that.  Plan is to give a total of 6 cycles of chemotherapy followed by surgery.         ROXY LANCASTER MD             D: 2020   T: 2020   MT: CORRINE      Name:     KARI YANG   MRN:      [id number]        Account:      [id number]   :      1940           Visit Date:   2020      Document: [id number]

## 2020-03-25 ENCOUNTER — HOSPITAL ENCOUNTER (OUTPATIENT)
Facility: CLINIC | Age: 80
Setting detail: SPECIMEN
End: 2020-03-25
Attending: INTERNAL MEDICINE
Payer: COMMERCIAL

## 2020-03-25 ENCOUNTER — ALLIED HEALTH/NURSE VISIT (OUTPATIENT)
Dept: INFUSION THERAPY | Facility: CLINIC | Age: 80
End: 2020-03-25
Attending: INTERNAL MEDICINE
Payer: COMMERCIAL

## 2020-03-25 ENCOUNTER — HOSPITAL ENCOUNTER (OUTPATIENT)
Facility: CLINIC | Age: 80
End: 2020-03-25
Payer: COMMERCIAL

## 2020-03-25 VITALS — RESPIRATION RATE: 18 BRPM | DIASTOLIC BLOOD PRESSURE: 69 MMHG | TEMPERATURE: 96.3 F | SYSTOLIC BLOOD PRESSURE: 123 MMHG

## 2020-03-25 DIAGNOSIS — T45.1X5A CHEMOTHERAPY-INDUCED NEUTROPENIA (H): Primary | ICD-10-CM

## 2020-03-25 DIAGNOSIS — C25.2 MALIGNANT NEOPLASM OF TAIL OF PANCREAS (H): ICD-10-CM

## 2020-03-25 DIAGNOSIS — D70.1 CHEMOTHERAPY-INDUCED NEUTROPENIA (H): Primary | ICD-10-CM

## 2020-03-25 PROCEDURE — 96372 THER/PROPH/DIAG INJ SC/IM: CPT

## 2020-03-25 PROCEDURE — 25000128 H RX IP 250 OP 636: Performed by: INTERNAL MEDICINE

## 2020-03-25 RX ADMIN — FILGRASTIM 300 MCG: 300 INJECTION, SOLUTION INTRAVENOUS; SUBCUTANEOUS at 10:58

## 2020-03-25 NOTE — PROGRESS NOTES
Infusion Nursing Note:  Richmond H Socratesholgerdustin presents today for Neupogen.    Patient seen by provider today: No   present during visit today: Not Applicable.    Note: N/A.    Intravenous Access:  No Intravenous access/labs at this visit.    Treatment Conditions:  Not Applicable.      Post Infusion Assessment:  Patient tolerated injection without incident.       Discharge Plan:   Discharge instructions reviewed with: Patient.  Patient and/or family verbalized understanding of discharge instructions and all questions answered.  AVS to patient via [employer].  Patient will return 3/26/2020 for next appointment.   Patient discharged in stable condition accompanied by: self.  Departure Mode: Ambulatory.    Lavern Perez RN

## 2020-03-26 ENCOUNTER — ALLIED HEALTH/NURSE VISIT (OUTPATIENT)
Dept: INFUSION THERAPY | Facility: CLINIC | Age: 80
End: 2020-03-26
Attending: INTERNAL MEDICINE
Payer: COMMERCIAL

## 2020-03-26 VITALS
DIASTOLIC BLOOD PRESSURE: 70 MMHG | HEART RATE: 80 BPM | RESPIRATION RATE: 16 BRPM | TEMPERATURE: 97.6 F | SYSTOLIC BLOOD PRESSURE: 118 MMHG

## 2020-03-26 DIAGNOSIS — C25.2 MALIGNANT NEOPLASM OF TAIL OF PANCREAS (H): ICD-10-CM

## 2020-03-26 DIAGNOSIS — T45.1X5A CHEMOTHERAPY-INDUCED NEUTROPENIA (H): Primary | ICD-10-CM

## 2020-03-26 DIAGNOSIS — D70.1 CHEMOTHERAPY-INDUCED NEUTROPENIA (H): Primary | ICD-10-CM

## 2020-03-26 PROCEDURE — 25000128 H RX IP 250 OP 636: Performed by: INTERNAL MEDICINE

## 2020-03-26 PROCEDURE — 96372 THER/PROPH/DIAG INJ SC/IM: CPT

## 2020-03-26 RX ADMIN — FILGRASTIM 300 MCG: 300 INJECTION, SOLUTION INTRAVENOUS; SUBCUTANEOUS at 11:23

## 2020-03-29 NOTE — PROGRESS NOTES
"Oncology Rooming Note    January 16, 2020 1:07 PM   Lowell Arredondo is a 79 year old male who presents for:    Chief Complaint   Patient presents with     Oncology Clinic Visit     Pancreatic Cancer     Initial Vitals: /66   Pulse 88   Temp 97.7  F (36.5  C) (Oral)   Resp 14   Ht 1.727 m (5' 7.99\")   Wt 72.6 kg (160 lb)   SpO2 100%   BMI 24.34 kg/m   Estimated body mass index is 24.34 kg/m  as calculated from the following:    Height as of this encounter: 1.727 m (5' 7.99\").    Weight as of this encounter: 72.6 kg (160 lb). Body surface area is 1.87 meters squared.  Mild Pain (3) Comment: Data Unavailable   No LMP for male patient.  Allergies reviewed: Yes  Medications reviewed: Yes    Medications: Medication refills not needed today.  Pharmacy name entered into [employer]: [id number] - [place], [place] - [phone]    Clinical concerns: no       Shari J. Schoenberger, CMA          5   " C/o htn after taking allegra x2 weeks - stopped 2 days ago

## 2020-03-30 ENCOUNTER — PATIENT OUTREACH (OUTPATIENT)
Dept: SURGERY | Facility: CLINIC | Age: 80
End: 2020-03-30

## 2020-03-30 ENCOUNTER — HOSPITAL ENCOUNTER (OUTPATIENT)
Facility: CLINIC | Age: 80
Setting detail: SPECIMEN
End: 2020-03-30
Attending: INTERNAL MEDICINE
Payer: COMMERCIAL

## 2020-03-30 NOTE — PROGRESS NOTES
Visit Date:   03/24/2020     ONCOLOGY HISTORY: Mr. Arredondo is a gentleman with pancreatic cancer.T3 N1 M0. Stage IIB.   -Also has prostate cancer [place] 7 prostate.   1.  Prostate MRI on 07/20/2019 revealed PI-RADS 5.  No suspicious adenopathy or evidence of pelvic metastasis.   2.  Bone scan on 09/26/2019 does not reveal any bone metastasis.  There is some cardiac uptake.   3.  CT abdomen and pelvis on 09/26/2019 reveals new soft tissue mesenteric mass in the region of pancreatic tail.   4.  EUS on 10/07/2019 revealed an irregular mass in the pancreatic tail measuring 3.8 x 2.2 cm.  There was some evidence of invasion into the portal vein.  No lymphadenopathy seen.  Based on the EUS, it was T3 Nx disease.  FNA is positive for moderately differentiated pancreatic adenocarcinoma.   5. PET scan on 10/15/2019 reveals hypermetabolic 4.2 cm mass in the pancreatic tail and an adjacent 1 cm peripancreatic lymph node. No evidence of any metastatic disease. There are 2 foci of mild FDG uptake in the prostate gland from prostate cancer.   6. Neoadjuvant chemotherapy with gemcitabine and Abraxane started on 11/07/2019.       SUBJECTIVE:  Mr. Arredondo is an 80-year-old gentleman with resectable pancreatic cancer on neoadjuvant gemcitabine and Abraxane.  He will be completing cycle 5 today.      Overall, he is tolerating it well.  He has some fatigue.  No worsening of it.  No headache.  No dizziness.  No chest pain.  No shortness of breath.  No abdominal pain, nausea or vomiting.  Appetite is fair.  No urinary or bowel complaints.  No bleeding.      PHYSICAL EXAMINATION:   GENERAL:  Alert and oriented x 3.   VITAL SIGNS:  Reviewed.  ECOG PS of 1.     EYES:  No icterus.   THROAT:  No ulcer. No thrush.   NECK:  Supple. No lymphadenopathy. No thyromegaly.   AXILLAE:  No lymphadenopathy.   LUNGS:  Good air entry bilaterally.  No crackles or wheezing.   HEART:  Regular.  No murmur.   GI: Abdomen is soft.  Nontender. No mass.    EXTREMITIES:  No pedal edema.  No calf swelling or tenderness.   SKIN:  No rash.      LABORATORY DATA:  Reviewed.      ASSESSMENT:   1.  An 80-year-old gentleman with resectable pancreatic cancer on neoadjuvant chemotherapy.   2.  Mild neutropenia.   3.  Normocytic anemia from chemotherapy. Stable.   4.  Peripheral neuropathy, stable.   5.  Fatigue from chemotherapy, pancreatic cancer and anemia.      PLAN:     1.  The patient clinically is stable.  He will continue on gemcitabine and Abraxane.  His ANC is 1.2.  It will likely go down.  We will give him Neupogen tomorrow and day after tomorrow.  The patient agreeable for it. Side effects of chemotherapy discussed.  Advised him to see a physician if he has fever, chills, worsening weakness, shortness of breath, diarrhea, or any other concerns.   2.  His neuropathy is stable.  He will continue on gabapentin/Neurontin.   3.  We will get CT chest, abdomen and pelvis in the next week.  I will see him back after that.  Plan is to give a total of 6 cycles of chemotherapy followed by surgery.         ROXY LANCASTER MD             D: 2020   T: 2020   MT: CORRINE      Name:     KARI YANG   MRN:      [id number]        Account:      [id number]   :      1940           Visit Date:   2020      Document: [id number]

## 2020-03-31 ENCOUNTER — DOCUMENTATION ONLY (OUTPATIENT)
Dept: SURGERY | Facility: CLINIC | Age: 80
End: 2020-03-31

## 2020-03-31 DIAGNOSIS — C25.2 MALIGNANT NEOPLASM OF TAIL OF PANCREAS (H): Primary | ICD-10-CM

## 2020-03-31 NOTE — PROGRESS NOTES
Surgical Oncology RN Care Coordination Note:     Patient due to finish neoadjuvant chemo on 4/21 pending no delays. Per review of chart these are not scheduled yet but will review post visit on 4/6 with oncology team.     Will need follow up CT scan with pancreas protocol and labs (orders placed) prior to follow up consult with Dr. Ryder.     Will plan to see patient for consult on 5/11, will offer virtual office visit to patient if they are able so that patient can have family present for consult.     Patient will need pre op H&P completed with either PCP or can arrange for consult with PAC.     Message to be sent to scheduling to contact patient to arrange follow up CT scan and visits with surgery team once we confirm remainder of appointment following oncology visit.     Rosa Machado RN, BSN  Care Coordinator   [phone]

## 2020-04-02 ENCOUNTER — TELEPHONE (OUTPATIENT)
Dept: ONCOLOGY | Facility: CLINIC | Age: 80
End: 2020-04-02

## 2020-04-02 NOTE — TELEPHONE ENCOUNTER
"Patient is currently scheduled for an appointment at [facility] in [place].  Called patient to review current visitor restrictions and complete COVID-19 Patient Infection/Travel Screening Tool.     Due to the recent public health concerns around COVID-19 and in an effort to keep our patients and staff safe and healthy, we are implementing a screening process for the patients that come to our clinic.      I am going to ask you a few questions, please answer yes or no.  Your honesty about any symptoms is critical, as it keeps patients and staff healthy.      Do you have a:  Fever (or reported chills)?  No  Cough?  No  Shortness of breath?  No  Rash?  No    In the last month, have you been in contact with someone who was confirmed or suspected to have Coronavirus/COVID-19?  No    Have you traveled internationally in the last month?  No  If so, where?  N/A     I also wanted to let you know that to protect our patients from the flu and other common illnesses, [facility] enforce visitor restrictions year round, but due to the community spread of COVID-19 in [place], we are taking additional precautionary steps to ensure the health of our patients.  At this time, NO visitors are allowed on our hospital and clinic campuses.     Patient PASSED the screening assessment.    Patient instructed to come to the clinic as planned for their scheduled appointment and to call the clinic if any symptoms develop prior to their appointment.    \"COVID-19 is contagious and can be dangerous for our patients and staff.  Please send us a MyChart message or call our clinic before coming in if you feel any of the following symptoms: fever, cough, congestion, runny nose, sore throat, muscle aches and pains, or shortness of breath.  If you are already at our clinic, it is very important that you be honest about any symptoms you are experiencing to ensure your safety and that of other patients and staff who " "treat you.  If you do have symptoms, we will have a nurse and/or provider asses you to determine next steps.\"    Sarika Fried on 4/2/2020 at 2:27 PM    "

## 2020-04-03 ENCOUNTER — TELEPHONE (OUTPATIENT)
Dept: INFUSION THERAPY | Facility: CLINIC | Age: 80
End: 2020-04-03

## 2020-04-03 ENCOUNTER — HOSPITAL ENCOUNTER (OUTPATIENT)
Facility: CLINIC | Age: 80
Setting detail: SPECIMEN
Discharge: HOME | End: 2020-04-03
Attending: INTERNAL MEDICINE | Admitting: INTERNAL MEDICINE
Payer: COMMERCIAL

## 2020-04-03 ENCOUNTER — INFUSION THERAPY VISIT (OUTPATIENT)
Dept: INFUSION THERAPY | Facility: CLINIC | Age: 80
End: 2020-04-03
Attending: INTERNAL MEDICINE
Payer: COMMERCIAL

## 2020-04-03 DIAGNOSIS — Z95.828 PORT-A-CATH IN PLACE: ICD-10-CM

## 2020-04-03 DIAGNOSIS — C25.2 MALIGNANT NEOPLASM OF TAIL OF PANCREAS (H): Primary | ICD-10-CM

## 2020-04-03 DIAGNOSIS — T45.1X5A CHEMOTHERAPY-INDUCED NEUTROPENIA (H): ICD-10-CM

## 2020-04-03 DIAGNOSIS — D70.1 CHEMOTHERAPY-INDUCED NEUTROPENIA (H): ICD-10-CM

## 2020-04-03 LAB
ALBUMIN SERPL-MCNC: 3.2 G/DL (ref 3.4–5)
ALP SERPL-CCNC: 64 U/L (ref 40–150)
ALT SERPL W P-5'-P-CCNC: 30 U/L (ref 0–70)
ANION GAP SERPL CALCULATED.3IONS-SCNC: 5 MMOL/L (ref 3–14)
AST SERPL W P-5'-P-CCNC: 25 U/L (ref 0–45)
BASOPHILS # BLD AUTO: 0 10E9/L (ref 0–0.2)
BASOPHILS NFR BLD AUTO: 0.4 %
BILIRUB SERPL-MCNC: 0.3 MG/DL (ref 0.2–1.3)
BUN SERPL-MCNC: 16 MG/DL (ref 7–30)
CALCIUM SERPL-MCNC: 8.8 MG/DL (ref 8.5–10.1)
CHLORIDE SERPL-SCNC: 110 MMOL/L (ref 94–109)
CO2 SERPL-SCNC: 24 MMOL/L (ref 20–32)
CREAT SERPL-MCNC: 1.03 MG/DL (ref 0.66–1.25)
DIFFERENTIAL METHOD BLD: ABNORMAL
EOSINOPHIL # BLD AUTO: 0.1 10E9/L (ref 0–0.7)
EOSINOPHIL NFR BLD AUTO: 2.2 %
ERYTHROCYTE [DISTWIDTH] IN BLOOD BY AUTOMATED COUNT: 19 % (ref 10–15)
GFR SERPL CREATININE-BSD FRML MDRD: 67 ML/MIN/{1.73_M2}
GLUCOSE SERPL-MCNC: 91 MG/DL (ref 70–99)
HCT VFR BLD AUTO: 33.8 % (ref 40–53)
HGB BLD-MCNC: 10.9 G/DL (ref 13.3–17.7)
IMM GRANULOCYTES # BLD: 0 10E9/L (ref 0–0.4)
IMM GRANULOCYTES NFR BLD: 0.6 %
LYMPHOCYTES # BLD AUTO: 1.7 10E9/L (ref 0.8–5.3)
LYMPHOCYTES NFR BLD AUTO: 32.2 %
MCH RBC QN AUTO: 32.2 PG (ref 26.5–33)
MCHC RBC AUTO-ENTMCNC: 32.2 G/DL (ref 31.5–36.5)
MCV RBC AUTO: 100 FL (ref 78–100)
MONOCYTES # BLD AUTO: 0.7 10E9/L (ref 0–1.3)
MONOCYTES NFR BLD AUTO: 12.6 %
NEUTROPHILS # BLD AUTO: 2.8 10E9/L (ref 1.6–8.3)
NEUTROPHILS NFR BLD AUTO: 52 %
NRBC # BLD AUTO: 0 10*3/UL
NRBC BLD AUTO-RTO: 0 /100
PLATELET # BLD AUTO: 164 10E9/L (ref 150–450)
POTASSIUM SERPL-SCNC: 4.3 MMOL/L (ref 3.4–5.3)
PROT SERPL-MCNC: 6.4 G/DL (ref 6.8–8.8)
RBC # BLD AUTO: 3.39 10E12/L (ref 4.4–5.9)
SODIUM SERPL-SCNC: 139 MMOL/L (ref 133–144)
WBC # BLD AUTO: 5.4 10E9/L (ref 4–11)

## 2020-04-03 PROCEDURE — 86301 IMMUNOASSAY TUMOR CA 19-9: CPT | Performed by: INTERNAL MEDICINE

## 2020-04-03 PROCEDURE — 80053 COMPREHEN METABOLIC PANEL: CPT | Performed by: INTERNAL MEDICINE

## 2020-04-03 PROCEDURE — 25000128 H RX IP 250 OP 636: Performed by: INTERNAL MEDICINE

## 2020-04-03 PROCEDURE — 36593 DECLOT VASCULAR DEVICE: CPT

## 2020-04-03 PROCEDURE — 85025 COMPLETE CBC W/AUTO DIFF WBC: CPT | Performed by: INTERNAL MEDICINE

## 2020-04-03 PROCEDURE — 25000128 H RX IP 250 OP 636: Performed by: NURSE PRACTITIONER

## 2020-04-03 RX ORDER — HEPARIN SODIUM (PORCINE) LOCK FLUSH IV SOLN 100 UNIT/ML 100 UNIT/ML
5 SOLUTION INTRAVENOUS
Status: DISCONTINUED | OUTPATIENT
Start: 2020-04-03 | End: 2020-04-03 | Stop reason: HOSPADM

## 2020-04-03 RX ORDER — HEPARIN SODIUM,PORCINE 10 UNIT/ML
5 VIAL (ML) INTRAVENOUS
Status: CANCELLED | OUTPATIENT
Start: 2020-04-03

## 2020-04-03 RX ORDER — HEPARIN SODIUM (PORCINE) LOCK FLUSH IV SOLN 100 UNIT/ML 100 UNIT/ML
5 SOLUTION INTRAVENOUS
Status: CANCELLED | OUTPATIENT
Start: 2020-04-03

## 2020-04-03 RX ADMIN — HEPARIN SODIUM (PORCINE) LOCK FLUSH IV SOLN 100 UNIT/ML 5 ML: 100 SOLUTION at 11:02

## 2020-04-03 RX ADMIN — ALTEPLASE 2 MG: 2.2 INJECTION, POWDER, LYOPHILIZED, FOR SOLUTION INTRAVENOUS at 10:26

## 2020-04-03 NOTE — TELEPHONE ENCOUNTER
"Patient is currently scheduled for an appointment at [facility] in [place].  Called patient to review current visitor restrictions and complete COVID-19 Patient Infection/Travel Screening Tool.     Due to the recent public health concerns around COVID-19 and in an effort to keep our patients and staff safe and healthy, we are implementing a screening process for the patients that come to our clinic.      I am going to ask you a few questions, please answer yes or no.  Your honesty about any symptoms is critical, as it keeps patients and staff healthy.      Do you have a:  Fever (or reported chills)?  No  Cough?  No  Shortness of breath?  No  Rash?  No    In the last month, have you been in contact with someone who was confirmed or suspected to have Coronavirus/COVID-19?  No    Have you traveled internationally in the last month?  No  If so, where?  N/A     I also wanted to let you know that to protect our patients from the flu and other common illnesses, [facility] enforce visitor restrictions year round, but due to the community spread of COVID-19 in [place], we are taking additional precautionary steps to ensure the health of our patients.  At this time, NO visitors are allowed on our hospital and clinic campuses.     Patient PASSED the screening assessment.    Patient instructed to come to the clinic as planned for their scheduled appointment and to call the clinic if any symptoms develop prior to their appointment.    \"COVID-19 is contagious and can be dangerous for our patients and staff.  Please send us a MyChart message or call our clinic before coming in if you feel any of the following symptoms: fever, cough, congestion, runny nose, sore throat, muscle aches and pains, or shortness of breath.  If you are already at our clinic, it is very important that you be honest about any symptoms you are experiencing to ensure your safety and that of other patients and staff who " "treat you.  If you do have symptoms, we will have a nurse and/or provider asses you to determine next steps.\"    Katherine Bean on 4/3/2020 at 10:02 AM    "

## 2020-04-03 NOTE — PROGRESS NOTES
Nursing Note:  Lowell SEFERINO Socratesholgerdustin presents today for port labs.    Patient seen by provider today: No   present during visit today: Not Applicable.    Note: only able to get a small amount of blood through port with positioning and pulsating flushes. Instilled cath naty.    Intravenous Access:  Implanted Port.    Discharge Plan:   Patient was sent home to return for appointment next Monday.    Emelyn King RN

## 2020-04-04 LAB — CANCER AG19-9 SERPL-ACNC: 2163 U/ML (ref 0–37)

## 2020-04-06 ENCOUNTER — ONCOLOGY VISIT (OUTPATIENT)
Dept: ONCOLOGY | Facility: CLINIC | Age: 80
End: 2020-04-06
Attending: INTERNAL MEDICINE
Payer: COMMERCIAL

## 2020-04-06 ENCOUNTER — HOSPITAL ENCOUNTER (OUTPATIENT)
Facility: CLINIC | Age: 80
Setting detail: SPECIMEN
Discharge: HOME | End: 2020-04-06
Attending: INTERNAL MEDICINE | Admitting: SURGERY
Payer: COMMERCIAL

## 2020-04-06 ENCOUNTER — INFUSION THERAPY VISIT (OUTPATIENT)
Dept: INFUSION THERAPY | Facility: CLINIC | Age: 80
End: 2020-04-06
Attending: INTERNAL MEDICINE
Payer: COMMERCIAL

## 2020-04-06 VITALS
DIASTOLIC BLOOD PRESSURE: 71 MMHG | OXYGEN SATURATION: 99 % | HEART RATE: 80 BPM | RESPIRATION RATE: 16 BRPM | WEIGHT: 155.4 LBS | TEMPERATURE: 97.5 F | HEIGHT: 67 IN | SYSTOLIC BLOOD PRESSURE: 111 MMHG | BODY MASS INDEX: 24.39 KG/M2

## 2020-04-06 DIAGNOSIS — T45.1X5A POLYNEUROPATHY FOLLOWING CHEMOTHERAPY (H): ICD-10-CM

## 2020-04-06 DIAGNOSIS — D70.1 CHEMOTHERAPY-INDUCED NEUTROPENIA (H): ICD-10-CM

## 2020-04-06 DIAGNOSIS — Z95.828 PORT-A-CATH IN PLACE: ICD-10-CM

## 2020-04-06 DIAGNOSIS — G62.0 POLYNEUROPATHY FOLLOWING CHEMOTHERAPY (H): ICD-10-CM

## 2020-04-06 DIAGNOSIS — T45.1X5A CHEMOTHERAPY-INDUCED NEUTROPENIA (H): ICD-10-CM

## 2020-04-06 DIAGNOSIS — C25.2 MALIGNANT NEOPLASM OF TAIL OF PANCREAS (H): Primary | ICD-10-CM

## 2020-04-06 LAB
ABO + RH BLD: NORMAL
ABO + RH BLD: NORMAL
BLD GP AB SCN SERPL QL: NORMAL
BLOOD BANK CMNT PATIENT-IMP: NORMAL
BLOOD BANK CMNT PATIENT-IMP: NORMAL
PREALB SERPL IA-MCNC: 25 MG/DL (ref 15–45)
SPECIMEN EXP DATE BLD: NORMAL

## 2020-04-06 PROCEDURE — 86900 BLOOD TYPING SEROLOGIC ABO: CPT | Performed by: SURGERY

## 2020-04-06 PROCEDURE — 25000128 H RX IP 250 OP 636: Performed by: NURSE PRACTITIONER

## 2020-04-06 PROCEDURE — 25800030 ZZH RX IP 258 OP 636: Performed by: INTERNAL MEDICINE

## 2020-04-06 PROCEDURE — G0463 HOSPITAL OUTPT CLINIC VISIT: HCPCS | Mod: 25

## 2020-04-06 PROCEDURE — 84134 ASSAY OF PREALBUMIN: CPT | Performed by: INTERNAL MEDICINE

## 2020-04-06 PROCEDURE — 99214 OFFICE O/P EST MOD 30 MIN: CPT | Performed by: INTERNAL MEDICINE

## 2020-04-06 PROCEDURE — 96413 CHEMO IV INFUSION 1 HR: CPT

## 2020-04-06 PROCEDURE — 86850 RBC ANTIBODY SCREEN: CPT | Performed by: SURGERY

## 2020-04-06 PROCEDURE — 25000128 H RX IP 250 OP 636: Performed by: INTERNAL MEDICINE

## 2020-04-06 PROCEDURE — 86901 BLOOD TYPING SEROLOGIC RH(D): CPT | Performed by: SURGERY

## 2020-04-06 PROCEDURE — 96375 TX/PRO/DX INJ NEW DRUG ADDON: CPT

## 2020-04-06 RX ORDER — METHYLPREDNISOLONE SODIUM SUCCINATE 125 MG/2ML
125 INJECTION, POWDER, LYOPHILIZED, FOR SOLUTION INTRAMUSCULAR; INTRAVENOUS
Status: CANCELLED
Start: 2020-04-20

## 2020-04-06 RX ORDER — METHYLPREDNISOLONE SODIUM SUCCINATE 125 MG/2ML
125 INJECTION, POWDER, LYOPHILIZED, FOR SOLUTION INTRAMUSCULAR; INTRAVENOUS
Status: CANCELLED
Start: 2020-04-13

## 2020-04-06 RX ORDER — SODIUM CHLORIDE 9 MG/ML
1000 INJECTION, SOLUTION INTRAVENOUS CONTINUOUS PRN
Status: CANCELLED
Start: 2020-04-13

## 2020-04-06 RX ORDER — ALBUTEROL SULFATE 90 UG/1
1-2 AEROSOL, METERED RESPIRATORY (INHALATION)
Status: CANCELLED
Start: 2020-04-06

## 2020-04-06 RX ORDER — ALBUTEROL SULFATE 0.83 MG/ML
2.5 SOLUTION RESPIRATORY (INHALATION)
Status: CANCELLED | OUTPATIENT
Start: 2020-04-13

## 2020-04-06 RX ORDER — EPINEPHRINE 1 MG/ML
0.3 INJECTION, SOLUTION INTRAMUSCULAR; SUBCUTANEOUS EVERY 5 MIN PRN
Status: CANCELLED | OUTPATIENT
Start: 2020-04-13

## 2020-04-06 RX ORDER — NALOXONE HYDROCHLORIDE 0.4 MG/ML
.1-.4 INJECTION, SOLUTION INTRAMUSCULAR; INTRAVENOUS; SUBCUTANEOUS
Status: CANCELLED | OUTPATIENT
Start: 2020-04-06

## 2020-04-06 RX ORDER — HEPARIN SODIUM (PORCINE) LOCK FLUSH IV SOLN 100 UNIT/ML 100 UNIT/ML
5 SOLUTION INTRAVENOUS
Status: CANCELLED | OUTPATIENT
Start: 2020-04-06

## 2020-04-06 RX ORDER — EPINEPHRINE 0.3 MG/.3ML
0.3 INJECTION SUBCUTANEOUS EVERY 5 MIN PRN
Status: CANCELLED | OUTPATIENT
Start: 2020-04-20

## 2020-04-06 RX ORDER — LORAZEPAM 2 MG/ML
0.5 INJECTION INTRAMUSCULAR EVERY 4 HOURS PRN
Status: CANCELLED
Start: 2020-04-20

## 2020-04-06 RX ORDER — ALBUTEROL SULFATE 90 UG/1
1-2 AEROSOL, METERED RESPIRATORY (INHALATION)
Status: CANCELLED
Start: 2020-04-20

## 2020-04-06 RX ORDER — SODIUM CHLORIDE 9 MG/ML
1000 INJECTION, SOLUTION INTRAVENOUS CONTINUOUS PRN
Status: CANCELLED
Start: 2020-04-20

## 2020-04-06 RX ORDER — LORAZEPAM 2 MG/ML
0.5 INJECTION INTRAMUSCULAR EVERY 4 HOURS PRN
Status: CANCELLED
Start: 2020-04-13

## 2020-04-06 RX ORDER — ALBUTEROL SULFATE 0.83 MG/ML
2.5 SOLUTION RESPIRATORY (INHALATION)
Status: CANCELLED | OUTPATIENT
Start: 2020-04-06

## 2020-04-06 RX ORDER — EPINEPHRINE 0.3 MG/.3ML
0.3 INJECTION SUBCUTANEOUS EVERY 5 MIN PRN
Status: CANCELLED | OUTPATIENT
Start: 2020-04-06

## 2020-04-06 RX ORDER — NALOXONE HYDROCHLORIDE 0.4 MG/ML
.1-.4 INJECTION, SOLUTION INTRAMUSCULAR; INTRAVENOUS; SUBCUTANEOUS
Status: CANCELLED | OUTPATIENT
Start: 2020-04-13

## 2020-04-06 RX ORDER — HEPARIN SODIUM,PORCINE 10 UNIT/ML
5 VIAL (ML) INTRAVENOUS
Status: CANCELLED | OUTPATIENT
Start: 2020-04-06

## 2020-04-06 RX ORDER — ALBUTEROL SULFATE 90 UG/1
1-2 AEROSOL, METERED RESPIRATORY (INHALATION)
Status: CANCELLED
Start: 2020-04-13

## 2020-04-06 RX ORDER — GABAPENTIN 300 MG/1
300 CAPSULE ORAL 2 TIMES DAILY
Qty: 60 CAPSULE | Refills: 3 | Status: SHIPPED | OUTPATIENT
Start: 2020-04-06 | End: 2020-05-18

## 2020-04-06 RX ORDER — SODIUM CHLORIDE 9 MG/ML
1000 INJECTION, SOLUTION INTRAVENOUS CONTINUOUS PRN
Status: CANCELLED
Start: 2020-04-06

## 2020-04-06 RX ORDER — DIPHENHYDRAMINE HYDROCHLORIDE 50 MG/ML
50 INJECTION INTRAMUSCULAR; INTRAVENOUS
Status: CANCELLED
Start: 2020-04-06

## 2020-04-06 RX ORDER — ALBUTEROL SULFATE 0.83 MG/ML
2.5 SOLUTION RESPIRATORY (INHALATION)
Status: CANCELLED | OUTPATIENT
Start: 2020-04-20

## 2020-04-06 RX ORDER — EPINEPHRINE 0.3 MG/.3ML
0.3 INJECTION SUBCUTANEOUS EVERY 5 MIN PRN
Status: CANCELLED | OUTPATIENT
Start: 2020-04-13

## 2020-04-06 RX ORDER — METHYLPREDNISOLONE SODIUM SUCCINATE 125 MG/2ML
125 INJECTION, POWDER, LYOPHILIZED, FOR SOLUTION INTRAMUSCULAR; INTRAVENOUS
Status: CANCELLED
Start: 2020-04-06

## 2020-04-06 RX ORDER — HEPARIN SODIUM (PORCINE) LOCK FLUSH IV SOLN 100 UNIT/ML 100 UNIT/ML
5 SOLUTION INTRAVENOUS
Status: DISCONTINUED | OUTPATIENT
Start: 2020-04-06 | End: 2020-04-06 | Stop reason: HOSPADM

## 2020-04-06 RX ORDER — DIPHENHYDRAMINE HYDROCHLORIDE 50 MG/ML
50 INJECTION INTRAMUSCULAR; INTRAVENOUS
Status: CANCELLED
Start: 2020-04-13

## 2020-04-06 RX ORDER — DIPHENHYDRAMINE HYDROCHLORIDE 50 MG/ML
50 INJECTION INTRAMUSCULAR; INTRAVENOUS
Status: CANCELLED
Start: 2020-04-20

## 2020-04-06 RX ORDER — LORAZEPAM 2 MG/ML
0.5 INJECTION INTRAMUSCULAR EVERY 4 HOURS PRN
Status: CANCELLED
Start: 2020-04-06

## 2020-04-06 RX ORDER — EPINEPHRINE 1 MG/ML
0.3 INJECTION, SOLUTION INTRAMUSCULAR; SUBCUTANEOUS EVERY 5 MIN PRN
Status: CANCELLED | OUTPATIENT
Start: 2020-04-06

## 2020-04-06 RX ORDER — NALOXONE HYDROCHLORIDE 0.4 MG/ML
.1-.4 INJECTION, SOLUTION INTRAMUSCULAR; INTRAVENOUS; SUBCUTANEOUS
Status: CANCELLED | OUTPATIENT
Start: 2020-04-20

## 2020-04-06 RX ORDER — EPINEPHRINE 1 MG/ML
0.3 INJECTION, SOLUTION INTRAMUSCULAR; SUBCUTANEOUS EVERY 5 MIN PRN
Status: CANCELLED | OUTPATIENT
Start: 2020-04-20

## 2020-04-06 RX ADMIN — HEPARIN SODIUM (PORCINE) LOCK FLUSH IV SOLN 100 UNIT/ML 5 ML: 100 SOLUTION at 11:15

## 2020-04-06 RX ADMIN — GEMCITABINE 1800 MG: 38 INJECTION, SOLUTION INTRAVENOUS at 10:36

## 2020-04-06 RX ADMIN — SODIUM CHLORIDE 250 ML: 9 INJECTION, SOLUTION INTRAVENOUS at 10:20

## 2020-04-06 RX ADMIN — DEXAMETHASONE SODIUM PHOSPHATE 12 MG: 10 INJECTION, SOLUTION INTRAMUSCULAR; INTRAVENOUS at 10:20

## 2020-04-06 ASSESSMENT — MIFFLIN-ST. JEOR: SCORE: 1363.52

## 2020-04-06 ASSESSMENT — PAIN SCALES - GENERAL: PAINLEVEL: NO PAIN (0)

## 2020-04-06 NOTE — PROGRESS NOTES
Visit Date:   04/06/2020      ONCOLOGY HISTORY: Mr. Arredondo is a gentleman with pancreatic cancer.T3 N1 M0. Stage IIB.   -Also has prostate cancer [place] 7 prostate.   1.  Prostate MRI on 07/20/2019 revealed PI-RADS 5.  No suspicious adenopathy or evidence of pelvic metastasis.   2.  Bone scan on 09/26/2019 does not reveal any bone metastasis.  There is some cardiac uptake.   3.  CT abdomen and pelvis on 09/26/2019 reveals new soft tissue mesenteric mass in the region of pancreatic tail.   4.  EUS on 10/07/2019 revealed an irregular mass in the pancreatic tail measuring 3.8 x 2.2 cm.  There was some evidence of invasion into the portal vein.  No lymphadenopathy seen.  Based on the EUS, it was T3 Nx disease.  FNA is positive for moderately differentiated pancreatic adenocarcinoma.   5. PET scan on 10/15/2019 reveals hypermetabolic 4.2 cm mass in the pancreatic tail and an adjacent 1 cm peripancreatic lymph node. No evidence of any metastatic disease. There are 2 foci of mild FDG uptake in the prostate gland from prostate cancer.   6. Neoadjuvant chemotherapy with gemcitabine and Abraxane started on 11/07/2019.       SUBJECTIVE:  Mr. Arredondo is an 80-year-old gentleman with resectable pancreatic cancer on neoadjuvant chemotherapy with gemcitabine and Abraxane.  Abraxane has been stopped after cycle 4 due to neuropathy.  The patient has numbness in both fingers and toes.  It is not improving.  Walking is fairly good.  He has not been falling down.  He can still hold things with hands.  Things are not falling out of his hands.  He is taking gabapentin.  It is stabilizing the neuropathy.      He has some fatigue.  No headache.  No dizziness.  No chest pain.  No shortness of breath.  No nausea or vomiting.  Appetite is fairly good.  No urinary complaints.  No bowel problem.  No bleeding.  No fever or chills.      All other review of systems negative.      PHYSICAL EXAMINATION:   GENERAL:  Alert and oriented x 3.   VITAL  SIGNS:  Reviewed.  ECOG PS of 1.     EYES:  No icterus.   THROAT:  No ulcer. No thrush.   NECK:  Supple. No lymphadenopathy. No thyromegaly.   AXILLAE:  No lymphadenopathy.   LUNGS:  Good air entry bilaterally.  No crackles or wheezing.   HEART:  Regular.  No murmur.   GI: Abdomen is soft.  Nontender. No mass.   EXTREMITIES:  No pedal edema.  No calf swelling or tenderness.   SKIN:  No rash.      LABORATORY DATA:  Reviewed.   -On 2020, CA 19-9 has increased significantly to 2163.      ASSESSMENT:   1.  An 80-year-old gentleman with pancreatic cancer on neoadjuvant chemotherapy.   2.  Peripheral neuropathy.   3.  Normocytic anemia.      PLAN:   1.  I discussed regarding pancreatic cancer.  Clinically, he is stable.  I am concerned regarding significant increase in CA 19-9.  We will recheck it today.  I want to make sure there is not a lab error.      For pancreatic cancer, he will continue on gemcitabine.  Abraxane has been stopped because of neuropathy.  So far, he is tolerating it well.      2.  Discussed regarding neuropathy.  He will take gabapentin 300 mg twice a day.  I told the patient that with time it should improve.      3.  He will get CT scan at end of 2020.  I will see him after that.  In between, he will see our nurse practitioner.  Advised him to see a physician if he has fever, chills, worsening weakness, shortness of breath, diarrhea or any other concerns.         ROXY LANCASTER MD             D: 2020   T: 2020   MT: WEN      Name:     KARI YANG   MRN:      [id number]        Account:      [id number]   :      1940           Visit Date:   2020      Document: [id number]

## 2020-04-06 NOTE — PATIENT INSTRUCTIONS
1. Continue chemotherapy. Scheduled/ Katherine   2. Add CA 19-9 to the lab today.  3. Take gabapentin 300 mg twice a day.  4. See Gerson Massey in 2 weeks.  Scheduled/ Katherine   5. Schedule CT scan end of April.  Already scheduled/ Katherine   6. See me after CT scan.  Scheduled/ Katherine       AVS printed and given to patient/ Katherine   
vital signs

## 2020-04-06 NOTE — PROGRESS NOTES
Infusion Nursing Note:  Lowell Arredondo presents today for C6D1 gemzar.    Patient seen by provider today: Yes: Colton   present during visit today: Not Applicable.    Note: N/A.    Intravenous Access:  Implanted Port.    Treatment Conditions:  Lab Results   Component Value Date    HGB 10.9 04/03/2020     Lab Results   Component Value Date    WBC 5.4 04/03/2020      Lab Results   Component Value Date    ANEU 2.8 04/03/2020     Lab Results   Component Value Date     04/03/2020      Lab Results   Component Value Date     04/03/2020                   Lab Results   Component Value Date    POTASSIUM 4.3 04/03/2020           Lab Results   Component Value Date    MAG 2.0 01/25/2020            Lab Results   Component Value Date    CR 1.03 04/03/2020                   Lab Results   Component Value Date    VICENTE 8.8 04/03/2020                Lab Results   Component Value Date    BILITOTAL 0.3 04/03/2020           Lab Results   Component Value Date    ALBUMIN 3.2 04/03/2020                    Lab Results   Component Value Date    ALT 30 04/03/2020           Lab Results   Component Value Date    AST 25 04/03/2020       Results reviewed, labs MET treatment parameters, ok to proceed with treatment.      Post Infusion Assessment:  Patient tolerated infusion without incident.  Site patent and intact, free from redness, edema or discomfort.  No evidence of extravasations.  Access discontinued per protocol.       Discharge Plan:   Patient and/or family verbalized understanding of discharge instructions and all questions answered.  AVS to patient via [employer].  Patient will return 4/13 for next appointment.   Patient discharged in stable condition accompanied by: self.  Departure Mode: Ambulatory.    Emelyn King RN

## 2020-04-06 NOTE — PROGRESS NOTES
"Oncology Rooming Note    April 6, 2020 9:01 AM   Lowell Arredondo is a 80 year old male who presents for:    Chief Complaint   Patient presents with     Oncology Clinic Visit     Initial Vitals: There were no vitals taken for this visit. Estimated body mass index is 24.43 kg/m  as calculated from the following:    Height as of 3/24/20: 1.702 m (5' 7\").    Weight as of 3/24/20: 70.8 kg (156 lb). There is no height or weight on file to calculate BSA.  Data Unavailable Comment: Data Unavailable   No LMP for male patient.  Allergies reviewed: Yes  Medications reviewed: Yes    Medications: Medication refills not needed today.  Pharmacy name entered into [employer]: [id number] - [place], [place] - [address]    Clinical concerns:  doctor was notified.      Alisa Persaud MA            "

## 2020-04-07 DIAGNOSIS — Z53.9 ERRONEOUS ENCOUNTER--DISREGARD: Primary | ICD-10-CM

## 2020-04-07 DIAGNOSIS — T45.1X5A POLYNEUROPATHY FOLLOWING CHEMOTHERAPY (H): ICD-10-CM

## 2020-04-07 DIAGNOSIS — G62.0 POLYNEUROPATHY FOLLOWING CHEMOTHERAPY (H): ICD-10-CM

## 2020-04-09 ENCOUNTER — TELEPHONE (OUTPATIENT)
Dept: ONCOLOGY | Facility: CLINIC | Age: 80
End: 2020-04-09

## 2020-04-09 NOTE — TELEPHONE ENCOUNTER
Patient is currently scheduled for an appointment at [facility] in [place].  Called patient to review current visitor restrictions and complete COVID-19 Patient Infection (Travel) Screening Tool.    No answer, voicemail message left.  Instructed patient to call the clinic to complete pre-screening.    Shari Schoenberger, CMA

## 2020-04-10 ENCOUNTER — VIRTUAL VISIT (OUTPATIENT)
Dept: ONCOLOGY | Facility: CLINIC | Age: 80
End: 2020-04-10
Attending: INTERNAL MEDICINE
Payer: COMMERCIAL

## 2020-04-10 ENCOUNTER — INFUSION THERAPY VISIT (OUTPATIENT)
Dept: INFUSION THERAPY | Facility: CLINIC | Age: 80
End: 2020-04-10
Attending: INTERNAL MEDICINE
Payer: COMMERCIAL

## 2020-04-10 ENCOUNTER — TELEPHONE (OUTPATIENT)
Dept: SURGERY | Facility: CLINIC | Age: 80
End: 2020-04-10

## 2020-04-10 ENCOUNTER — HOSPITAL ENCOUNTER (OUTPATIENT)
Facility: CLINIC | Age: 80
Setting detail: SPECIMEN
Discharge: HOME | End: 2020-04-10
Attending: INTERNAL MEDICINE | Admitting: INTERNAL MEDICINE
Payer: COMMERCIAL

## 2020-04-10 DIAGNOSIS — Z95.828 PORT-A-CATH IN PLACE: ICD-10-CM

## 2020-04-10 DIAGNOSIS — T45.1X5A CHEMOTHERAPY-INDUCED NEUTROPENIA (H): ICD-10-CM

## 2020-04-10 DIAGNOSIS — C25.2 MALIGNANT NEOPLASM OF TAIL OF PANCREAS (H): Primary | ICD-10-CM

## 2020-04-10 DIAGNOSIS — D70.1 CHEMOTHERAPY-INDUCED NEUTROPENIA (H): ICD-10-CM

## 2020-04-10 LAB
BASOPHILS # BLD AUTO: 0 10E9/L (ref 0–0.2)
BASOPHILS NFR BLD AUTO: 0.7 %
DIFFERENTIAL METHOD BLD: ABNORMAL
EOSINOPHIL # BLD AUTO: 0.1 10E9/L (ref 0–0.7)
EOSINOPHIL NFR BLD AUTO: 3.2 %
ERYTHROCYTE [DISTWIDTH] IN BLOOD BY AUTOMATED COUNT: 17.8 % (ref 10–15)
HCT VFR BLD AUTO: 32.6 % (ref 40–53)
HGB BLD-MCNC: 10.7 G/DL (ref 13.3–17.7)
IMM GRANULOCYTES # BLD: 0 10E9/L (ref 0–0.4)
IMM GRANULOCYTES NFR BLD: 0.2 %
LYMPHOCYTES # BLD AUTO: 1.4 10E9/L (ref 0.8–5.3)
LYMPHOCYTES NFR BLD AUTO: 35.1 %
MCH RBC QN AUTO: 32.8 PG (ref 26.5–33)
MCHC RBC AUTO-ENTMCNC: 32.8 G/DL (ref 31.5–36.5)
MCV RBC AUTO: 100 FL (ref 78–100)
MONOCYTES # BLD AUTO: 0.2 10E9/L (ref 0–1.3)
MONOCYTES NFR BLD AUTO: 3.7 %
NEUTROPHILS # BLD AUTO: 2.3 10E9/L (ref 1.6–8.3)
NEUTROPHILS NFR BLD AUTO: 57.1 %
NRBC # BLD AUTO: 0 10*3/UL
NRBC BLD AUTO-RTO: 0 /100
PLATELET # BLD AUTO: 344 10E9/L (ref 150–450)
RBC # BLD AUTO: 3.26 10E12/L (ref 4.4–5.9)
WBC # BLD AUTO: 4.1 10E9/L (ref 4–11)

## 2020-04-10 PROCEDURE — 85025 COMPLETE CBC W/AUTO DIFF WBC: CPT | Performed by: INTERNAL MEDICINE

## 2020-04-10 PROCEDURE — 36591 DRAW BLOOD OFF VENOUS DEVICE: CPT

## 2020-04-10 PROCEDURE — 99213 OFFICE O/P EST LOW 20 MIN: CPT | Mod: 95 | Performed by: NURSE PRACTITIONER

## 2020-04-10 PROCEDURE — 25000128 H RX IP 250 OP 636: Performed by: NURSE PRACTITIONER

## 2020-04-10 RX ORDER — HEPARIN SODIUM (PORCINE) LOCK FLUSH IV SOLN 100 UNIT/ML 100 UNIT/ML
5 SOLUTION INTRAVENOUS
Status: CANCELLED | OUTPATIENT
Start: 2020-04-10

## 2020-04-10 RX ORDER — HEPARIN SODIUM (PORCINE) LOCK FLUSH IV SOLN 100 UNIT/ML 100 UNIT/ML
5 SOLUTION INTRAVENOUS
Status: DISCONTINUED | OUTPATIENT
Start: 2020-04-10 | End: 2020-04-10 | Stop reason: HOSPADM

## 2020-04-10 RX ORDER — HEPARIN SODIUM,PORCINE 10 UNIT/ML
5 VIAL (ML) INTRAVENOUS
Status: CANCELLED | OUTPATIENT
Start: 2020-04-10

## 2020-04-10 RX ADMIN — HEPARIN SODIUM (PORCINE) LOCK FLUSH IV SOLN 100 UNIT/ML 5 ML: 100 SOLUTION at 10:41

## 2020-04-10 NOTE — PROGRESS NOTES
Nursing Note:  Lowell Arredondo presents today for port labs.    Patient seen by provider today: No   present during visit today: Not Applicable.    Note: N/A.    Intravenous Access:  Labs drawn without difficulty.  Implanted Port.    Discharge Plan:   Patient was sent to [facility] for discharge to home.    Mili Mendoza RN

## 2020-04-10 NOTE — PROGRESS NOTES
"Lowell Arredondo is a 80 year old male who is being evaluated via a billable telephone visit.      The patient has been notified of following:     \"This telephone visit will be conducted via a call between you and your physician/provider. We have found that certain health care needs can be provided without the need for a physical exam.  This service lets us provide the care you need with a short phone conversation.  If a prescription is necessary we can send it directly to your pharmacy.  If lab work is needed we can place an order for that and you can then stop by our lab to have the test done at a later time.    Telephone visits are billed at different rates depending on your insurance coverage. During this emergency period, for some insurers they may be billed the same as an in-person visit.  Please reach out to your insurance provider with any questions.    If during the course of the call the physician/provider feels a telephone visit is not appropriate, you will not be charged for this service.\"    Patient has given verbal consent for Telephone visit?  Yes    How would you like to obtain your AVS? Malcolm    I have reviewed and updated the patient's Past Medical History, Social History, Family History and Medication List.    ALLERGIES  Demerol [meperidine]     No refills needed. Feeling ok.     Phone call duration: 5 minutes    Marcia Gomez CMA    "

## 2020-04-10 NOTE — PROGRESS NOTES
"Subjective     Kari Yang is a 80 year old male who is being evaluated via a billable telephone visit.      The patient has been notified of following:     \"This telephone visit will be conducted via a call between you and your physician/provider. We have found that certain health care needs can be provided without the need for a physical exam.  This service lets us provide the care you need with a short phone conversation.  If a prescription is necessary we can send it directly to your pharmacy.  If lab work is needed we can place an order for that and you can then stop by our lab to have the test done at a later time.    Telephone visits are billed at different rates depending on your insurance coverage. During this emergency period, for some insurers they may be billed the same as an in-person visit.  Please reach out to your insurance provider with any questions.    If during the course of the call the physician/provider feels a telephone visit is not appropriate, you will not be charged for this service.\"    Patient has given verbal consent for Telephone visit?  Yes       Subjective   Patient reports feeling well.  He denies fever chills sweats denies cough no shortness of breath denies chest pain denies nausea vomiting diarrhea.  Neuropathy is stable he is taking gabapentin     ROS: 14 point ROS neg other than the symptoms noted above in the HPI.    ALLERGIES  Demerol [meperidine]             Objective   Reported vitals:  There were no vitals taken for this visit.   healthy, alert and no distress  Psych: Alert and oriented times 3; coherent speech, normal   rate and volume, able to articulate logical thoughts, able   to abstract reason, no tangential thoughts, no hallucinations   or delusions        Results for KARI YANG (MRN [id number]) as of 4/10/2020 13:33   Ref. Range 4/10/2020 10:42   WBC Latest Ref Range: 4.0 - 11.0 10e9/L 4.1   Hemoglobin Latest Ref Range: 13.3 - 17.7 g/dL 10.7 (L) "   Hematocrit Latest Ref Range: 40.0 - 53.0 % 32.6 (L)   Platelet Count Latest Ref Range: 150 - 450 10e9/L 344   RBC Count Latest Ref Range: 4.4 - 5.9 10e12/L 3.26 (L)   MCV Latest Ref Range: 78 - 100 fl 100   MCH Latest Ref Range: 26.5 - 33.0 pg 32.8   MCHC Latest Ref Range: 31.5 - 36.5 g/dL 32.8   RDW Latest Ref Range: 10.0 - 15.0 % 17.8 (H)   Diff Method Unknown Automated Method   % Neutrophils Latest Units: % 57.1   % Lymphocytes Latest Units: % 35.1   % Monocytes Latest Units: % 3.7   % Eosinophils Latest Units: % 3.2   % Basophils Latest Units: % 0.7   % Immature Granulocytes Latest Units: % 0.2   Nucleated RBCs Latest Ref Range: 0 /100 0   Absolute Neutrophil Latest Ref Range: 1.6 - 8.3 10e9/L 2.3   Absolute Lymphocytes Latest Ref Range: 0.8 - 5.3 10e9/L 1.4   Absolute Monocytes Latest Ref Range: 0.0 - 1.3 10e9/L 0.2   Absolute Eosinophils Latest Ref Range: 0.0 - 0.7 10e9/L 0.1   Absolute Basophils Latest Ref Range: 0.0 - 0.2 10e9/L 0.0   Abs Immature Granulocytes Latest Ref Range: 0 - 0.4 10e9/L 0.0   Absolute Nucleated RBC Unknown 0.0     Assessment/Plan:      This is a 80-year-old male with    Pancreatic cancer   Patient started neoadjuvant chemotherapy with gemcitabine and Abraxane added on 11/7/2019  Due to  significant neuropathy Abraxane has been discontinued  -His CA 19-9 showing significant increase. Pt is scheduled for CT chest abdomen pelvis 04/21 follow-up with Dr. Segura after the CT scan  Labs reviewed okay to proceed with day 8 cycle 6 on 4/13 gemcitabine      Peripheral neuropathy  Abraxane has been discontinued  He will continue with gabapentin    Phone call duration:  20 minutes

## 2020-04-10 NOTE — TELEPHONE ENCOUNTER
Home # not working.    I scheduled surgery for this patient with Dr. Ryder on 5/21 at [facility].   Scheduled per MD.    Additional appointments:  4/27:   Britni at 11:30 AM  5/14:  PAC at 10 AM    Post-op: 6/19 at 9 AM    I called the patient and left a VM for wife to confirm the scheduled dates.     I sent a surgery packet to them via [employer], per their preference. This contains education and directions for the surgery.     They are aware that they will get their exact times at their appointment with PAC. They are also aware that more education regarding the surgery will be provided at their consult.

## 2020-04-12 ENCOUNTER — TELEPHONE (OUTPATIENT)
Dept: ONCOLOGY | Facility: CLINIC | Age: 80
End: 2020-04-12

## 2020-04-12 NOTE — TELEPHONE ENCOUNTER
"Patient is currently scheduled for an appointment at [facility] in [place].  Called patient to review current visitor restrictions and complete COVID-19 Patient Infection/Travel Screening Tool.     Due to the recent public health concerns around COVID-19 and in an effort to keep our patients and staff safe and healthy, we are implementing a screening process for the patients that come to our clinic.      I am going to ask you a few questions, please answer yes or no.  Your honesty about any symptoms is critical, as it keeps patients and staff healthy.      Do you have a:  Fever (or reported chills)?  No  Cough?  No  Shortness of breath?  No  Rash?  No    In the last month, have you been in contact with someone who was confirmed or suspected to have Coronavirus/COVID-19?  No    Have you traveled internationally in the last month?  No  If so, where?  N/A     I also wanted to let you know that to protect our patients from the flu and other common illnesses, [facility] enforce visitor restrictions year round, but due to the community spread of COVID-19 in [place], we are taking additional precautionary steps to ensure the health of our patients.  At this time, NO visitors are allowed on our hospital and clinic campuses.     Patient PASSED the screening assessment.    Patient instructed to come to the clinic as planned for their scheduled appointment and to call the clinic if any symptoms develop prior to their appointment.    \"COVID-19 is contagious and can be dangerous for our patients and staff.  Please send us a MyChart message or call our clinic before coming in if you feel any of the following symptoms: fever, cough, congestion, runny nose, sore throat, muscle aches and pains, or shortness of breath.  If you are already at our clinic, it is very important that you be honest about any symptoms you are experiencing to ensure your safety and that of other patients and staff who " "treat you.  If you do have symptoms, we will have a nurse and/or provider asses you to determine next steps.\"    Mitzi Heard RN on 4/12/2020 at 12:20 PM      "

## 2020-04-12 NOTE — PROGRESS NOTES
Visit Date:   04/06/2020     ONCOLOGY HISTORY: Mr. Arredondo is a gentleman with pancreatic cancer.T3 N1 M0. Stage IIB.   -Also has prostate cancer [place] 7 prostate.   1.  Prostate MRI on 07/20/2019 revealed PI-RADS 5.  No suspicious adenopathy or evidence of pelvic metastasis.   2.  Bone scan on 09/26/2019 does not reveal any bone metastasis.  There is some cardiac uptake.   3.  CT abdomen and pelvis on 09/26/2019 reveals new soft tissue mesenteric mass in the region of pancreatic tail.   4.  EUS on 10/07/2019 revealed an irregular mass in the pancreatic tail measuring 3.8 x 2.2 cm.  There was some evidence of invasion into the portal vein.  No lymphadenopathy seen.  Based on the EUS, it was T3 Nx disease.  FNA is positive for moderately differentiated pancreatic adenocarcinoma.   5. PET scan on 10/15/2019 reveals hypermetabolic 4.2 cm mass in the pancreatic tail and an adjacent 1 cm peripancreatic lymph node. No evidence of any metastatic disease. There are 2 foci of mild FDG uptake in the prostate gland from prostate cancer.   6. Neoadjuvant chemotherapy with gemcitabine and Abraxane started on 11/07/2019.       SUBJECTIVE:  Mr. Arredondo is an 80-year-old gentleman with resectable pancreatic cancer on neoadjuvant chemotherapy with gemcitabine and Abraxane.  Abraxane has been stopped after cycle 4 due to neuropathy.  The patient has numbness in both fingers and toes.  It is not improving.  Walking is fairly good.  He has not been falling down.  He can still hold things with hands.  Things are not falling out of his hands.  He is taking gabapentin.  It is stabilizing the neuropathy.      He has some fatigue.  No headache.  No dizziness.  No chest pain.  No shortness of breath.  No nausea or vomiting.  Appetite is fairly good.  No urinary complaints.  No bowel problem.  No bleeding.  No fever or chills.      All other review of systems negative.      PHYSICAL EXAMINATION:   GENERAL:  Alert and oriented x 3.   VITAL  SIGNS:  Reviewed.  ECOG PS of 1.     EYES:  No icterus.   THROAT:  No ulcer. No thrush.   NECK:  Supple. No lymphadenopathy. No thyromegaly.   AXILLAE:  No lymphadenopathy.   LUNGS:  Good air entry bilaterally.  No crackles or wheezing.   HEART:  Regular.  No murmur.   GI: Abdomen is soft.  Nontender. No mass.   EXTREMITIES:  No pedal edema.  No calf swelling or tenderness.   SKIN:  No rash.      LABORATORY DATA:  Reviewed.   -On 2020, CA 19-9 has increased significantly to 2163.      ASSESSMENT:   1.  An 80-year-old gentleman with pancreatic cancer on neoadjuvant chemotherapy.   2.  Peripheral neuropathy.   3.  Normocytic anemia.      PLAN:   1.  I discussed regarding pancreatic cancer.  Clinically, he is stable.  I am concerned regarding significant increase in CA 19-9.  We will recheck it today.  I want to make sure there is not a lab error.      For pancreatic cancer, he will continue on gemcitabine.  Abraxane has been stopped because of neuropathy.  So far, he is tolerating it well.      2.  Discussed regarding neuropathy.  He will take gabapentin 300 mg twice a day.  I told the patient that with time it should improve.      3.  He will get CT scan at end of 2020.  I will see him after that.  In between, he will see our nurse practitioner.  Advised him to see a physician if he has fever, chills, worsening weakness, shortness of breath, diarrhea or any other concerns.         ROXY LANCASTER MD             D: 2020   T: 2020   MT: WEN      Name:     KARI YANG   MRN:      [id number]        Account:      [id number]   :      1940           Visit Date:   2020      Document: [id number]

## 2020-04-13 ENCOUNTER — HOSPITAL ENCOUNTER (OUTPATIENT)
Facility: CLINIC | Age: 80
Setting detail: SPECIMEN
Discharge: HOME | End: 2020-04-13
Attending: INTERNAL MEDICINE | Admitting: INTERNAL MEDICINE
Payer: COMMERCIAL

## 2020-04-13 ENCOUNTER — INFUSION THERAPY VISIT (OUTPATIENT)
Dept: INFUSION THERAPY | Facility: CLINIC | Age: 80
End: 2020-04-13
Attending: INTERNAL MEDICINE
Payer: COMMERCIAL

## 2020-04-13 VITALS
HEART RATE: 70 BPM | RESPIRATION RATE: 16 BRPM | BODY MASS INDEX: 25.11 KG/M2 | WEIGHT: 160 LBS | HEIGHT: 67 IN | DIASTOLIC BLOOD PRESSURE: 70 MMHG | SYSTOLIC BLOOD PRESSURE: 111 MMHG | OXYGEN SATURATION: 98 % | TEMPERATURE: 97.9 F

## 2020-04-13 DIAGNOSIS — T45.1X5A CHEMOTHERAPY-INDUCED NEUTROPENIA (H): ICD-10-CM

## 2020-04-13 DIAGNOSIS — D70.1 CHEMOTHERAPY-INDUCED NEUTROPENIA (H): ICD-10-CM

## 2020-04-13 DIAGNOSIS — C25.2 MALIGNANT NEOPLASM OF TAIL OF PANCREAS (H): Primary | ICD-10-CM

## 2020-04-13 DIAGNOSIS — Z95.828 PORT-A-CATH IN PLACE: ICD-10-CM

## 2020-04-13 PROCEDURE — 25000128 H RX IP 250 OP 636: Performed by: INTERNAL MEDICINE

## 2020-04-13 PROCEDURE — 25000128 H RX IP 250 OP 636: Performed by: NURSE PRACTITIONER

## 2020-04-13 PROCEDURE — 86301 IMMUNOASSAY TUMOR CA 19-9: CPT | Performed by: INTERNAL MEDICINE

## 2020-04-13 PROCEDURE — 25800030 ZZH RX IP 258 OP 636: Performed by: INTERNAL MEDICINE

## 2020-04-13 PROCEDURE — 96375 TX/PRO/DX INJ NEW DRUG ADDON: CPT

## 2020-04-13 PROCEDURE — 96413 CHEMO IV INFUSION 1 HR: CPT

## 2020-04-13 RX ORDER — HEPARIN SODIUM (PORCINE) LOCK FLUSH IV SOLN 100 UNIT/ML 100 UNIT/ML
5 SOLUTION INTRAVENOUS
Status: DISCONTINUED | OUTPATIENT
Start: 2020-04-13 | End: 2020-04-13 | Stop reason: HOSPADM

## 2020-04-13 RX ORDER — HEPARIN SODIUM (PORCINE) LOCK FLUSH IV SOLN 100 UNIT/ML 100 UNIT/ML
5 SOLUTION INTRAVENOUS
Status: CANCELLED | OUTPATIENT
Start: 2020-04-13

## 2020-04-13 RX ORDER — HEPARIN SODIUM,PORCINE 10 UNIT/ML
5 VIAL (ML) INTRAVENOUS
Status: CANCELLED | OUTPATIENT
Start: 2020-04-13

## 2020-04-13 RX ADMIN — GEMCITABINE 1800 MG: 38 INJECTION, SOLUTION INTRAVENOUS at 10:57

## 2020-04-13 RX ADMIN — SODIUM CHLORIDE 250 ML: 9 INJECTION, SOLUTION INTRAVENOUS at 10:27

## 2020-04-13 RX ADMIN — HEPARIN SODIUM (PORCINE) LOCK FLUSH IV SOLN 100 UNIT/ML 5 ML: 100 SOLUTION at 11:30

## 2020-04-13 RX ADMIN — DEXAMETHASONE SODIUM PHOSPHATE 12 MG: 10 INJECTION, SOLUTION INTRAMUSCULAR; INTRAVENOUS at 10:27

## 2020-04-13 ASSESSMENT — PAIN SCALES - GENERAL: PAINLEVEL: NO PAIN (0)

## 2020-04-13 ASSESSMENT — MIFFLIN-ST. JEOR: SCORE: 1384.51

## 2020-04-13 NOTE — TELEPHONE ENCOUNTER
FUTURE VISIT INFORMATION      SURGERY INFORMATION:    Date: 20    Location: [facility] OR    Surgeon:  Sanjay Ryder MD     Anesthesia Type:  Combined General with Block     Procedure: Robotic distal pancreatectomy Robotic splenectomy     RECORDS REQUESTED FROM:       Primary Care Provider: Dawson Dang MD - [facility]    Most recent EKG+ Tracin20    Most recent Cardiac Stress Test: 10/3/2008- [facility]

## 2020-04-13 NOTE — PROGRESS NOTES
Infusion Nursing Note:  Lowell Arredondo presents today for C6D8 Gemzar.    Patient seen by provider today: No   present during visit today: Not Applicable.    Note: Patient feeling well overall, continues with numbness/neuropathy in bilateral feet for which he is taking gabapentin, unchanged. No other new concerns today.    Intravenous Access:  Labs drawn without difficulty.  Implanted Port.    Treatment Conditions:  Lab Results   Component Value Date    HGB 10.7 04/10/2020     Lab Results   Component Value Date    WBC 4.1 04/10/2020      Lab Results   Component Value Date    ANEU 2.3 04/10/2020     Lab Results   Component Value Date     04/10/2020      Results reviewed, labs MET treatment parameters, ok to proceed with treatment.      Post Infusion Assessment:  Patient tolerated infusion without incident.  Blood return noted pre and post infusion.  Site patent and intact, free from redness, edema or discomfort.  No evidence of extravasations.  Access discontinued per protocol.       Discharge Plan:   Discharge instructions reviewed with: Patient.  Patient and/or family verbalized understanding of discharge instructions and all questions answered.  Copy of AVS reviewed with patient and/or family.  Patient will return 4/17/20 for next appointment.  Patient discharged in stable condition accompanied by: self.  Departure Mode: Ambulatory.    Danni Grayson RN

## 2020-04-14 LAB — CANCER AG19-9 SERPL-ACNC: 1992 U/ML (ref 0–37)

## 2020-04-17 ENCOUNTER — HOSPITAL ENCOUNTER (OUTPATIENT)
Facility: CLINIC | Age: 80
Setting detail: SPECIMEN
Discharge: HOME | End: 2020-04-17
Attending: INTERNAL MEDICINE | Admitting: INTERNAL MEDICINE
Payer: COMMERCIAL

## 2020-04-17 ENCOUNTER — VIRTUAL VISIT (OUTPATIENT)
Dept: ONCOLOGY | Facility: CLINIC | Age: 80
End: 2020-04-17
Attending: NURSE PRACTITIONER
Payer: COMMERCIAL

## 2020-04-17 DIAGNOSIS — T45.1X5A CHEMOTHERAPY-INDUCED NEUTROPENIA (H): ICD-10-CM

## 2020-04-17 DIAGNOSIS — Z95.828 PORT-A-CATH IN PLACE: ICD-10-CM

## 2020-04-17 DIAGNOSIS — D70.1 CHEMOTHERAPY-INDUCED NEUTROPENIA (H): ICD-10-CM

## 2020-04-17 DIAGNOSIS — C25.2 MALIGNANT NEOPLASM OF TAIL OF PANCREAS (H): Primary | ICD-10-CM

## 2020-04-17 LAB
BASOPHILS # BLD AUTO: 0 10E9/L (ref 0–0.2)
BASOPHILS NFR BLD AUTO: 0.8 %
DIFFERENTIAL METHOD BLD: ABNORMAL
EOSINOPHIL # BLD AUTO: 0.1 10E9/L (ref 0–0.7)
EOSINOPHIL NFR BLD AUTO: 3.7 %
ERYTHROCYTE [DISTWIDTH] IN BLOOD BY AUTOMATED COUNT: 18.1 % (ref 10–15)
HCT VFR BLD AUTO: 32.6 % (ref 40–53)
HGB BLD-MCNC: 10.4 G/DL (ref 13.3–17.7)
IMM GRANULOCYTES # BLD: 0 10E9/L (ref 0–0.4)
IMM GRANULOCYTES NFR BLD: 0.3 %
LYMPHOCYTES # BLD AUTO: 2 10E9/L (ref 0.8–5.3)
LYMPHOCYTES NFR BLD AUTO: 51.7 %
MCH RBC QN AUTO: 33.5 PG (ref 26.5–33)
MCHC RBC AUTO-ENTMCNC: 31.9 G/DL (ref 31.5–36.5)
MCV RBC AUTO: 105 FL (ref 78–100)
MONOCYTES # BLD AUTO: 0.1 10E9/L (ref 0–1.3)
MONOCYTES NFR BLD AUTO: 2.6 %
NEUTROPHILS # BLD AUTO: 1.6 10E9/L (ref 1.6–8.3)
NEUTROPHILS NFR BLD AUTO: 40.9 %
NRBC # BLD AUTO: 0 10*3/UL
NRBC BLD AUTO-RTO: 0 /100
PLATELET # BLD AUTO: 155 10E9/L (ref 150–450)
RBC # BLD AUTO: 3.1 10E12/L (ref 4.4–5.9)
WBC # BLD AUTO: 3.8 10E9/L (ref 4–11)

## 2020-04-17 PROCEDURE — 36591 DRAW BLOOD OFF VENOUS DEVICE: CPT

## 2020-04-17 PROCEDURE — 85025 COMPLETE CBC W/AUTO DIFF WBC: CPT | Performed by: INTERNAL MEDICINE

## 2020-04-17 PROCEDURE — 99213 OFFICE O/P EST LOW 20 MIN: CPT | Mod: 95 | Performed by: NURSE PRACTITIONER

## 2020-04-17 PROCEDURE — 25000128 H RX IP 250 OP 636: Performed by: NURSE PRACTITIONER

## 2020-04-17 RX ORDER — HEPARIN SODIUM (PORCINE) LOCK FLUSH IV SOLN 100 UNIT/ML 100 UNIT/ML
5 SOLUTION INTRAVENOUS
Status: CANCELLED | OUTPATIENT
Start: 2020-04-17

## 2020-04-17 RX ORDER — HEPARIN SODIUM (PORCINE) LOCK FLUSH IV SOLN 100 UNIT/ML 100 UNIT/ML
5 SOLUTION INTRAVENOUS
Status: DISCONTINUED | OUTPATIENT
Start: 2020-04-17 | End: 2020-04-17 | Stop reason: HOSPADM

## 2020-04-17 RX ORDER — HEPARIN SODIUM,PORCINE 10 UNIT/ML
5 VIAL (ML) INTRAVENOUS
Status: CANCELLED | OUTPATIENT
Start: 2020-04-17

## 2020-04-17 RX ADMIN — Medication 5 ML: at 13:20

## 2020-04-17 NOTE — PROGRESS NOTES
Nursing Note:  Lowell Arredondo presents today for port labs.    Patient seen by provider today: No   present during visit today: Not Applicable.    Note: N/A.    Intravenous Access:  Labs drawn without difficulty.  Implanted Port.    Discharge Plan:   Patient was sent home and has telephone visit with Gerson Massey this afternoon.    Danay Carvajal RN

## 2020-04-17 NOTE — PROGRESS NOTES
"Lowell Arredondo is a 80 year old male who is being evaluated via a billable telephone visit.      The patient has been notified of following:     \"This telephone visit will be conducted via a call between you and your physician/provider. We have found that certain health care needs can be provided without the need for a physical exam.  This service lets us provide the care you need with a short phone conversation.  If a prescription Subjective     Lowell Arredondo is a 80 year old male who is being evaluated via a billable telephone visit.      The patient has been notified of following:     \"This telephone visit will be conducted via a call between you and your physician/provider. We have found that certain health care needs can be provided without the need for a physical exam.  This service lets us provide the care you need with a short phone conversation.  If a prescription is necessary we can send it directly to your pharmacy.  If lab work is needed we can place an order for that and you can then stop by our lab to have the test done at a later time.    Telephone visits are billed at different rates depending on your insurance coverage. During this emergency period, for some insurers they may be billed the same as an in-person visit.  Please reach out to your insurance provider with any questions.    If during the course of the call the physician/provider feels a telephone visit is not appropriate, you will not be charged for this service.\"    Patient has given verbal consent for Telephone visit?  Yes       Subjective   Continues to feel well.  He continues to have some mild form of neuropathy he is taking gabapentin.  He denies worsening of neuropathy.  Patient denies fever chills sweats denies cough no shortness of breath denies chest pain denies nausea vomiting diarrhea denies abdominal pain     ROS: 14 point ROS neg other than the symptoms noted above in the HPI.    ALLERGIES  Demerol [meperidine]         "     Objective   Reported vitals:  There were no vitals taken for this visit.   healthy, alert and no distress  Psych: Alert and oriented times 3; coherent speech, normal   rate and volume, able to articulate logical thoughts, able   to abstract reason, no tangential thoughts, no hallucinations   or delusions          Results for KARI YANG (MRN [id number]) as of 4/17/2020 15:23   Ref. Range 4/17/2020 13:20   WBC Latest Ref Range: 4.0 - 11.0 10e9/L 3.8 (L)   Hemoglobin Latest Ref Range: 13.3 - 17.7 g/dL 10.4 (L)   Hematocrit Latest Ref Range: 40.0 - 53.0 % 32.6 (L)   Platelet Count Latest Ref Range: 150 - 450 10e9/L 155   RBC Count Latest Ref Range: 4.4 - 5.9 10e12/L 3.10 (L)   MCV Latest Ref Range: 78 - 100 fl 105 (H)   MCH Latest Ref Range: 26.5 - 33.0 pg 33.5 (H)   MCHC Latest Ref Range: 31.5 - 36.5 g/dL 31.9   RDW Latest Ref Range: 10.0 - 15.0 % 18.1 (H)   Diff Method Unknown Automated Method   % Neutrophils Latest Units: % 40.9   % Lymphocytes Latest Units: % 51.7   % Monocytes Latest Units: % 2.6   % Eosinophils Latest Units: % 3.7   % Basophils Latest Units: % 0.8   % Immature Granulocytes Latest Units: % 0.3   Nucleated RBCs Latest Ref Range: 0 /100 0   Absolute Neutrophil Latest Ref Range: 1.6 - 8.3 10e9/L 1.6   Absolute Lymphocytes Latest Ref Range: 0.8 - 5.3 10e9/L 2.0   Absolute Monocytes Latest Ref Range: 0.0 - 1.3 10e9/L 0.1   Absolute Eosinophils Latest Ref Range: 0.0 - 0.7 10e9/L 0.1   Absolute Basophils Latest Ref Range: 0.0 - 0.2 10e9/L 0.0   Abs Immature Granulocytes Latest Ref Range: 0 - 0.4 10e9/L 0.0   Absolute Nucleated RBC Unknown 0.0         Assessment/Plan:      This is a 80-year-old male with    Pancreatic cancer   Patient started neoadjuvant chemotherapy with gemcitabine and Abraxane added on 11/7/2019  Due to  significant neuropathy Abraxane has been discontinued  -His CA 19-9 showing significant increase. Pt is scheduled for CT chest abdomen pelvis 04/21 follow-up with Dr. Segura  "after the CT scan  Labs reviewed okay to proceed with day 15cycle 6 on 4/20 gemcitabine  -His CA 19-9 showing significant increase. Pt is scheduled for CT chest abdomen pelvis 04/21  Patient will have a phone visit with Dr. Segura on 4/23 to discuss t he results of the CT scan    Peripheral neuropathy  Abraxane has been discontinued  He will continue with gabapentin    Phone call duration:  20 minutes            is necessary we can send it directly to your pharmacy.  If lab work is needed we can place an order for that and you can then stop by our lab to have the test done at a later time.    Telephone visits are billed at different rates depending on your insurance coverage. During this emergency period, for some insurers they may be billed the same as an in-person visit.  Please reach out to your insurance provider with any questions.    If during the course of the call the physician/provider feels a telephone visit is not appropriate, you will not be charged for this service.\"    Patient has given verbal consent for Telephone visit?  Yes    How would you like to obtain your AVS? [email or web address]    Additional provider notes: Medications and Allergies were reviewed. No refills are needed at this time. No pain.    Phone call duration: 6 minutes    PLEASE CALL -[phone]    Sarah Cifuentes CMA    "

## 2020-04-19 ENCOUNTER — TELEPHONE (OUTPATIENT)
Dept: ONCOLOGY | Facility: CLINIC | Age: 80
End: 2020-04-19

## 2020-04-19 NOTE — TELEPHONE ENCOUNTER
"Patient is currently scheduled for an appointment at [facility] in [place].  Called patient to review current visitor restrictions and complete COVID-19 Patient Infection/Travel Screening Tool.     Due to the recent public health concerns around COVID-19 and in an effort to keep our patients and staff safe and healthy, we are implementing a screening process for the patients that come to our clinic.      I am going to ask you a few questions, please answer yes or no.  Your honesty about any symptoms is critical, as it keeps patients and staff healthy.      Do you have a:  Fever (or reported chills)?  No  Cough?  No  Shortness of breath?  No  Rash?  No    In the last month, have you been in contact with someone who was confirmed or suspected to have Coronavirus/COVID-19?  No    Have you traveled internationally in the last month?  No  If so, where?  N/A     I also wanted to let you know that to protect our patients from the flu and other common illnesses, [facility] enforce visitor restrictions year round, but due to the community spread of COVID-19 in [place], we are taking additional precautionary steps to ensure the health of our patients.  At this time, NO visitors are allowed on our hospital and clinic campuses.     Patient PASSED the screening assessment.    Patient instructed to come to the clinic as planned for their scheduled appointment and to call the clinic if any symptoms develop prior to their appointment.    \"COVID-19 is contagious and can be dangerous for our patients and staff.  Please send us a MyChart message or call our clinic before coming in if you feel any of the following symptoms: fever, cough, congestion, runny nose, sore throat, muscle aches and pains, or shortness of breath.  If you are already at our clinic, it is very important that you be honest about any symptoms you are experiencing to ensure your safety and that of other patients and staff who " "treat you.  If you do have symptoms, we will have a nurse and/or provider asses you to determine next steps.\"    Mitzi Heard RN on 4/19/2020 at 9:55 AM      "

## 2020-04-20 ENCOUNTER — INFUSION THERAPY VISIT (OUTPATIENT)
Dept: INFUSION THERAPY | Facility: CLINIC | Age: 80
End: 2020-04-20
Attending: INTERNAL MEDICINE
Payer: COMMERCIAL

## 2020-04-20 VITALS
BODY MASS INDEX: 25.02 KG/M2 | OXYGEN SATURATION: 99 % | RESPIRATION RATE: 20 BRPM | DIASTOLIC BLOOD PRESSURE: 76 MMHG | HEART RATE: 71 BPM | SYSTOLIC BLOOD PRESSURE: 130 MMHG | WEIGHT: 159.8 LBS | TEMPERATURE: 97.4 F

## 2020-04-20 DIAGNOSIS — C25.2 MALIGNANT NEOPLASM OF TAIL OF PANCREAS (H): Primary | ICD-10-CM

## 2020-04-20 DIAGNOSIS — Z95.828 PORT-A-CATH IN PLACE: ICD-10-CM

## 2020-04-20 DIAGNOSIS — T45.1X5A CHEMOTHERAPY-INDUCED NEUTROPENIA (H): ICD-10-CM

## 2020-04-20 DIAGNOSIS — D70.1 CHEMOTHERAPY-INDUCED NEUTROPENIA (H): ICD-10-CM

## 2020-04-20 PROCEDURE — 96375 TX/PRO/DX INJ NEW DRUG ADDON: CPT

## 2020-04-20 PROCEDURE — 25000128 H RX IP 250 OP 636: Performed by: INTERNAL MEDICINE

## 2020-04-20 PROCEDURE — 25000128 H RX IP 250 OP 636: Performed by: NURSE PRACTITIONER

## 2020-04-20 PROCEDURE — 25800030 ZZH RX IP 258 OP 636: Performed by: INTERNAL MEDICINE

## 2020-04-20 PROCEDURE — 96413 CHEMO IV INFUSION 1 HR: CPT

## 2020-04-20 RX ORDER — HEPARIN SODIUM (PORCINE) LOCK FLUSH IV SOLN 100 UNIT/ML 100 UNIT/ML
5 SOLUTION INTRAVENOUS
Status: DISCONTINUED | OUTPATIENT
Start: 2020-04-20 | End: 2020-04-20 | Stop reason: HOSPADM

## 2020-04-20 RX ORDER — HEPARIN SODIUM (PORCINE) LOCK FLUSH IV SOLN 100 UNIT/ML 100 UNIT/ML
5 SOLUTION INTRAVENOUS
Status: CANCELLED | OUTPATIENT
Start: 2020-04-20

## 2020-04-20 RX ORDER — HEPARIN SODIUM,PORCINE 10 UNIT/ML
5 VIAL (ML) INTRAVENOUS
Status: CANCELLED | OUTPATIENT
Start: 2020-04-20

## 2020-04-20 RX ADMIN — DEXAMETHASONE SODIUM PHOSPHATE 12 MG: 10 INJECTION, SOLUTION INTRAMUSCULAR; INTRAVENOUS at 10:18

## 2020-04-20 RX ADMIN — HEPARIN SODIUM (PORCINE) LOCK FLUSH IV SOLN 100 UNIT/ML 5 ML: 100 SOLUTION at 11:34

## 2020-04-20 RX ADMIN — SODIUM CHLORIDE 250 ML: 9 INJECTION, SOLUTION INTRAVENOUS at 10:18

## 2020-04-20 RX ADMIN — GEMCITABINE 1800 MG: 38 INJECTION, SOLUTION INTRAVENOUS at 10:56

## 2020-04-20 ASSESSMENT — PAIN SCALES - GENERAL: PAINLEVEL: NO PAIN (0)

## 2020-04-20 NOTE — PROGRESS NOTES
Infusion Nursing Note:  Lowell Arredondo presents today for Cycle 6 Day 15 Gemzar.    Patient seen by provider today: Yes: Gerson Massey NP on 4/17.   present during visit today: Not Applicable.    Note: N/A.    Intravenous Access:  Implanted Port.    Treatment Conditions:  Lab Results   Component Value Date    HGB 10.4 04/17/2020     Lab Results   Component Value Date    WBC 3.8 04/17/2020      Lab Results   Component Value Date    ANEU 1.6 04/17/2020     Lab Results   Component Value Date     04/17/2020      Results reviewed, labs MET treatment parameters, ok to proceed with treatment.      Post Infusion Assessment:  Patient tolerated infusion without incident.  Blood return noted pre and post infusion.  Site patent and intact, free from redness, edema or discomfort.  No evidence of extravasations.  Access discontinued per protocol.       Discharge Plan:   Patient declined prescription refills.  Discharge instructions reviewed with: Patient.  Patient verbalized understanding of discharge instructions and all questions answered.  AVS to patient via [employer].  Patient will return 4/23/20 for next appointment.   Patient discharged in stable condition accompanied by: self.  Departure Mode: Ambulatory.    Sarika Gay RN

## 2020-04-21 ENCOUNTER — HOSPITAL ENCOUNTER (OUTPATIENT)
Dept: CT IMAGING | Facility: CLINIC | Age: 80
Discharge: HOME | End: 2020-04-21
Attending: INTERNAL MEDICINE | Admitting: INTERNAL MEDICINE
Payer: COMMERCIAL

## 2020-04-21 DIAGNOSIS — C25.2 MALIGNANT NEOPLASM OF TAIL OF PANCREAS (H): ICD-10-CM

## 2020-04-21 PROCEDURE — 25000128 H RX IP 250 OP 636: Performed by: RADIOLOGY

## 2020-04-21 PROCEDURE — 25000125 ZZHC RX 250: Performed by: RADIOLOGY

## 2020-04-21 PROCEDURE — 71260 CT THORAX DX C+: CPT

## 2020-04-21 PROCEDURE — 25000128 H RX IP 250 OP 636

## 2020-04-21 RX ORDER — HEPARIN SODIUM (PORCINE) LOCK FLUSH IV SOLN 100 UNIT/ML 100 UNIT/ML
SOLUTION INTRAVENOUS
Status: COMPLETED
Start: 2020-04-21 | End: 2020-04-21

## 2020-04-21 RX ORDER — IOPAMIDOL 755 MG/ML
500 INJECTION, SOLUTION INTRAVASCULAR ONCE
Status: COMPLETED | OUTPATIENT
Start: 2020-04-21 | End: 2020-04-21

## 2020-04-21 RX ORDER — HEPARIN SODIUM (PORCINE) LOCK FLUSH IV SOLN 100 UNIT/ML 100 UNIT/ML
5 SOLUTION INTRAVENOUS ONCE
Status: COMPLETED | OUTPATIENT
Start: 2020-04-21 | End: 2020-04-21

## 2020-04-21 RX ADMIN — HEPARIN 5 ML: 100 SYRINGE at 09:49

## 2020-04-21 RX ADMIN — IOPAMIDOL 78 ML: 755 INJECTION, SOLUTION INTRAVENOUS at 09:40

## 2020-04-21 RX ADMIN — HEPARIN SODIUM (PORCINE) LOCK FLUSH IV SOLN 100 UNIT/ML 5 ML: 100 SOLUTION at 09:49

## 2020-04-21 RX ADMIN — SODIUM CHLORIDE 59 ML: 9 INJECTION, SOLUTION INTRAVENOUS at 09:40

## 2020-04-23 ENCOUNTER — VIRTUAL VISIT (OUTPATIENT)
Dept: ONCOLOGY | Facility: CLINIC | Age: 80
End: 2020-04-23
Attending: INTERNAL MEDICINE
Payer: COMMERCIAL

## 2020-04-23 DIAGNOSIS — C25.2 MALIGNANT NEOPLASM OF TAIL OF PANCREAS (H): Primary | ICD-10-CM

## 2020-04-23 PROCEDURE — 99213 OFFICE O/P EST LOW 20 MIN: CPT | Mod: 95 | Performed by: INTERNAL MEDICINE

## 2020-04-23 ASSESSMENT — PAIN SCALES - GENERAL: PAINLEVEL: NO PAIN (0)

## 2020-04-23 NOTE — PROGRESS NOTES
"Lowell Arredondo is a 80 year old male who is being evaluated via a billable telephone visit.      The patient has been notified of following:     \"This telephone visit will be conducted via a call between you and your physician/provider. We have found that certain health care needs can be provided without the need for a physical exam.  This service lets us provide the care you need with a short phone conversation.  If a prescription is necessary we can send it directly to your pharmacy.  If lab work is needed we can place an order for that and you can then stop by our lab to have the test done at a later time.    Telephone visits are billed at different rates depending on your insurance coverage. During this emergency period, for some insurers they may be billed the same as an in-person visit.  Please reach out to your insurance provider with any questions.    If during the course of the call the physician/provider feels a telephone visit is not appropriate, you will not be charged for this service.\"    Patient has given verbal consent for Telephone visit?  Yes    How would you like to obtain your AVS? Malcolm     Reviewed meds and allergies.    Marcia Gomez, CMA        "

## 2020-04-23 NOTE — PROGRESS NOTES
Visit Date:   04/23/2020     ONCOLOGY HISTORY: Mr. Arredondo is a gentleman with pancreatic cancer.T3 N1 M0. Stage IIB.   -Also has prostate cancer [place] 7 prostate.   1.  Prostate MRI on 07/20/2019 revealed PI-RADS 5.  No suspicious adenopathy or evidence of pelvic metastasis.   2.  Bone scan on 09/26/2019 does not reveal any bone metastasis.  There is some cardiac uptake.   3.  CT abdomen and pelvis on 09/26/2019 reveals new soft tissue mesenteric mass in the region of pancreatic tail.   4.  EUS on 10/07/2019 revealed an irregular mass in the pancreatic tail measuring 3.8 x 2.2 cm.  There was some evidence of invasion into the portal vein.  No lymphadenopathy seen.  Based on the EUS, it was T3 Nx disease.  FNA is positive for moderately differentiated pancreatic adenocarcinoma.   5. PET scan on 10/15/2019 reveals hypermetabolic 4.2 cm mass in the pancreatic tail and an adjacent 1 cm peripancreatic lymph node. No evidence of any metastatic disease. There are 2 foci of mild FDG uptake in the prostate gland from prostate cancer.   6. Neoadjuvant gemcitabine and Abraxane x 6 cycles between 11/07/2019 and 04/20/2020.  Abraxane stopped after cycle 4 because of toxicity.      SUBJECTIVE:  Mr. Arredondo is an 80-year-old gentleman with resectable pancreatic cancer.  He received 6 cycles of neoadjuvant gemcitabine and Abraxane between 11/7/2019 and 4/20/2020.  Abraxane was stopped with cycle 5 because of toxicity.      The patient's condition overall is stable.  He has some fatigue.  Fatigue is slowly improving.  No headache.  No dizziness.  No fall.  No visual problem.  No chest pain.  No shortness of breath.  No nausea or vomiting.  Appetite is fairly good.  No urinary complaints.  No diarrhea.  No bleeding.  He has peripheral neuropathy mainly in the lower extremity.  He also has some numbness in his fingers.  He has mild swelling of the feet.      Wife was also on the phone.  As per her, overall his condition is stable.       CT chest, abdomen and pelvis was done on 2020.  It reveals stable to slight decrease in size of pancreatic tail mass.  No lymphadenopathy.  Stable tiny lung nodules.      His CA 19-9 has not decreased significantly.  It is still 1992 on  2020.      PHYSICAL EXAMINATION:  He is alert, oriented x 3.      This is a virtual visit.      LABORATORY DATA:  Reviewed.      ASSESSMENT:   1.  An 80-year-old gentleman with pancreatic cancer status post neoadjuvant chemotherapy.   2.  Peripheral neuropathy from Abraxane.   3.  Fatigue secondary to pancreatic cancer, chemotherapy and his age.   4.  Mild leukopenia and anemia.      PLAN:   1.  I had a long discussion with the patient. His wife was on phone.  CT scan was reviewed.  Pancreatic mass is stable to slightly decreased.  No new lesion.  They were happy to know that.  I explained to them that CA 19-9 has not decreased significantly.  I am a little concerned regarding that.  Because of that, I am going to get a PET scan.  They are agreeable for it.     2.  The patient is going to meet with the surgeon next week.  As of now, he is scheduled for surgery in 2020.   3.  The patient has fatigue.  I told him it should improve as he is off chemotherapy.  He has neuropathy.  This should slowly improve as he is no longer on Abraxane.  He has pedal edema.  Hopefully, it will get better also.     4.  He and his wife had multiple questions, which were all answered.  I will see him after the PET scan.        This telephone visit lasted between 10:53 a.m. and 11:04 a.m.         ROXY LANCASTER MD             D: 2020   T: 2020   MT:       Name:     KARI YANG   MRN:      [id number]        Account:      [id number]   :      1940           Visit Date:   2020      Document: [id number]

## 2020-04-23 NOTE — PROGRESS NOTES
Visit Date:   04/23/2020     ONCOLOGY HISTORY: Mr. Arredondo is a gentleman with pancreatic cancer.T3 N1 M0. Stage IIB.   -Also has prostate cancer [place] 7 prostate.   1.  Prostate MRI on 07/20/2019 revealed PI-RADS 5.  No suspicious adenopathy or evidence of pelvic metastasis.   2.  Bone scan on 09/26/2019 does not reveal any bone metastasis.  There is some cardiac uptake.   3.  CT abdomen and pelvis on 09/26/2019 reveals new soft tissue mesenteric mass in the region of pancreatic tail.   4.  EUS on 10/07/2019 revealed an irregular mass in the pancreatic tail measuring 3.8 x 2.2 cm.  There was some evidence of invasion into the portal vein.  No lymphadenopathy seen.  Based on the EUS, it was T3 Nx disease.  FNA is positive for moderately differentiated pancreatic adenocarcinoma.   5. PET scan on 10/15/2019 reveals hypermetabolic 4.2 cm mass in the pancreatic tail and an adjacent 1 cm peripancreatic lymph node. No evidence of any metastatic disease. There are 2 foci of mild FDG uptake in the prostate gland from prostate cancer.   6. Neoadjuvant gemcitabine and Abraxane x 6 cycles between 11/07/2019 and 04/20/2020.  Abraxane stopped after cycle 4 because of toxicity.      SUBJECTIVE:  Mr. Arredondo is an 80-year-old gentleman with resectable pancreatic cancer.  He received 6 cycles of neoadjuvant gemcitabine and Abraxane between 11/7/2019 and 4/20/2020.  Abraxane was stopped with cycle 5 because of toxicity.      The patient's condition overall is stable.  He has some fatigue.  Fatigue is slowly improving.  No headache.  No dizziness.  No fall.  No visual problem.  No chest pain.  No shortness of breath.  No nausea or vomiting.  Appetite is fairly good.  No urinary complaints.  No diarrhea.  No bleeding.  He has peripheral neuropathy mainly in the lower extremity.  He also has some numbness in his fingers.  He has mild swelling of the feet.      Wife was also on the phone.  As per her, overall his condition is stable.       CT chest, abdomen and pelvis was done on 2020.  It reveals stable to slight decrease in size of pancreatic tail mass.  No lymphadenopathy.  Stable tiny lung nodules.      His CA 19-9 has not decreased significantly.  It is still 1992 on  2020.      PHYSICAL EXAMINATION:  He is alert, oriented x 3.      This is a virtual visit.      LABORATORY DATA:  Reviewed.      ASSESSMENT:   1.  An 80-year-old gentleman with pancreatic cancer status post neoadjuvant chemotherapy.   2.  Peripheral neuropathy from Abraxane.   3.  Fatigue secondary to pancreatic cancer, chemotherapy and his age.   4.  Mild leukopenia and anemia.      PLAN:   1.  I had a long discussion with the patient. His wife was on phone.  CT scan was reviewed.  Pancreatic mass is stable to slightly decreased.  No new lesion.  They were happy to know that.  I explained to them that CA 19-9 has not decreased significantly.  I am a little concerned regarding that.  Because of that, I am going to get a PET scan.  They are agreeable for it.     2.  The patient is going to meet with the surgeon next week.  As of now, he is scheduled for surgery in 2020.   3.  The patient has fatigue.  I told him it should improve as he is off chemotherapy.  He has neuropathy.  This should slowly improve as he is no longer on Abraxane.  He has pedal edema.  Hopefully, it will get better also.     4.  He and his wife had multiple questions, which were all answered.  I will see him after the PET scan.        This telephone visit lasted between 10:53 a.m. and 11:04 a.m.         ROXY LANCASTER MD             D: 2020   T: 2020   MT:       Name:     KARI YANG   MRN:      [id number]        Account:      [id number]   :      1940           Visit Date:   2020      Document: [id number]

## 2020-04-23 NOTE — PATIENT INSTRUCTIONS
1. PET scan in next few days. (see if it can be done at [facility] on Monday when he is there for appointment with surgeon).  2. Virtual visit after PET scans.    Please call patient to schedule appts and send AVS to [place].

## 2020-04-24 RX ORDER — TAMSULOSIN HYDROCHLORIDE 0.4 MG/1
0.4 CAPSULE ORAL DAILY
COMMUNITY
End: 2020-06-24

## 2020-04-24 RX ORDER — ACETAMINOPHEN 500 MG
500-1000 TABLET ORAL EVERY 8 HOURS PRN
Status: ON HOLD | COMMUNITY
End: 2020-09-21

## 2020-04-24 NOTE — PROGRESS NOTES
Preoperative Assessment Center Medication History Note    Medication history completed via phone call on April 24, 2020 by this writer. See Epic admission navigator for prior to admission medications. Operating room staff will still need to confirm medications and last dose information on day of surgery.     Medication history interview sources:  patient, payor information    Changes made to PTA medication list (reason)  Added: none  Deleted: lorazepam, omeprazole, compazine,   Changed: none    Additional medication history information (including reliability of information, actions taken by pharmacist):    -- No recent (within 30 days) course of antibiotics  -- Has had steroid in past 30 days w/ chemo.   -- Patient declines being on any other prescription or over-the-counter medications  -- Patient's wife requested exemption to no visitor records d/t concern for confusion and pt getting lost. Request forwarded to PAC clinic manager.     Prior to Admission medications    Medication Sig Last Dose Taking? Auth Provider   acetaminophen (TYLENOL) 500 MG tablet Take 500-1,000 mg by mouth every 8 hours as needed for mild pain Taking Yes Unknown, Entered By History   gabapentin (NEURONTIN) 300 MG capsule Take 1 capsule (300 mg) by mouth 2 times daily Taking Yes Diego Segura MD   loratadine (CLARITIN) 10 MG tablet Take 10 mg by mouth daily Taking Yes Reported, Patient   polyethylene glycol (MIRALAX/GLYCOLAX) packet Take 17 g by mouth daily  Taking Yes Reported, Patient   psyllium (METAMUCIL) 28.3 % POWD Take 0.5 Tablespoonful by mouth daily  Taking Yes Reported, Patient   tamsulosin (FLOMAX) 0.4 MG capsule Take 0.4 mg by mouth daily Taking Yes Unknown, Entered By History          Medication history completed by: Talon Carr [facility]

## 2020-04-26 ASSESSMENT — ENCOUNTER SYMPTOMS
DYSURIA: 0
HEADACHES: 0
SPEECH CHANGE: 0
MEMORY LOSS: 0
HEMATURIA: 0
NUMBNESS: 1
DIZZINESS: 1
SEIZURES: 0
DISTURBANCES IN COORDINATION: 0
FLANK PAIN: 0
DIFFICULTY URINATING: 0
TREMORS: 0
PARALYSIS: 0
WEAKNESS: 1
TINGLING: 1
LOSS OF CONSCIOUSNESS: 0

## 2020-04-27 ENCOUNTER — OFFICE VISIT (OUTPATIENT)
Dept: SURGERY | Facility: CLINIC | Age: 80
End: 2020-04-27
Attending: SURGERY

## 2020-04-27 ENCOUNTER — OFFICE VISIT (OUTPATIENT)
Dept: SURGERY | Facility: CLINIC | Age: 80
End: 2020-04-27
Attending: SURGERY
Payer: COMMERCIAL

## 2020-04-27 ENCOUNTER — PRE VISIT (OUTPATIENT)
Dept: SURGERY | Facility: CLINIC | Age: 80
End: 2020-04-27

## 2020-04-27 ENCOUNTER — ANESTHESIA EVENT (OUTPATIENT)
Dept: SURGERY | Facility: CLINIC | Age: 80
End: 2020-04-27

## 2020-04-27 ENCOUNTER — OFFICE VISIT (OUTPATIENT)
Dept: SURGERY | Facility: CLINIC | Age: 80
End: 2020-04-27

## 2020-04-27 VITALS
BODY MASS INDEX: 25.35 KG/M2 | HEART RATE: 97 BPM | DIASTOLIC BLOOD PRESSURE: 74 MMHG | OXYGEN SATURATION: 99 % | SYSTOLIC BLOOD PRESSURE: 131 MMHG | TEMPERATURE: 97.7 F | WEIGHT: 161.5 LBS | HEIGHT: 67 IN | RESPIRATION RATE: 16 BRPM

## 2020-04-27 VITALS
WEIGHT: 160 LBS | DIASTOLIC BLOOD PRESSURE: 81 MMHG | HEIGHT: 67 IN | BODY MASS INDEX: 25.11 KG/M2 | HEART RATE: 77 BPM | OXYGEN SATURATION: 100 % | SYSTOLIC BLOOD PRESSURE: 134 MMHG | TEMPERATURE: 97.9 F | RESPIRATION RATE: 14 BRPM

## 2020-04-27 DIAGNOSIS — C25.9 PANCREAS CANCER (H): Primary | ICD-10-CM

## 2020-04-27 DIAGNOSIS — Z01.818 PREOP EXAMINATION: Primary | ICD-10-CM

## 2020-04-27 DIAGNOSIS — C25.2 MALIGNANT NEOPLASM OF TAIL OF PANCREAS (H): ICD-10-CM

## 2020-04-27 PROCEDURE — 86900 BLOOD TYPING SEROLOGIC ABO: CPT | Performed by: CLINICAL NURSE SPECIALIST

## 2020-04-27 PROCEDURE — G0463 HOSPITAL OUTPT CLINIC VISIT: HCPCS | Mod: ZF

## 2020-04-27 PROCEDURE — 86850 RBC ANTIBODY SCREEN: CPT | Performed by: CLINICAL NURSE SPECIALIST

## 2020-04-27 PROCEDURE — 86901 BLOOD TYPING SEROLOGIC RH(D): CPT | Performed by: CLINICAL NURSE SPECIALIST

## 2020-04-27 ASSESSMENT — MIFFLIN-ST. JEOR
SCORE: 1391.34
SCORE: 1384.39

## 2020-04-27 ASSESSMENT — PAIN SCALES - GENERAL
PAINLEVEL: NO PAIN (0)
PAINLEVEL: NO PAIN (0)

## 2020-04-27 NOTE — PROGRESS NOTES
Chief Complaint   Patient presents with     Blood Draw     labs obtained via butterfly     Shalonda Metcalf RN

## 2020-04-27 NOTE — NURSING NOTE
"Oncology Rooming Note    April 27, 2020 11:30 AM   Lowell Arredondo is a 80 year old male who presents for:    Chief Complaint   Patient presents with     Oncology Clinic Visit     Return: Pancreas cancer     Initial Vitals: /74 (BP Location: Right arm, Patient Position: Sitting, Cuff Size: Adult Regular)   Pulse 97   Temp 97.7  F (36.5  C) (Oral)   Resp 16   Ht 1.702 m (5' 7.01\")   Wt 73.3 kg (161 lb 8 oz)   SpO2 99%   BMI 25.29 kg/m   Estimated body mass index is 25.29 kg/m  as calculated from the following:    Height as of this encounter: 1.702 m (5' 7.01\").    Weight as of this encounter: 73.3 kg (161 lb 8 oz). Body surface area is 1.86 meters squared.  No Pain (0) Comment: Data Unavailable   No LMP for male patient.  Allergies reviewed: Yes  Medications reviewed: Yes    Medications: Medication refills not needed today.  Pharmacy name entered into [employer]: [id number] - [place] - [address]    Clinical concerns: N/A        Cyn Pérez CMA              "

## 2020-04-27 NOTE — PATIENT INSTRUCTIONS
Preparing for Your Surgery      Name:  Lowell Arredondo   MRN:  [id number]   :  1940   Today's Date:  2020     Arriving for surgery:  Surgery date:  20  Arrival time:  5:00 am  Due to the COVID 19 crisis, we are trying to keep our patients safe from others who might have respiratory illnesses so the hospital is implementing a no visitor policy.  Please come to:       [facility] [address] [place] [address]  [id number]    - ? parking is available in front of the hospital      -    Please proceed to the Surgery Lounge on the 3rd floor. [phone]?     - ?If you are in need of directions, wheelchair or escort please stop at the Information Desk in the lobby.  Inform the information person that you are here for surgery; a wheelchair and escort will be provided to the Surgery Lounge .?     What can I eat or drink?  -  You may have solid food or milk products until 8 hours prior to your surgery (11:30 pm).  -  You may have water, apple juice or 7up/Sprite until 2 hours prior to your surgery (5:00 am).    Which medicines can I take?  Hold Aspirin, vitamins and supplements one week prior to surgery.  Hold Ibuprofen for 24 hours and/or Naproxen for 48 hours prior to surgery.     -  Do NOT take these medications in the morning, the day of surgery:  Polyethylene (Miralax)  Psyllium (Metamucil)    -  Please take these medications the day of surgery:  Acetaminophen (Tylenol)  Gabapentin (Neurontin)  Loratadine (Claritin)  Tamsulosin (Flomax)    How do I prepare myself?  -  Take two showers: one the night before surgery; and one the morning of surgery.         Use Scrubcare or Hibiclens to wash from neck down, leave soap on your skin for up to one minute.  Do not get soap in your eyes or ears.  You may use your own shampoo and conditioner; no other hair products.   -  Do NOT use lotion, powder, deodorant, or antiperspirant the day of your surgery.  -  Do NOT wear  any jewelry.  -  Do not bring your own medications to the hospital.  -  Bring your ID and insurance card.    Questions or Concerns:  -If you are scheduled on the [facility] or [facility] campus and have questions or concerns regarding the day of surgery, please call Preadmission Nursing at [phone].     -If you have health changes between today and your surgery please call your surgeon. For questions after surgery please call your surgeons office.     AFTER YOUR SURGERY  Breathing exercises   Breathing exercises help you recover faster. Take deep breaths and let the air out slowly. This will:     Help you wake up after surgery.    Help prevent complications like pneumonia.  Preventing complications will help you go home sooner.   We may give you a breathing device (incentive spirometer) to encourage you to breathe deeply.   Nausea and vomiting   You may feel sick to your stomach after surgery; if so, let your nurse know.    Pain control:  After surgery, you may have pain. Our goal is to help you manage your pain. Pain medicine will help you feel comfortable enough to do activities that will help you heal.  These activities may include breathing exercises, walking and physical therapy.   To help your health care team treat your pain we will ask: 1) If you have pain  2) where it is located 3) describe your pain in your words  Methods of pain control include medications given by mouth, vein or by nerve block for some surgeries.  Sequential Compression Device (SCD):  You may need to wear SCD S (also called pneumo boots)on your legs or feet. These are wraps connected to a machine that pumps in air and releases it. The repeated pumping helps prevent blood clots from forming.

## 2020-04-27 NOTE — ANESTHESIA PREPROCEDURE EVALUATION
"Anesthesia Pre-Procedure Evaluation    Patient: Lowell Arredondo   MRN:     [id number] Gender:   male   Age:    80 year old :      1940        Preoperative Diagnosis: * No surgery found *        LABS:  CBC:   Lab Results   Component Value Date    WBC 3.8 (L) 2020    WBC 4.1 04/10/2020    HGB 10.4 (L) 2020    HGB 10.7 (L) 04/10/2020    HCT 32.6 (L) 2020    HCT 32.6 (L) 04/10/2020     2020     04/10/2020     BMP:   Lab Results   Component Value Date     2020     2020    POTASSIUM 4.3 2020    POTASSIUM 3.7 2020    CHLORIDE 110 (H) 2020    CHLORIDE 110 (H) 2020    CO2 24 2020    CO2 25 2020    BUN 16 2020    BUN 13 2020    CR 1.03 2020    CR 1.03 2020    GLC 91 2020     (H) 2020     COAGS: No results found for: PTT, INR, FIBR  POC:   Lab Results   Component Value Date     (H) 2020     OTHER:   Lab Results   Component Value Date    VICENTE 8.8 2020    MAG 2.0 2020    ALBUMIN 3.2 (L) 2020    PROTTOTAL 6.4 (L) 2020    ALT 30 2020    AST 25 2020    ALKPHOS 64 2020    BILITOTAL 0.3 2020    LIPASE 19 (L) 2020    TSH 1.62 10/03/2019        Preop Vitals    BP Readings from Last 3 Encounters:   20 131/74   20 130/76   20 111/70    Pulse Readings from Last 3 Encounters:   20 97   20 71   20 70      Resp Readings from Last 3 Encounters:   20 16   20 20   20 16    SpO2 Readings from Last 3 Encounters:   20 99%   20 99%   20 98%      Temp Readings from Last 1 Encounters:   20 97.7  F (36.5  C) (Oral)    Ht Readings from Last 1 Encounters:   20 1.702 m (5' 7.01\")      Wt Readings from Last 1 Encounters:   20 73.3 kg (161 lb 8 oz)    Estimated body mass index is 25.29 kg/m  as calculated from the following:    Height as of an earlier " "encounter on 4/27/20: 1.702 m (5' 7.01\").    Weight as of an earlier encounter on 4/27/20: 73.3 kg (161 lb 8 oz).     LDA:  Port A Cath Single 11/01/19 Right Chest wall (Active)   Access Date 04/14/20 04/21/20 0947   Access Attempts 1 04/21/20 0947   Gauge Noncoring 90 degree bend;19 gauge 04/21/20 0947   Site Assessment WDL 04/21/20 0947   Line Status Blood return noted 04/21/20 0947   Extravasation? No 04/21/20 0947   Dressing Intervention Transparent 04/21/20 0947   De-Access Date 04/21/20 04/21/20 0947   Number of days: 178        Past Medical History:   Diagnosis Date     Basal cell carcinoma of skin 9/19/2013    Overview:  Completely excised 5/13     BPH (benign prostatic hyperplasia) 5/2/2013     Cancer (H)      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) 10/10/2019     Peripheral neuropathy      Prostate cancer (H) 6/7/2016      Past Surgical History:   Procedure Laterality Date     FOOT SURGERY Left 1992     HERNIA REPAIR       IR CHEST PORT PLACEMENT > 5 YRS OF AGE  11/1/2019     PROSTATE SURGERY      biopsy      Allergies   Allergen Reactions     Demerol [Meperidine] Difficulty breathing        Anesthesia Evaluation     . Pt has had prior anesthetic. Type: General and MAC    No history of anesthetic complications          ROS/MED HX    ENT/Pulmonary:  - neg pulmonary ROS     Neurologic:     (+)neuropathy - feet,     Cardiovascular:  - neg cardiovascular ROS   (+) ----. : . . . :. . Previous cardiac testing date:results:Stress Testdate:2008 results:ECG reviewed date:1/25/20 results:SR date: results:          METS/Exercise Tolerance:  4 - Raking leaves, gardening   Hematologic:  - neg hematologic  ROS       Musculoskeletal:  - neg musculoskeletal ROS       GI/Hepatic:  - neg GI/hepatic ROS       Renal/Genitourinary:     (+) BPH,       Endo:  - neg endo ROS       Psychiatric:  - neg psychiatric ROS       Infectious Disease:  - neg infectious disease ROS       Malignancy:   (+) Malignancy History of Other and " Skin  Skin CA Remission status post Surgery, Other CA pancreas Active status post Chemo         Other:    (+) No chance of pregnancy C-spine cleared: N/A, no H/O Chronic Pain,no other significant disability                        PHYSICAL EXAM:   Mental Status/Neuro: A/A/O; Age Appropriate   Airway: Facies: Feasible  Mallampati: I  Mouth/Opening: Full  TM distance: > 6 cm  Neck ROM: Limited   Respiratory: Auscultation: CTAB     Resp. Rate: Normal     Resp. Effort: Normal      CV: Rhythm: Regular  Heart: Normal Sounds  Edema: None   Comments:      Dental: Normal Dentition                JZG FV AN PLAN NO PONV RULE       PAC Discussion and Assessment    ASA Classification: 3  Case is suitable for: [place]  Anesthetic techniques and relevant risks discussed: GA and GA with regional block for post-op pain control  Invasive monitoring and risk discussed: No  Types:   Possibility and Risk of blood transfusion discussed: Yes  NPO instructions given:   Additional anesthetic preparation and risks discussed:   Needs early admission to pre-op area:   Other:     PAC Resident/NP Anesthesia Assessment:  Lowell Arredondo is a 80 year old male scheduled to undergo Robotic distal pancreatectomy, robotic splenectomy with Dr. Ryder on 5/21/20. He has the following specific operative considerations:   - RCRI : 0.9% risk of major adverse cardiac event.   - VTE risk: 3%  - FELICIA # of risks 2/8 = Low risk  - Risk of PONV score = 2.  If > 2, anti-emetic intervention recommended.    --Pancreas cancer s/p chemotherapy. Above procedure now planned.   --Peripheral neuropathy after chemotherapy. Will take Gabapentin on DOS.  --No cardiac history, symptoms or meds. EKG above. Neg stress test in 2008. Able to walk distance and climb stairs.   --Nonsmoker. Denies pulmonary symptoms.   --BPH. Will take tamsulosin on DOS.  --Pain management. Discussed possibility of nerve block if appropriate for patient's procedure. Final counseling and decisions by  regional team on DOS.  --Type and screen drawn today.   --Right upper chest port a cath.       Patient was discussed with Dr Gonzalez.      Reviewed and Signed by PAC Mid-Level Provider/Resident  Mid-Level Provider/Resident: YAN Jaffe, WINIFRED  Date: 4/27/20  Time: 12:32pm    Attending Anesthesiologist Anesthesia Assessment:        Anesthesiologist:   Date:   Time:   Pass/Fail:   Disposition:     PAC Pharmacist Assessment:        Pharmacist:   Date:   Time:    YAN Gifford CNS

## 2020-04-27 NOTE — LETTER
4/27/2020       RE: Lowell Arredondo  [address] [facility] [id number]     Dear Colleague,    Thank you for referring your patient, Lowell Arredondo, to the [facility]. Please see a copy of my visit note below.    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  He was referred by Dr Segura.     Pertinent Oncologic History: Patient found to have a mass in the tail of the pancreas on imaging 9/26/19.  Underwent EUS on 10/7/19 with findings of a 3.8 cm mass in the tail of the pancreas with FNA showing adenocarcinoma.  No critical vascular involvement other than the splenic.  Subsequent PET/CT did not show evidence of metastatic disease, but did show the avid tail mass and an adjacent positive peripancreatic lymph node.  CA 19-9 was almost 2500.  Other than a history of prostate cancer, he has no other major medical problems.  He presents for recommendations on management.     Pertinent Exam: Soft, non-tender, and non-distended.  No pain, scleral icterus, or jaundice.     HISTORY OF PRESENT ILLNESS:  He is doing well and is without symptoms other than fatigue.  Has lost some weight on chemotherapy.  Highly active and independent.  No prior abdominal operations.  No major medical problems.     Pertinent Work-Up/Findings:     Labs: Most recent CA 19-9 on 4/13/2020 = 1992.  Last CBC on 4/17/2020 with pancytopenia, likely from chemotherapy.     CT chest, abdomen, and pelvis (4/21/2020): Mass in the tail of the pancreas with abutment and involvement of the distal splenic vessels.  Possible abutment of the splenic flexure of the colon.  Mass is essentially unchanged.  Pulmonary nodules are stable.     Biopsy: Moderately differentiated adenocarcinoma.     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  I had an extensive discussion about pancreas cancer and it's natural history.  I discussed the 70-80% recurrence rate for all comers following R0 resection, but that surgery in combination with systemic therapy gives the best survival outcomes.  Per the [facility] protocol, the patient has completed neoadjuvant chemotherapy and is doing well.  His last dose was 4/20/2020.  His imaging shows stable disease with no clear evidence of distant metastatic disease.  His CA 19-9 remains significantly elevated and I discussed that this can mean either resistance to chemotherapy, presence of microscopic metastatic disease, or it can be non-specific.  Regardless, I am concerned that he will be at even higher risk of recurrence following curative intent resection given the CA 19-9 is still very elevated.  I also discussed that given this finding, I would like to be extra sure that he is a good candidate for surgery.  We plan on obtaining a PET-CT and also performing a diagnostic laparoscopy in the middle of May to evaluate for occult metastatic disease prior to proceeding with definitive resection.  He was in understanding and agreement with this plan and the reasoning behind it.  I discussed that definitive surgery would involve laparoscopic, possible open distal pancreatectomy and splenectomy.  He will need to get his splenectomy vaccines.  I discussed the main risks including but not limited to the risk of recurrence, bleeding, infection, < 2% death rate, cardiac events, renal failure, stroke, COVID-19, damage to surrounding structures, DVT/PE, pneumonia, UTI, pancreatic leak, and post-splenectomy sepsis.  All questions were answered.  The patient was in agreement with and understanding of the plan.     Total time spent with the patient was 45 minutes, of which more than half was counseling and coordination of  care.  30 minutes was face to face, and 15 minutes were spent reviewing history and imaging.     Again, thank you for allowing me to participate in the care of your patient.      Sincerely,    Sanjay Ryder MD

## 2020-04-27 NOTE — H&P
Pre-Operative H & P     CC:  Preoperative exam to assess for increased cardiopulmonary risk while undergoing surgery and anesthesia.    Date of Encounter: 4/27/2020  Primary Care Physician:  Diego Segura  Reason for visit: Pancreas cancer (H) [C25.9]  HPI  Lowell Arredondo is a 80 year old male who presents for pre-operative H & P in preparation for Robotic distal pancreatectomy, robotic splenectomy  with Dr. Ryder on 5/21/20 at [facility]. History is obtained from the patient and medical records.    Patient with resectable pancreatic cancer. He is s/p 6 cycles of neoadjuvant gemcitabine and Abraxane between 11/7/2019 and 4/20/2020. Abraxane was stopped with cycle 5 because of toxicity. CT chest, abdomen and pelvis was done on 04/21/2020 which revealed stable to slight decrease in size of pancreatic tail mass. No lymphadenopathy. Stable tiny lung nodules. He consulted with Dr. Ryder and was counseled for above procedure.     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       Past Medical History  Past Medical History:   Diagnosis Date     Basal cell carcinoma of skin 9/19/2013    Overview:  Completely excised 5/13     BPH (benign prostatic hyperplasia) 5/2/2013     Cancer (H)      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) 10/10/2019     Peripheral neuropathy      Prostate cancer (H) 6/7/2016       Past Surgical History  Past Surgical History:   Procedure Laterality Date     FOOT SURGERY Left 1992     HERNIA REPAIR       IR CHEST PORT PLACEMENT > 5 YRS OF AGE  11/1/2019     PROSTATE SURGERY      biopsy       Hx of Blood transfusions/reactions: Denies.      Hx of abnormal bleeding or anti-platelet use: Denies.     Menstrual history: No LMP for male patient.    Steroid use in the last year: Unknown.    Personal or FH with difficulty with Anesthesia:  Denies. Reports being allergic to Demerol.    Prior to Admission  Medications  Current Outpatient Medications   Medication Sig Dispense Refill     acetaminophen (TYLENOL) 500 MG tablet Take 500-1,000 mg by mouth every 8 hours as needed for mild pain       gabapentin (NEURONTIN) 300 MG capsule Take 1 capsule (300 mg) by mouth 2 times daily 60 capsule 3     loratadine (CLARITIN) 10 MG tablet Take 10 mg by mouth daily       polyethylene glycol (MIRALAX/GLYCOLAX) packet Take 17 g by mouth daily        psyllium (METAMUCIL) 28.3 % POWD Take 0.5 Tablespoonful by mouth daily        tamsulosin (FLOMAX) 0.4 MG capsule Take 0.4 mg by mouth daily         Allergies  Allergies   Allergen Reactions     Demerol [Meperidine] Difficulty breathing       Social History  Social History     Socioeconomic History     Marital status:      Spouse name: Not on file     Number of children: Not on file     Years of education: Not on file     Highest education level: Not on file   Occupational History     Not on file   Social Needs     Financial resource strain: Not on file     Food insecurity     Worry: Not on file     Inability: Not on file     Transportation needs     Medical: Not on file     Non-medical: Not on file   Tobacco Use     Smoking status: Never Smoker     Smokeless tobacco: Never Used   Substance and Sexual Activity     Alcohol use: Not Currently     Alcohol/week: 10.0 - 14.0 standard drinks     Types: 10 - 14 Standard drinks or equivalent per week     Drug use: Never     Sexual activity: Not on file   Lifestyle     Physical activity     Days per week: Not on file     Minutes per session: Not on file     Stress: Not on file   Relationships     Social connections     Talks on phone: Not on file     Gets together: Not on file     Attends [employer] service: Not on file     Active member of club or organization: Not on file     Attends meetings of clubs or organizations: Not on file     Relationship status: Not on file     Intimate partner violence     Fear of current or ex partner: Not on  file     Emotionally abused: Not on file     Physically abused: Not on file     Forced sexual activity: Not on file   Other Topics Concern     Parent/sibling w/ CABG, MI or angioplasty before 65F 55M? Not Asked   Social History Narrative     Not on file       Family History  Family History   Problem Relation Age of Onset     Prostate Cancer Father      Prostate Cancer Brother      Prostate Cancer Brother        Personally reviewed    LABS:  CBC:   Lab Results   Component Value Date    WBC 3.8 (L) 04/17/2020    WBC 4.1 04/10/2020    HGB 10.4 (L) 04/17/2020    HGB 10.7 (L) 04/10/2020    HCT 32.6 (L) 04/17/2020    HCT 32.6 (L) 04/10/2020     04/17/2020     04/10/2020     BMP:   Lab Results   Component Value Date     04/03/2020     03/02/2020    POTASSIUM 4.3 04/03/2020    POTASSIUM 3.7 03/02/2020    CHLORIDE 110 (H) 04/03/2020    CHLORIDE 110 (H) 03/02/2020    CO2 24 04/03/2020    CO2 25 03/02/2020    BUN 16 04/03/2020    BUN 13 03/02/2020    CR 1.03 04/03/2020    CR 1.03 03/02/2020    GLC 91 04/03/2020     (H) 03/02/2020     COAGS: No results found for: PTT, INR, FIBR  POC:   Lab Results   Component Value Date     (H) 01/25/2020     OTHER:   Lab Results   Component Value Date    VICENTE 8.8 04/03/2020    MAG 2.0 01/25/2020    ALBUMIN 3.2 (L) 04/03/2020    PROTTOTAL 6.4 (L) 04/03/2020    ALT 30 04/03/2020    AST 25 04/03/2020    ALKPHOS 64 04/03/2020    BILITOTAL 0.3 04/03/2020    LIPASE 19 (L) 01/25/2020    TSH 1.62 10/03/2019        Preop Vitals    BP Readings from Last 3 Encounters:   04/27/20 131/74   04/20/20 130/76   04/13/20 111/70    Pulse Readings from Last 3 Encounters:   04/27/20 97   04/20/20 71   04/13/20 70      Resp Readings from Last 3 Encounters:   04/27/20 16   04/20/20 20   04/13/20 16    SpO2 Readings from Last 3 Encounters:   04/27/20 99%   04/20/20 99%   04/13/20 98%      Temp Readings from Last 1 Encounters:   04/27/20 97.7  F (36.5  C) (Oral)    Ht Readings  "from Last 1 Encounters:   04/27/20 1.702 m (5' 7.01\")      Wt Readings from Last 1 Encounters:   04/27/20 73.3 kg (161 lb 8 oz)    Estimated body mass index is 25.29 kg/m  as calculated from the following:    Height as of an earlier encounter on 4/27/20: 1.702 m (5' 7.01\").    Weight as of an earlier encounter on 4/27/20: 73.3 kg (161 lb 8 oz).     ROS/MED HX  The complete review of systems is negative other than noted in the HPI or here.     ENT/Pulmonary:  - neg pulmonary ROS     Neurologic:     (+)neuropathy     Cardiovascular:  - neg cardiovascular ROS   (+) ----. : . . . :. . Previous cardiac testing date:results:date: results:ECG reviewed date:1/25/20 results:SR date: results:          METS/Exercise Tolerance:  4 METS   Hematologic:  - neg hematologic  ROS       Musculoskeletal:  - neg musculoskeletal ROS       GI/Hepatic:  - neg GI/hepatic ROS       Renal/Genitourinary:     (+) BPH,       Endo:  - neg endo ROS       Psychiatric:  - neg psychiatric ROS       Infectious Disease:  - neg infectious disease ROS       Malignancy:   (+) Malignancy History of Other and Skin  Skin CA Remission status post Surgery, Other CA pancreas Active status post Chemo         Other:    (+) No chance of pregnancy C-spine cleared: N/A, no H/O Chronic Pain,no other significant disability            PHYSICAL EXAM:   Mental Status/Neuro: A/A/O; Age Appropriate   Airway: Facies: Feasible  Mallampati: I  Mouth/Opening: Full  TM distance: > 6 cm  Neck ROM: Limited   Respiratory: Auscultation: CTAB     Resp. Rate: Normal     Resp. Effort: Normal      CV: Rhythm: Regular  Heart: Normal Sounds  Edema: None   Comments:      Temp: 97.9  F (36.6  C) Temp src: Oral BP: 134/81 Pulse: 77   Resp: 14 SpO2: 100 %         160 lbs 0 oz  5' 7\"   Body mass index is 25.06 kg/m .       Physical Exam  Constitutional: Awake, alert, cooperative, no apparent distress, and appears stated age.  Eyes: Pupils equal, round and reactive to light, extra ocular " muscles intact, sclera clear, conjunctiva normal. Glasses on.  HENT: Normocephalic, oral pharynx with moist mucus membranes, good dentition. No goiter appreciated. Bilateral hearing aids.  Respiratory: Clear to auscultation bilaterally, no crackles or wheezing. No cough or obvious dyspnea.  Cardiovascular: Regular rate and rhythm, normal S1 and S2, and no murmur noted.  Carotids +2, no bruits. No edema. Palpable pulses to radial  DP and PT arteries.   GI: Normal bowel sounds, soft, non-distended, non-tender, no masses palpated, no hepatosplenomegaly.    Lymph/Hematologic: No cervical lymphadenopathy and no supraclavicular lymphadenopathy.  Genitourinary: Deferred.   Skin: Warm and dry.  No rashes at anticipated surgical site.   Musculoskeletal: Limited ROM of neck. There is no redness, warmth, or swelling of the joints. Gross motor strength is normal.    Neurologic: Awake, alert, oriented to name, place and time. Cranial nerves II-XII are grossly intact. Gait is normal.   Neuropsychiatric: Calm, cooperative. Normal affect.   EKG: Personally reviewed 1/25/20 Sinus rhythm  Stress test: Stress echo test 2008   Final Impression:  1.  No echocardiographic evidence of ischemia.    2.  No EKG changes diagnostic of ischemia.    3.  The patient exercised for 10 minutes and 19 seconds on a standard   Scott protocol reaching 102% of maximum age-predicted heart rate.    Rate/pressure product was 26,980.  The test was terminated because of   fatigue.  There was no chest pain.     4/21/20 CT CAP  IMPRESSION:  1. Stable to slight decrease in size in the pancreatic tail mass since  the comparison study.  2. No adenopathy by size criteria currently.  3. Stable pulmonary nodules.    Imaging reviewed by this provider    Outside records reviewed from: Care Everywhere    ASSESSMENT and PLAN  Lowell Arredondo is a 80 year old male scheduled to undergo Robotic distal pancreatectomy, robotic splenectomy with Dr. Ryder on 5/21/20. He has the  following specific operative considerations:   - RCRI : 0.9% risk of major adverse cardiac event.   - Anesthesia considerations:  Refer to PAC assessment in anesthesia records  - VTE risk: 3%  - FELICIA # of risks 2/8 = Low risk  - Risk of PONV score = 2.  If > 2, anti-emetic intervention recommended.    --Pancreas cancer s/p chemotherapy. Above procedure now planned.   --Peripheral neuropathy after chemotherapy. Will take Gabapentin on DOS.  --No cardiac history, symptoms or meds. EKG above. Neg stress test in 2008. Able to walk distance and climb stairs.   --Nonsmoker. Denies pulmonary symptoms.   --BPH. Will take tamsulosin on DOS.  --Pain management. Discussed possibility of nerve block if appropriate for patient's procedure. Final counseling and decisions by regional team on DOS.  --Type and screen drawn today.   --Right upper chest port a cath.     Arrival time, NPO, shower and medication instructions provided by nursing staff today.     Patient was discussed with Dr Gonzalez.    YAN Gifford CNS  Preoperative Assessment Center  [facility]  Clinic and Surgery Center  Phone: [phone]  Fax: [phone]

## 2020-04-28 ENCOUNTER — TELEPHONE (OUTPATIENT)
Dept: SURGERY | Facility: CLINIC | Age: 80
End: 2020-04-28

## 2020-04-28 ENCOUNTER — PATIENT OUTREACH (OUTPATIENT)
Dept: SURGERY | Facility: CLINIC | Age: 80
End: 2020-04-28

## 2020-04-28 DIAGNOSIS — C25.2 MALIGNANT NEOPLASM OF TAIL OF PANCREAS (H): Primary | ICD-10-CM

## 2020-04-28 NOTE — PROGRESS NOTES
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  He was referred by Dr Segura.     Pertinent Oncologic History: Patient found to have a mass in the tail of the pancreas on imaging 9/26/19.  Underwent EUS on 10/7/19 with findings of a 3.8 cm mass in the tail of the pancreas with FNA showing adenocarcinoma.  No critical vascular involvement other than the splenic.  Subsequent PET/CT did not show evidence of metastatic disease, but did show the avid tail mass and an adjacent positive peripancreatic lymph node.  CA 19-9 was almost 2500.  Other than a history of prostate cancer, he has no other major medical problems.  He presents for recommendations on management.     Pertinent Exam: Soft, non-tender, and non-distended.  No pain, scleral icterus, or jaundice.     HISTORY OF PRESENT ILLNESS:  He is doing well and is without symptoms other than fatigue.  Has lost some weight on chemotherapy.  Highly active and independent.  No prior abdominal operations.  No major medical problems.     Pertinent Work-Up/Findings:     Labs: Most recent CA 19-9 on 4/13/2020 = 1992.  Last CBC on 4/17/2020 with pancytopenia, likely from chemotherapy.     CT chest, abdomen, and pelvis (4/21/2020): Mass in the tail of the pancreas with abutment and involvement of the distal splenic vessels.  Possible abutment of the splenic flexure of the colon.  Mass is essentially unchanged.  Pulmonary nodules are stable.     Biopsy: Moderately differentiated adenocarcinoma.     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  I had an extensive discussion about pancreas cancer and it's natural history.  I discussed the 70-80% recurrence rate for all comers following R0  resection, but that surgery in combination with systemic therapy gives the best survival outcomes.  Per the [facility] protocol, the patient has completed neoadjuvant chemotherapy and is doing well.  His last dose was 4/20/2020.  His imaging shows stable disease with no clear evidence of distant metastatic disease.  His CA 19-9 remains significantly elevated and I discussed that this can mean either resistance to chemotherapy, presence of microscopic metastatic disease, or it can be non-specific.  Regardless, I am concerned that he will be at even higher risk of recurrence following curative intent resection given the CA 19-9 is still very elevated.  I also discussed that given this finding, I would like to be extra sure that he is a good candidate for surgery.  We plan on obtaining a PET-CT and also performing a diagnostic laparoscopy in the middle of May to evaluate for occult metastatic disease prior to proceeding with definitive resection.  He was in understanding and agreement with this plan and the reasoning behind it.  I discussed that definitive surgery would involve laparoscopic, possible open distal pancreatectomy and splenectomy.  He will need to get his splenectomy vaccines.  I discussed the main risks including but not limited to the risk of recurrence, bleeding, infection, < 2% death rate, cardiac events, renal failure, stroke, COVID-19, damage to surrounding structures, DVT/PE, pneumonia, UTI, pancreatic leak, and post-splenectomy sepsis.  All questions were answered.  The patient was in agreement with and understanding of the plan.     Total time spent with the patient was 45 minutes, of which more than half was counseling and coordination of care.  30 minutes was face to face, and 15 minutes were spent reviewing history and imaging.

## 2020-04-28 NOTE — TELEPHONE ENCOUNTER
Surgical Oncology RN Care Coordination Note:     Called and spoke with patients wife and explain reasoning for diagnostic laparoscopy. Also informed her that we did offer to call her during the visit but the patient declined and did not want to do this. Informed her that we discussed this plan in detail and that I also followed up with patient after the provider to confirm plan. Informed her that I would confirm if PET CT scan is needed and that our scheduling team would be in touch to confirm the plan and scheduled the diagnostic laparoscopy as discussed.     All questions answered and she was appreciative of the call.     Rosa Machado, RN, BSN  Care Coordinator   [phone]

## 2020-04-28 NOTE — TELEPHONE ENCOUNTER
Spoke with Billie and confirmed procedure on 5/12 with Dr. Ryder, and also procedure on 5/21.    She will schedule a PET scan for Lowell at [facility] next week.    They are aware that a phone call 1-2 days prior from [facility] will take place with the arrival time and start time.

## 2020-04-29 NOTE — ADDENDUM NOTE
Addendum  created 04/29/20 1434 by Rosa Vazquez, YAN CNS    Diagnosis association updated, Order list changed, Visit diagnoses modified

## 2020-04-30 ENCOUNTER — TELEPHONE (OUTPATIENT)
Dept: SURGERY | Facility: CLINIC | Age: 80
End: 2020-04-30

## 2020-04-30 NOTE — TELEPHONE ENCOUNTER
Surgical Oncology RN Care Coordination Note:     Called and spoke with wife. She reports she got the information from the person who called her as she attempted to reach them back as well. Patient is set up for testing as directed.     Rosa Machado RN, BSN  Care Coordinator   [phone]

## 2020-04-30 NOTE — TELEPHONE ENCOUNTER
Patient's wife called in and left a message on 4/29 wondering where they need to go for COVID-19 testing and would like assistance scheduling the test.     Routing to RN.

## 2020-05-04 ENCOUNTER — TELEPHONE (OUTPATIENT)
Dept: SURGERY | Facility: CLINIC | Age: 80
End: 2020-05-04

## 2020-05-04 DIAGNOSIS — Z11.59 ENCOUNTER FOR SCREENING FOR OTHER VIRAL DISEASES: Primary | ICD-10-CM

## 2020-05-04 NOTE — TELEPHONE ENCOUNTER
I called to discuss surgery again with the patient given he and his spouse had some concerns with potential recovery at the age of 80.  I re-discussed the risks and benefits we reviewed at my last clinic appointment.  I discussed that the patient is at higher risk of poor outcome from any given complication and that he will be at higher risk of poor physical and mental outcome than a patient that is much younger.  With that being said, he is a relatively well functioning 80 year old that I believe has a very reasonable chance to make a complete recovery from surgery such that I believe the benefit currently outweighs the risk of the surgery.  With that being said, he does have elevated CA 19-9 and PET/CT and diagnostic laparoscopy are all pending.  These could change the management entirely depending on results.  As such, the patient would like to stay with the current management plan and re-evaluate along the way as new results return.  All questions answered.  The patient was in agreement with and understanding of the plan.

## 2020-05-05 ENCOUNTER — HOSPITAL ENCOUNTER (OUTPATIENT)
Dept: PET IMAGING | Facility: CLINIC | Age: 80
Discharge: HOME | End: 2020-05-05
Attending: INTERNAL MEDICINE | Admitting: INTERNAL MEDICINE
Payer: COMMERCIAL

## 2020-05-05 DIAGNOSIS — C25.2 MALIGNANT NEOPLASM OF TAIL OF PANCREAS (H): ICD-10-CM

## 2020-05-05 PROCEDURE — 78816 PET IMAGE W/CT FULL BODY: CPT | Mod: PS

## 2020-05-05 PROCEDURE — 34300033 ZZH RX 343: Performed by: INTERNAL MEDICINE

## 2020-05-05 PROCEDURE — A9552 F18 FDG: HCPCS | Performed by: INTERNAL MEDICINE

## 2020-05-05 RX ADMIN — FLUDEOXYGLUCOSE F-18 12.5 MCI.: 500 INJECTION, SOLUTION INTRAVENOUS at 10:25

## 2020-05-06 ENCOUNTER — VIRTUAL VISIT (OUTPATIENT)
Dept: ONCOLOGY | Facility: CLINIC | Age: 80
End: 2020-05-06
Attending: INTERNAL MEDICINE
Payer: COMMERCIAL

## 2020-05-06 DIAGNOSIS — C25.2 MALIGNANT NEOPLASM OF TAIL OF PANCREAS (H): Primary | ICD-10-CM

## 2020-05-06 PROCEDURE — 99213 OFFICE O/P EST LOW 20 MIN: CPT | Mod: 95 | Performed by: INTERNAL MEDICINE

## 2020-05-06 ASSESSMENT — PAIN SCALES - GENERAL: PAINLEVEL: NO PAIN (0)

## 2020-05-06 NOTE — PROGRESS NOTES
Visit Date:   05/06/2020     ONCOLOGY HISTORY: Mr. Arredondo is a gentleman with pancreatic cancer.T3 N1 M0. Stage IIB.   -Also has prostate cancer [place] 7 prostate.   1.  Prostate MRI on 07/20/2019 revealed PI-RADS 5.  No suspicious adenopathy or evidence of pelvic metastasis.   2.  Bone scan on 09/26/2019 does not reveal any bone metastasis.  There is some cardiac uptake.   3.  CT abdomen and pelvis on 09/26/2019 reveals new soft tissue mesenteric mass in the region of pancreatic tail.   4.  EUS on 10/07/2019 revealed an irregular mass in the pancreatic tail measuring 3.8 x 2.2 cm.  There was some evidence of invasion into the portal vein.  No lymphadenopathy seen.  Based on the EUS, it was T3 Nx disease.  FNA is positive for moderately differentiated pancreatic adenocarcinoma.   5. PET scan on 10/15/2019 reveals hypermetabolic 4.2 cm mass in the pancreatic tail and an adjacent 1 cm peripancreatic lymph node. No evidence of any metastatic disease. There are 2 foci of mild FDG uptake in the prostate gland from prostate cancer.   6. Neoadjuvant gemcitabine and Abraxane x 6 cycles between 11/07/2019 and 04/20/2020.  Abraxane stopped after cycle 4 because of toxicity.   7. PET scan on 05/05/2020 reveals decrease in size of hypermetabolic mass at pancreatic tail and improvement in adjacent hypermetabolic lymph node.      SUBJECTIVE:  Mr. Arredondo is an 80-year-old gentleman with resectable pancreatic cancer status post 6 cycles of neoadjuvant gemcitabine and Abraxane completed in 04/2020.  His CA 19-9 has continued to be elevated.  For further evaluation, a PET scan was done on 05/05/2020.  It reveals decrease in size of hypermetabolic mass at pancreatic tail.  There is an adjacent slightly smaller hypermetabolic lymph node.  No evidence of distant metastatic disease.      The patient has met with a surgeon, Dr. Ryder.  The patient is scheduled for diagnostic laparoscopy next week.      Overall, his condition is stable.  Fatigue is slowly improving.  Appetite is slowly improving.  No headache.  Occasional dizziness.  He has not fallen down recently.  No chest pain. No shortness of breath.  No nausea or vomiting.  No urinary or bowel complaints.  He still has peripheral neuropathy.      PHYSICAL EXAMINATION:   GENERAL:  The patient is  alert, oriented x  3.   This was a virtual visit.      ASSESSMENT:   1.  An 80-year-old gentleman with resectable pancreatic cancer, status post neoadjuvant chemotherapy.   2.  Peripheral neuropathy, stable.   3.  Fatigue, stable.      PLAN:     1.  I had a long discussion with the patient and his wife.  PET scan was reviewed with him.  I explained to them, there is no evidence of distant metastatic disease.  Pancreatic mass is slightly smaller.      The patient is scheduled for diagnostic laparoscopy next week.  If there is no evidence of disease, then he will proceed to Whipple procedure. I explained to the patient that I would like to see him about 3-4 weeks after the surgery.  He is agreeable for it.  I am hoping that he is able to have Whipple procedure.        2.  He has neuropathy.  Hopefully, with time it will improve.  He is on gabapentin, which will be continued.      3.  The patient and wife had a few questions, which were all answered.  I will see him around mid .      This is a video visit using [facility] for 15 minutes, starting at 9:07 a.m.         ROXY LANCASTER MD             D: 2020   T: 2020   MT: WEN      Name:     KARI YANG   MRN:      [id number]        Account:      [id number]   :      1940           Visit Date:   2020      Document: [id number]

## 2020-05-06 NOTE — PROGRESS NOTES
"Lowell Arredondo is a 80 year old male who is being evaluated via a billable telephone visit.      The patient has been notified of following:     \"This telephone visit will be conducted via a call between you and your physician/provider. We have found that certain health care needs can be provided without the need for a physical exam.  This service lets us provide the care you need with a short phone conversation.  If a prescription is necessary we can send it directly to your pharmacy.  If lab work is needed we can place an order for that and you can then stop by our lab to have the test done at a later time.    Telephone visits are billed at different rates depending on your insurance coverage. During this emergency period, for some insurers they may be billed the same as an in-person visit.  Please reach out to your insurance provider with any questions.    If during the course of the call the physician/provider feels a telephone visit is not appropriate, you will not be charged for this service.\"    Patient has given verbal consent for Telephone visit?  Yes    What phone number would you like to be contacted at? [phone]    [facility]    Phone call duration: 5 minutes    Alisa Persaud MA      "

## 2020-05-06 NOTE — PROGRESS NOTES
Visit Date:   05/06/2020     ONCOLOGY HISTORY: Mr. Arredondo is a gentleman with pancreatic cancer.T3 N1 M0. Stage IIB.   -Also has prostate cancer [place] 7 prostate.   1.  Prostate MRI on 07/20/2019 revealed PI-RADS 5.  No suspicious adenopathy or evidence of pelvic metastasis.   2.  Bone scan on 09/26/2019 does not reveal any bone metastasis.  There is some cardiac uptake.   3.  CT abdomen and pelvis on 09/26/2019 reveals new soft tissue mesenteric mass in the region of pancreatic tail.   4.  EUS on 10/07/2019 revealed an irregular mass in the pancreatic tail measuring 3.8 x 2.2 cm.  There was some evidence of invasion into the portal vein.  No lymphadenopathy seen.  Based on the EUS, it was T3 Nx disease.  FNA is positive for moderately differentiated pancreatic adenocarcinoma.   5. PET scan on 10/15/2019 reveals hypermetabolic 4.2 cm mass in the pancreatic tail and an adjacent 1 cm peripancreatic lymph node. No evidence of any metastatic disease. There are 2 foci of mild FDG uptake in the prostate gland from prostate cancer.   6. Neoadjuvant gemcitabine and Abraxane x 6 cycles between 11/07/2019 and 04/20/2020.  Abraxane stopped after cycle 4 because of toxicity.   7. PET scan on 05/05/2020 reveals decrease in size of hypermetabolic mass at pancreatic tail and improvement in adjacent hypermetabolic lymph node.      SUBJECTIVE:  Mr. Arredondo is an 80-year-old gentleman with resectable pancreatic cancer status post 6 cycles of neoadjuvant gemcitabine and Abraxane completed in 04/2020.  His CA 19-9 has continued to be elevated.  For further evaluation, a PET scan was done on 05/05/2020.  It reveals decrease in size of hypermetabolic mass at pancreatic tail.  There is an adjacent slightly smaller hypermetabolic lymph node.  No evidence of distant metastatic disease.      The patient has met with a surgeon, Dr. Ryder.  The patient is scheduled for diagnostic laparoscopy next week.      Overall, his condition is stable.  Fatigue is slowly improving.  Appetite is slowly improving.  No headache.  Occasional dizziness.  He has not fallen down recently.  No chest pain. No shortness of breath.  No nausea or vomiting.  No urinary or bowel complaints.  He still has peripheral neuropathy.      PHYSICAL EXAMINATION:   GENERAL:  The patient is  alert, oriented x  3.   This was a virtual visit.      ASSESSMENT:   1.  An 80-year-old gentleman with resectable pancreatic cancer, status post neoadjuvant chemotherapy.   2.  Peripheral neuropathy, stable.   3.  Fatigue, stable.      PLAN:     1.  I had a long discussion with the patient and his wife.  PET scan was reviewed with him.  I explained to them, there is no evidence of distant metastatic disease.  Pancreatic mass is slightly smaller.      The patient is scheduled for diagnostic laparoscopy next week.  If there is no evidence of disease, then he will proceed to Whipple procedure. I explained to the patient that I would like to see him about 3-4 weeks after the surgery.  He is agreeable for it.  I am hoping that he is able to have Whipple procedure.        2.  He has neuropathy.  Hopefully, with time it will improve.  He is on gabapentin, which will be continued.      3.  The patient and wife had a few questions, which were all answered.  I will see him around mid .      This is a video visit using [employer] for 15 minutes, starting at 9:07 a.m.         ROXY LANCASTER MD             D: 2020   T: 2020   MT: WEN      Name:     KARI YANG   MRN:      [id number]        Account:      [id number]   :      1940           Visit Date:   2020      Document: [id number]

## 2020-05-08 ENCOUNTER — OFFICE VISIT (OUTPATIENT)
Dept: URGENT CARE | Facility: URGENT CARE | Age: 80
End: 2020-05-08
Payer: COMMERCIAL

## 2020-05-08 DIAGNOSIS — Z11.59 ENCOUNTER FOR SCREENING FOR OTHER VIRAL DISEASES: ICD-10-CM

## 2020-05-08 PROCEDURE — 99000 SPECIMEN HANDLING OFFICE-LAB: CPT | Performed by: SURGERY

## 2020-05-08 PROCEDURE — U0003 INFECTIOUS AGENT DETECTION BY NUCLEIC ACID (DNA OR RNA); SEVERE ACUTE RESPIRATORY SYNDROME CORONAVIRUS 2 (SARS-COV-2) (CORONAVIRUS DISEASE [COVID-19]), AMPLIFIED PROBE TECHNIQUE, MAKING USE OF HIGH THROUGHPUT TECHNOLOGIES AS DESCRIBED BY CMS-2020-01-R: HCPCS | Mod: 90 | Performed by: SURGERY

## 2020-05-08 PROCEDURE — 99207 ZZC NO BILLABLE SERVICE THIS VISIT: CPT

## 2020-05-09 LAB
SARS-COV-2 RNA SPEC QL NAA+PROBE: NOT DETECTED
SPECIMEN SOURCE: NORMAL

## 2020-05-11 ENCOUNTER — ANESTHESIA EVENT (OUTPATIENT)
Dept: SURGERY | Facility: CLINIC | Age: 80
End: 2020-05-11
Payer: COMMERCIAL

## 2020-05-12 ENCOUNTER — ANESTHESIA (OUTPATIENT)
Dept: SURGERY | Facility: CLINIC | Age: 80
End: 2020-05-12
Payer: COMMERCIAL

## 2020-05-12 ENCOUNTER — HOSPITAL ENCOUNTER (OUTPATIENT)
Facility: CLINIC | Age: 80
Discharge: HOME | End: 2020-05-12
Attending: SURGERY | Admitting: SURGERY
Payer: COMMERCIAL

## 2020-05-12 ENCOUNTER — ANCILLARY PROCEDURE (OUTPATIENT)
Dept: ULTRASOUND IMAGING | Facility: CLINIC | Age: 80
End: 2020-05-12
Attending: STUDENT IN AN ORGANIZED HEALTH CARE EDUCATION/TRAINING PROGRAM
Payer: COMMERCIAL

## 2020-05-12 VITALS
WEIGHT: 162.7 LBS | HEIGHT: 69 IN | SYSTOLIC BLOOD PRESSURE: 127 MMHG | BODY MASS INDEX: 24.1 KG/M2 | TEMPERATURE: 97.6 F | OXYGEN SATURATION: 97 % | HEART RATE: 77 BPM | DIASTOLIC BLOOD PRESSURE: 76 MMHG | RESPIRATION RATE: 14 BRPM

## 2020-05-12 DIAGNOSIS — C25.9 PANCREAS CANCER (H): ICD-10-CM

## 2020-05-12 DIAGNOSIS — C25.9 PRIMARY PANCREATIC CANCER WITH METASTASIS TO OTHER SITE (H): Primary | ICD-10-CM

## 2020-05-12 LAB
ABO + RH BLD: NORMAL
ABO + RH BLD: NORMAL
BLD GP AB SCN SERPL QL: NORMAL
BLOOD BANK CMNT PATIENT-IMP: NORMAL
BLOOD BANK CMNT PATIENT-IMP: NORMAL
GLUCOSE BLDC GLUCOMTR-MCNC: 85 MG/DL (ref 70–99)
SPECIMEN EXP DATE BLD: NORMAL

## 2020-05-12 PROCEDURE — 82962 GLUCOSE BLOOD TEST: CPT

## 2020-05-12 PROCEDURE — 25800030 ZZH RX IP 258 OP 636: Performed by: NURSE ANESTHETIST, CERTIFIED REGISTERED

## 2020-05-12 PROCEDURE — 27210794 ZZH OR GENERAL SUPPLY STERILE: Performed by: SURGERY

## 2020-05-12 PROCEDURE — 88331 PATH CONSLTJ SURG 1 BLK 1SPC: CPT | Performed by: SURGERY

## 2020-05-12 PROCEDURE — 25800030 ZZH RX IP 258 OP 636: Performed by: STUDENT IN AN ORGANIZED HEALTH CARE EDUCATION/TRAINING PROGRAM

## 2020-05-12 PROCEDURE — 25000566 ZZH SEVOFLURANE, EA 15 MIN: Performed by: SURGERY

## 2020-05-12 PROCEDURE — 25000128 H RX IP 250 OP 636: Performed by: NEUROLOGICAL SURGERY

## 2020-05-12 PROCEDURE — 00000155 ZZHCL STATISTIC H-CELL BLOCK W/STAIN: Performed by: SURGERY

## 2020-05-12 PROCEDURE — 88305 TISSUE EXAM BY PATHOLOGIST: CPT | Performed by: SURGERY

## 2020-05-12 PROCEDURE — 40000171 ZZH STATISTIC PRE-PROCEDURE ASSESSMENT III: Performed by: SURGERY

## 2020-05-12 PROCEDURE — 36000059 ZZH SURGERY LEVEL 3 EA 15 ADDTL MIN UMMC: Performed by: SURGERY

## 2020-05-12 PROCEDURE — 00000159 ZZHCL STATISTIC H-SEND OUTS PREP: Performed by: SURGERY

## 2020-05-12 PROCEDURE — 37000008 ZZH ANESTHESIA TECHNICAL FEE, 1ST 30 MIN: Performed by: SURGERY

## 2020-05-12 PROCEDURE — 71000014 ZZH RECOVERY PHASE 1 LEVEL 2 FIRST HR: Performed by: SURGERY

## 2020-05-12 PROCEDURE — 25000128 H RX IP 250 OP 636: Performed by: STUDENT IN AN ORGANIZED HEALTH CARE EDUCATION/TRAINING PROGRAM

## 2020-05-12 PROCEDURE — 25000125 ZZHC RX 250: Performed by: STUDENT IN AN ORGANIZED HEALTH CARE EDUCATION/TRAINING PROGRAM

## 2020-05-12 PROCEDURE — 25000125 ZZHC RX 250: Performed by: NURSE ANESTHETIST, CERTIFIED REGISTERED

## 2020-05-12 PROCEDURE — 88112 CYTOPATH CELL ENHANCE TECH: CPT | Performed by: SURGERY

## 2020-05-12 PROCEDURE — 71000027 ZZH RECOVERY PHASE 2 EACH 15 MINS: Performed by: SURGERY

## 2020-05-12 PROCEDURE — 25000132 ZZH RX MED GY IP 250 OP 250 PS 637: Performed by: ANESTHESIOLOGY

## 2020-05-12 PROCEDURE — 25000128 H RX IP 250 OP 636: Performed by: NURSE ANESTHETIST, CERTIFIED REGISTERED

## 2020-05-12 PROCEDURE — 36000057 ZZH SURGERY LEVEL 3 1ST 30 MIN - UMMC: Performed by: SURGERY

## 2020-05-12 PROCEDURE — 37000009 ZZH ANESTHESIA TECHNICAL FEE, EACH ADDTL 15 MIN: Performed by: SURGERY

## 2020-05-12 RX ORDER — FENTANYL CITRATE 50 UG/ML
25-50 INJECTION, SOLUTION INTRAMUSCULAR; INTRAVENOUS
Status: DISCONTINUED | OUTPATIENT
Start: 2020-05-12 | End: 2020-05-12 | Stop reason: HOSPADM

## 2020-05-12 RX ORDER — SODIUM CHLORIDE, SODIUM LACTATE, POTASSIUM CHLORIDE, CALCIUM CHLORIDE 600; 310; 30; 20 MG/100ML; MG/100ML; MG/100ML; MG/100ML
INJECTION, SOLUTION INTRAVENOUS CONTINUOUS
Status: DISCONTINUED | OUTPATIENT
Start: 2020-05-12 | End: 2020-05-12 | Stop reason: HOSPADM

## 2020-05-12 RX ORDER — ONDANSETRON 4 MG/1
4 TABLET, ORALLY DISINTEGRATING ORAL EVERY 30 MIN PRN
Status: DISCONTINUED | OUTPATIENT
Start: 2020-05-12 | End: 2020-05-12 | Stop reason: HOSPADM

## 2020-05-12 RX ORDER — PROPOFOL 10 MG/ML
INJECTION, EMULSION INTRAVENOUS PRN
Status: DISCONTINUED | OUTPATIENT
Start: 2020-05-12 | End: 2020-05-12

## 2020-05-12 RX ORDER — DEXAMETHASONE SODIUM PHOSPHATE 10 MG/ML
INJECTION, SOLUTION INTRAMUSCULAR; INTRAVENOUS PRN
Status: DISCONTINUED | OUTPATIENT
Start: 2020-05-12 | End: 2020-05-12

## 2020-05-12 RX ORDER — NALOXONE HYDROCHLORIDE 0.4 MG/ML
.1-.4 INJECTION, SOLUTION INTRAMUSCULAR; INTRAVENOUS; SUBCUTANEOUS
Status: DISCONTINUED | OUTPATIENT
Start: 2020-05-12 | End: 2020-05-12 | Stop reason: HOSPADM

## 2020-05-12 RX ORDER — SODIUM CHLORIDE, SODIUM LACTATE, POTASSIUM CHLORIDE, CALCIUM CHLORIDE 600; 310; 30; 20 MG/100ML; MG/100ML; MG/100ML; MG/100ML
INJECTION, SOLUTION INTRAVENOUS CONTINUOUS PRN
Status: DISCONTINUED | OUTPATIENT
Start: 2020-05-12 | End: 2020-05-12

## 2020-05-12 RX ORDER — HYDROMORPHONE HYDROCHLORIDE 1 MG/ML
.3-.5 INJECTION, SOLUTION INTRAMUSCULAR; INTRAVENOUS; SUBCUTANEOUS EVERY 10 MIN PRN
Status: DISCONTINUED | OUTPATIENT
Start: 2020-05-12 | End: 2020-05-12 | Stop reason: HOSPADM

## 2020-05-12 RX ORDER — LIDOCAINE HYDROCHLORIDE 20 MG/ML
INJECTION, SOLUTION INFILTRATION; PERINEURAL PRN
Status: DISCONTINUED | OUTPATIENT
Start: 2020-05-12 | End: 2020-05-12

## 2020-05-12 RX ORDER — ONDANSETRON 2 MG/ML
4 INJECTION INTRAMUSCULAR; INTRAVENOUS EVERY 30 MIN PRN
Status: DISCONTINUED | OUTPATIENT
Start: 2020-05-12 | End: 2020-05-12 | Stop reason: HOSPADM

## 2020-05-12 RX ORDER — BUPIVACAINE HYDROCHLORIDE 2.5 MG/ML
INJECTION, SOLUTION EPIDURAL; INFILTRATION; INTRACAUDAL PRN
Status: DISCONTINUED | OUTPATIENT
Start: 2020-05-12 | End: 2020-05-12

## 2020-05-12 RX ORDER — GLYCOPYRROLATE 0.2 MG/ML
INJECTION, SOLUTION INTRAMUSCULAR; INTRAVENOUS PRN
Status: DISCONTINUED | OUTPATIENT
Start: 2020-05-12 | End: 2020-05-12

## 2020-05-12 RX ORDER — FLUMAZENIL 0.1 MG/ML
0.2 INJECTION, SOLUTION INTRAVENOUS
Status: DISCONTINUED | OUTPATIENT
Start: 2020-05-12 | End: 2020-05-12 | Stop reason: HOSPADM

## 2020-05-12 RX ORDER — OXYCODONE HYDROCHLORIDE 5 MG/1
5 TABLET ORAL EVERY 6 HOURS PRN
Qty: 10 TABLET | Refills: 0 | Status: SHIPPED | OUTPATIENT
Start: 2020-05-12 | End: 2020-05-18

## 2020-05-12 RX ORDER — FENTANYL CITRATE 50 UG/ML
INJECTION, SOLUTION INTRAMUSCULAR; INTRAVENOUS PRN
Status: DISCONTINUED | OUTPATIENT
Start: 2020-05-12 | End: 2020-05-12

## 2020-05-12 RX ORDER — FENTANYL CITRATE 50 UG/ML
25-50 INJECTION, SOLUTION INTRAMUSCULAR; INTRAVENOUS EVERY 5 MIN PRN
Status: DISCONTINUED | OUTPATIENT
Start: 2020-05-12 | End: 2020-05-12 | Stop reason: HOSPADM

## 2020-05-12 RX ORDER — EPHEDRINE SULFATE 50 MG/ML
INJECTION, SOLUTION INTRAMUSCULAR; INTRAVENOUS; SUBCUTANEOUS PRN
Status: DISCONTINUED | OUTPATIENT
Start: 2020-05-12 | End: 2020-05-12

## 2020-05-12 RX ORDER — ACETAMINOPHEN 325 MG/1
650 TABLET ORAL
Status: DISCONTINUED | OUTPATIENT
Start: 2020-05-12 | End: 2020-05-12 | Stop reason: HOSPADM

## 2020-05-12 RX ORDER — ONDANSETRON 4 MG/1
4 TABLET, ORALLY DISINTEGRATING ORAL
Status: DISCONTINUED | OUTPATIENT
Start: 2020-05-12 | End: 2020-05-12 | Stop reason: HOSPADM

## 2020-05-12 RX ORDER — ACETAMINOPHEN 500 MG
1000 TABLET ORAL ONCE
Status: COMPLETED | OUTPATIENT
Start: 2020-05-12 | End: 2020-05-12

## 2020-05-12 RX ORDER — CEFOXITIN 2 G/1
2 INJECTION, POWDER, FOR SOLUTION INTRAVENOUS
Status: COMPLETED | OUTPATIENT
Start: 2020-05-12 | End: 2020-05-12

## 2020-05-12 RX ADMIN — Medication 10 MG: at 09:26

## 2020-05-12 RX ADMIN — ACETAMINOPHEN 1000 MG: 500 TABLET, FILM COATED ORAL at 06:17

## 2020-05-12 RX ADMIN — PROPOFOL 80 MG: 10 INJECTION, EMULSION INTRAVENOUS at 08:36

## 2020-05-12 RX ADMIN — Medication 5 MG: at 09:22

## 2020-05-12 RX ADMIN — FENTANYL CITRATE 50 MCG: 50 INJECTION, SOLUTION INTRAMUSCULAR; INTRAVENOUS at 09:15

## 2020-05-12 RX ADMIN — SODIUM CHLORIDE, POTASSIUM CHLORIDE, SODIUM LACTATE AND CALCIUM CHLORIDE: 600; 310; 30; 20 INJECTION, SOLUTION INTRAVENOUS at 08:15

## 2020-05-12 RX ADMIN — METRONIDAZOLE 500 MG: 500 INJECTION, SOLUTION INTRAVENOUS at 07:34

## 2020-05-12 RX ADMIN — ENOXAPARIN SODIUM 40 MG: 40 INJECTION SUBCUTANEOUS at 07:36

## 2020-05-12 RX ADMIN — BUPIVACAINE HYDROCHLORIDE 60 ML: 2.5 INJECTION, SOLUTION EPIDURAL; INFILTRATION; INTRACAUDAL; PERINEURAL at 07:20

## 2020-05-12 RX ADMIN — FENTANYL CITRATE 50 MCG: 50 INJECTION, SOLUTION INTRAMUSCULAR; INTRAVENOUS at 08:36

## 2020-05-12 RX ADMIN — GLYCOPYRROLATE 0.3 MG: 0.2 INJECTION, SOLUTION INTRAMUSCULAR; INTRAVENOUS at 09:40

## 2020-05-12 RX ADMIN — DEXAMETHASONE SODIUM PHOSPHATE 2 MG: 10 INJECTION, SOLUTION INTRAMUSCULAR; INTRAVENOUS at 07:20

## 2020-05-12 RX ADMIN — FENTANYL CITRATE 50 MCG: 50 INJECTION, SOLUTION INTRAMUSCULAR; INTRAVENOUS at 07:15

## 2020-05-12 RX ADMIN — Medication 5 MG: at 08:47

## 2020-05-12 RX ADMIN — CEFOXITIN SODIUM 2 G: 2 POWDER, FOR SOLUTION INTRAVENOUS at 09:00

## 2020-05-12 RX ADMIN — PHENYLEPHRINE HYDROCHLORIDE 150 MCG: 10 INJECTION INTRAVENOUS at 08:47

## 2020-05-12 RX ADMIN — FENTANYL CITRATE 50 MCG: 50 INJECTION, SOLUTION INTRAMUSCULAR; INTRAVENOUS at 09:48

## 2020-05-12 RX ADMIN — Medication 5 MG: at 08:44

## 2020-05-12 RX ADMIN — ROCURONIUM BROMIDE 10 MG: 10 INJECTION INTRAVENOUS at 09:49

## 2020-05-12 RX ADMIN — ROCURONIUM BROMIDE 50 MG: 10 INJECTION INTRAVENOUS at 08:36

## 2020-05-12 RX ADMIN — DEXMEDETOMIDINE HYDROCHLORIDE 40 MCG: 100 INJECTION, SOLUTION INTRAVENOUS at 07:20

## 2020-05-12 RX ADMIN — FENTANYL CITRATE 50 MCG: 50 INJECTION, SOLUTION INTRAMUSCULAR; INTRAVENOUS at 10:55

## 2020-05-12 RX ADMIN — LIDOCAINE HYDROCHLORIDE 80 MG: 20 INJECTION, SOLUTION INFILTRATION; PERINEURAL at 08:36

## 2020-05-12 RX ADMIN — SUGAMMADEX 200 MG: 100 INJECTION, SOLUTION INTRAVENOUS at 10:44

## 2020-05-12 ASSESSMENT — MIFFLIN-ST. JEOR: SCORE: 1428.38

## 2020-05-12 NOTE — ANESTHESIA PROCEDURE NOTES
Peripheral Nerve Block Procedure Note  Staff -   Anesthesiologist:  Bryant Holm MD  Resident/Fellow: Annmarie Asif MD    Performed By: resident        Location: Pre-op  Procedure Start/Stop TImes:      5/12/2020 7:10 AM     5/12/2020 7:20 AM    patient identified, IV checked, site marked, risks and benefits discussed, informed consent, monitors and equipment checked, pre-op evaluation, at physician/surgeon's request and post-op pain management      Correct Patient: Yes      Correct Position: Yes      Correct Site: Yes      Correct Procedure: Yes      Correct Laterality:  N/A    Site Marked:  N/A  Procedure details:     Procedure:  TAP    ASA:  3    Diagnosis:  Post operative pain    Laterality:  Bilateral    Position:  Supine    Sterile Prep: chloraprep, mask and sterile gloves      Local skin infiltration:  None    Needle:  Short bevel    Needle gauge:  21    Needle length (mm):  110    Ultrasound: Yes      Ultrasound used to identify targeted nerve, plexus, or vascular structure and placed a needle adjacent to it      Permanent Image entered into patiient's record      Abnormal pain on injection: No      Blood Aspirated: No      Paresthesias:  No    Bleeding at site: No      Bolus via:  Needle    Infusion Method:  Single Shot    Complications:  None  Assessment/Narrative:     Injection made incrementally with aspirations every (mL):  5

## 2020-05-12 NOTE — ANESTHESIA POSTPROCEDURE EVALUATION
Anesthesia POST Procedure Evaluation    Patient: Lowell Arredondo   MRN:     [id number] Gender:   male   Age:    80 year old :      1940        Preoperative Diagnosis: Pancreas cancer (H) [C25.9]   Procedure(s):  Diagnostic laparoscopy   Postop Comments: No value filed.     Anesthesia Type: General       Disposition: Outpatient   Postop Pain Control: Uneventful            Sign Out: Well controlled pain   PONV: No   Neuro/Psych: Uneventful            Sign Out: Acceptable/Baseline neuro status   Airway/Respiratory: Uneventful            Sign Out: Acceptable/Baseline resp. status   CV/Hemodynamics: Uneventful            Sign Out: Acceptable CV status   Other NRE: NONE   DID A NON-ROUTINE EVENT OCCUR? No         Last Anesthesia Record Vitals:  CRNA VITALS  2020 1036 - 2020 1136      2020             NIBP:  119/64    Pulse:  79          Last PACU Vitals:  Vitals Value Taken Time   /69 2020 11:45 AM   Temp 36.4  C (97.5  F) 2020 11:15 AM   Pulse 103 2020 11:45 AM   Resp 15 2020 11:50 AM   SpO2 96 % 2020 11:30 AM   Temp src     NIBP 119/64 2020 11:10 AM   Pulse 79 2020 11:10 AM   SpO2     Resp     Temp     Ht Rate     Temp 2     Vitals shown include unvalidated device data.      Electronically Signed By: Yrn Estevez MD, May 12, 2020, 11:54 AM

## 2020-05-12 NOTE — BRIEF OP NOTE
[facility], [place]    Brief Operative Note    Pre-operative diagnosis: Pancreas cancer (H) [C25.9]  Post-operative diagnosis Same as pre-operative diagnosis    Procedure: Procedure(s):  Diagnostic laparoscopy  Surgeon: Surgeon(s) and Role:     * Sanjay Ryder MD - Primary     * Jaret Durand MD - Resident - Assisting  Anesthesia: Combined General with Block   Estimated blood loss: 10 mL  Drains: None  Specimens:   ID Type Source Tests Collected by Time Destination   1 : Abdomnial Washings Washings Abdomen CYTOLOGY NON GYN Sanjay Ryder MD 5/12/2020 10:12 AM    A : Peritoneal Nodules #1 Tissue Peritoneum SURGICAL PATHOLOGY EXAM Sanjay Ryder MD 5/12/2020 10:13 AM    B : peritoneal nodules #2 Tissue Peritoneum SURGICAL PATHOLOGY EXAM Sanjay Ryder MD 5/12/2020 10:19 AM      Findings:   Frozen specimen concerning for adenocarcinoma, awaiting results of final pathology.  Complications: None.  Implants: None

## 2020-05-12 NOTE — ANESTHESIA PREPROCEDURE EVALUATION
"Anesthesia Pre-Procedure Evaluation    Patient: Lowell Arredondo   MRN:     [id number] Gender:   male   Age:    80 year old :      1940        Preoperative Diagnosis: Pancreas cancer (H) [C25.9]   Procedure(s):  Diagnostic laparoscopy     LABS:  CBC:   Lab Results   Component Value Date    WBC 3.8 (L) 2020    WBC 4.1 04/10/2020    HGB 10.4 (L) 2020    HGB 10.7 (L) 04/10/2020    HCT 32.6 (L) 2020    HCT 32.6 (L) 04/10/2020     2020     04/10/2020     BMP:   Lab Results   Component Value Date     2020     2020    POTASSIUM 4.3 2020    POTASSIUM 3.7 2020    CHLORIDE 110 (H) 2020    CHLORIDE 110 (H) 2020    CO2 24 2020    CO2 25 2020    BUN 16 2020    BUN 13 2020    CR 1.03 2020    CR 1.03 2020    GLC 91 2020     (H) 2020     COAGS: No results found for: PTT, INR, FIBR  POC:   Lab Results   Component Value Date    BGM 85 2020     OTHER:   Lab Results   Component Value Date    VICENTE 8.8 2020    MAG 2.0 2020    ALBUMIN 3.2 (L) 2020    PROTTOTAL 6.4 (L) 2020    ALT 30 2020    AST 25 2020    ALKPHOS 64 2020    BILITOTAL 0.3 2020    LIPASE 19 (L) 2020    TSH 1.62 10/03/2019        Preop Vitals    BP Readings from Last 3 Encounters:   20 114/67   20 134/81   20 131/74    Pulse Readings from Last 3 Encounters:   20 89   20 77   20 97      Resp Readings from Last 3 Encounters:   20 11   20 14   20 16    SpO2 Readings from Last 3 Encounters:   20 96%   20 100%   20 99%      Temp Readings from Last 1 Encounters:   20 36.4  C (97.5  F) (Oral)    Ht Readings from Last 1 Encounters:   20 1.753 m (5' 9\")      Wt Readings from Last 1 Encounters:   20 73.8 kg (162 lb 11.2 oz)    Estimated body mass index is 24.03 kg/m  as calculated from the " "following:    Height as of this encounter: 1.753 m (5' 9\").    Weight as of this encounter: 73.8 kg (162 lb 11.2 oz).     LDA:  Peripheral IV 05/12/20 Left Lower forearm (Active)   Site Assessment WDL 05/12/20 1140   Line Status Saline locked 05/12/20 1140   Phlebitis Scale 0-->no symptoms 05/12/20 1140   Infiltration Scale 0 05/12/20 1140   Extravasation? No 05/12/20 1140   Number of days: 0       Port A Cath Single 11/01/19 Right Chest wall (Active)   Access Date 05/12/20 05/12/20 0625   Access Attempts 2 05/12/20 0625   Gauge Power noncoring 90 degree bend;20 gauge;3/4 inch 05/12/20 0625   Site Assessment [facility] 05/12/20 1140   Line Status No blood return 05/12/20 0750   Extravasation? No 05/12/20 0625   Dressing Intervention Transparent 05/12/20 0625   Line Necessity No, provider contacted 05/12/20 0750   De-Access Date 05/12/20 05/12/20 0750   Number of days: 193        Past Medical History:   Diagnosis Date     Basal cell carcinoma of skin 9/19/2013    Overview:  Completely excised 5/13     BPH (benign prostatic hyperplasia) 5/2/2013     Cancer (H)      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) 10/10/2019     Peripheral neuropathy      Prostate cancer (H) 6/7/2016      Past Surgical History:   Procedure Laterality Date     FOOT SURGERY Left 1992     HERNIA REPAIR       IR CHEST PORT PLACEMENT > 5 YRS OF AGE  11/1/2019     PROSTATE SURGERY      biopsy      Allergies   Allergen Reactions     Demerol [Meperidine] Difficulty breathing        Anesthesia Evaluation     . Pt has had prior anesthetic. Type: General and MAC    No history of anesthetic complications          ROS/MED HX    ENT/Pulmonary:  - neg pulmonary ROS     Neurologic:     (+)neuropathy - feet,     Cardiovascular:  - neg cardiovascular ROS   (+) ----. : . . . :. . Previous cardiac testing date:results:Stress Testdate:2008 results:ECG reviewed date:1/25/20 results:SR date: results:          METS/Exercise Tolerance:  4 - Raking leaves, gardening "   Hematologic:  - neg hematologic  ROS       Musculoskeletal:  - neg musculoskeletal ROS       GI/Hepatic:  - neg GI/hepatic ROS       Renal/Genitourinary:     (+) BPH,       Endo:  - neg endo ROS       Psychiatric:  - neg psychiatric ROS       Infectious Disease:  - neg infectious disease ROS       Malignancy:   (+) Malignancy History of Other and Skin  Skin CA Remission status post Surgery, Other CA pancreas Active status post Chemo         Other:    (+) No chance of pregnancy C-spine cleared: N/A, no H/O Chronic Pain,no other significant disability                        PHYSICAL EXAM:   Mental Status/Neuro:    Airway: Facies: Feasible  Mallampati: II  Mouth/Opening: Full  TM distance: > 6 cm  Neck ROM: Full   Respiratory: Auscultation: CTAB     Resp. Rate: Normal      CV: Rhythm: Regular  Heart: Normal Sounds   Comments:      Dental: Normal Dentition                Assessment:   ASA SCORE: 2    H&P: History and physical reviewed and following examination; no interval change.   Smoking Status:  Non-Smoker/Unknown        Plan:   Anes. Type:  General   Pre-Medication: None   Induction:  IV (RSI)   Airway: ETT   Access/Monitoring: PIV   Maintenance: Balanced     Postop Plan:   Postop Pain: None  Postop Sedation/Airway: Not planned  Disposition: Outpatient     PONV Management:   Adult Risk Factors:, Non-Smoker   Prevention: Ondansetron     CONSENT: Direct conversation   Plan and risks discussed with: Patient   Blood Products: Consent Deferred (Minimal Blood Loss)                   Yrn Estevez MD

## 2020-05-12 NOTE — PROGRESS NOTES
Paged Dr. Ryder concerning seeing pt before discharge home, MD didn't call back. MD stopped by but pt left already.

## 2020-05-12 NOTE — OR NURSING
Unable on multiple prior attempts to reach Dr. Estevez by phone. He has been here now and he does not need any pre op labs.  Informed of no blood return from jamari cath which was accessed by 2 RNs  Flushes easily;  Jamari removed.

## 2020-05-12 NOTE — OR NURSING
Bilateral TAP blocks completed. Pt tolerated well. Denies metallic taste, circumoral numbness and tinnitus.

## 2020-05-12 NOTE — ANESTHESIA CARE TRANSFER NOTE
Patient: Lowell Arredondo    Procedure(s):  Diagnostic laparoscopy    Diagnosis: Pancreas cancer (H) [C25.9]  Diagnosis Additional Information: No value filed.    Anesthesia Type:   No value filed.     Note:  Airway :Nasal Cannula  Patient transferred to:PACU  Comments: Pt remains stable, monitors on alarms in place, report to PACU RN, no complicationsHandoff Report: Identifed the Patient, Identified the Reponsible Provider, Reviewed the pertinent medical history, Discussed the surgical course, Reviewed Intra-OP anesthesia mangement and issues during anesthesia, Set expectations for post-procedure period and Allowed opportunity for questions and acknowledgement of understanding      Vitals: (Last set prior to Anesthesia Care Transfer)    CRNA VITALS  5/12/2020 1036 - 5/12/2020 1114      5/12/2020             Pulse:  86    Ht Rate:  86    SpO2:  98 %                Electronically Signed By: YAN Ken CRNA  May 12, 2020  11:14 AM

## 2020-05-12 NOTE — DISCHARGE INSTRUCTIONS
General acute hospital  Same-Day Surgery   Adult Discharge Orders & Instructions     For 24 hours after surgery    1. Get plenty of rest.  A responsible adult must stay with you for at least 24 hours after you leave the hospital.   2. Do not drive or use heavy equipment.  If you have weakness or tingling, don't drive or use heavy equipment until this feeling goes away.  3. Do not drink alcohol.  4. Avoid strenuous or risky activities.  Ask for help when climbing stairs.   5. You may feel lightheaded.  IF so, sit for a few minutes before standing.  Have someone help you get up.   6. If you have nausea (feel sick to your stomach): Drink only clear liquids such as apple juice, ginger ale, broth or 7-Up.  Rest may also help.  Be sure to drink enough fluids.  Move to a regular diet as you feel able.  7. You may have a slight fever. Call the doctor if your fever is over 100 F (37.7 C) (taken under the tongue) or lasts longer than 24 hours.  8. You may have a dry mouth, a sore throat, muscle aches or trouble sleeping.  These should go away after 24 hours.  9. Do not make important or legal decisions.   Call your doctor for any of the followin.  Signs of infection (fever, growing tenderness at the surgery site, a large amount of drainage or bleeding, severe pain, foul-smelling drainage, redness, swelling).    2. It has been over 8 to 10 hours since surgery and you are still not able to urinate (pass water).    3.  Headache for over 24 hours.    4.  Numbness, tingling or weakness the day after surgery (if you had spinal anesthesia).  To contact a doctor, call _____Dr. Ryder _[phone]  __________________________________ or:        [phone] and ask for the resident on call for   ______________________________________________ (answered 24 hours a day)      Emergency Department:    [facility]: [phone]       (TTY for hearing impaired: [phone])    [facility]:  [phone]       (TTY for hearing impaired: [phone])

## 2020-05-13 ENCOUNTER — PATIENT OUTREACH (OUTPATIENT)
Dept: SURGERY | Facility: CLINIC | Age: 80
End: 2020-05-13

## 2020-05-13 LAB
COPATH REPORT: NORMAL
COPATH REPORT: NORMAL

## 2020-05-13 NOTE — OP NOTE
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 Diagnostic Laparoscopy  2. Peritoneal Biopsies  3. Peritoneal Lavage for Cytology     Anesthesia Type: General Endotracheal  Attending Physician: Sanjay Ryder MD  Resident: Jaret Durand MD     Indications: 80 M w/ resectable adenocarcinoma of the tail of the pancreas and initial CA 19-9 of ~ 2000.  He underwent neoadjuvant chemotherapy and tolerated this well without radiographic evidence of metastatic disease or local disease progression on repeat staging.  Unfortunately, his CA 19-9 remained significantly elevated near 2000 during and after his neoadjuvant therapy.  Addition of PET-CT continued to show no radiographic evidence of metastatic disease.  As such, I recommended starting with a thorough diagnostic laparoscopy with peritoneal washings prior to proceeding with curative intent resection.  The patient presented for that procedure today.     Findings: Diagnostic laparoscopy initially showed no evidence of hepatic or peritoneal metastatic disease in the upper or lower quadrants of the abdominal cavity.  There was a small amount of serous appearing ascites in the pelvis and the paracolic gutters.  We then opened the lesser sac by dividing the gastrocolic omentum from the distal third of the stomach to the first few short gastric branches below the inferior pole of the spleen.  Upon retracting this space open, we first noted the primary tumor was firmly adherent to the fat containing the short gastric vessels along the greater curvature of the stomach.  In addition, we noted several, white appearing plaques/implants on the posterior surface of the stomach, the perigastric fat containing the short gastric vessels on the lesser  sac side, and on the peripancreatic fat.  Three to four of these were biopsied with laparoscopic scissors.  One was sent for frozen section and was interpreted as adenocarcinoma.  700-800 ml of peritoneal washings were sent for cytology.  Hemostasis was achieved.     Description of Procedure: After appropriate informed consent was obtained, the patient was brought to the operating room where a timeout was performed to identify the correct patient, procedure, and site.  Antibiotics were administered for perioperative prophylaxis.  SCDs were placed for DVT prophylaxis.  The patient was also given chemical DVT prophylaxis in the pre-operative holding area.  The patient was placed in suppine position with arms out to the side and was then induced under general endotracheal anesthesia.  The patient was prepped and draped in a sterile fashion.  We began by accessing the peritoneal cavity at Martinez's point in the left upper quadrant using a Veres needle.  We established insufflation then inserted four 5 mm OptiView trocars into the peritoneal cavity across the upper abdomen under direct visualization.  There was no injury from entrance.  Diagnostic laparoscopy initially showed no evidence of hepatic or peritoneal metastatic disease in the upper or lower quadrants of the abdominal cavity.  There was a small amount of serous appearing ascites in the pelvis and the paracolic gutters.  We then opened the lesser sac by dividing the gastrocolic omentum with LigaSure from the distal third of the stomach to the first few short gastric branches below the inferior pole of the spleen.  Upon retracting this space open, we first noted the primary tumor was firmly adherent to the fat containing the short gastric vessels along the greater curvature of the stomach.  In addition, we noted several, white appearing plaques/implants on the posterior surface of the stomach, the perigastric fat containing the short gastric vessels on the lesser  sac side, and on the peripancreatic fat.  Three to four of these were biopsied with laparoscopic scissors and sent for permanent specimen.  One was sent for frozen section and was interpreted as adenocarcinoma.  700-800 ml of peritoneal washings were sent for cytology.  At this time, the decision was made to terminate the operation as the diagnosis of metastatic disease had been made.  The abdomen was desufflated and the trocars removed.  The open incisions were irrigated.  The skin of the ports was closed using 4-0 Monacryl and skin glue.  All instrument, sponge, and needle counts were correct at the end of the case x 2.  The patient was then extubated and taken to the PACU in stable condition.  He tolerated the procedure well and I discussed the case with the family over the phone.     Estimated Blood Loss: Minimal  Urine Output: See anesthesia record  Fluids: See anesthesia record  Drains: None  Specimens: Peritoneal Nodule (Frozen Section); Peritoneal Nodules (Permanent); Peritoneal Washings (Cytology)     Disposition: PACU --> Home

## 2020-05-13 NOTE — TELEPHONE ENCOUNTER
Surgical Oncology RN Care Coordination Note:     Called to check in with patient post outpatient procedure with Dr. Ryder. Patient is doing well and has no concerns at this time. Informed him that we moved his post op visit to Monday 5/18 at 10 am for a phone visit since we are no longer proceeding with larger planned surgery. Informed him that Dr. Ryder also sent a message to the oncology team updated them regarding the findings to ensure he is scheduled for follow up with their team. Patient and wife verbalized understanding and have my direct number should they have any surgical concerns.     Rosa Machado, RN, BSN  Care Coordinator   [phone]

## 2020-05-15 DIAGNOSIS — C25.2 MALIGNANT NEOPLASM OF TAIL OF PANCREAS (H): Primary | ICD-10-CM

## 2020-05-18 ENCOUNTER — DOCUMENTATION ONLY (OUTPATIENT)
Dept: OTHER | Facility: CLINIC | Age: 80
End: 2020-05-18

## 2020-05-18 ENCOUNTER — VIRTUAL VISIT (OUTPATIENT)
Dept: ONCOLOGY | Facility: CLINIC | Age: 80
End: 2020-05-18
Attending: INTERNAL MEDICINE
Payer: COMMERCIAL

## 2020-05-18 ENCOUNTER — VIRTUAL VISIT (OUTPATIENT)
Dept: SURGERY | Facility: CLINIC | Age: 80
End: 2020-05-18
Attending: SURGERY
Payer: COMMERCIAL

## 2020-05-18 DIAGNOSIS — C25.9 PRIMARY MALIGNANT NEOPLASM OF PANCREAS WITH METASTASIS TO OTHER SITE (H): Primary | ICD-10-CM

## 2020-05-18 DIAGNOSIS — G62.0 POLYNEUROPATHY FOLLOWING CHEMOTHERAPY (H): ICD-10-CM

## 2020-05-18 DIAGNOSIS — C25.2 MALIGNANT NEOPLASM OF TAIL OF PANCREAS (H): Primary | ICD-10-CM

## 2020-05-18 DIAGNOSIS — T45.1X5A POLYNEUROPATHY FOLLOWING CHEMOTHERAPY (H): ICD-10-CM

## 2020-05-18 PROCEDURE — 99213 OFFICE O/P EST LOW 20 MIN: CPT | Mod: 95 | Performed by: INTERNAL MEDICINE

## 2020-05-18 PROCEDURE — 40000114 ZZH STATISTIC NO CHARGE CLINIC VISIT

## 2020-05-18 RX ORDER — GABAPENTIN 300 MG/1
300 CAPSULE ORAL 3 TIMES DAILY
Qty: 90 CAPSULE | Refills: 3 | Status: SHIPPED | OUTPATIENT
Start: 2020-05-18 | End: 2020-06-24

## 2020-05-18 NOTE — PROGRESS NOTES
"Lowell Arredondo is a 80 year old male who is being evaluated via a billable video visit.      The patient has been notified of following:     \"This video visit will be conducted via a call between you and your physician/provider. We have found that certain health care needs can be provided without the need for an in-person physical exam.  This service lets us provide the care you need with a video conversation.  If a prescription is necessary we can send it directly to your pharmacy.  If lab work is needed we can place an order for that and you can then stop by our lab to have the test done at a later time.    Video visits are billed at different rates depending on your insurance coverage.  Please reach out to your insurance provider with any questions.    If during the course of the call the physician/provider feels a video visit is not appropriate, you will not be charged for this service.\"    Patient has given verbal consent for Video visit? Yes    How would you like to obtain your AVS? MyChart    Patient would like the video invitation sent by: Text to cell phone: [phone]    Will anyone else be joining your video visit? No      Video-Visit Details    Type of service:  Video Visit    Video Start Time: 2:52 PM  Video End Time:     Originating Location (pt. Location): Home    Distant Location (provider location):  [facility]     Platform used for Video Visit: Maria Ines Cifuentes [facility]      "

## 2020-05-18 NOTE — PROGRESS NOTES
"Lowell Arredondo is a 80 year old male who is being evaluated via a billable telephone visit.      The patient has been notified of following:     \"This telephone visit will be conducted via a call between you and your physician/provider. We have found that certain health care needs can be provided without the need for a physical exam.  This service lets us provide the care you need with a short phone conversation.  If a prescription is necessary we can send it directly to your pharmacy.  If lab work is needed we can place an order for that and you can then stop by our lab to have the test done at a later time.    Telephone visits are billed at different rates depending on your insurance coverage. During this emergency period, for some insurers they may be billed the same as an in-person visit.  Please reach out to your insurance provider with any questions.    If during the course of the call the physician/provider feels a telephone visit is not appropriate, you will not be charged for this service.\"    Patient has given verbal consent for Telephone visit?  Yes    What phone number would you like to be contacted at? [phone]    How would you like to obtain your AVS? Mail a copy     I have reviewed and updated the patient's allergies and medication list.    Concerns: Patient has no new concerns.      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  He was referred by Dr Segura.     Pertinent Oncologic History: Patient found to have a mass in the tail of the pancreas on imaging 9/26/19.  Underwent EUS on 10/7/19 with findings of a 3.8 cm mass in the tail of the pancreas with FNA showing adenocarcinoma.  No critical vascular involvement other than the splenic.  Subsequent " PET/CT did not show evidence of metastatic disease, but did show the avid tail mass and an adjacent positive peripancreatic lymph node.  CA 19-9 was almost 2500.  He underwent neoadjuvant chemotherapy with minimal change in his CA 19-9, which stayed near 2000.  Repeat PET-CT did not show evidence of metastatic disease, but diagnostic laparoscopy on 5/12/2020 showed deposits of biopsy proven, peritoneal metastatic disease in the lesser sac on the posterior surface of the stomach, the perigastric fat, and peripancreatic fat.  He now presents for his post-operative visit.     Pertinent Exam: Exam deferred given phone visit.     HISTORY OF PRESENT ILLNESS:  He is doing well and is without symptoms.  He is eating and having normal bowel movements.  Incisions have given him no trouble.  He demonstrates understanding of his surgical pathology report and the subsequent plans.     Pertinent Work-Up/Findings:     Labs: Surgical pathology revealed peritoneal nodules positive for metastatic adenocarcinoma.     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  We have had extensive discussion that his disease is not curable and that surgery will not offer benefit in terms of survival.  At this time, we will refer the patient back to medical oncology for treatment with chemotherapy.  All questions were answered.  The patient was in agreement with and understanding of the plan.  No need for further follow-up unless questions or issues arise.     Total time spent with the patient was 10 minutes, of which more than half was counseling and coordination of care.

## 2020-05-18 NOTE — Clinical Note
"    5/18/2020         RE: Lowell Arredondo  [address] [facility] [address]        Dear Colleague,    Thank you for referring your patient, Lowell Arredondo, to the [facility]. Please see a copy of my visit note below.    Lowell Arredondo is a 80 year old male who is being evaluated via a billable video visit.      The patient has been notified of following:     \"This video visit will be conducted via a call between you and your physician/provider. We have found that certain health care needs can be provided without the need for an in-person physical exam.  This service lets us provide the care you need with a video conversation.  If a prescription is necessary we can send it directly to your pharmacy.  If lab work is needed we can place an order for that and you can then stop by our lab to have the test done at a later time.    Video visits are billed at different rates depending on your insurance coverage.  Please reach out to your insurance provider with any questions.    If during the course of the call the physician/provider feels a video visit is not appropriate, you will not be charged for this service.\"    Patient has given verbal consent for Video visit? Yes    How would you like to obtain your AVS? [facility]    Patient would like the video invitation sent by: Text to cell phone: [phone]    Will anyone else be joining your video visit?   {If patient encounters technical issues they should call [phone] :[phone]}      Video-Visit Details    Type of service:  Video Visit    Video Start Time: {video visit start/end time:152948}  Video End Time: {video visit start/end time:152948}    Originating Location (pt. Location): {video visit patient location:565794::\"Home\"}    Distant Location (provider location):  [facility]     Platform used for Video Visit: {Virtual Visit Platforms:546340::\"AmWell\"}    {signature options:121377}        Oncology Rooming Note    May 18, 2020 " "2:50 PM   Lowell Arredondo is a 80 year old male who presents for:    No chief complaint on file.    Initial Vitals: There were no vitals taken for this visit. Estimated body mass index is 24.03 kg/m  as calculated from the following:    Height as of 5/12/20: 1.753 m (5' 9\").    Weight as of 5/12/20: 73.8 kg (162 lb 11.2 oz). There is no height or weight on file to calculate BSA.  Data Unavailable Comment: Data Unavailable   No LMP for male patient.  Allergies reviewed: {ALLERGIES:260151}  Medications reviewed: {MEDICATIONS:054459}    Medications: {REFILLS NEEDED:539314}  Pharmacy name entered into [employer]: [id number] - [place], [place] - [address]    Clinical concerns: *** {PROVIDER NOTIFIED?:227898}      Sarah Cifuentes CMA                Lowell Arredondo is a 80 year old male who is being evaluated via a billable video visit.      The patient has been notified of following:     \"This video visit will be conducted via a call between you and your physician/provider. We have found that certain health care needs can be provided without the need for an in-person physical exam.  This service lets us provide the care you need with a video conversation.  If a prescription is necessary we can send it directly to your pharmacy.  If lab work is needed we can place an order for that and you can then stop by our lab to have the test done at a later time.    Video visits are billed at different rates depending on your insurance coverage.  Please reach out to your insurance provider with any questions.    If during the course of the call the physician/provider feels a video visit is not appropriate, you will not be charged for this service.\"    Patient has given verbal consent for Video visit? Yes    How would you like to obtain your AVS? [facility]    Patient would like the video invitation sent by: Text to cell phone: [phone]    Will anyone else be joining your video visit? No  {If patient encounters technical " issues they should call [phone] :[phone]}      Video-Visit Details    Type of service:  Video Visit    Video Start Time: 2:52 PM  Video End Time:     Originating Location (pt. Location): Home    Distant Location (provider location):  [facility] [place]     Platform used for Video Visit: Maria Ines Cifuentes [facility]        Visit Date:   05/18/2020     ONCOLOGY HISTORY: Mr. Arredondo is a gentleman with pancreatic cancer.T3 N1 M0. Stage IIB.   -Also has prostate cancer [place] 7 prostate.   1.  Prostate MRI on 07/20/2019 revealed PI-RADS 5.  No suspicious adenopathy or evidence of pelvic metastasis.   2.  Bone scan on 09/26/2019 does not reveal any bone metastasis.  There is some cardiac uptake.   3.  CT abdomen and pelvis on 09/26/2019 reveals new soft tissue mesenteric mass in the region of pancreatic tail.   4.  EUS on 10/07/2019 revealed an irregular mass in the pancreatic tail measuring 3.8 x 2.2 cm.  There was some evidence of invasion into the portal vein.  No lymphadenopathy seen.  Based on the EUS, it was T3 Nx disease.  FNA is positive for moderately differentiated pancreatic adenocarcinoma.   5. PET scan on 10/15/2019 reveals hypermetabolic 4.2 cm mass in the pancreatic tail and an adjacent 1 cm peripancreatic lymph node. No evidence of any metastatic disease. There are 2 foci of mild FDG uptake in the prostate gland from prostate cancer.   6. Neoadjuvant gemcitabine and Abraxane x 6 cycles between 11/07/2019 and 04/20/2020.  Abraxane stopped after cycle 4 because of toxicity.   7. PET scan on 05/05/2020 reveals decrease in size of hypermetabolic mass at pancreatic tail and improvement in adjacent hypermetabolic lymph node.   8. Patient had diagnostic laparoscopy on 05/12/2020. He has unresectable pancreatic cancer. There are tumor implants.      SUBJECTIVE:  Mr. Arredondo is an 80-year-old gentleman with pancreatic cancer.  He received neoadjuvant chemotherapy.  Surgery was recommended.      The  patient had diagnostic laparoscopy done on 05/12/2020.  It was found to be unresectable.  The primary tumor was adherent to the fat-containing the short gastric vessels along the greater curvature of the stomach.  There were several implants of tumor on the posterior surface of the stomach and peripancreatic fat.  Pathology reveals metastatic adenocarcinoma.      The patient's overall condition is stable.  He has fatigue, but it is slowly improving as he is off chemotherapy.  He still has peripheral neuropathy.  He has pain in the feet.  His walking is fairly good.  He has not been falling down.      No headache.  Some lightheadedness.  No chest pain.  No shortness of breath at rest.  No nausea or vomiting.  Appetite is fair.  No urinary or bowel complaints.  All other review of systems negative.      PHYSICAL EXAMINATION:   GENERAL:  The patient was alert, oriented x 3.    This is a virtual visit.      ASSESSMENT:   1.  An 80-year-old gentleman with metastatic pancreatic cancer.   2.  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   3.  Fatigue.      PLAN:   1.  I had a long discussion with the patient and his wife.  Unfortunately, he has unresectable pancreatic cancer.  On laparoscopy, there is evidence of metastatic disease.  These were not seen on the PET scan.   2.  Discussed with the patient regarding further treatment.  I explained to him this is an incurable situation.  Treatment will be mainly palliative chemotherapy.  We discussed regarding giving some time off for him improve.  The patient is elderly.  He had side effects with chemotherapy.  He is now slowly beginning to feel better.  We will give him a month to improve.  He is agreeable for it.  In a month, we will get labs and CT chest, abdomen and pelvis.  I will see him after that and will decide regarding systemic treatment.   3.  We will get NGS panel on the specimen. We will also check for BRCA1 and BRCA2.  That will help us with future treatment.   4.   The patient and wife had multiple questions, which were all answered.  He wanted a refill on gabapentin.       ROXY LANCASTER MD             D: 2020   T: 2020   MT: AJJA      Name:     KARI YANG   MRN:      [id number]        Account:      [id number]   :      1940           Visit Date:   2020      Document: [id number]      Telephone visit for 11 minutes starting at 3:28 PM.    Visit Date:   2020      SUBJECTIVE:  Mr. Yang is an 80-year-old gentleman with pancreatic cancer.  He received neoadjuvant chemotherapy.  Surgery was recommended.      The patient had diagnostic laparoscopy done on 2020.  It was found to be unresectable.  The primary tumor was adherent to the fat-containing the short gastric vessels along the greater curvature of the stomach.  There were several implants of tumor on the posterior surface of the stomach and peripancreatic fat.  Pathology reveals metastatic adenocarcinoma.      The patient's overall condition is stable.  He has fatigue, but it is slowly improving as he is off chemotherapy.  He still has peripheral neuropathy.  He has pain in the feet.  His walking is fairly good.  He has not been falling down.      No headache.  Some lightheadedness.  No chest pain.  No shortness of breath at rest.  No nausea or vomiting.  Appetite is fair.  No urinary or bowel complaints.  All other review of systems negative.      PHYSICAL EXAMINATION:   GENERAL:  The patient was alert, oriented x 3.  This is a virtual visit.      ASSESSMENT:   1.  An 80-year-old gentleman with metastatic pancreatic cancer.   2.  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   3.  Fatigue.      PLAN:   1.  I had a long discussion with the patient and his wife.  Unfortunately, he has unresectable pancreatic cancer.  On laparoscopy, there is evidence of metastatic disease.  These were not seen on the PET scan.   2.  Discussed with the patient regarding further treatment.  I explained to  him this is an incurable situation.  Treatment will be mainly palliative chemotherapy.  We discussed regarding giving some time off for him improve.  The patient is elderly.  He had side effects with chemotherapy.  He is now slowly beginning to feel better.  We will give him a month to improve.  He is agreeable for it.  In a month, we will get labs and CT chest, abdomen and pelvis.  I will see him after that, we will decide regarding systemic treatment.   3.  We will get NGS panel on the pathology, we will also check for BRCA1 and BRCA2.  That will help us with future treatment.   4.  The patient and wife had multiple questions, which were all answered.  He wanted a refill on gabapentin.  I told him to start taking it 3 times a day.         ROXY LANCASTER MD             D: 2020   T: 2020   MT: JAJA      Name:     KARI YANG   MRN:      [id number]        Account:      [id number]   :      1940           Visit Date:   2020      Document: [id number]       Again, thank you for allowing me to participate in the care of your patient.        Sincerely,        Roxy Lancaster MD

## 2020-05-19 DIAGNOSIS — C25.2 MALIGNANT NEOPLASM OF TAIL OF PANCREAS (H): Primary | ICD-10-CM

## 2020-05-19 PROCEDURE — 81445 SO NEO GSAP 5-50DNA/DNA&RNA: CPT | Performed by: SURGERY

## 2020-05-19 PROCEDURE — 40000803 ZZHCL STATISTIC DNA ISOL HIGH PURITY: Performed by: SURGERY

## 2020-05-19 PROCEDURE — 81301 MICROSATELLITE INSTABILITY: CPT | Performed by: SURGERY

## 2020-05-19 NOTE — PROGRESS NOTES
Visit Date:   05/18/2020     ONCOLOGY HISTORY: Mr. Arredondo is a gentleman with pancreatic cancer.T3 N1 M0. Stage IIB.   -Also has prostate cancer [place] 7 prostate.   1.  Prostate MRI on 07/20/2019 revealed PI-RADS 5.  No suspicious adenopathy or evidence of pelvic metastasis.   2.  Bone scan on 09/26/2019 does not reveal any bone metastasis.  There is some cardiac uptake.   3.  CT abdomen and pelvis on 09/26/2019 reveals new soft tissue mesenteric mass in the region of pancreatic tail.   4.  EUS on 10/07/2019 revealed an irregular mass in the pancreatic tail measuring 3.8 x 2.2 cm.  There was some evidence of invasion into the portal vein.  No lymphadenopathy seen.  Based on the EUS, it was T3 Nx disease.  FNA is positive for moderately differentiated pancreatic adenocarcinoma.   5. PET scan on 10/15/2019 reveals hypermetabolic 4.2 cm mass in the pancreatic tail and an adjacent 1 cm peripancreatic lymph node. No evidence of any metastatic disease. There are 2 foci of mild FDG uptake in the prostate gland from prostate cancer.   6. Neoadjuvant gemcitabine and Abraxane x 6 cycles between 11/07/2019 and 04/20/2020.  Abraxane stopped after cycle 4 because of toxicity.   7. PET scan on 05/05/2020 reveals decrease in size of hypermetabolic mass at pancreatic tail and improvement in adjacent hypermetabolic lymph node.   8. Patient had diagnostic laparoscopy on 05/12/2020. He has unresectable pancreatic cancer. There are tumor implants.      SUBJECTIVE:  Mr. Arredondo is an 80-year-old gentleman with pancreatic cancer.  He received neoadjuvant chemotherapy.  Surgery was recommended.      The patient had diagnostic laparoscopy done on 05/12/2020.  It was found to be unresectable.  The primary tumor was adherent to the fat-containing the short gastric vessels along the greater curvature of the stomach.  There were several implants of tumor on the posterior surface of the stomach and peripancreatic fat.  Pathology reveals  metastatic adenocarcinoma.      The patient's overall condition is stable.  He has fatigue, but it is slowly improving as he is off chemotherapy.  He still has peripheral neuropathy.  He has pain in the feet.  His walking is fairly good.  He has not been falling down.      No headache.  Some lightheadedness.  No chest pain.  No shortness of breath at rest.  No nausea or vomiting.  Appetite is fair.  No urinary or bowel complaints.  All other review of systems negative.      PHYSICAL EXAMINATION:   GENERAL:  The patient was alert, oriented x 3.    This is a virtual visit.      ASSESSMENT:   1.  An 80-year-old gentleman with metastatic pancreatic cancer.   2.  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   3.  Fatigue.      PLAN:   1.  I had a long discussion with the patient and his wife.  Unfortunately, he has unresectable pancreatic cancer.  On laparoscopy, there is evidence of metastatic disease.  These were not seen on the PET scan.   2.  Discussed with the patient regarding further treatment.  I explained to him this is an incurable situation.  Treatment will be mainly palliative chemotherapy.  We discussed regarding giving some time off for him improve.  The patient is elderly.  He had side effects with chemotherapy.  He is now slowly beginning to feel better.  We will give him a month to improve.  He is agreeable for it.  In a month, we will get labs and CT chest, abdomen and pelvis.  I will see him after that and will decide regarding systemic treatment.   3.  We will get NGS panel on the specimen. We will also check for BRCA1 and BRCA2.  That will help us with future treatment.   4.  The patient and wife had multiple questions, which were all answered.  He wanted a refill on gabapentin.       ROXY LANCASTER MD             D: 2020   T: 2020   MT: JAJA      Name:     KAIR YANG   MRN:      [id number]        Account:      [id number]   :      1940           Visit Date:   2020       Document: [id number]      Telephone visit for 11 minutes starting at 3:28 PM.

## 2020-05-19 NOTE — PROGRESS NOTES
Visit Date:   05/18/2020     ONCOLOGY HISTORY: Mr. Arredondo is a gentleman with pancreatic cancer.T3 N1 M0. Stage IIB.   -Also has prostate cancer [place] 7 prostate.   1.  Prostate MRI on 07/20/2019 revealed PI-RADS 5.  No suspicious adenopathy or evidence of pelvic metastasis.   2.  Bone scan on 09/26/2019 does not reveal any bone metastasis.  There is some cardiac uptake.   3.  CT abdomen and pelvis on 09/26/2019 reveals new soft tissue mesenteric mass in the region of pancreatic tail.   4.  EUS on 10/07/2019 revealed an irregular mass in the pancreatic tail measuring 3.8 x 2.2 cm.  There was some evidence of invasion into the portal vein.  No lymphadenopathy seen.  Based on the EUS, it was T3 Nx disease.  FNA is positive for moderately differentiated pancreatic adenocarcinoma.   5. PET scan on 10/15/2019 reveals hypermetabolic 4.2 cm mass in the pancreatic tail and an adjacent 1 cm peripancreatic lymph node. No evidence of any metastatic disease. There are 2 foci of mild FDG uptake in the prostate gland from prostate cancer.   6. Neoadjuvant gemcitabine and Abraxane x 6 cycles between 11/07/2019 and 04/20/2020.  Abraxane stopped after cycle 4 because of toxicity.   7. PET scan on 05/05/2020 reveals decrease in size of hypermetabolic mass at pancreatic tail and improvement in adjacent hypermetabolic lymph node.   8. Patient had diagnostic laparoscopy on 05/12/2020. He has unresectable pancreatic cancer. There are tumor implants.      SUBJECTIVE:  Mr. Arredondo is an 80-year-old gentleman with pancreatic cancer.  He received neoadjuvant chemotherapy.  Surgery was recommended.      The patient had diagnostic laparoscopy done on 05/12/2020.  It was found to be unresectable.  The primary tumor was adherent to the fat-containing the short gastric vessels along the greater curvature of the stomach.  There were several implants of tumor on the posterior surface of the stomach and peripancreatic fat.  Pathology reveals  metastatic adenocarcinoma.      The patient's overall condition is stable.  He has fatigue, but it is slowly improving as he is off chemotherapy.  He still has peripheral neuropathy.  He has pain in the feet.  His walking is fairly good.  He has not been falling down.      No headache.  Some lightheadedness.  No chest pain.  No shortness of breath at rest.  No nausea or vomiting.  Appetite is fair.  No urinary or bowel complaints.  All other review of systems negative.      PHYSICAL EXAMINATION:   GENERAL:  The patient was alert, oriented x 3.    This is a virtual visit.      ASSESSMENT:   1.  An 80-year-old gentleman with metastatic pancreatic cancer.   2.  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   3.  Fatigue.      PLAN:   1.  I had a long discussion with the patient and his wife.  Unfortunately, he has unresectable pancreatic cancer.  On laparoscopy, there is evidence of metastatic disease.  These were not seen on the PET scan.   2.  Discussed with the patient regarding further treatment.  I explained to him this is an incurable situation.  Treatment will be mainly palliative chemotherapy.  We discussed regarding giving some time off for him improve.  The patient is elderly.  He had side effects with chemotherapy.  He is now slowly beginning to feel better.  We will give him a month to improve.  He is agreeable for it.  In a month, we will get labs and CT chest, abdomen and pelvis.  I will see him after that and will decide regarding systemic treatment.   3.  We will get NGS panel on the specimen. We will also check for BRCA1 and BRCA2.  That will help us with future treatment.   4.  The patient and wife had multiple questions, which were all answered.  He wanted a refill on gabapentin.       ROXY LANCASTER MD             D: 2020   T: 2020   MT: JAJA      Name:     KARI YANG   MRN:      [id number]        Account:      [id number]   :      1940           Visit Date:   2020       Document: [id number]      Telephone visit for 11 minutes starting at 3:28 PM.

## 2020-05-20 ENCOUNTER — HOSPITAL ENCOUNTER (OUTPATIENT)
Dept: LAB | Facility: CLINIC | Age: 80
End: 2020-05-20
Attending: UROLOGY
Payer: COMMERCIAL

## 2020-05-28 LAB — COPATH REPORT: NORMAL

## 2020-05-29 LAB — COPATH REPORT: NORMAL

## 2020-06-03 ENCOUNTER — TELEPHONE (OUTPATIENT)
Dept: CONSULT | Facility: CLINIC | Age: 80
End: 2020-06-03

## 2020-06-03 NOTE — TELEPHONE ENCOUNTER
Notified Billie, patient's wife, that we received partial prior authorization approval for Lowell's genetic testing. Valid from 5/28/2020 to 11/24/2020. Authorization number is [id number].     Explained that insurance benefits may still apply, therefore, there could be an out of pocket cost. Provided Billie with estimated out of pocket cost for testing.    Billie expressed understanding, but stated she was not aware genetic testing was ordered. She would like to hold off on testing for Lowell until further explanation. Billie had no further questions.    Miya Zheng, JANNY  Genomics Billing    [facility]   Molecular Diagnostics Laboratory  [address] [place] [id number]  [phone]

## 2020-06-03 NOTE — TELEPHONE ENCOUNTER
Writer sent a message to Dr. Segura to call patient to explain the genetic testing.     Kathe Coles RN

## 2020-06-08 LAB — COPATH REPORT: NORMAL

## 2020-06-17 NOTE — TELEPHONE ENCOUNTER
Dr. Segura is aware and labs can be drawn at anytime. Patient per this encounter states wants further explanation. Dr. Segura to explain at exam on 6/24.    Kathe Coles RN

## 2020-06-22 ENCOUNTER — HOSPITAL ENCOUNTER (OUTPATIENT)
Dept: CT IMAGING | Facility: CLINIC | Age: 80
End: 2020-06-22
Attending: INTERNAL MEDICINE
Payer: COMMERCIAL

## 2020-06-22 ENCOUNTER — HOSPITAL ENCOUNTER (OUTPATIENT)
Facility: CLINIC | Age: 80
Setting detail: SPECIMEN
End: 2020-06-22
Attending: INTERNAL MEDICINE
Payer: COMMERCIAL

## 2020-06-22 DIAGNOSIS — C25.2 MALIGNANT NEOPLASM OF TAIL OF PANCREAS (H): ICD-10-CM

## 2020-06-22 LAB
ALBUMIN SERPL-MCNC: 3.6 G/DL (ref 3.4–5)
ALP SERPL-CCNC: 63 U/L (ref 40–150)
ALT SERPL W P-5'-P-CCNC: 23 U/L (ref 0–70)
ANION GAP SERPL CALCULATED.3IONS-SCNC: 7 MMOL/L (ref 3–14)
AST SERPL W P-5'-P-CCNC: 22 U/L (ref 0–45)
BASOPHILS # BLD AUTO: 0 10E9/L (ref 0–0.2)
BASOPHILS NFR BLD AUTO: 0.7 %
BILIRUB SERPL-MCNC: 0.7 MG/DL (ref 0.2–1.3)
BUN SERPL-MCNC: 19 MG/DL (ref 7–30)
CALCIUM SERPL-MCNC: 8.8 MG/DL (ref 8.5–10.1)
CHLORIDE SERPL-SCNC: 108 MMOL/L (ref 94–109)
CO2 SERPL-SCNC: 25 MMOL/L (ref 20–32)
CREAT SERPL-MCNC: 1.05 MG/DL (ref 0.66–1.25)
DIFFERENTIAL METHOD BLD: ABNORMAL
EOSINOPHIL # BLD AUTO: 0.3 10E9/L (ref 0–0.7)
EOSINOPHIL NFR BLD AUTO: 5 %
ERYTHROCYTE [DISTWIDTH] IN BLOOD BY AUTOMATED COUNT: 13.8 % (ref 10–15)
GFR SERPL CREATININE-BSD FRML MDRD: 66 ML/MIN/{1.73_M2}
GLUCOSE SERPL-MCNC: 94 MG/DL (ref 70–99)
HCT VFR BLD AUTO: 39.7 % (ref 40–53)
HGB BLD-MCNC: 12.9 G/DL (ref 13.3–17.7)
IMM GRANULOCYTES # BLD: 0 10E9/L (ref 0–0.4)
IMM GRANULOCYTES NFR BLD: 0.4 %
LYMPHOCYTES # BLD AUTO: 2.5 10E9/L (ref 0.8–5.3)
LYMPHOCYTES NFR BLD AUTO: 44.4 %
MCH RBC QN AUTO: 32.3 PG (ref 26.5–33)
MCHC RBC AUTO-ENTMCNC: 32.5 G/DL (ref 31.5–36.5)
MCV RBC AUTO: 99 FL (ref 78–100)
MONOCYTES # BLD AUTO: 0.6 10E9/L (ref 0–1.3)
MONOCYTES NFR BLD AUTO: 10.7 %
NEUTROPHILS # BLD AUTO: 2.2 10E9/L (ref 1.6–8.3)
NEUTROPHILS NFR BLD AUTO: 38.8 %
NRBC # BLD AUTO: 0 10*3/UL
NRBC BLD AUTO-RTO: 0 /100
PLATELET # BLD AUTO: 181 10E9/L (ref 150–450)
POTASSIUM SERPL-SCNC: 4.2 MMOL/L (ref 3.4–5.3)
PROT SERPL-MCNC: 6.8 G/DL (ref 6.8–8.8)
RBC # BLD AUTO: 4 10E12/L (ref 4.4–5.9)
SODIUM SERPL-SCNC: 140 MMOL/L (ref 133–144)
WBC # BLD AUTO: 5.6 10E9/L (ref 4–11)

## 2020-06-22 PROCEDURE — 25000125 ZZHC RX 250: Performed by: INTERNAL MEDICINE

## 2020-06-22 PROCEDURE — 36415 COLL VENOUS BLD VENIPUNCTURE: CPT

## 2020-06-22 PROCEDURE — 25000128 H RX IP 250 OP 636: Performed by: INTERNAL MEDICINE

## 2020-06-22 PROCEDURE — 74178 CT ABD&PLV WO CNTR FLWD CNTR: CPT

## 2020-06-22 PROCEDURE — 96374 THER/PROPH/DIAG INJ IV PUSH: CPT

## 2020-06-22 PROCEDURE — 85025 COMPLETE CBC W/AUTO DIFF WBC: CPT | Performed by: INTERNAL MEDICINE

## 2020-06-22 PROCEDURE — 86301 IMMUNOASSAY TUMOR CA 19-9: CPT | Performed by: INTERNAL MEDICINE

## 2020-06-22 PROCEDURE — 80053 COMPREHEN METABOLIC PANEL: CPT | Performed by: INTERNAL MEDICINE

## 2020-06-22 RX ORDER — IOPAMIDOL 755 MG/ML
500 INJECTION, SOLUTION INTRAVASCULAR ONCE
Status: COMPLETED | OUTPATIENT
Start: 2020-06-22 | End: 2020-06-22

## 2020-06-22 RX ADMIN — SODIUM CHLORIDE 57 ML: 9 INJECTION, SOLUTION INTRAVENOUS at 11:53

## 2020-06-22 RX ADMIN — IOPAMIDOL 81 ML: 755 INJECTION, SOLUTION INTRAVENOUS at 11:53

## 2020-06-22 RX ADMIN — ALTEPLASE 2 MG: 2.2 INJECTION, POWDER, LYOPHILIZED, FOR SOLUTION INTRAVENOUS at 11:00

## 2020-06-22 NOTE — PROGRESS NOTES
"Nursing Note:  Lowell Arredondo presents today for Blood Draw.    Patient seen by provider today: No   present during visit today: Not Applicable.    Note: No blood return with port.  Per patient, there was no blood return \"last time\" and no intervention done.  Multiple flushes and position changes tried. Cathflo inserted at 1100.  Had 1 hr dwell time while patient was at CT.  Port now has good blood return.  Heparin locked and de-accessed.    PIV inserted for lab draw and CT scan.     Intravenous Access:  Labs drawn without difficulty.  Peripheral IV placed.    Discharge Plan:   Patient was sent to CT for 1140 appointment.    Danay Bautista RN            "

## 2020-06-23 ENCOUNTER — TELEPHONE (OUTPATIENT)
Dept: ONCOLOGY | Facility: CLINIC | Age: 80
End: 2020-06-23

## 2020-06-23 LAB — CANCER AG19-9 SERPL-ACNC: 3600 U/ML (ref 0–37)

## 2020-06-23 NOTE — TELEPHONE ENCOUNTER
"Patient is currently scheduled for an appointment at [facility] in [place].  Called patient to review current visitor restrictions and complete COVID-19 Patient Infection/Travel Screening Tool.     Due to the recent public health concerns around COVID-19 and in an effort to keep our patients and staff safe and healthy, we are implementing a screening process for the patients that come to our clinic.      I am going to ask you a few questions, please answer yes or no.  Your honesty about any symptoms is critical, as it keeps patients and staff healthy.      Do you have a:  Fever (or reported chills)?  No  New cough (started within the past 14 days)?  No  New shortness of breath (started within the past 14 days)?  No  Rash?  No    In the last month, have you been in contact with someone who was confirmed or suspected to have Coronavirus/COVID-19?  No    Have you traveled internationally in the last month?  No  If so, where?  N/A     I also wanted to let you know that to protect our patients from the flu and other common illnesses, [facility] enforce visitor restrictions year round, but due to the community spread of COVID-19 in [place], we are taking additional precautionary steps to ensure the health of our patients.  At this time, NO visitors are allowed on our hospital and clinic campuses.     Patient PASSED the screening assessment.    Patient instructed to come to the clinic as planned for their scheduled appointment and to call the clinic if any symptoms develop prior to their appointment.    \"Per CDC Guidelines, we are asking all patients that are coming into the building to wear a cloth covering that covers your mouth and nose.  You will be screened again at the entrance to the clinic for any Covid 19 symptoms. If you screen positive to any Covid 19 symptoms during our screening process you will be provided a surgical mask to wear during your time in the " "building.\"    \"COVID-19 is contagious and can be dangerous for our patients and staff.  Please send us a [employer] message or call our clinic before coming in if you feel any of the following symptoms: fever, cough, congestion, runny nose, sore throat, muscle aches and pains, or shortness of breath.  If you are already at our clinic, it is very important that you be honest about any symptoms you are experiencing to ensure your safety and that of other patients and staff who treat you.  If you do have symptoms, we will have a nurse and/or provider asses you to determine next steps.\"    Amber Gomez, CMA on 6/23/2020 at 10:51 AM    "

## 2020-06-24 ENCOUNTER — ONCOLOGY VISIT (OUTPATIENT)
Dept: ONCOLOGY | Facility: CLINIC | Age: 80
End: 2020-06-24
Attending: INTERNAL MEDICINE
Payer: COMMERCIAL

## 2020-06-24 VITALS
HEART RATE: 71 BPM | TEMPERATURE: 97.8 F | RESPIRATION RATE: 16 BRPM | HEIGHT: 69 IN | BODY MASS INDEX: 23.85 KG/M2 | OXYGEN SATURATION: 99 % | SYSTOLIC BLOOD PRESSURE: 134 MMHG | DIASTOLIC BLOOD PRESSURE: 82 MMHG | WEIGHT: 161 LBS

## 2020-06-24 DIAGNOSIS — C25.2 MALIGNANT NEOPLASM OF TAIL OF PANCREAS (H): Primary | ICD-10-CM

## 2020-06-24 DIAGNOSIS — N40.1 BENIGN PROSTATIC HYPERPLASIA WITH LOWER URINARY TRACT SYMPTOMS, SYMPTOM DETAILS UNSPECIFIED: ICD-10-CM

## 2020-06-24 DIAGNOSIS — G62.0 POLYNEUROPATHY FOLLOWING CHEMOTHERAPY (H): ICD-10-CM

## 2020-06-24 DIAGNOSIS — T45.1X5A POLYNEUROPATHY FOLLOWING CHEMOTHERAPY (H): ICD-10-CM

## 2020-06-24 PROCEDURE — 99214 OFFICE O/P EST MOD 30 MIN: CPT | Performed by: INTERNAL MEDICINE

## 2020-06-24 PROCEDURE — G0463 HOSPITAL OUTPT CLINIC VISIT: HCPCS

## 2020-06-24 RX ORDER — TAMSULOSIN HYDROCHLORIDE 0.4 MG/1
0.4 CAPSULE ORAL DAILY
Qty: 90 CAPSULE | Refills: 3 | Status: SHIPPED | OUTPATIENT
Start: 2020-06-24 | End: 2020-09-25

## 2020-06-24 RX ORDER — GABAPENTIN 300 MG/1
300 CAPSULE ORAL 3 TIMES DAILY
Qty: 90 CAPSULE | Refills: 3 | Status: SHIPPED | OUTPATIENT
Start: 2020-06-24 | End: 2020-07-23

## 2020-06-24 ASSESSMENT — PAIN SCALES - GENERAL: PAINLEVEL: NO PAIN (0)

## 2020-06-24 ASSESSMENT — MIFFLIN-ST. JEOR: SCORE: 1420.67

## 2020-06-24 NOTE — PROGRESS NOTES
Visit Date:   06/24/2020     ONCOLOGY HISTORY: Mr. Arredondo is a gentleman with metastatic pancreatic cancer.  -Prostate cancer [place] 7.   1.  Prostate biopsy on 09/09/2019 revealed patricia 7 prostate cancer.  -Bone scan on 09/26/2019 does not reveal any bone metastasis.  There is some cardiac uptake.   -CT abdomen and pelvis on 09/26/2019 reveals new soft tissue mesenteric mass in the region of pancreatic tail.     2.  EUS on 10/07/2019 revealed an irregular mass in the pancreatic tail measuring 3.8 x 2.2 cm.  There was some evidence of invasion into the portal vein.  No lymphadenopathy seen.  Based on the EUS, it was T3 Nx disease.    -FNA is positive for moderately differentiated pancreatic adenocarcinoma.     3. PET scan on 10/15/2019 reveals hypermetabolic 4.2 cm mass in the pancreatic tail and an adjacent 1 cm peripancreatic lymph node. No evidence of any metastatic disease. There are 2 foci of mild FDG uptake in the prostate gland from prostate cancer.     4. Neoadjuvant gemcitabine and Abraxane x 6 cycles between 11/07/2019 and 04/20/2020.  Abraxane stopped after cycle 4 because of toxicity.   -PET scan on 05/05/2020 reveals decrease in size of hypermetabolic mass at pancreatic tail and improvement in adjacent hypermetabolic lymph node.     5. Patient had diagnostic laparoscopy on 05/12/2020. He has unresectable pancreatic cancer. There are tumor implants.  -Peritoneal nodule biopsy is positive for adenocarcinoma.    SUBJECTIVE:  Mr. Arredondo is an 80-year-old gentleman with metastatic pancreatic cancer with peritoneal carcinomatosis.  He had neoadjuvant chemotherapy.  He was taken to operating room in 05/2020 when he was found to have peritoneal carcinomatosis.  Last chemotherapy was in April 2020.      The patient's overall condition is stable.  His strength is improving.  His is a little stronger.  No headache.  Occasional dizziness.  No neck pain.  No chest pain.  No shortness of breath.  Occasionally, he  gets some abdominal discomfort, but it is not a severe pain.  No nausea or vomiting.  Appetite is improving.  No urinary complaints.  No bowel problems.  No diarrhea.  No fever, chills or night sweats.      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  He still has some numbness and cold sensation in the feet.      CT of chest, abdomen and pelvis was done on 06/22/2020.  It reveals an increase in size of the pancreatic tail mass.  No new lesions.  No abdominal or pelvic lymphadenopathy.      PHYSICAL EXAMINATION:   GENERAL:  He is alert, oriented x 3.   VITAL SIGNS:  Reviewed.  ECOG PS of 2.   The rest of systems not examined.      LABORATORY DATA:  Reviewed.      ASSESSMENT:   1.  An 80-year-old gentleman with metastatic pancreatic adenocarcinoma, which is progressing.   2.  Peripheral neuropathy.   3.  Intermittent abdominal pain from pancreatic cancer.   4.  Mild normocytic anemia.      PLAN:   1.  I had a long discussion with the patient and his wife.  CT scan was reviewed.  It reveals progression of disease.  Labs were all reviewed.  CA 19-9 is increasing.  I explained his pancreatic cancer is slowly progressing.      2.  Discussed regarding treatment.  Treatment is all palliative.  I explained to the patient this is not a curative situation.      The patient is still weak and recovering from previous chemotherapy and surgery.  I told the patient we can start treatment now or we can wait for 1-2 months, then start treatment.  The patient would like to wait for some time as he is starting to feel better and he wants to gain more strength.  His performance status is still poor.  It is reasonable to wait. We will get CT scan and labs in a month and see him after that.      I told him that one option would be to go back on gemcitabine.  I would not favor that as he has progression of disease.  Another option would be FOLFOX.  This was discussed.  The side effects reviewed.  Further discussion when I see him  in a month.      3.  The patient has neuropathy.  He will continue on gabapentin.  Hopefully, with time it will improve.        4.  He and his wife had a few questions, which were all answered.  I will see him in a month to discuss regarding starting of chemotherapy.      TOTAL FACE TO FACE TIME SPENT:  30 minutes, more than 50% of time spent in counseling and coordination of care.         ROXY LANCASTER MD             D: 2020   T: 2020   MT:       Name:     KARI YANG   MRN:      [id number]        Account:      [id number]   :      1940           Visit Date:   2020      Document: [id number]

## 2020-06-24 NOTE — PROGRESS NOTES
"Oncology Rooming Note    June 24, 2020 10:29 AM   Lowell Arredondo is a 80 year old male who presents for:    Chief Complaint   Patient presents with     Oncology Clinic Visit     Initial Vitals: There were no vitals taken for this visit. Estimated body mass index is 24.03 kg/m  as calculated from the following:    Height as of 5/12/20: 1.753 m (5' 9\").    Weight as of 5/12/20: 73.8 kg (162 lb 11.2 oz). There is no height or weight on file to calculate BSA.  Data Unavailable Comment: Data Unavailable   No LMP for male patient.  Allergies reviewed: Yes  Medications reviewed: Yes    Medications: MEDICATION REFILLS NEEDED TODAY. Provider was notified.  Pharmacy name entered into [employer]: [id number] - [place] - [id number]    Clinical concerns:  doctor was notified.      Alisa Persaud MA            "

## 2020-06-24 NOTE — Clinical Note
"    6/24/2020         RE: Lowell Arredondo  [address] [address]        Dear Colleague,    Thank you for referring your patient, Lowell Arredondo, to the [facility]. Please see a copy of my visit note below.    Oncology Rooming Note    June 24, 2020 10:29 AM   Lowell Arredondo is a 80 year old male who presents for:    Chief Complaint   Patient presents with     Oncology Clinic Visit     Initial Vitals: There were no vitals taken for this visit. Estimated body mass index is 24.03 kg/m  as calculated from the following:    Height as of 5/12/20: 1.753 m (5' 9\").    Weight as of 5/12/20: 73.8 kg (162 lb 11.2 oz). There is no height or weight on file to calculate BSA.  Data Unavailable Comment: Data Unavailable   No LMP for male patient.  Allergies reviewed: Yes  Medications reviewed: Yes    Medications: MEDICATION REFILLS NEEDED TODAY. Provider was notified.  Pharmacy name entered into [employer]: [id number] - [place] - [id number]    Clinical concerns:  doctor was notified.      Alisa Persaud MA              Visit Date:   06/24/2020     ONCOLOGY HISTORY: Mr. Arredondo is a gentleman with metastatic pancreatic cancer.  -Prostate cancer [place] 7.   1.  Prostate biopsy on 09/09/2019 revealed patricia 7 prostate cancer.  -Bone scan on 09/26/2019 does not reveal any bone metastasis.  There is some cardiac uptake.   -CT abdomen and pelvis on 09/26/2019 reveals new soft tissue mesenteric mass in the region of pancreatic tail.     2.  EUS on 10/07/2019 revealed an irregular mass in the pancreatic tail measuring 3.8 x 2.2 cm.  There was some evidence of invasion into the portal vein.  No lymphadenopathy seen.  Based on the EUS, it was T3 Nx disease.    -FNA is positive for moderately differentiated pancreatic adenocarcinoma.     3. PET scan on 10/15/2019 reveals hypermetabolic 4.2 cm mass in the pancreatic tail and an adjacent 1 cm peripancreatic lymph node. No evidence of any " metastatic disease. There are 2 foci of mild FDG uptake in the prostate gland from prostate cancer.     4. Neoadjuvant gemcitabine and Abraxane x 6 cycles between 11/07/2019 and 04/20/2020.  Abraxane stopped after cycle 4 because of toxicity.   -PET scan on 05/05/2020 reveals decrease in size of hypermetabolic mass at pancreatic tail and improvement in adjacent hypermetabolic lymph node.     5. Patient had diagnostic laparoscopy on 05/12/2020. He has unresectable pancreatic cancer. There are tumor implants.  -Peritoneal nodule biopsy is positive for adenocarcinoma.    SUBJECTIVE:  Mr. Arredondo is an 80-year-old gentleman with metastatic pancreatic cancer with peritoneal carcinomatosis.  He had neoadjuvant chemotherapy.  He was taken to operating room in 05/2020 when he was found to have peritoneal carcinomatosis.  Last chemotherapy was in April 2020.      The patient's overall condition is stable.  His strength is improving.  His is a little stronger.  No headache.  Occasional dizziness.  No neck pain.  No chest pain.  No shortness of breath.  Occasionally, he gets some abdominal discomfort, but it is not a severe pain.  No nausea or vomiting.  Appetite is improving.  No urinary complaints.  No bowel problems.  No diarrhea.  No fever, chills or night sweats.      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  He still has some numbness and cold sensation in the feet.      CT of chest, abdomen and pelvis was done on 06/22/2020.  It reveals an increase in size of the pancreatic tail mass.  No new lesions.  No abdominal or pelvic lymphadenopathy.      PHYSICAL EXAMINATION:   GENERAL:  He is alert, oriented x 3.   VITAL SIGNS:  Reviewed.  ECOG PS of 2.   The rest of systems not examined.      LABORATORY DATA:  Reviewed.      ASSESSMENT:   1.  An 80-year-old gentleman with metastatic pancreatic adenocarcinoma, which is progressing.   2.  Peripheral neuropathy.   3.  Intermittent abdominal pain from pancreatic  cancer.   4.  Mild normocytic anemia.      PLAN:   1.  I had a long discussion with the patient and his wife.  CT scan was reviewed.  It reveals progression of disease.  Labs were all reviewed.  CA 19-9 is increasing.  I explained his pancreatic cancer is slowly progressing.      2.  Discussed regarding treatment.  Treatment is all palliative.  I explained to the patient this is not a curative situation.      The patient is still weak and recovering from previous chemotherapy and surgery.  I told the patient we can start treatment now or we can wait for 1-2 months, then start treatment.  The patient would like to wait for some time as he is starting to feel better and he wants to gain more strength.  His performance status is still poor.  It is reasonable to wait. We will get CT scan and labs in a month and see him after that.      I told him that one option would be to go back on gemcitabine.  I would not favor that as he has progression of disease.  Another option would be FOLFOX.  This was discussed.  The side effects reviewed.  Further discussion when I see him in a month.      3.  The patient has neuropathy.  He will continue on gabapentin.  Hopefully, with time it will improve.        4.  He and his wife had a few questions, which were all answered.  I will see him in a month to discuss regarding starting of chemotherapy.      TOTAL FACE TO FACE TIME SPENT:  30 minutes, more than 50% of time spent in counseling and coordination of care.         ROXY LANCASTER MD             D: 2020   T: 2020   MT:       Name:     KARI YANG   MRN:      [id number]        Account:      [id number]   :      1940           Visit Date:   2020      Document: [id number]      Visit Date:   2020      SUBJECTIVE:  Mr. Yang is an 80-year-old gentleman with metastatic pancreatic cancer with peritoneal carcinomatosis.  He had neoadjuvant chemotherapy.  He was taken to operating room in 2020 when  he was found to have peritoneal carcinomatosis.  Last chemotherapy was in April 2020.      The patient's overall condition is stable.  His strength is improving.  His is a little stronger.  No headache.  Occasional dizziness.  No neck pain.  No chest pain.  No shortness of breath.  Occasionally, he gets some abdominal discomfort, but it is not a severe pain.  No nausea or vomiting.  Appetite improving.  No urinary complaints.  No bowel problems.  No diarrhea.  No fever, chills or night sweats.      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  He still has some numbness and cold sensation in the feet.      CT of chest, abdomen and pelvis was done on 06/22/2020.  It reveals an increase in size of the pancreatic tail mass.  No new lesions.  No abdominal or pelvic lymphadenopathy.      PHYSICAL EXAMINATION:   GENERAL:  He is alert, oriented x 3.   VITAL SIGNS:  Reviewed.  ECOG PS of 2.   The rest of systems not examined.      LABORATORY DATA:  Reviewed.      ASSESSMENT:   1.  An 80-year-old gentleman with metastatic pancreatic adenocarcinoma, which is progressing.   2.  Peripheral neuropathy.   3.  Intermittent abdominal pain from pancreatic cancer.   4.  Mild normocytic anemia.   5.  Peripheral neuropathy.      PLAN:   1.  I had a long discussion with the patient and his wife.  CT scan was reviewed.  It reveals progression of disease.  Labs were all reviewed.  CA 19-9 is increasing.  I explained his pancreatic cancer is slowly progressing.      2.  Discussed regarding treatment.  Treatment is all palliative.  I explained to the patient this is not a curative situation.      Discussed regarding treatment.  The patient is still weak and recovering from previous chemotherapy and surgery.  I told the patient we can start treatment now or we can wait for 1-2 months, then start treatment.  The patient would like to wait for some time as he is starting to feel better and he wants to gain more strength.  His  performance status is still poor.  It is reasonable to wait.      We will get CT scan and labs in a month and see him after that.      Discussed regarding chemotherapy.  I told him that one option would be to go back on gemcitabine.  I would not favor that as he has progression of disease.  Another option would be FOLFOX.  This was discussed.  The side effects reviewed.  Further discussion when I see him in a month.      3.  The patient has neuropathy.  He will continue on gabapentin.  Hopefully, with time it will improve.        4.  He and his wife had a few questions, which were all answered.  I will see him in a month to discuss regarding starting of chemotherapy.      TOTAL FACE TO FACE TIME SPENT:  30 minutes, more than 50% of time spent in counseling and coordination of care.         ROXY LANCASTER MD             D: 2020   T: 2020   MT:       Name:     KARI YANG   MRN:      [id number]        Account:      [id number]   :      1940           Visit Date:   2020      Document: [id number]       Again, thank you for allowing me to participate in the care of your patient.        Sincerely,        Roxy Lancaster MD

## 2020-06-28 NOTE — PROGRESS NOTES
Visit Date:   06/24/2020     ONCOLOGY HISTORY: Mr. Arredondo is a gentleman with metastatic pancreatic cancer.  -Prostate cancer [place] 7.   1.  Prostate biopsy on 09/09/2019 revealed patricia 7 prostate cancer.  -Bone scan on 09/26/2019 does not reveal any bone metastasis.  There is some cardiac uptake.   -CT abdomen and pelvis on 09/26/2019 reveals new soft tissue mesenteric mass in the region of pancreatic tail.     2.  EUS on 10/07/2019 revealed an irregular mass in the pancreatic tail measuring 3.8 x 2.2 cm.  There was some evidence of invasion into the portal vein.  No lymphadenopathy seen.  Based on the EUS, it was T3 Nx disease.    -FNA is positive for moderately differentiated pancreatic adenocarcinoma.     3. PET scan on 10/15/2019 reveals hypermetabolic 4.2 cm mass in the pancreatic tail and an adjacent 1 cm peripancreatic lymph node. No evidence of any metastatic disease. There are 2 foci of mild FDG uptake in the prostate gland from prostate cancer.     4. Neoadjuvant gemcitabine and Abraxane x 6 cycles between 11/07/2019 and 04/20/2020.  Abraxane stopped after cycle 4 because of toxicity.   -PET scan on 05/05/2020 reveals decrease in size of hypermetabolic mass at pancreatic tail and improvement in adjacent hypermetabolic lymph node.     5. Patient had diagnostic laparoscopy on 05/12/2020. He has unresectable pancreatic cancer. There are tumor implants.  -Peritoneal nodule biopsy is positive for adenocarcinoma.    SUBJECTIVE:  Mr. Arredondo is an 80-year-old gentleman with metastatic pancreatic cancer with peritoneal carcinomatosis.  He had neoadjuvant chemotherapy.  He was taken to operating room in 05/2020 when he was found to have peritoneal carcinomatosis.  Last chemotherapy was in April 2020.      The patient's overall condition is stable.  His strength is improving.  His is a little stronger.  No headache.  Occasional dizziness.  No neck pain.  No chest pain.  No shortness of breath.  Occasionally, he  gets some abdominal discomfort, but it is not a severe pain.  No nausea or vomiting.  Appetite is improving.  No urinary complaints.  No bowel problems.  No diarrhea.  No fever, chills or night sweats.      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  He still has some numbness and cold sensation in the feet.      CT of chest, abdomen and pelvis was done on 06/22/2020.  It reveals an increase in size of the pancreatic tail mass.  No new lesions.  No abdominal or pelvic lymphadenopathy.      PHYSICAL EXAMINATION:   GENERAL:  He is alert, oriented x 3.   VITAL SIGNS:  Reviewed.  ECOG PS of 2.   The rest of systems not examined.      LABORATORY DATA:  Reviewed.      ASSESSMENT:   1.  An 80-year-old gentleman with metastatic pancreatic adenocarcinoma, which is progressing.   2.  Peripheral neuropathy.   3.  Intermittent abdominal pain from pancreatic cancer.   4.  Mild normocytic anemia.      PLAN:   1.  I had a long discussion with the patient and his wife.  CT scan was reviewed.  It reveals progression of disease.  Labs were all reviewed.  CA 19-9 is increasing.  I explained his pancreatic cancer is slowly progressing.      2.  Discussed regarding treatment.  Treatment is all palliative.  I explained to the patient this is not a curative situation.      The patient is still weak and recovering from previous chemotherapy and surgery.  I told the patient we can start treatment now or we can wait for 1-2 months, then start treatment.  The patient would like to wait for some time as he is starting to feel better and he wants to gain more strength.  His performance status is still poor.  It is reasonable to wait. We will get CT scan and labs in a month and see him after that.      I told him that one option would be to go back on gemcitabine.  I would not favor that as he has progression of disease.  Another option would be FOLFOX.  This was discussed.  The side effects reviewed.  Further discussion when I see him  in a month.      3.  The patient has neuropathy.  He will continue on gabapentin.  Hopefully, with time it will improve.        4.  He and his wife had a few questions, which were all answered.  I will see him in a month to discuss regarding starting of chemotherapy.      TOTAL FACE TO FACE TIME SPENT:  30 minutes, more than 50% of time spent in counseling and coordination of care.         ROXY LANCASTER MD             D: 2020   T: 2020   MT:       Name:     KARI YANG   MRN:      [id number]        Account:      [id number]   :      1940           Visit Date:   2020      Document: [id number]

## 2020-07-01 NOTE — PROGRESS NOTES
EDUCATION CHEMOTHERAPY INFUSION    Discussed with patient information regarding FOLFOX infusions. Went over side affects of medications, as self care while on chemotherapy.   Informed patient and family when he should call the triage nurse at clinic or seek medical attention if you have chills and/or temperature greater than or equal to 100.5, uncontrolled nausea/vomiting, diarrhea, constipation, dizziness, shortness of breath, chest pain, heart palpitations, weakness or any other new or concerning symptoms, questions or concerns.  You can not have any live virus vaccines prior to or during treatment or up to 6 months post infusion.  If you have an upcoming surgery, medical procedure or dental procedure during treatment, this should be discussed with your ordering physician and your surgeon/dentist.  If you are having any concerning symptom, if you are unsure if you should get your next infusion or wish to speak to a provider before your next infusion, please call your care coordinator or triage nurse at your clinic to notify them so we can adequately serve you.     Kathe Coles RN

## 2020-07-09 ENCOUNTER — PATIENT OUTREACH (OUTPATIENT)
Dept: SURGERY | Facility: CLINIC | Age: 80
End: 2020-07-09

## 2020-07-09 NOTE — TELEPHONE ENCOUNTER
Surgical Oncology RN Care Coordination Note:     Received call from patients wife stating that patient is having ongoing abdominal pain post diagnostic laparoscopy and are requesting a visit with Dr. Ryder.     Returned call and spoke with Lowell's wife regarding symptoms and discussed appointment options. Spoke with her that we are happy to see patient but the pain he is having may not be related to the surgery and could be related to the metastatic disease. Informed her that we are still happy to see him either way. Confirmed an appointment with wife and will plan to see patient for a face to face evaluation on 7/16 at 9 am.     Rosa Machado RN, BSN  Care Coordinator   [phone]

## 2020-07-16 ENCOUNTER — OFFICE VISIT (OUTPATIENT)
Dept: SURGERY | Facility: CLINIC | Age: 80
End: 2020-07-16
Attending: SURGERY
Payer: COMMERCIAL

## 2020-07-16 VITALS
SYSTOLIC BLOOD PRESSURE: 110 MMHG | HEART RATE: 67 BPM | TEMPERATURE: 97.9 F | DIASTOLIC BLOOD PRESSURE: 66 MMHG | OXYGEN SATURATION: 99 %

## 2020-07-16 DIAGNOSIS — C25.9 PRIMARY MALIGNANT NEOPLASM OF PANCREAS WITH METASTASIS TO OTHER SITE (H): Primary | ICD-10-CM

## 2020-07-16 PROCEDURE — G0463 HOSPITAL OUTPT CLINIC VISIT: HCPCS

## 2020-07-16 ASSESSMENT — PAIN SCALES - GENERAL: PAINLEVEL: NO PAIN (0)

## 2020-07-16 NOTE — NURSING NOTE
"Oncology Rooming Note    July 16, 2020 8:55 AM   Lowell Arredondo is a 80 year old male who presents for:    Chief Complaint   Patient presents with     Oncology Clinic Visit     Initial Vitals: There were no vitals taken for this visit. Estimated body mass index is 23.78 kg/m  as calculated from the following:    Height as of 6/24/20: 1.753 m (5' 9\").    Weight as of 6/24/20: 73 kg (161 lb). There is no height or weight on file to calculate BSA.  Data Unavailable Comment: Data Unavailable   No LMP for male patient.  Allergies reviewed: Yes  Medications reviewed: Yes    Medications: Medication refills not needed today.  Pharmacy name entered into [employer]: [id number] - [place], [place] - [address]    Clinical concerns: None       Loida Pires CMA              "

## 2020-07-16 NOTE — PROGRESS NOTES
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  He was referred by Dr Segura.     Pertinent Oncologic History: Patient found to have a mass in the tail of the pancreas on imaging 9/26/19.  Underwent EUS on 10/7/19 with findings of a 3.8 cm mass in the tail of the pancreas with FNA showing adenocarcinoma.  No critical vascular involvement other than the splenic.  Subsequent PET/CT did not show evidence of metastatic disease, but did show the avid tail mass and an adjacent positive peripancreatic lymph node.  CA 19-9 was almost 2500.  He underwent neoadjuvant chemotherapy with minimal change in his CA 19-9, which stayed near 2000.  Repeat PET-CT did not show evidence of metastatic disease, but diagnostic laparoscopy on 5/12/2020 showed deposits of biopsy proven, peritoneal metastatic disease in the lesser sac on the posterior surface of the stomach, the perigastric fat, and peripancreatic fat.  He was doing very well at his post-operative visit and for 2-3 weeks after that.  He now presents for evaluation of ongoing abdominal wall discomfort/pain.     Pertinent Exam: Abdomen soft, non-tender away from port sites, and non-distended.  There is no bulging or evidence of hernia at the port sites with valsalva and coughing.  The lateral most port sites on both sides are tender to direct palpation.  There is some firm nodularity beneath the left lateral port site that is tender.  No jaundice or scleral icterus.  No skin redness, induration, or fluctuance to suggest infection.     HISTORY OF PRESENT ILLNESS:  The patient has abdominal discomfort/pain, particularly at the lateral port sites on both sides.  He can feel lumpiness beneath he left lateral port site.  Most of his tenderness is when these sites are pushed on directly.  Sometimes causes issues with  sleeping due to positioning at night.  Also having issues with foot neuropathy.  He is passing gas, having normal bowel movements, and is eating normally.  No nausea or vomiting.  No fevers, chills, or sweats.     Pertinent Work-Up/Findings:     CT Scan (6/22/2020): Increase in size of the primary tumor.  No obvious progression of known metastatic disease.  No findings in the abdominal wall at the port sites.     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  We have had extensive discussion that his disease is not curable and that surgery will not offer benefit in terms of survival.  He has been referred back to medical oncology for management of stage IV disease and has been seeing medical oncology with plans to restart chemotherapy soon.  He is having persistent abdominal pain for the past few weeks, particularly at his lateral port sites on both sides.  No obvious pathology on CT scan a month ago when he was already having the same symptoms.  He is scheduled for another CT scan this upcoming week, which will be informative to see if there is any change or progression of disease that would explain his symptoms.  I feel nodularity beneath the left lateral port site that is painful to palpation.  I do not see evidence of hernia or infection.  This nodularity could be scar tissue/phlegmon versus port site seeding of the cancer.  No other GI findings of concern.  Again his upcoming CT scan will be informative.  I discussed that I will review his upcoming CT scan on 7/20 and contact them after to reveal the findings.  In addition, I discussed that a referral to palliative care would likely be helpful for management of ongoing pain, sleep issues, neuropathy, chemotherapy/cancer related symptoms, and other quality of life issues.  He was interested in this and we will make a referral for palliative care close to his home.  I discussed  that if this is indeed port site seeding from cancer, then chemotherapy will possibly decrease his pain.  All questions were answered and the patient was in agreement with and understanding of the plan.     Total time spent with the patient was 30 minutes, of which more than half was counseling and coordination of care.

## 2020-07-16 NOTE — LETTER
7/16/2020         RE: Lowell Arredondo  [address] [address]        Dear Colleague,    Thank you for referring your patient, Lowell Arredondo, to the [facility]. Please see a copy of my visit note below.    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  He was referred by Dr Segura.     Pertinent Oncologic History: Patient found to have a mass in the tail of the pancreas on imaging 9/26/19.  Underwent EUS on 10/7/19 with findings of a 3.8 cm mass in the tail of the pancreas with FNA showing adenocarcinoma.  No critical vascular involvement other than the splenic.  Subsequent PET/CT did not show evidence of metastatic disease, but did show the avid tail mass and an adjacent positive peripancreatic lymph node.  CA 19-9 was almost 2500.  He underwent neoadjuvant chemotherapy with minimal change in his CA 19-9, which stayed near 2000.  Repeat PET-CT did not show evidence of metastatic disease, but diagnostic laparoscopy on 5/12/2020 showed deposits of biopsy proven, peritoneal metastatic disease in the lesser sac on the posterior surface of the stomach, the perigastric fat, and peripancreatic fat.  He was doing very well at his post-operative visit and for 2-3 weeks after that.  He now presents for evaluation of ongoing abdominal wall discomfort/pain.     Pertinent Exam: Abdomen soft, non-tender away from port sites, and non-distended.  There is no bulging or evidence of hernia at the port sites with valsalva and coughing.  The lateral most port sites on both sides are tender to direct palpation.  There is some firm nodularity beneath the left lateral port site that is tender.  No jaundice or scleral icterus.  No skin redness, induration, or fluctuance to suggest infection.     HISTORY OF PRESENT  ILLNESS:  The patient has abdominal discomfort/pain, particularly at the lateral port sites on both sides.  He can feel lumpiness beneath he left lateral port site.  Most of his tenderness is when these sites are pushed on directly.  Sometimes causes issues with sleeping due to positioning at night.  Also having issues with foot neuropathy.  He is passing gas, having normal bowel movements, and is eating normally.  No nausea or vomiting.  No fevers, chills, or sweats.     Pertinent Work-Up/Findings:     CT Scan (6/22/2020): Increase in size of the primary tumor.  No obvious progression of known metastatic disease.  No findings in the abdominal wall at the port sites.     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  We have had extensive discussion that his disease is not curable and that surgery will not offer benefit in terms of survival.  He has been referred back to medical oncology for management of stage IV disease and has been seeing medical oncology with plans to restart chemotherapy soon.  He is having persistent abdominal pain for the past few weeks, particularly at his lateral port sites on both sides.  No obvious pathology on CT scan a month ago when he was already having the same symptoms.  He is scheduled for another CT scan this upcoming week, which will be informative to see if there is any change or progression of disease that would explain his symptoms.  I feel nodularity beneath the left lateral port site that is painful to palpation.  I do not see evidence of hernia or infection.  This nodularity could be scar tissue/phlegmon versus port site seeding of the cancer.  No other GI findings of concern.  Again his upcoming CT scan will be informative.  I discussed that I will review his upcoming CT scan on 7/20 and contact them after to reveal the findings.  In addition, I discussed that a referral to palliative care  would likely be helpful for management of ongoing pain, sleep issues, neuropathy, chemotherapy/cancer related symptoms, and other quality of life issues.  He was interested in this and we will make a referral for palliative care close to his home.  I discussed that if this is indeed port site seeding from cancer, then chemotherapy will possibly decrease his pain.  All questions were answered and the patient was in agreement with and understanding of the plan.     Total time spent with the patient was 30 minutes, of which more than half was counseling and coordination of care.    Again, thank you for allowing me to participate in the care of your patient.        Sincerely,        Sanjay Ryder MD

## 2020-07-20 ENCOUNTER — INFUSION THERAPY VISIT (OUTPATIENT)
Dept: INFUSION THERAPY | Facility: CLINIC | Age: 80
End: 2020-07-20
Attending: INTERNAL MEDICINE
Payer: COMMERCIAL

## 2020-07-20 ENCOUNTER — TELEPHONE (OUTPATIENT)
Dept: SURGERY | Facility: CLINIC | Age: 80
End: 2020-07-20

## 2020-07-20 ENCOUNTER — HOSPITAL ENCOUNTER (OUTPATIENT)
Facility: CLINIC | Age: 80
Setting detail: SPECIMEN
End: 2020-07-20
Attending: INTERNAL MEDICINE
Payer: COMMERCIAL

## 2020-07-20 ENCOUNTER — HOSPITAL ENCOUNTER (OUTPATIENT)
Dept: CT IMAGING | Facility: CLINIC | Age: 80
End: 2020-07-20
Attending: INTERNAL MEDICINE
Payer: COMMERCIAL

## 2020-07-20 DIAGNOSIS — C25.2 MALIGNANT NEOPLASM OF TAIL OF PANCREAS (H): ICD-10-CM

## 2020-07-20 DIAGNOSIS — Z95.828 PORT-A-CATH IN PLACE: ICD-10-CM

## 2020-07-20 DIAGNOSIS — D70.1 CHEMOTHERAPY-INDUCED NEUTROPENIA (H): ICD-10-CM

## 2020-07-20 DIAGNOSIS — T45.1X5A CHEMOTHERAPY-INDUCED NEUTROPENIA (H): ICD-10-CM

## 2020-07-20 DIAGNOSIS — C25.2 MALIGNANT NEOPLASM OF TAIL OF PANCREAS (H): Primary | ICD-10-CM

## 2020-07-20 LAB
ALBUMIN SERPL-MCNC: 3.6 G/DL (ref 3.4–5)
ALP SERPL-CCNC: 62 U/L (ref 40–150)
ALT SERPL W P-5'-P-CCNC: 21 U/L (ref 0–70)
ANION GAP SERPL CALCULATED.3IONS-SCNC: 5 MMOL/L (ref 3–14)
AST SERPL W P-5'-P-CCNC: 19 U/L (ref 0–45)
BASOPHILS # BLD AUTO: 0 10E9/L (ref 0–0.2)
BASOPHILS NFR BLD AUTO: 0.3 %
BILIRUB SERPL-MCNC: 0.5 MG/DL (ref 0.2–1.3)
BUN SERPL-MCNC: 17 MG/DL (ref 7–30)
CALCIUM SERPL-MCNC: 9 MG/DL (ref 8.5–10.1)
CHLORIDE SERPL-SCNC: 107 MMOL/L (ref 94–109)
CO2 SERPL-SCNC: 27 MMOL/L (ref 20–32)
CREAT SERPL-MCNC: 0.96 MG/DL (ref 0.66–1.25)
DIFFERENTIAL METHOD BLD: ABNORMAL
EOSINOPHIL # BLD AUTO: 0.1 10E9/L (ref 0–0.7)
EOSINOPHIL NFR BLD AUTO: 2.4 %
ERYTHROCYTE [DISTWIDTH] IN BLOOD BY AUTOMATED COUNT: 13.3 % (ref 10–15)
GFR SERPL CREATININE-BSD FRML MDRD: 73 ML/MIN/{1.73_M2}
GLUCOSE SERPL-MCNC: 86 MG/DL (ref 70–99)
HCT VFR BLD AUTO: 41.4 % (ref 40–53)
HGB BLD-MCNC: 13.5 G/DL (ref 13.3–17.7)
IMM GRANULOCYTES # BLD: 0 10E9/L (ref 0–0.4)
IMM GRANULOCYTES NFR BLD: 0.2 %
LYMPHOCYTES # BLD AUTO: 2.6 10E9/L (ref 0.8–5.3)
LYMPHOCYTES NFR BLD AUTO: 45 %
MCH RBC QN AUTO: 32.1 PG (ref 26.5–33)
MCHC RBC AUTO-ENTMCNC: 32.6 G/DL (ref 31.5–36.5)
MCV RBC AUTO: 98 FL (ref 78–100)
MONOCYTES # BLD AUTO: 0.6 10E9/L (ref 0–1.3)
MONOCYTES NFR BLD AUTO: 9.5 %
NEUTROPHILS # BLD AUTO: 2.5 10E9/L (ref 1.6–8.3)
NEUTROPHILS NFR BLD AUTO: 42.6 %
NRBC # BLD AUTO: 0 10*3/UL
NRBC BLD AUTO-RTO: 0 /100
PLATELET # BLD AUTO: 190 10E9/L (ref 150–450)
POTASSIUM SERPL-SCNC: 4.2 MMOL/L (ref 3.4–5.3)
PROT SERPL-MCNC: 7 G/DL (ref 6.8–8.8)
RBC # BLD AUTO: 4.21 10E12/L (ref 4.4–5.9)
SODIUM SERPL-SCNC: 139 MMOL/L (ref 133–144)
WBC # BLD AUTO: 5.9 10E9/L (ref 4–11)

## 2020-07-20 PROCEDURE — 25000125 ZZHC RX 250: Performed by: INTERNAL MEDICINE

## 2020-07-20 PROCEDURE — 80053 COMPREHEN METABOLIC PANEL: CPT | Performed by: INTERNAL MEDICINE

## 2020-07-20 PROCEDURE — 74177 CT ABD & PELVIS W/CONTRAST: CPT

## 2020-07-20 PROCEDURE — 25000128 H RX IP 250 OP 636: Performed by: NURSE PRACTITIONER

## 2020-07-20 PROCEDURE — 85025 COMPLETE CBC W/AUTO DIFF WBC: CPT | Performed by: INTERNAL MEDICINE

## 2020-07-20 PROCEDURE — 36591 DRAW BLOOD OFF VENOUS DEVICE: CPT

## 2020-07-20 PROCEDURE — 86301 IMMUNOASSAY TUMOR CA 19-9: CPT | Performed by: INTERNAL MEDICINE

## 2020-07-20 PROCEDURE — 25000128 H RX IP 250 OP 636: Performed by: INTERNAL MEDICINE

## 2020-07-20 RX ORDER — HEPARIN SODIUM (PORCINE) LOCK FLUSH IV SOLN 100 UNIT/ML 100 UNIT/ML
5 SOLUTION INTRAVENOUS
Status: CANCELLED | OUTPATIENT
Start: 2020-07-20

## 2020-07-20 RX ORDER — HEPARIN SODIUM,PORCINE 10 UNIT/ML
5 VIAL (ML) INTRAVENOUS
Status: CANCELLED | OUTPATIENT
Start: 2020-07-20

## 2020-07-20 RX ORDER — HEPARIN SODIUM (PORCINE) LOCK FLUSH IV SOLN 100 UNIT/ML 100 UNIT/ML
5 SOLUTION INTRAVENOUS
Status: DISCONTINUED | OUTPATIENT
Start: 2020-07-20 | End: 2020-07-20 | Stop reason: HOSPADM

## 2020-07-20 RX ORDER — IOPAMIDOL 755 MG/ML
500 INJECTION, SOLUTION INTRAVASCULAR ONCE
Status: COMPLETED | OUTPATIENT
Start: 2020-07-20 | End: 2020-07-20

## 2020-07-20 RX ADMIN — SODIUM CHLORIDE 60 ML: 9 INJECTION, SOLUTION INTRAVENOUS at 11:21

## 2020-07-20 RX ADMIN — Medication 5 ML: at 11:40

## 2020-07-20 RX ADMIN — IOPAMIDOL 79 ML: 755 INJECTION, SOLUTION INTRAVENOUS at 11:20

## 2020-07-20 NOTE — TELEPHONE ENCOUNTER
I called and talked to the patient and his wife regarding his CT scan results.  I looked at the scan and the read and it appears that he has port site progression of the cancer in his abdominal wall that is the likely cause of his pain.  As such, the most effective treatment will be to get back on treatment for the cancer.  He had the opportunity to ask questions and was in understanding of the explanation and plan.

## 2020-07-21 LAB — CANCER AG19-9 SERPL-ACNC: 5765 U/ML (ref 0–37)

## 2020-07-23 ENCOUNTER — CARE COORDINATION (OUTPATIENT)
Dept: ONCOLOGY | Facility: CLINIC | Age: 80
End: 2020-07-23

## 2020-07-23 ENCOUNTER — ONCOLOGY VISIT (OUTPATIENT)
Dept: ONCOLOGY | Facility: CLINIC | Age: 80
End: 2020-07-23
Attending: INTERNAL MEDICINE
Payer: COMMERCIAL

## 2020-07-23 VITALS
OXYGEN SATURATION: 99 % | HEART RATE: 65 BPM | WEIGHT: 159.6 LBS | DIASTOLIC BLOOD PRESSURE: 66 MMHG | RESPIRATION RATE: 16 BRPM | SYSTOLIC BLOOD PRESSURE: 114 MMHG | BODY MASS INDEX: 23.64 KG/M2 | HEIGHT: 69 IN | TEMPERATURE: 98.1 F

## 2020-07-23 DIAGNOSIS — T45.1X5A POLYNEUROPATHY FOLLOWING CHEMOTHERAPY (H): ICD-10-CM

## 2020-07-23 DIAGNOSIS — G62.0 POLYNEUROPATHY FOLLOWING CHEMOTHERAPY (H): ICD-10-CM

## 2020-07-23 DIAGNOSIS — C25.2 MALIGNANT NEOPLASM OF TAIL OF PANCREAS (H): Primary | ICD-10-CM

## 2020-07-23 PROCEDURE — G0463 HOSPITAL OUTPT CLINIC VISIT: HCPCS

## 2020-07-23 PROCEDURE — 99214 OFFICE O/P EST MOD 30 MIN: CPT | Performed by: INTERNAL MEDICINE

## 2020-07-23 RX ORDER — METHYLPREDNISOLONE SODIUM SUCCINATE 125 MG/2ML
125 INJECTION, POWDER, LYOPHILIZED, FOR SOLUTION INTRAMUSCULAR; INTRAVENOUS
Status: CANCELLED
Start: 2020-07-27

## 2020-07-23 RX ORDER — HEPARIN SODIUM,PORCINE 10 UNIT/ML
5 VIAL (ML) INTRAVENOUS
Status: CANCELLED | OUTPATIENT
Start: 2020-07-29

## 2020-07-23 RX ORDER — NALOXONE HYDROCHLORIDE 0.4 MG/ML
.1-.4 INJECTION, SOLUTION INTRAMUSCULAR; INTRAVENOUS; SUBCUTANEOUS
Status: CANCELLED | OUTPATIENT
Start: 2020-07-27

## 2020-07-23 RX ORDER — EPINEPHRINE 1 MG/ML
0.3 INJECTION, SOLUTION INTRAMUSCULAR; SUBCUTANEOUS EVERY 5 MIN PRN
Status: CANCELLED | OUTPATIENT
Start: 2020-07-27

## 2020-07-23 RX ORDER — GABAPENTIN 300 MG/1
300 CAPSULE ORAL 3 TIMES DAILY
Qty: 90 CAPSULE | Refills: 3 | Status: SHIPPED | OUTPATIENT
Start: 2020-07-23 | End: 2020-11-12

## 2020-07-23 RX ORDER — ALBUTEROL SULFATE 0.83 MG/ML
2.5 SOLUTION RESPIRATORY (INHALATION)
Status: CANCELLED | OUTPATIENT
Start: 2020-07-27

## 2020-07-23 RX ORDER — ALBUTEROL SULFATE 90 UG/1
1-2 AEROSOL, METERED RESPIRATORY (INHALATION)
Status: CANCELLED
Start: 2020-07-27

## 2020-07-23 RX ORDER — HEPARIN SODIUM,PORCINE 10 UNIT/ML
5 VIAL (ML) INTRAVENOUS
Status: CANCELLED | OUTPATIENT
Start: 2020-07-27

## 2020-07-23 RX ORDER — LORAZEPAM 2 MG/ML
0.5 INJECTION INTRAMUSCULAR EVERY 4 HOURS PRN
Status: CANCELLED
Start: 2020-07-27

## 2020-07-23 RX ORDER — HEPARIN SODIUM (PORCINE) LOCK FLUSH IV SOLN 100 UNIT/ML 100 UNIT/ML
5 SOLUTION INTRAVENOUS
Status: CANCELLED | OUTPATIENT
Start: 2020-07-27

## 2020-07-23 RX ORDER — HEPARIN SODIUM (PORCINE) LOCK FLUSH IV SOLN 100 UNIT/ML 100 UNIT/ML
5 SOLUTION INTRAVENOUS
Status: CANCELLED | OUTPATIENT
Start: 2020-07-29

## 2020-07-23 RX ORDER — SODIUM CHLORIDE 9 MG/ML
1000 INJECTION, SOLUTION INTRAVENOUS CONTINUOUS PRN
Status: CANCELLED
Start: 2020-07-27

## 2020-07-23 RX ORDER — DIPHENHYDRAMINE HYDROCHLORIDE 50 MG/ML
50 INJECTION INTRAMUSCULAR; INTRAVENOUS
Status: CANCELLED
Start: 2020-07-27

## 2020-07-23 RX ORDER — MEPERIDINE HYDROCHLORIDE 25 MG/ML
25 INJECTION INTRAMUSCULAR; INTRAVENOUS; SUBCUTANEOUS EVERY 30 MIN PRN
Status: CANCELLED | OUTPATIENT
Start: 2020-07-27

## 2020-07-23 ASSESSMENT — MIFFLIN-ST. JEOR: SCORE: 1414.32

## 2020-07-23 ASSESSMENT — PAIN SCALES - GENERAL: PAINLEVEL: MODERATE PAIN (5)

## 2020-07-23 NOTE — PROGRESS NOTES
"Oncology Rooming Note    July 23, 2020 10:34 AM   Lowell Arredondo is a 80 year old male who presents for:    Chief Complaint   Patient presents with     Oncology Clinic Visit     Initial Vitals: There were no vitals taken for this visit. Estimated body mass index is 23.78 kg/m  as calculated from the following:    Height as of 6/24/20: 1.753 m (5' 9\").    Weight as of 6/24/20: 73 kg (161 lb). There is no height or weight on file to calculate BSA.  Data Unavailable Comment: Data Unavailable   No LMP for male patient.  Allergies reviewed: Yes  Medications reviewed: Yes    Medications: MEDICATION REFILLS NEEDED TODAY. Provider was notified.  Pharmacy name entered into [employer]: [id number] - [place], [place] - [address]    Clinical concerns:  doctor was notified.      Alisa Persaud MA            "

## 2020-07-23 NOTE — PROGRESS NOTES
FOLFOX literature printed and handed to patient/spouse today in clinic. Patient will review and RNCC will call tomorrow (Friday) to complete education by phone. Scheduling to coordinate chemotherapy appointment starting next week.     InBasket message to RNCC covering for Dr. Segura Friday to follow-up with patient via phone to complete teaching.     Lou Box, JANNYN, RN, PHN  Oncology Care Coordinator  [facility]

## 2020-07-23 NOTE — PATIENT INSTRUCTIONS
1. Side-effect of FOLFOX. Start chemotherapy next week.  2. Palliative care appointment.  3. See Gerson Massey when he comes to start chemotherapy.  4. See me in about 4-6 weeks.

## 2020-07-23 NOTE — Clinical Note
"    7/23/2020         RE: Lowell Arredondo  [address] [address]        Dear Colleague,    Thank you for referring your patient, Lowell Arredondo, to the [facility]CER [facility]. Please see a copy of my visit note below.    Oncology Rooming Note    July 23, 2020 10:34 AM   Lowell Arredondo is a 80 year old male who presents for:    Chief Complaint   Patient presents with     Oncology Clinic Visit     Initial Vitals: There were no vitals taken for this visit. Estimated body mass index is 23.78 kg/m  as calculated from the following:    Height as of 6/24/20: 1.753 m (5' 9\").    Weight as of 6/24/20: 73 kg (161 lb). There is no height or weight on file to calculate BSA.  Data Unavailable Comment: Data Unavailable   No LMP for male patient.  Allergies reviewed: Yes  Medications reviewed: Yes    Medications: MEDICATION REFILLS NEEDED TODAY. Provider was notified.  Pharmacy name entered into [employer]: [id number] - [place], [place] - [address]    Clinical concerns:  doctor was notified.      Alisa Persaud MA              EDUCATION CHEMOTHERAPY INFUSION    Discussed with patient information regarding FOLFOX infusions. Went over side affects of medications, as self care while on chemotherapy.   Informed patient and family when he should call the triage nurse at clinic or seek medical attention if you have chills and/or temperature greater than or equal to 100.5, uncontrolled nausea/vomiting, diarrhea, constipation, dizziness, shortness of breath, chest pain, heart palpitations, weakness or any other new or concerning symptoms, questions or concerns.  You can not have any live virus vaccines prior to or during treatment or up to 6 months post infusion.  If you have an upcoming surgery, medical procedure or dental procedure during treatment, this should be discussed with your ordering physician and your surgeon/dentist.  If you are having any concerning symptom, if you are unsure if you " should get your next infusion or wish to speak to a provider before your next infusion, please call your care coordinator or triage nurse at your clinic to notify them so we can adequately serve you.       Kathe Coles RN        Again, thank you for allowing me to participate in the care of your patient.        Sincerely,        Diego Segura MD

## 2020-07-24 ENCOUNTER — TELEPHONE (OUTPATIENT)
Dept: ONCOLOGY | Facility: CLINIC | Age: 80
End: 2020-07-24

## 2020-07-24 RX ORDER — ONDANSETRON 8 MG/1
8 TABLET, FILM COATED ORAL EVERY 8 HOURS PRN
Qty: 10 TABLET | Refills: 2 | Status: SHIPPED | OUTPATIENT
Start: 2020-07-24 | End: 2021-06-19

## 2020-07-24 RX ORDER — ONDANSETRON 8 MG/1
8 TABLET, FILM COATED ORAL EVERY 8 HOURS PRN
Qty: 10 TABLET | Refills: 2 | Status: CANCELLED | OUTPATIENT
Start: 2020-07-26

## 2020-07-24 NOTE — TELEPHONE ENCOUNTER
Left a vm for family to give our office a call back to complete chemo teaching.   Everett Gibbs, DAYDAY   ----- Message from Lou Box RN sent at 7/23/2020 11:21 AM CDT -----  Regarding: Chemo Teaching  Patient given chemo literature in clinic today (Thursday 7/23). RNCC to call to discuss literature Friday 7/24 via phone and confirm chemo appt is scheduled next week.     RN  Covering for Olena Friday- please call patient to do teaching. Thank you!     Lou

## 2020-07-24 NOTE — TELEPHONE ENCOUNTER
Called in ativan, zofran sent electronically, Wife reports he has compazine from past treatment.   Everett Gibbs RN

## 2020-07-24 NOTE — PROGRESS NOTES
EDUCATION CHEMOTHERAPY INFUSION    Discussed with pt and his wife information regarding Oxaliplatin, and 5FU infusions. Went over side affects of medications, as self care while on chemotherapy.   Informed patient and family when he should call the triage nurse at clinic or seek medical attention if you have chills and/or temperature greater than or equal to 100.5, uncontrolled nausea/vomiting, diarrhea, constipation, dizziness, shortness of breath, chest pain, heart palpitations, weakness or any other new or concerning symptoms, questions or concerns.  You can not have any live virus vaccines prior to or during treatment or up to 6 months post infusion.  If you have an upcoming surgery, medical procedure or dental procedure during treatment, this should be discussed with your ordering physician and your surgeon/dentist.  If you are having any concerning symptom, if you are unsure if you should get your next infusion or wish to speak to a provider before your next infusion, please call your care coordinator or triage nurse at your clinic to notify them so we can adequately serve you.   Antiemetics zofran and ativan sent to [facility] in [place]. Pt wife says they will  on 7/27/20 to review with pharmacist best use during infusion appt on 7/28/20.  Everett Gibbs RN

## 2020-07-24 NOTE — TELEPHONE ENCOUNTER
Consult only per Ms Dixon, first step is submitting supporting documents. She sys it won't change the outcome.   They have available today @5pm with Dr. Mushtaq Burgess. He will call you on your cell phone.   Everett Gibbs RN   ----- Message from Diego Segura MD sent at 2020 10:39 AM CDT -----  Regarding: RE: Peer to peer  Olena, please, schedule it.    MARIETTA  ----- Message -----  From: Lenka Vu  Sent: 2020   7:43 AM CDT  To: Diego Segura MD  Subject: Peer to peer                                         Peer to Peer Request    Patient Name:  Lowell Arredondo  :    1940  MRN:    [id number]    Dr. Segura,    The authorization for procedure Ct Abdomen pelvis w/contrast on date of service 2020 has been denied. We were unsuccessful in obtaining approval through clinical review. A [id number] review can be done by calling the insurance/third party authorization vendor with the following information:    Insurance: [employer] MEDICARE  Auth Vendor:  [employer]  Phone:  [phone] opt 4  Due:  ASAP    Patient ID: [id number]  Case/Ref #:[id number]    Denial Reason: [facility] stated this has been denied since there was another scan that was done on 2020 and their guidelines only support scans 6-8 weeks apart. There is an option for peer to peer [phone] opt 4.     Please complete this as soon as you are able and let me know when it is done.    Thank you,    Kristyn PIERRE   [facility]

## 2020-07-27 ENCOUNTER — HOSPITAL ENCOUNTER (OUTPATIENT)
Facility: CLINIC | Age: 80
Setting detail: SPECIMEN
Discharge: HOME | End: 2020-07-27
Attending: INTERNAL MEDICINE | Admitting: INTERNAL MEDICINE
Payer: COMMERCIAL

## 2020-07-27 ENCOUNTER — ALLIED HEALTH/NURSE VISIT (OUTPATIENT)
Dept: INFUSION THERAPY | Facility: CLINIC | Age: 80
End: 2020-07-27
Attending: INTERNAL MEDICINE
Payer: COMMERCIAL

## 2020-07-27 DIAGNOSIS — Z95.828 PORT-A-CATH IN PLACE: ICD-10-CM

## 2020-07-27 DIAGNOSIS — C25.2 MALIGNANT NEOPLASM OF TAIL OF PANCREAS (H): Primary | ICD-10-CM

## 2020-07-27 DIAGNOSIS — T45.1X5A CHEMOTHERAPY-INDUCED NEUTROPENIA (H): ICD-10-CM

## 2020-07-27 DIAGNOSIS — D70.1 CHEMOTHERAPY-INDUCED NEUTROPENIA (H): ICD-10-CM

## 2020-07-27 LAB
ALBUMIN SERPL-MCNC: 3.5 G/DL (ref 3.4–5)
ALP SERPL-CCNC: 62 U/L (ref 40–150)
ALT SERPL W P-5'-P-CCNC: 21 U/L (ref 0–70)
ANION GAP SERPL CALCULATED.3IONS-SCNC: 5 MMOL/L (ref 3–14)
AST SERPL W P-5'-P-CCNC: 16 U/L (ref 0–45)
BASOPHILS # BLD AUTO: 0 10E9/L (ref 0–0.2)
BASOPHILS NFR BLD AUTO: 0.3 %
BILIRUB SERPL-MCNC: 0.6 MG/DL (ref 0.2–1.3)
BUN SERPL-MCNC: 15 MG/DL (ref 7–30)
CALCIUM SERPL-MCNC: 8.8 MG/DL (ref 8.5–10.1)
CHLORIDE SERPL-SCNC: 107 MMOL/L (ref 94–109)
CO2 SERPL-SCNC: 25 MMOL/L (ref 20–32)
CREAT SERPL-MCNC: 0.94 MG/DL (ref 0.66–1.25)
DIFFERENTIAL METHOD BLD: ABNORMAL
EOSINOPHIL # BLD AUTO: 0.2 10E9/L (ref 0–0.7)
EOSINOPHIL NFR BLD AUTO: 3.7 %
ERYTHROCYTE [DISTWIDTH] IN BLOOD BY AUTOMATED COUNT: 13.8 % (ref 10–15)
GFR SERPL CREATININE-BSD FRML MDRD: 75 ML/MIN/{1.73_M2}
GLUCOSE SERPL-MCNC: 157 MG/DL (ref 70–99)
HCT VFR BLD AUTO: 39.9 % (ref 40–53)
HGB BLD-MCNC: 13.5 G/DL (ref 13.3–17.7)
IMM GRANULOCYTES # BLD: 0 10E9/L (ref 0–0.4)
IMM GRANULOCYTES NFR BLD: 0 %
LYMPHOCYTES # BLD AUTO: 3.1 10E9/L (ref 0.8–5.3)
LYMPHOCYTES NFR BLD AUTO: 50.3 %
MCH RBC QN AUTO: 32.2 PG (ref 26.5–33)
MCHC RBC AUTO-ENTMCNC: 33.8 G/DL (ref 31.5–36.5)
MCV RBC AUTO: 95 FL (ref 78–100)
MONOCYTES # BLD AUTO: 0.5 10E9/L (ref 0–1.3)
MONOCYTES NFR BLD AUTO: 7.9 %
NEUTROPHILS # BLD AUTO: 2.3 10E9/L (ref 1.6–8.3)
NEUTROPHILS NFR BLD AUTO: 37.8 %
NRBC # BLD AUTO: 0 10*3/UL
NRBC BLD AUTO-RTO: 0 /100
PLATELET # BLD AUTO: 210 10E9/L (ref 150–450)
POTASSIUM SERPL-SCNC: 3.8 MMOL/L (ref 3.4–5.3)
PROT SERPL-MCNC: 6.9 G/DL (ref 6.8–8.8)
RBC # BLD AUTO: 4.19 10E12/L (ref 4.4–5.9)
SODIUM SERPL-SCNC: 137 MMOL/L (ref 133–144)
WBC # BLD AUTO: 6.2 10E9/L (ref 4–11)

## 2020-07-27 PROCEDURE — 85025 COMPLETE CBC W/AUTO DIFF WBC: CPT | Performed by: INTERNAL MEDICINE

## 2020-07-27 PROCEDURE — 36591 DRAW BLOOD OFF VENOUS DEVICE: CPT

## 2020-07-27 PROCEDURE — 25000128 H RX IP 250 OP 636: Performed by: NURSE PRACTITIONER

## 2020-07-27 PROCEDURE — 80053 COMPREHEN METABOLIC PANEL: CPT | Performed by: INTERNAL MEDICINE

## 2020-07-27 RX ORDER — HEPARIN SODIUM (PORCINE) LOCK FLUSH IV SOLN 100 UNIT/ML 100 UNIT/ML
5 SOLUTION INTRAVENOUS
Status: DISCONTINUED | OUTPATIENT
Start: 2020-07-27 | End: 2020-07-27 | Stop reason: HOSPADM

## 2020-07-27 RX ORDER — HEPARIN SODIUM,PORCINE 10 UNIT/ML
5 VIAL (ML) INTRAVENOUS
Status: CANCELLED | OUTPATIENT
Start: 2020-07-27

## 2020-07-27 RX ORDER — HEPARIN SODIUM (PORCINE) LOCK FLUSH IV SOLN 100 UNIT/ML 100 UNIT/ML
5 SOLUTION INTRAVENOUS
Status: CANCELLED | OUTPATIENT
Start: 2020-07-27

## 2020-07-27 RX ADMIN — SODIUM CHLORIDE, PRESERVATIVE FREE 5 ML: 5 INJECTION INTRAVENOUS at 08:52

## 2020-07-27 NOTE — PROGRESS NOTES
Infusion Nursing Note:  Lowell SEFERINO Maria Elena presents today for port labs.    Patient seen by provider today: No   present during visit today: Not Applicable.    Note: N/A.    Intravenous Access:  Labs drawn without difficulty.  Implanted Port.    Treatment Conditions:  Not Applicable. Labs pending at time of discharge.      Post Infusion Assessment:  Site patent and intact, free from redness, edema or discomfort.  No evidence of extravasations.  Access discontinued per protocol.       Discharge Plan:   Patient discharged in stable condition accompanied by: self.  Departure Mode: Ambulatory.    Danni Grayson RN

## 2020-07-28 ENCOUNTER — ONCOLOGY VISIT (OUTPATIENT)
Dept: ONCOLOGY | Facility: CLINIC | Age: 80
End: 2020-07-28
Attending: NURSE PRACTITIONER
Payer: COMMERCIAL

## 2020-07-28 ENCOUNTER — HOME INFUSION (PRE-WILLOW HOME INFUSION) (OUTPATIENT)
Dept: PHARMACY | Facility: CLINIC | Age: 80
End: 2020-07-28

## 2020-07-28 ENCOUNTER — INFUSION THERAPY VISIT (OUTPATIENT)
Dept: INFUSION THERAPY | Facility: CLINIC | Age: 80
End: 2020-07-28
Attending: INTERNAL MEDICINE
Payer: COMMERCIAL

## 2020-07-28 VITALS
SYSTOLIC BLOOD PRESSURE: 121 MMHG | DIASTOLIC BLOOD PRESSURE: 75 MMHG | WEIGHT: 162.26 LBS | TEMPERATURE: 97.6 F | HEART RATE: 64 BPM | HEIGHT: 69 IN | OXYGEN SATURATION: 100 % | BODY MASS INDEX: 24.03 KG/M2 | RESPIRATION RATE: 18 BRPM

## 2020-07-28 VITALS
BODY MASS INDEX: 23.95 KG/M2 | DIASTOLIC BLOOD PRESSURE: 75 MMHG | WEIGHT: 162.2 LBS | OXYGEN SATURATION: 100 % | TEMPERATURE: 97.6 F | SYSTOLIC BLOOD PRESSURE: 121 MMHG | HEART RATE: 64 BPM | RESPIRATION RATE: 18 BRPM

## 2020-07-28 DIAGNOSIS — C25.2 MALIGNANT NEOPLASM OF TAIL OF PANCREAS (H): Primary | ICD-10-CM

## 2020-07-28 PROCEDURE — G0463 HOSPITAL OUTPT CLINIC VISIT: HCPCS | Mod: 25

## 2020-07-28 PROCEDURE — 25000128 H RX IP 250 OP 636: Performed by: INTERNAL MEDICINE

## 2020-07-28 PROCEDURE — G0498 CHEMO EXTEND IV INFUS W/PUMP: HCPCS

## 2020-07-28 PROCEDURE — 25800030 ZZH RX IP 258 OP 636: Performed by: INTERNAL MEDICINE

## 2020-07-28 PROCEDURE — 96415 CHEMO IV INFUSION ADDL HR: CPT

## 2020-07-28 PROCEDURE — 99213 OFFICE O/P EST LOW 20 MIN: CPT | Performed by: NURSE PRACTITIONER

## 2020-07-28 PROCEDURE — 96413 CHEMO IV INFUSION 1 HR: CPT

## 2020-07-28 PROCEDURE — 96367 TX/PROPH/DG ADDL SEQ IV INF: CPT

## 2020-07-28 RX ADMIN — OXALIPLATIN 150 MG: 100 INJECTION, SOLUTION, CONCENTRATE INTRAVENOUS at 08:28

## 2020-07-28 RX ADMIN — DEXAMETHASONE SODIUM PHOSPHATE: 10 INJECTION, SOLUTION INTRAMUSCULAR; INTRAVENOUS at 08:08

## 2020-07-28 RX ADMIN — DEXTROSE MONOHYDRATE 250 ML: 50 INJECTION, SOLUTION INTRAVENOUS at 08:08

## 2020-07-28 ASSESSMENT — PAIN SCALES - GENERAL
PAINLEVEL: MODERATE PAIN (5)
PAINLEVEL: MODERATE PAIN (5)

## 2020-07-28 ASSESSMENT — MIFFLIN-ST. JEOR: SCORE: 1426.63

## 2020-07-28 NOTE — PROGRESS NOTES
"Oncology/Hematology Visit Note  Jul 28, 2020    Reason for Visit: follow up of metastatic  pancreatic cancer  Progressed after neoadjuvant gemcitabine and Abraxane  Patient met with Dr. Segura who recommends treatment with FOLFOX     Patient comes here for cycle 1 day 1 FOLFOX        Interval History:    Overall he reports feeling okay.  He denies fever chills sweats denies cough no shortness of breath denies chest pain denies nausea vomiting diarrhea abdominal pain bleeding  Patient reports he continues to have neuropathy in his fingers but he reports neuropathy is well controlled with gabapentin  Denies swelling of the extremities  Patient reports appetite is okay        Review of Systems:  14 point ROS of systems including Constitutional, Eyes, Respiratory, Cardiovascular, Gastroenterology, Genitourinary, Integumentary, Muscularskeletal, Psychiatric were all negative except for pertinent positives noted in my HPI.      Physical Examination:  General: The patient is a pleasant male in no acute distress.  /75   Pulse 64   Temp 97.6  F (36.4  C) (Oral)   Resp 18   Ht 1.753 m (5' 9.02\")   Wt 73.6 kg (162 lb 4.1 oz)   SpO2 100%   BMI 23.95 kg/m    HEENT: EOMI, PERRL. Sclerae are anicteric. Oral mucosa is pink and moist with no lesions or thrush.   Lymph: Neck is supple with no lymphadenopathy in the cervical or supraclavicular areas.   Heart: Regular rate and rhythm.   Lungs: Clear to auscultation bilaterally.   GI: Bowel sounds present, soft, nontender with no palpable hepatosplenomegaly or masses.   Extremities: No lower extremity edema noted bilaterally.   Skin: No rashes, petechiae, or bruising noted on exposed skin.    Laboratory Data:  Results for KARI YANG (MRN [id number]) as of 7/28/2020 12:22   Ref. Range 7/27/2020 08:57   Sodium Latest Ref Range: 133 - 144 mmol/L 137   Potassium Latest Ref Range: 3.4 - 5.3 mmol/L 3.8   Chloride Latest Ref Range: 94 - 109 mmol/L 107   Carbon Dioxide Latest " Ref Range: 20 - 32 mmol/L 25   Urea Nitrogen Latest Ref Range: 7 - 30 mg/dL 15   Creatinine Latest Ref Range: 0.66 - 1.25 mg/dL 0.94   GFR Estimate Latest Ref Range: >60 mL/min/1.73_m2 75   GFR Estimate If Black Latest Ref Range: >60 mL/min/1.73_m2 87   Calcium Latest Ref Range: 8.5 - 10.1 mg/dL 8.8   Anion Gap Latest Ref Range: 3 - 14 mmol/L 5   Albumin Latest Ref Range: 3.4 - 5.0 g/dL 3.5   Protein Total Latest Ref Range: 6.8 - 8.8 g/dL 6.9   Bilirubin Total Latest Ref Range: 0.2 - 1.3 mg/dL 0.6   Alkaline Phosphatase Latest Ref Range: 40 - 150 U/L 62   ALT Latest Ref Range: 0 - 70 U/L 21   AST Latest Ref Range: 0 - 45 U/L 16   Glucose Latest Ref Range: 70 - 99 mg/dL 157 (H)   WBC Latest Ref Range: 4.0 - 11.0 10e9/L 6.2   Hemoglobin Latest Ref Range: 13.3 - 17.7 g/dL 13.5   Hematocrit Latest Ref Range: 40.0 - 53.0 % 39.9 (L)   Platelet Count Latest Ref Range: 150 - 450 10e9/L 210   RBC Count Latest Ref Range: 4.4 - 5.9 10e12/L 4.19 (L)   MCV Latest Ref Range: 78 - 100 fl 95   MCH Latest Ref Range: 26.5 - 33.0 pg 32.2   MCHC Latest Ref Range: 31.5 - 36.5 g/dL 33.8   RDW Latest Ref Range: 10.0 - 15.0 % 13.8   Diff Method Unknown Automated Method   % Neutrophils Latest Units: % 37.8   % Lymphocytes Latest Units: % 50.3   % Monocytes Latest Units: % 7.9   % Eosinophils Latest Units: % 3.7   % Basophils Latest Units: % 0.3   % Immature Granulocytes Latest Units: % 0.0   Nucleated RBCs Latest Ref Range: 0 /100 0   Absolute Neutrophil Latest Ref Range: 1.6 - 8.3 10e9/L 2.3   Absolute Lymphocytes Latest Ref Range: 0.8 - 5.3 10e9/L 3.1   Absolute Monocytes Latest Ref Range: 0.0 - 1.3 10e9/L 0.5   Absolute Eosinophils Latest Ref Range: 0.0 - 0.7 10e9/L 0.2   Absolute Basophils Latest Ref Range: 0.0 - 0.2 10e9/L 0.0   Abs Immature Granulocytes Latest Ref Range: 0 - 0.4 10e9/L 0.0   Absolute Nucleated RBC Unknown 0.0       Assessment and Plan:    This  a 80-year-old male with    metastatic  pancreatic cancer  Progressed after  neoadjuvant gemcitabine and Abraxane  Patient met with Dr. Segura who recommends treatment with modified FOLFOX   Patient comes here for cycle 1 day 1 FOLFOX  Side Effects of modified FOLFOX discussed in detail patient verbalized understanding and is agreeable to proceed with treatment  As reviewed okay to proceed with chemo  Patient has follow-up appointment with Dr. Segura with next treatment      Peripheral neuropathy  Continue with gabapentin  oxaliplatin can cause worsening neuropathy  Call if worsening of symptoms        Patient advised to call our clinic or go to ER in the event of fever chills sweats cough shortness of breath chest pain nausea vomiting diarrhea abdominal pain or bleeding    YAN Carranza [facility]     Chart documentation with Dragon Voice recognition Software. Although reviewed after completion, some words and grammatical errors may remain.

## 2020-07-28 NOTE — PROGRESS NOTES
"Infusion Nursing Note:  Lowell Arredondo presents today for C1D1 Folfox.    Patient seen by provider today: Yes: DINORAH Nieto   present during visit today: Not Applicable.    Note: Per Dr. Segura, ok to start chemo prior to patient seeing [facility] and 9am.    Intravenous Access:  Implanted Port.    Treatment Conditions:  Lab Results   Component Value Date    HGB 13.5 07/27/2020     Lab Results   Component Value Date    WBC 6.2 07/27/2020      Lab Results   Component Value Date    ANEU 2.3 07/27/2020     Lab Results   Component Value Date     07/27/2020      Lab Results   Component Value Date     07/27/2020                   Lab Results   Component Value Date    POTASSIUM 3.8 07/27/2020           Lab Results   Component Value Date    MAG 2.0 01/25/2020            Lab Results   Component Value Date    CR 0.94 07/27/2020                   Lab Results   Component Value Date    VICENTE 8.8 07/27/2020                Lab Results   Component Value Date    BILITOTAL 0.6 07/27/2020           Lab Results   Component Value Date    ALBUMIN 3.5 07/27/2020                    Lab Results   Component Value Date    ALT 21 07/27/2020           Lab Results   Component Value Date    AST 16 07/27/2020       Results reviewed, labs MET treatment parameters, ok to proceed with treatment.      Post Infusion Assessment:  Patient tolerated infusion without incident.  Blood return noted pre and post infusion.  Site patent and intact, free from redness, edema or discomfort.  No evidence of extravasations.     Prior to discharge: Port is secured in place with tegaderm and flushed with 10cc NS with positive blood return noted.  Continuous home infusion CADD pump connected.    All connectors secured in place and clamps taped open.    Pump started, \"running\" noted on display (CADD): YES.  Patient instructed to call our clinic or [facility] with any questions or concerns at home.  Patient verbalized understanding.    Patient " set up for pump disconnect at our clinic on 7/30 ([facility]) at 8:15am.              Discharge Plan:   Prescription given for zofran (will stop and  at [facility]).  Copy of AVS reviewed with patient and/or family.  Patient will return 7/30 at [facility] for disconnect for next appointment.  Patient discharged in stable condition accompanied by: self.  Departure Mode: Ambulatory.    Chavez Ricci RN

## 2020-07-28 NOTE — PROGRESS NOTES
"Oncology Rooming Note    July 28, 2020 9:07 AM   Lowell Arredondo is a 80 year old male who presents for:    Chief Complaint   Patient presents with     Oncology Clinic Visit     Initial Vitals: /75   Pulse 64   Temp 97.6  F (36.4  C) (Oral)   Resp 18   Ht 1.753 m (5' 9.02\")   Wt 73.6 kg (162 lb 4.1 oz)   SpO2 100%   BMI 23.95 kg/m   Estimated body mass index is 23.95 kg/m  as calculated from the following:    Height as of this encounter: 1.753 m (5' 9.02\").    Weight as of this encounter: 73.6 kg (162 lb 4.1 oz). Body surface area is 1.89 meters squared.  Moderate Pain (5) Comment: Data Unavailable   No LMP for male patient.  Allergies reviewed: Yes  Medications reviewed: Yes    Medications: Medication refills not needed today.  Pharmacy name entered into [email or web address]: [id number] - [place], [place] - [address]    Clinical concerns:  NP was notified.      Sarah Cifuentes CMA            "

## 2020-07-28 NOTE — LETTER
"    7/28/2020         RE: Lowell Arredondo  [address]  [place] [address]        Dear Colleague,    Thank you for referring your patient, Lowell Arredondo, to the [facility]. Please see a copy of my visit note below.    Oncology Rooming Note    July 28, 2020 9:07 AM   Lowell Arredondo is a 80 year old male who presents for:    Chief Complaint   Patient presents with     Oncology Clinic Visit     Initial Vitals: /75   Pulse 64   Temp 97.6  F (36.4  C) (Oral)   Resp 18   Ht 1.753 m (5' 9.02\")   Wt 73.6 kg (162 lb 4.1 oz)   SpO2 100%   BMI 23.95 kg/m   Estimated body mass index is 23.95 kg/m  as calculated from the following:    Height as of this encounter: 1.753 m (5' 9.02\").    Weight as of this encounter: 73.6 kg (162 lb 4.1 oz). Body surface area is 1.89 meters squared.  Moderate Pain (5) Comment: Data Unavailable   No LMP for male patient.  Allergies reviewed: Yes  Medications reviewed: Yes    Medications: Medication refills not needed today.  Pharmacy name entered into [employer]): [id number] - [place], [place] - [address]    Clinical concerns:  NP was notified.      Sarah Cifuentes, [facility]              Oncology/Hematology Visit Note  Jul 28, 2020    Reason for Visit: follow up of metastatic  pancreatic cancer  Progressed after neoadjuvant gemcitabine and Abraxane  Patient met with Dr. Segura who recommends treatment with FOLFOX     Patient comes here for cycle 1 day 1 FOLFOX        Interval History:    Overall he reports feeling okay.  He denies fever chills sweats denies cough no shortness of breath denies chest pain denies nausea vomiting diarrhea abdominal pain bleeding  Patient reports he continues to have neuropathy in his fingers but he reports neuropathy is well controlled with gabapentin  Denies swelling of the extremities  Patient reports appetite is okay        Review of Systems:  14 point ROS of systems including Constitutional, Eyes, Respiratory, " "Cardiovascular, Gastroenterology, Genitourinary, Integumentary, Muscularskeletal, Psychiatric were all negative except for pertinent positives noted in my HPI.      Physical Examination:  General: The patient is a pleasant male in no acute distress.  /75   Pulse 64   Temp 97.6  F (36.4  C) (Oral)   Resp 18   Ht 1.753 m (5' 9.02\")   Wt 73.6 kg (162 lb 4.1 oz)   SpO2 100%   BMI 23.95 kg/m    HEENT: EOMI, PERRL. Sclerae are anicteric. Oral mucosa is pink and moist with no lesions or thrush.   Lymph: Neck is supple with no lymphadenopathy in the cervical or supraclavicular areas.   Heart: Regular rate and rhythm.   Lungs: Clear to auscultation bilaterally.   GI: Bowel sounds present, soft, nontender with no palpable hepatosplenomegaly or masses.   Extremities: No lower extremity edema noted bilaterally.   Skin: No rashes, petechiae, or bruising noted on exposed skin.    Laboratory Data:  Results for KARI YANG (MRN [id number]) as of 7/28/2020 12:22   Ref. Range 7/27/2020 08:57   Sodium Latest Ref Range: 133 - 144 mmol/L 137   Potassium Latest Ref Range: 3.4 - 5.3 mmol/L 3.8   Chloride Latest Ref Range: 94 - 109 mmol/L 107   Carbon Dioxide Latest Ref Range: 20 - 32 mmol/L 25   Urea Nitrogen Latest Ref Range: 7 - 30 mg/dL 15   Creatinine Latest Ref Range: 0.66 - 1.25 mg/dL 0.94   GFR Estimate Latest Ref Range: >60 mL/min/1.73_m2 75   GFR Estimate If Black Latest Ref Range: >60 mL/min/1.73_m2 87   Calcium Latest Ref Range: 8.5 - 10.1 mg/dL 8.8   Anion Gap Latest Ref Range: 3 - 14 mmol/L 5   Albumin Latest Ref Range: 3.4 - 5.0 g/dL 3.5   Protein Total Latest Ref Range: 6.8 - 8.8 g/dL 6.9   Bilirubin Total Latest Ref Range: 0.2 - 1.3 mg/dL 0.6   Alkaline Phosphatase Latest Ref Range: 40 - 150 U/L 62   ALT Latest Ref Range: 0 - 70 U/L 21   AST Latest Ref Range: 0 - 45 U/L 16   Glucose Latest Ref Range: 70 - 99 mg/dL 157 (H)   WBC Latest Ref Range: 4.0 - 11.0 10e9/L 6.2   Hemoglobin Latest Ref Range: 13.3 - " 17.7 g/dL 13.5   Hematocrit Latest Ref Range: 40.0 - 53.0 % 39.9 (L)   Platelet Count Latest Ref Range: 150 - 450 10e9/L 210   RBC Count Latest Ref Range: 4.4 - 5.9 10e12/L 4.19 (L)   MCV Latest Ref Range: 78 - 100 fl 95   MCH Latest Ref Range: 26.5 - 33.0 pg 32.2   MCHC Latest Ref Range: 31.5 - 36.5 g/dL 33.8   RDW Latest Ref Range: 10.0 - 15.0 % 13.8   Diff Method Unknown Automated Method   % Neutrophils Latest Units: % 37.8   % Lymphocytes Latest Units: % 50.3   % Monocytes Latest Units: % 7.9   % Eosinophils Latest Units: % 3.7   % Basophils Latest Units: % 0.3   % Immature Granulocytes Latest Units: % 0.0   Nucleated RBCs Latest Ref Range: 0 /100 0   Absolute Neutrophil Latest Ref Range: 1.6 - 8.3 10e9/L 2.3   Absolute Lymphocytes Latest Ref Range: 0.8 - 5.3 10e9/L 3.1   Absolute Monocytes Latest Ref Range: 0.0 - 1.3 10e9/L 0.5   Absolute Eosinophils Latest Ref Range: 0.0 - 0.7 10e9/L 0.2   Absolute Basophils Latest Ref Range: 0.0 - 0.2 10e9/L 0.0   Abs Immature Granulocytes Latest Ref Range: 0 - 0.4 10e9/L 0.0   Absolute Nucleated RBC Unknown 0.0       Assessment and Plan:    This  a 80-year-old male with    metastatic  pancreatic cancer  Progressed after neoadjuvant gemcitabine and Abraxane  Patient met with Dr. Segura who recommends treatment with modified FOLFOX   Patient comes here for cycle 1 day 1 FOLFOX  Side Effects of modified FOLFOX discussed in detail patient verbalized understanding and is agreeable to proceed with treatment  As reviewed okay to proceed with chemo  Patient has follow-up appointment with Dr. Segura with next treatment      Peripheral neuropathy  Continue with gabapentin  oxaliplatin can cause worsening neuropathy  Call if worsening of symptoms        Patient advised to call our clinic or go to ER in the event of fever chills sweats cough shortness of breath chest pain nausea vomiting diarrhea abdominal pain or bleeding    YAN Carranza CNP  [facility]      Chart documentation with Dragon Voice recognition Software. Although reviewed after completion, some words and grammatical errors may remain.          Again, thank you for allowing me to participate in the care of your patient.        Sincerely,        YAN Carranza CNP

## 2020-07-29 NOTE — PROGRESS NOTES
This is a recent snapshot of the patient's [facility] medical record.  For current drug dose and complete information and questions, call [phone] or In Basket pool, [facility] home infusion ([id number])  CSN Number:  [id number]

## 2020-07-30 ENCOUNTER — PATIENT OUTREACH (OUTPATIENT)
Dept: ONCOLOGY | Facility: CLINIC | Age: 80
End: 2020-07-30

## 2020-07-30 VITALS
SYSTOLIC BLOOD PRESSURE: 98 MMHG | DIASTOLIC BLOOD PRESSURE: 57 MMHG | OXYGEN SATURATION: 96 % | RESPIRATION RATE: 18 BRPM | HEART RATE: 68 BPM | TEMPERATURE: 97.2 F

## 2020-07-30 PROCEDURE — 25000128 H RX IP 250 OP 636: Performed by: INTERNAL MEDICINE

## 2020-07-30 RX ORDER — HEPARIN SODIUM (PORCINE) LOCK FLUSH IV SOLN 100 UNIT/ML 100 UNIT/ML
5 SOLUTION INTRAVENOUS EVERY 8 HOURS
Status: DISCONTINUED | OUTPATIENT
Start: 2020-07-30 | End: 2020-07-30 | Stop reason: HOSPADM

## 2020-07-30 RX ADMIN — Medication 5 ML: at 08:34

## 2020-07-30 ASSESSMENT — PAIN SCALES - GENERAL: PAINLEVEL: MODERATE PAIN (5)

## 2020-07-30 NOTE — TELEPHONE ENCOUNTER
Writer spoke with Karan and was updated that a PEER to PEER will not change the outcome. A letter/information to support evidence of why CT scan was ordered and performed needs to be faxed to the information below.    Fax [phone] Attn: First level Appeal and Patient ID: [id number]  Case/Ref #:[id number]      Kathe Coles RN

## 2020-07-30 NOTE — PROGRESS NOTES
Infusion Nursing Note:  Lowell Arredondo presents today for pump DC.    Patient seen by provider today: No   present during visit today: Not Applicable.    Note: Denies SE to chemo.    Intravenous Access:  Implanted Port.    Treatment Conditions:  Not Applicable.      Post Infusion Assessment:  Patient tolerated procedure without incident.  Site patent and intact, free from redness, edema or discomfort.  No evidence of extravasations.  Access discontinued per protocol.       Discharge Plan:   Discharge instructions reviewed with: Patient.  Patient discharged in stable condition accompanied by: self.  Departure Mode: Ambulatory.  Scheduled for labs on 8/10/2020 and NP/TX on 8/11/2020.    NIKKI ASHLEY RN

## 2020-07-30 NOTE — PROGRESS NOTES
"Writer called to follow up on C1D1  FOLFOX on 7/28 and spoke with patient's wife, Melvi, because patient was in the \"woodshed\" working.  The wife reports that patient has been walking the \"[place] drinking and eating well. The first night patient did not sleep well , but had a lorazepam last night and did manage to get a better night sleep.     Writer encouraged wife, Melvi, to have patient call with any questions or concerns.    Kathe Coles RN    "

## 2020-07-31 ENCOUNTER — THERAPY VISIT (OUTPATIENT)
Dept: CHIROPRACTIC MEDICINE | Facility: CLINIC | Age: 80
End: 2020-07-31
Payer: COMMERCIAL

## 2020-07-31 DIAGNOSIS — M79.672 BILATERAL FOOT PAIN: ICD-10-CM

## 2020-07-31 DIAGNOSIS — M99.06 SOMATIC DYSFUNCTION OF LOWER EXTREMITIES: Primary | ICD-10-CM

## 2020-07-31 DIAGNOSIS — M79.671 BILATERAL FOOT PAIN: ICD-10-CM

## 2020-07-31 PROCEDURE — 97810 ACUP 1/> WO ESTIM 1ST 15 MIN: CPT | Mod: GA | Performed by: CHIROPRACTOR

## 2020-07-31 PROCEDURE — 99202 OFFICE O/P NEW SF 15 MIN: CPT | Mod: GA | Performed by: CHIROPRACTOR

## 2020-07-31 NOTE — PROGRESS NOTES
Initial Chiropractic Clinic Visit    PCP: Diego Segura SEFERINO Arredondo is a 80 year old male who is seen  as a self referral presenting with neuropathy related to chemotherapy. Patient reports being diagnosed with pancreatic cancer Fall of 2019. Patient states having several months of round one of chemotherapy and developing neuropathy in the feet Jan/Feb 2020. States he has less feeling in the bottom of his feet and he has tingling type pain much of the time. Patient reports if he stands on his feet for 3-4 hours the pain/tingling gets worse. Patient reports the sensation of having his socks rolled/bunched up on the bottom of his feet. Patient is here today to try acupuncture of the neuropathy. Patient reports he has mild tingling in his fingers as well.     Patient reports he recently started a new round of chemotherapy, a different type a few weeks ago.       Location of Pain: bilateral lower leg, foot, tingling in fingers both hands  Duration of Pain: Winter 6-7 months  Rating of Pain at worst: 7/10  Rating of Pain Currently: 5/10  Symptoms are better with: Rest  Symptoms are worse with: prolonged standing  Additional Features: paresthesias and this does affect his balance due to not feeling the bottoms of his feet as well.     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    Past Medical History:   Diagnosis Date     Basal cell carcinoma of skin 9/19/2013    Overview:  Completely excised 5/13     BPH (benign prostatic hyperplasia) 5/2/2013     Cancer (H)      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) 10/10/2019     Peripheral neuropathy      Prostate cancer (H) 6/7/2016     Past Surgical History:   Procedure Laterality Date     FOOT SURGERY Left 1992     HERNIA REPAIR       IR CHEST PORT PLACEMENT > 5 YRS OF AGE  11/1/2019     LAPAROSCOPY DIAGNOSTIC (GENERAL) N/A 5/12/2020    Procedure: Diagnostic laparoscopy;  Surgeon: Sanjay Ryder MD;  Location: [facility] OR     PROSTATE SURGERY      biopsy     Objective  There were no vitals taken for this visit.      GENERAL APPEARANCE: healthy, alert and no distress   GAIT: normal, with slight apprehension.   SKIN: no suspicious lesions or rashes  NEURO: Normal strength and tone, mentation intact and speech normal  PSYCH:  mentation appears normal and affect normal/bright    Foot exam:    Inspection:       normal skin, no swelling    ROM:       Motion is normal for both feet    Tender:         No tenderness noted when palpating the   Strength:       ankle dorsiflexion 5/5 Normal       ankle plantarflexion 5/5 Normal       dorsiflexion of the great toe 5/5 Normal    Reflexes:       patellar (L3, L4) 2 bilaterally       achilles tendons (S1) 2 bilaterally    Sensation:       Decrease in sensation both feet      Radiology:  none    Assessment:    No diagnosis found.    RX ordered/plan of care  Anticipated outcomes  Possible risks and side effects    After discussing the risk and benefits of care, patient consented to treatment    Prognosis: Guarded      Patient's condition:   Symptoms are unchanged    Treatment effectiveness:  Unknown at this time      Plan:    Procedures:  Evaluation and Management:  13252 Low to moderate level exam 20 min    CMT:  No SMT-for acupuncture only at this time for neuropathy due to cancer diagnosis  and chemo.    Modalities:  98720: Acupuncture, for 15 minutes:  Points: for bilateral foot pain/numbness due to neuropathy related to chemotherapy. May add hand/finger points next visit.     Therapeutic procedures:  None    Response to Treatment  Patient tolerated the treatment well today      Treatment plan and goals:  Goals:  Able to fall asleep more quickly and not have to rub his feet together in 2-4 weeks. Able to stand and do wood working for 2 hours without an increase in neuropathy/foot pain in 4-6 weeks.     Frequency of care  Duration of care is estimated to be 6 weeks, from the initial treatment.  It is estimated that the patient will need a total of 4-6 visits to resolve this episode.  For the initial therapeutic trial of care, the frequency is recommended at 1 X week, once daily.  A reevaluation would be clinically appropriate in 6 visits, to determine progress and further course of care.    In-Office Treatment  Evaluation     Acupuncture, for 15 minutes:  Points: for bilateral foot pain/numbness due to neuropathy related to chemotherapy.    Recommendations:    Instructions:rest    Follow-up:  Recommendation of a trial of 2-4 additional visits.       Discussed the assessment with the patient.      Disclaimer: This note consists of symbols derived from keyboarding, dictation and/or voice recognition software. As a result, there may be errors in the script that have gone undetected. Please consider this when interpreting information found in this chart.

## 2020-08-03 ENCOUNTER — PATIENT OUTREACH (OUTPATIENT)
Dept: ONCOLOGY | Facility: CLINIC | Age: 80
End: 2020-08-03

## 2020-08-03 NOTE — PROGRESS NOTES
Please, have MSI testing on biopsy done on October 7, 2019 at Dr. Davidson's office.     Writer received above request and called [employer] and was directed to website and printed of requisition that has been completed and is in Dr. Segura's office to be signed.     Kathe Coles RN

## 2020-08-03 NOTE — TELEPHONE ENCOUNTER
Writer faxed recent office visit with Dr. Segura's recommendation to have a CT .  Kathe Coles RN

## 2020-08-06 ENCOUNTER — TELEPHONE (OUTPATIENT)
Dept: PHARMACY | Facility: CLINIC | Age: 80
End: 2020-08-06

## 2020-08-06 NOTE — TELEPHONE ENCOUNTER
PA Initiation    Medication: ondansetron 8mg_PENDING  Insurance Company: [employer] - Phone [phone] Fax [phone]  Pharmacy Filling the Rx: [id number] [place] - [address]  Filling Pharmacy Phone:    Filling Pharmacy Fax:    Start Date: 8/6/2020

## 2020-08-10 ENCOUNTER — HOSPITAL ENCOUNTER (OUTPATIENT)
Facility: CLINIC | Age: 80
Setting detail: SPECIMEN
Discharge: HOME | End: 2020-08-10
Attending: INTERNAL MEDICINE | Admitting: INTERNAL MEDICINE
Payer: COMMERCIAL

## 2020-08-10 DIAGNOSIS — C25.2 MALIGNANT NEOPLASM OF TAIL OF PANCREAS (H): Primary | ICD-10-CM

## 2020-08-10 LAB
ALBUMIN SERPL-MCNC: 3.5 G/DL (ref 3.4–5)
ALP SERPL-CCNC: 61 U/L (ref 40–150)
ALT SERPL W P-5'-P-CCNC: 24 U/L (ref 0–70)
ANION GAP SERPL CALCULATED.3IONS-SCNC: 4 MMOL/L (ref 3–14)
AST SERPL W P-5'-P-CCNC: 28 U/L (ref 0–45)
BASOPHILS # BLD AUTO: 0.1 10E9/L (ref 0–0.2)
BASOPHILS NFR BLD AUTO: 0.8 %
BILIRUB SERPL-MCNC: 0.5 MG/DL (ref 0.2–1.3)
BUN SERPL-MCNC: 18 MG/DL (ref 7–30)
CALCIUM SERPL-MCNC: 8.8 MG/DL (ref 8.5–10.1)
CHLORIDE SERPL-SCNC: 108 MMOL/L (ref 94–109)
CO2 SERPL-SCNC: 27 MMOL/L (ref 20–32)
CREAT SERPL-MCNC: 1.02 MG/DL (ref 0.66–1.25)
DIFFERENTIAL METHOD BLD: ABNORMAL
EOSINOPHIL # BLD AUTO: 0.2 10E9/L (ref 0–0.7)
EOSINOPHIL NFR BLD AUTO: 3.3 %
ERYTHROCYTE [DISTWIDTH] IN BLOOD BY AUTOMATED COUNT: 14 % (ref 10–15)
GFR SERPL CREATININE-BSD FRML MDRD: 68 ML/MIN/{1.73_M2}
GLUCOSE SERPL-MCNC: 104 MG/DL (ref 70–99)
HCT VFR BLD AUTO: 42.3 % (ref 40–53)
HGB BLD-MCNC: 13.9 G/DL (ref 13.3–17.7)
IMM GRANULOCYTES # BLD: 0 10E9/L (ref 0–0.4)
IMM GRANULOCYTES NFR BLD: 0.2 %
LYMPHOCYTES # BLD AUTO: 2.6 10E9/L (ref 0.8–5.3)
LYMPHOCYTES NFR BLD AUTO: 40.9 %
MCH RBC QN AUTO: 32.1 PG (ref 26.5–33)
MCHC RBC AUTO-ENTMCNC: 32.9 G/DL (ref 31.5–36.5)
MCV RBC AUTO: 98 FL (ref 78–100)
MONOCYTES # BLD AUTO: 0.6 10E9/L (ref 0–1.3)
MONOCYTES NFR BLD AUTO: 9.7 %
NEUTROPHILS # BLD AUTO: 2.8 10E9/L (ref 1.6–8.3)
NEUTROPHILS NFR BLD AUTO: 45.1 %
NRBC # BLD AUTO: 0 10*3/UL
NRBC BLD AUTO-RTO: 0 /100
PLATELET # BLD AUTO: 202 10E9/L (ref 150–450)
POTASSIUM SERPL-SCNC: 4.2 MMOL/L (ref 3.4–5.3)
PROT SERPL-MCNC: 7.2 G/DL (ref 6.8–8.8)
RBC # BLD AUTO: 4.33 10E12/L (ref 4.4–5.9)
SODIUM SERPL-SCNC: 139 MMOL/L (ref 133–144)
WBC # BLD AUTO: 6.3 10E9/L (ref 4–11)

## 2020-08-10 PROCEDURE — 80053 COMPREHEN METABOLIC PANEL: CPT | Performed by: INTERNAL MEDICINE

## 2020-08-10 PROCEDURE — 36591 DRAW BLOOD OFF VENOUS DEVICE: CPT

## 2020-08-10 PROCEDURE — 25000128 H RX IP 250 OP 636: Performed by: INTERNAL MEDICINE

## 2020-08-10 PROCEDURE — 36415 COLL VENOUS BLD VENIPUNCTURE: CPT

## 2020-08-10 PROCEDURE — 36593 DECLOT VASCULAR DEVICE: CPT

## 2020-08-10 PROCEDURE — 85025 COMPLETE CBC W/AUTO DIFF WBC: CPT | Performed by: INTERNAL MEDICINE

## 2020-08-10 RX ORDER — ALBUTEROL SULFATE 90 UG/1
1-2 AEROSOL, METERED RESPIRATORY (INHALATION)
Status: CANCELLED
Start: 2020-08-11

## 2020-08-10 RX ORDER — SODIUM CHLORIDE 9 MG/ML
1000 INJECTION, SOLUTION INTRAVENOUS CONTINUOUS PRN
Status: CANCELLED
Start: 2020-08-11

## 2020-08-10 RX ORDER — HEPARIN SODIUM (PORCINE) LOCK FLUSH IV SOLN 100 UNIT/ML 100 UNIT/ML
5 SOLUTION INTRAVENOUS
Status: CANCELLED | OUTPATIENT
Start: 2020-08-11

## 2020-08-10 RX ORDER — LORAZEPAM 2 MG/ML
0.5 INJECTION INTRAMUSCULAR EVERY 4 HOURS PRN
Status: CANCELLED
Start: 2020-08-11

## 2020-08-10 RX ORDER — ALBUTEROL SULFATE 0.83 MG/ML
2.5 SOLUTION RESPIRATORY (INHALATION)
Status: CANCELLED | OUTPATIENT
Start: 2020-08-11

## 2020-08-10 RX ORDER — MEPERIDINE HYDROCHLORIDE 25 MG/ML
25 INJECTION INTRAMUSCULAR; INTRAVENOUS; SUBCUTANEOUS EVERY 30 MIN PRN
Status: CANCELLED | OUTPATIENT
Start: 2020-08-11

## 2020-08-10 RX ORDER — NALOXONE HYDROCHLORIDE 0.4 MG/ML
.1-.4 INJECTION, SOLUTION INTRAMUSCULAR; INTRAVENOUS; SUBCUTANEOUS
Status: CANCELLED | OUTPATIENT
Start: 2020-08-11

## 2020-08-10 RX ORDER — HEPARIN SODIUM,PORCINE 10 UNIT/ML
5 VIAL (ML) INTRAVENOUS
Status: CANCELLED | OUTPATIENT
Start: 2020-08-11

## 2020-08-10 RX ORDER — HEPARIN SODIUM (PORCINE) LOCK FLUSH IV SOLN 100 UNIT/ML 100 UNIT/ML
5 SOLUTION INTRAVENOUS
Status: CANCELLED | OUTPATIENT
Start: 2020-08-12

## 2020-08-10 RX ORDER — HEPARIN SODIUM,PORCINE 10 UNIT/ML
5 VIAL (ML) INTRAVENOUS
Status: CANCELLED | OUTPATIENT
Start: 2020-08-12

## 2020-08-10 RX ORDER — DIPHENHYDRAMINE HYDROCHLORIDE 50 MG/ML
50 INJECTION INTRAMUSCULAR; INTRAVENOUS
Status: CANCELLED
Start: 2020-08-11

## 2020-08-10 RX ORDER — EPINEPHRINE 1 MG/ML
0.3 INJECTION, SOLUTION INTRAMUSCULAR; SUBCUTANEOUS EVERY 5 MIN PRN
Status: CANCELLED | OUTPATIENT
Start: 2020-08-11

## 2020-08-10 RX ORDER — METHYLPREDNISOLONE SODIUM SUCCINATE 125 MG/2ML
125 INJECTION, POWDER, LYOPHILIZED, FOR SOLUTION INTRAMUSCULAR; INTRAVENOUS
Status: CANCELLED
Start: 2020-08-11

## 2020-08-10 RX ADMIN — ALTEPLASE 2 MG: 2.2 INJECTION, POWDER, LYOPHILIZED, FOR SOLUTION INTRAVENOUS at 10:12

## 2020-08-10 NOTE — PROGRESS NOTES
Infusion Nursing Note:  Lowell Arredondo presents today for labs.     present during visit today: Not Applicable.    Note: port accessed, with easy flushing, but no blood return.  Positions changes and multiple flushing attempts tried.  Cathflo instilled, needle removed, covered with bandaid that indicates Cathflo is instilled.  Pt has chemo tomorrow at [facility].  Pt will also alert nurses then re: Cathflo    Intravenous Access:  Lab draw site L arm, Needle type butterfly, Gauge 23.  Labs drawn without difficulty.  Peripheral IV placed.  Implanted Port instilled with Cathflo    Treatment Conditions:  cathflo    Post Lab Assessment:  Patient tolerated blood collection   Access discontinued     Discharge Plan:   Patient and/or family verbalized understanding of  instructions and all questions answered.  Patient  to lobby in stable condition accompanied by: self.  Patient to see provider today: No  Departure Mode: Ambulatory.  Roya Hawthorne, RN, RN

## 2020-08-11 ENCOUNTER — ONCOLOGY VISIT (OUTPATIENT)
Dept: ONCOLOGY | Facility: CLINIC | Age: 80
End: 2020-08-11
Attending: NURSE PRACTITIONER
Payer: COMMERCIAL

## 2020-08-11 ENCOUNTER — INFUSION THERAPY VISIT (OUTPATIENT)
Dept: INFUSION THERAPY | Facility: CLINIC | Age: 80
End: 2020-08-11
Attending: INTERNAL MEDICINE
Payer: COMMERCIAL

## 2020-08-11 VITALS
RESPIRATION RATE: 16 BRPM | SYSTOLIC BLOOD PRESSURE: 112 MMHG | HEIGHT: 69 IN | BODY MASS INDEX: 23.61 KG/M2 | HEART RATE: 76 BPM | OXYGEN SATURATION: 98 % | DIASTOLIC BLOOD PRESSURE: 71 MMHG | TEMPERATURE: 98 F | WEIGHT: 159.39 LBS

## 2020-08-11 VITALS
DIASTOLIC BLOOD PRESSURE: 71 MMHG | HEART RATE: 76 BPM | TEMPERATURE: 98 F | WEIGHT: 159.4 LBS | RESPIRATION RATE: 16 BRPM | BODY MASS INDEX: 23.61 KG/M2 | HEIGHT: 69 IN | OXYGEN SATURATION: 98 % | SYSTOLIC BLOOD PRESSURE: 112 MMHG

## 2020-08-11 DIAGNOSIS — C25.2 MALIGNANT NEOPLASM OF TAIL OF PANCREAS (H): Primary | ICD-10-CM

## 2020-08-11 PROCEDURE — G0463 HOSPITAL OUTPT CLINIC VISIT: HCPCS | Mod: 25

## 2020-08-11 PROCEDURE — 96415 CHEMO IV INFUSION ADDL HR: CPT

## 2020-08-11 PROCEDURE — 99214 OFFICE O/P EST MOD 30 MIN: CPT | Performed by: NURSE PRACTITIONER

## 2020-08-11 PROCEDURE — 96413 CHEMO IV INFUSION 1 HR: CPT

## 2020-08-11 PROCEDURE — 25800030 ZZH RX IP 258 OP 636: Performed by: NURSE PRACTITIONER

## 2020-08-11 PROCEDURE — G0498 CHEMO EXTEND IV INFUS W/PUMP: HCPCS

## 2020-08-11 PROCEDURE — 96367 TX/PROPH/DG ADDL SEQ IV INF: CPT

## 2020-08-11 PROCEDURE — 25000128 H RX IP 250 OP 636: Performed by: NURSE PRACTITIONER

## 2020-08-11 RX ORDER — OXYCODONE HYDROCHLORIDE 5 MG/1
5 TABLET ORAL EVERY 6 HOURS PRN
Qty: 12 TABLET | Refills: 0 | Status: SHIPPED | OUTPATIENT
Start: 2020-08-11 | End: 2020-08-25

## 2020-08-11 RX ADMIN — OXALIPLATIN 150 MG: 100 INJECTION, SOLUTION, CONCENTRATE INTRAVENOUS at 09:49

## 2020-08-11 RX ADMIN — DEXTROSE MONOHYDRATE 250 ML: 50 INJECTION, SOLUTION INTRAVENOUS at 09:26

## 2020-08-11 RX ADMIN — DEXAMETHASONE SODIUM PHOSPHATE: 10 INJECTION, SOLUTION INTRAMUSCULAR; INTRAVENOUS at 09:26

## 2020-08-11 ASSESSMENT — MIFFLIN-ST. JEOR
SCORE: 1413.63
SCORE: 1413.66

## 2020-08-11 NOTE — PATIENT INSTRUCTIONS
Your chemo Pump will infuse for 46 hours. If at any time the pump should malfunction or you have pump concerns, please contact [facility] after hours at [phone].You will need an appointment for the pump disconnect in our clinic or with [facility].

## 2020-08-11 NOTE — PROGRESS NOTES
"Oncology Rooming Note    August 11, 2020 8:47 AM   Lowell Arredondo is a 80 year old male who presents for:    Chief Complaint   Patient presents with     Oncology Clinic Visit     Initial Vitals: /71   Pulse 76   Temp 98  F (36.7  C) (Oral)   Resp 16   Ht 1.753 m (5' 9.02\")   Wt 72.3 kg (159 lb 6.3 oz)   SpO2 98%   BMI 23.53 kg/m   Estimated body mass index is 23.53 kg/m  as calculated from the following:    Height as of this encounter: 1.753 m (5' 9.02\").    Weight as of this encounter: 72.3 kg (159 lb 6.3 oz). Body surface area is 1.88 meters squared.  Data Unavailable Comment: Data Unavailable   No LMP for male patient.  Allergies reviewed: Yes  Medications reviewed: Yes    Medications: Medication refills not needed today.  Pharmacy name entered into [employer]: [id number] - [place], [place] - [address]    Clinical concerns:  NP was notified.      Sarah Cifuentes CMA            "

## 2020-08-11 NOTE — PROGRESS NOTES
"Infusion Nursing Note:  Lowell Arredondo presents today for C2D1 FOLFOX.    Patient seen by provider today: Yes: DINORAH Nieto   present during visit today: Not Applicable.    Note: N/A.    Intravenous Access:  Implanted Port.    Treatment Conditions:  Lab Results   Component Value Date    HGB 13.9 08/10/2020     Lab Results   Component Value Date    WBC 6.3 08/10/2020      Lab Results   Component Value Date    ANEU 2.8 08/10/2020     Lab Results   Component Value Date     08/10/2020      Lab Results   Component Value Date     08/10/2020                   Lab Results   Component Value Date    POTASSIUM 4.2 08/10/2020           Lab Results   Component Value Date    MAG 2.0 01/25/2020            Lab Results   Component Value Date    CR 1.02 08/10/2020                   Lab Results   Component Value Date    VICENTE 8.8 08/10/2020                Lab Results   Component Value Date    BILITOTAL 0.5 08/10/2020           Lab Results   Component Value Date    ALBUMIN 3.5 08/10/2020                    Lab Results   Component Value Date    ALT 24 08/10/2020           Lab Results   Component Value Date    AST 28 08/10/2020       Results reviewed, labs MET treatment parameters, ok to proceed with treatment.      Post Infusion Assessment:  Patient tolerated infusion without incident.  Blood return noted pre and post infusion.  Site patent and intact, free from redness, edema or discomfort.  No evidence of extravasations.    Prior to discharge: Port is secured in place with tegaderm and flushed with 10cc NS with positive blood return noted.  Continuous home infusion CADD pump connected.    All connectors secured in place and clamps taped open.    Pump started, \"running\" noted on display (CADD): YES.  Patient instructed to call our clinic or [facility] with any questions or concerns at home.  Patient verbalized understanding.    Patient set up for pump disconnect at our clinic on 10:45am.       "       Discharge Plan:   Patient declined prescription refills.  Discharge instructions reviewed with: Patient.  Patient and/or family verbalized understanding of discharge instructions and all questions answered.  Copy of AVS reviewed with patient and/or family.  Patient will return [facility] on  8/13/20 for next appointment.  Patient discharged in stable condition accompanied by: self.  Departure Mode: Ambulatory.    Shekhar Smith RN

## 2020-08-11 NOTE — LETTER
"    8/11/2020         RE: Lowell Arredondo  [address] [facility] [address]        Dear Colleague,    Thank you for referring your patient, Lowell Arredondo, to the [facility]. Please see a copy of my visit note below.    Oncology Rooming Note    August 11, 2020 8:47 AM   Lowell Arredondo is a 80 year old male who presents for:    Chief Complaint   Patient presents with     Oncology Clinic Visit     Initial Vitals: /71   Pulse 76   Temp 98  F (36.7  C) (Oral)   Resp 16   Ht 1.753 m (5' 9.02\")   Wt 72.3 kg (159 lb 6.3 oz)   SpO2 98%   BMI 23.53 kg/m   Estimated body mass index is 23.53 kg/m  as calculated from the following:    Height as of this encounter: 1.753 m (5' 9.02\").    Weight as of this encounter: 72.3 kg (159 lb 6.3 oz). Body surface area is 1.88 meters squared.  Data Unavailable Comment: Data Unavailable   No LMP for male patient.  Allergies reviewed: Yes  Medications reviewed: Yes    Medications: Medication refills not needed today.  Pharmacy name entered into [employer]: [id number] - [place], [place] - [address]    Clinical concerns:  NP was notified.      Sarah Cifuentes [facility]              Oncology/Hematology Visit Note  Aug 11, 2020    Reason for Visit: follow up of metastatic  pancreatic cancer  Progressed after neoadjuvant gemcitabine and Abraxane he had diagnostic laparoscopy which showed tumor implants pancreatic cancer is unresectable . Abdominal wall nodule biopsy is positive for adenocarcinoma  Patient met with Dr. Segura who recommends treatment with modified  FOLFOX  On 07/28 (patient is receiving oxaliplatin and 5-FU home infusion)        Interval History:  Patient reports overall he has been tolerating treatment well.  Continues to have intermittent generalized abdominal pain almost for last 3 months  Patient denies nausea vomiting.  He reports normal bowel movements.  Denies fever chills sweats cough shortness of breath chest pain        Review of " "Systems:  14 point ROS of systems including Constitutional, Eyes, Respiratory, Cardiovascular, Gastroenterology, Genitourinary, Integumentary, Muscularskeletal, Psychiatric were all negative except for pertinent positives noted in my HPI.      Physical Examination:  General: The patient is a pleasant male in no acute distress.  /71   Pulse 76   Temp 98  F (36.7  C) (Oral)   Resp 16   Ht 1.753 m (5' 9.02\")   Wt 72.3 kg (159 lb 6.3 oz)   SpO2 98%   BMI 23.53 kg/m    HEENT: EOMI, PERRL. Sclerae are anicteric. Oral mucosa is pink and moist with no lesions or thrush.   Lymph: Neck is supple with no lymphadenopathy in the cervical or supraclavicular areas.   Heart: Regular rate and rhythm.   Lungs: Clear to auscultation bilaterally.   GI: Bowel sounds present, soft, nontender with no palpable node right lower abdomen   Extremities: No lower extremity edema noted bilaterally.   Skin: No rashes, petechiae, or bruising noted on exposed skin.    Laboratory Data:    Results for KARI YANG (MRN [id number]) as of 8/11/2020 11:19   Ref. Range 8/10/2020 10:05   Sodium Latest Ref Range: 133 - 144 mmol/L 139   Potassium Latest Ref Range: 3.4 - 5.3 mmol/L 4.2   Chloride Latest Ref Range: 94 - 109 mmol/L 108   Carbon Dioxide Latest Ref Range: 20 - 32 mmol/L 27   Urea Nitrogen Latest Ref Range: 7 - 30 mg/dL 18   Creatinine Latest Ref Range: 0.66 - 1.25 mg/dL 1.02   GFR Estimate Latest Ref Range: >60 mL/min/1.73_m2 68   GFR Estimate If Black Latest Ref Range: >60 mL/min/1.73_m2 79   Calcium Latest Ref Range: 8.5 - 10.1 mg/dL 8.8   Anion Gap Latest Ref Range: 3 - 14 mmol/L 4   Albumin Latest Ref Range: 3.4 - 5.0 g/dL 3.5   Protein Total Latest Ref Range: 6.8 - 8.8 g/dL 7.2   Bilirubin Total Latest Ref Range: 0.2 - 1.3 mg/dL 0.5   Alkaline Phosphatase Latest Ref Range: 40 - 150 U/L 61   ALT Latest Ref Range: 0 - 70 U/L 24   AST Latest Ref Range: 0 - 45 U/L 28   Glucose Latest Ref Range: 70 - 99 mg/dL 104 (H)   WBC Latest " Ref Range: 4.0 - 11.0 10e9/L 6.3   Hemoglobin Latest Ref Range: 13.3 - 17.7 g/dL 13.9   Hematocrit Latest Ref Range: 40.0 - 53.0 % 42.3   Platelet Count Latest Ref Range: 150 - 450 10e9/L 202   RBC Count Latest Ref Range: 4.4 - 5.9 10e12/L 4.33 (L)   MCV Latest Ref Range: 78 - 100 fl 98   MCH Latest Ref Range: 26.5 - 33.0 pg 32.1   MCHC Latest Ref Range: 31.5 - 36.5 g/dL 32.9   RDW Latest Ref Range: 10.0 - 15.0 % 14.0   Diff Method Unknown Automated Method   % Neutrophils Latest Units: % 45.1   % Lymphocytes Latest Units: % 40.9   % Monocytes Latest Units: % 9.7   % Eosinophils Latest Units: % 3.3   % Basophils Latest Units: % 0.8   % Immature Granulocytes Latest Units: % 0.2   Nucleated RBCs Latest Ref Range: 0 /100 0   Absolute Neutrophil Latest Ref Range: 1.6 - 8.3 10e9/L 2.8   Absolute Lymphocytes Latest Ref Range: 0.8 - 5.3 10e9/L 2.6   Absolute Monocytes Latest Ref Range: 0.0 - 1.3 10e9/L 0.6   Absolute Eosinophils Latest Ref Range: 0.0 - 0.7 10e9/L 0.2   Absolute Basophils Latest Ref Range: 0.0 - 0.2 10e9/L 0.1   Abs Immature Granulocytes Latest Ref Range: 0 - 0.4 10e9/L 0.0   Absolute Nucleated RBC Unknown 0.0           Assessment and Plan:    This  a 80-year-old male with    pancreatic cancer  Progressed after neoadjuvant gemcitabine and Abraxane he had diagnostic laparoscopy which showed tumor implants pancreatic cancer is unresectable return , abdominal wall nodule biopsy is positive for adenocarcinoma  Patient met with Dr. Segura who recommends treatment with FOLFOX on 07/28  Labs reviewed okay to proceed with treatment  -Schedule with Dr. 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 6 to 8 hours as needed  For pain  Side effects of oxycodone discussed in detail including drowsiness and constipation-patient is aware and is agreeable with the plan  -Refer to palliative care          Patient advised to call our clinic or go to ER in the event of fever chills sweats cough shortness of breath chest pain nausea vomiting diarrhea worsening of abdominal pain or bleeding  -        YAN Carranza CNP  [facility]     Chart documentation with Dragon Voice recognition Software. Although reviewed after completion, some words and grammatical errors may remain.          Again, thank you for allowing me to participate in the care of your patient.        Sincerely,        YAN Carranza CNP

## 2020-08-11 NOTE — PROGRESS NOTES
"Oncology/Hematology Visit Note  Aug 11, 2020    Reason for Visit: follow up of metastatic  pancreatic cancer  Progressed after neoadjuvant gemcitabine and Abraxane he had diagnostic laparoscopy which showed tumor implants pancreatic cancer is unresectable . Abdominal wall nodule biopsy is positive for adenocarcinoma  Patient met with Dr. Segura who recommends treatment with modified  FOLFOX  On 07/28 (patient is receiving oxaliplatin and 5-FU home infusion)        Interval History:  Patient reports overall he has been tolerating treatment well.  Continues to have intermittent generalized abdominal pain almost for last 3 months  Patient denies nausea vomiting.  He reports normal bowel movements.  Denies fever chills sweats cough shortness of breath chest pain        Review of Systems:  14 point ROS of systems including Constitutional, Eyes, Respiratory, Cardiovascular, Gastroenterology, Genitourinary, Integumentary, Muscularskeletal, Psychiatric were all negative except for pertinent positives noted in my HPI.      Physical Examination:  General: The patient is a pleasant male in no acute distress.  /71   Pulse 76   Temp 98  F (36.7  C) (Oral)   Resp 16   Ht 1.753 m (5' 9.02\")   Wt 72.3 kg (159 lb 6.3 oz)   SpO2 98%   BMI 23.53 kg/m    HEENT: EOMI, PERRL. Sclerae are anicteric. Oral mucosa is pink and moist with no lesions or thrush.   Lymph: Neck is supple with no lymphadenopathy in the cervical or supraclavicular areas.   Heart: Regular rate and rhythm.   Lungs: Clear to auscultation bilaterally.   GI: Bowel sounds present, soft, nontender with no palpable node right lower abdomen   Extremities: No lower extremity edema noted bilaterally.   Skin: No rashes, petechiae, or bruising noted on exposed skin.    Laboratory Data:    Results for KARI YANG (MRN [id number]) as of 8/11/2020 11:19   Ref. Range 8/10/2020 10:05   Sodium Latest Ref Range: 133 - 144 mmol/L 139   Potassium Latest Ref Range: 3.4 - " 5.3 mmol/L 4.2   Chloride Latest Ref Range: 94 - 109 mmol/L 108   Carbon Dioxide Latest Ref Range: 20 - 32 mmol/L 27   Urea Nitrogen Latest Ref Range: 7 - 30 mg/dL 18   Creatinine Latest Ref Range: 0.66 - 1.25 mg/dL 1.02   GFR Estimate Latest Ref Range: >60 mL/min/1.73_m2 68   GFR Estimate If Black Latest Ref Range: >60 mL/min/1.73_m2 79   Calcium Latest Ref Range: 8.5 - 10.1 mg/dL 8.8   Anion Gap Latest Ref Range: 3 - 14 mmol/L 4   Albumin Latest Ref Range: 3.4 - 5.0 g/dL 3.5   Protein Total Latest Ref Range: 6.8 - 8.8 g/dL 7.2   Bilirubin Total Latest Ref Range: 0.2 - 1.3 mg/dL 0.5   Alkaline Phosphatase Latest Ref Range: 40 - 150 U/L 61   ALT Latest Ref Range: 0 - 70 U/L 24   AST Latest Ref Range: 0 - 45 U/L 28   Glucose Latest Ref Range: 70 - 99 mg/dL 104 (H)   WBC Latest Ref Range: 4.0 - 11.0 10e9/L 6.3   Hemoglobin Latest Ref Range: 13.3 - 17.7 g/dL 13.9   Hematocrit Latest Ref Range: 40.0 - 53.0 % 42.3   Platelet Count Latest Ref Range: 150 - 450 10e9/L 202   RBC Count Latest Ref Range: 4.4 - 5.9 10e12/L 4.33 (L)   MCV Latest Ref Range: 78 - 100 fl 98   MCH Latest Ref Range: 26.5 - 33.0 pg 32.1   MCHC Latest Ref Range: 31.5 - 36.5 g/dL 32.9   RDW Latest Ref Range: 10.0 - 15.0 % 14.0   Diff Method Unknown Automated Method   % Neutrophils Latest Units: % 45.1   % Lymphocytes Latest Units: % 40.9   % Monocytes Latest Units: % 9.7   % Eosinophils Latest Units: % 3.3   % Basophils Latest Units: % 0.8   % Immature Granulocytes Latest Units: % 0.2   Nucleated RBCs Latest Ref Range: 0 /100 0   Absolute Neutrophil Latest Ref Range: 1.6 - 8.3 10e9/L 2.8   Absolute Lymphocytes Latest Ref Range: 0.8 - 5.3 10e9/L 2.6   Absolute Monocytes Latest Ref Range: 0.0 - 1.3 10e9/L 0.6   Absolute Eosinophils Latest Ref Range: 0.0 - 0.7 10e9/L 0.2   Absolute Basophils Latest Ref Range: 0.0 - 0.2 10e9/L 0.1   Abs Immature Granulocytes Latest Ref Range: 0 - 0.4 10e9/L 0.0   Absolute Nucleated RBC Unknown 0.0           Assessment and  Plan:    This  a 80-year-old male with    pancreatic cancer  Progressed after neoadjuvant gemcitabine and Abraxane he had diagnostic laparoscopy which showed tumor implants pancreatic cancer is unresectable return , abdominal wall nodule biopsy is positive for adenocarcinoma  Patient met with Dr. Segura who recommends treatment with FOLFOX on 07/28  Labs reviewed okay to proceed with treatment  -Schedule with Dr. 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 6 to 8 hours as needed  For pain  Side effects of oxycodone discussed in detail including drowsiness and constipation-patient is aware and is agreeable with the plan  -Refer to palliative care          Patient advised to call our clinic or go to ER in the event of fever chills sweats cough shortness of breath chest pain nausea vomiting diarrhea worsening of abdominal pain or bleeding  -        YAN Carranza [facility] [place]     Chart documentation with Dragon Voice recognition Software. Although reviewed after completion, some words and grammatical errors may remain.

## 2020-08-12 ENCOUNTER — PATIENT OUTREACH (OUTPATIENT)
Dept: ONCOLOGY | Facility: CLINIC | Age: 80
End: 2020-08-12

## 2020-08-12 NOTE — PROGRESS NOTES
Writer called [employer] to get an update on the requested MSI testing to be completed.     Writer updated that qty insufficient to perform requested testing.    Kathe Coles RN

## 2020-08-13 VITALS
TEMPERATURE: 97.4 F | DIASTOLIC BLOOD PRESSURE: 71 MMHG | HEART RATE: 68 BPM | RESPIRATION RATE: 16 BRPM | SYSTOLIC BLOOD PRESSURE: 128 MMHG

## 2020-08-13 PROCEDURE — 40000268 ZZH STATISTIC NO CHARGES

## 2020-08-13 NOTE — PROGRESS NOTES
Nursing Note:  Lowell Arredondo presents today for pump disconnect.    Patient seen by provider today: No   present during visit today: Not Applicable.    Note: N/A.    Intravenous Access:  Implanted Port.    Discharge Plan:   Patient was sent home    Jo Ann Galo RN

## 2020-08-24 ENCOUNTER — HOSPITAL ENCOUNTER (OUTPATIENT)
Facility: CLINIC | Age: 80
Setting detail: SPECIMEN
Discharge: HOME | End: 2020-08-24
Attending: INTERNAL MEDICINE | Admitting: INTERNAL MEDICINE
Payer: COMMERCIAL

## 2020-08-24 DIAGNOSIS — T45.1X5A CHEMOTHERAPY-INDUCED NEUTROPENIA (H): ICD-10-CM

## 2020-08-24 DIAGNOSIS — Z95.828 PORT-A-CATH IN PLACE: ICD-10-CM

## 2020-08-24 DIAGNOSIS — D70.1 CHEMOTHERAPY-INDUCED NEUTROPENIA (H): ICD-10-CM

## 2020-08-24 DIAGNOSIS — C25.2 MALIGNANT NEOPLASM OF TAIL OF PANCREAS (H): Primary | ICD-10-CM

## 2020-08-24 LAB
ALBUMIN SERPL-MCNC: 3.4 G/DL (ref 3.4–5)
ALP SERPL-CCNC: 71 U/L (ref 40–150)
ALT SERPL W P-5'-P-CCNC: 27 U/L (ref 0–70)
ANION GAP SERPL CALCULATED.3IONS-SCNC: 4 MMOL/L (ref 3–14)
AST SERPL W P-5'-P-CCNC: 29 U/L (ref 0–45)
BASOPHILS # BLD AUTO: 0 10E9/L (ref 0–0.2)
BASOPHILS NFR BLD AUTO: 0.5 %
BILIRUB SERPL-MCNC: 0.8 MG/DL (ref 0.2–1.3)
BUN SERPL-MCNC: 16 MG/DL (ref 7–30)
CALCIUM SERPL-MCNC: 8.9 MG/DL (ref 8.5–10.1)
CHLORIDE SERPL-SCNC: 107 MMOL/L (ref 94–109)
CO2 SERPL-SCNC: 27 MMOL/L (ref 20–32)
CREAT SERPL-MCNC: 0.93 MG/DL (ref 0.66–1.25)
DIFFERENTIAL METHOD BLD: ABNORMAL
EOSINOPHIL # BLD AUTO: 0.2 10E9/L (ref 0–0.7)
EOSINOPHIL NFR BLD AUTO: 3.4 %
ERYTHROCYTE [DISTWIDTH] IN BLOOD BY AUTOMATED COUNT: 14.5 % (ref 10–15)
GFR SERPL CREATININE-BSD FRML MDRD: 76 ML/MIN/{1.73_M2}
GLUCOSE SERPL-MCNC: 89 MG/DL (ref 70–99)
HCT VFR BLD AUTO: 41.1 % (ref 40–53)
HGB BLD-MCNC: 13.4 G/DL (ref 13.3–17.7)
IMM GRANULOCYTES # BLD: 0 10E9/L (ref 0–0.4)
IMM GRANULOCYTES NFR BLD: 0.2 %
LYMPHOCYTES # BLD AUTO: 2 10E9/L (ref 0.8–5.3)
LYMPHOCYTES NFR BLD AUTO: 36.6 %
MCH RBC QN AUTO: 31.2 PG (ref 26.5–33)
MCHC RBC AUTO-ENTMCNC: 32.6 G/DL (ref 31.5–36.5)
MCV RBC AUTO: 96 FL (ref 78–100)
MONOCYTES # BLD AUTO: 0.8 10E9/L (ref 0–1.3)
MONOCYTES NFR BLD AUTO: 14.9 %
NEUTROPHILS # BLD AUTO: 2.5 10E9/L (ref 1.6–8.3)
NEUTROPHILS NFR BLD AUTO: 44.4 %
NRBC # BLD AUTO: 0 10*3/UL
NRBC BLD AUTO-RTO: 0 /100
PLATELET # BLD AUTO: 160 10E9/L (ref 150–450)
POTASSIUM SERPL-SCNC: 4.2 MMOL/L (ref 3.4–5.3)
PROT SERPL-MCNC: 7.1 G/DL (ref 6.8–8.8)
RBC # BLD AUTO: 4.3 10E12/L (ref 4.4–5.9)
SODIUM SERPL-SCNC: 138 MMOL/L (ref 133–144)
WBC # BLD AUTO: 5.6 10E9/L (ref 4–11)

## 2020-08-24 PROCEDURE — 80053 COMPREHEN METABOLIC PANEL: CPT | Performed by: INTERNAL MEDICINE

## 2020-08-24 PROCEDURE — 36591 DRAW BLOOD OFF VENOUS DEVICE: CPT

## 2020-08-24 PROCEDURE — 85025 COMPLETE CBC W/AUTO DIFF WBC: CPT | Performed by: INTERNAL MEDICINE

## 2020-08-24 PROCEDURE — 25000128 H RX IP 250 OP 636: Performed by: NURSE PRACTITIONER

## 2020-08-24 RX ORDER — HEPARIN SODIUM (PORCINE) LOCK FLUSH IV SOLN 100 UNIT/ML 100 UNIT/ML
5 SOLUTION INTRAVENOUS
Status: DISCONTINUED | OUTPATIENT
Start: 2020-08-24 | End: 2020-08-24 | Stop reason: HOSPADM

## 2020-08-24 RX ORDER — HEPARIN SODIUM (PORCINE) LOCK FLUSH IV SOLN 100 UNIT/ML 100 UNIT/ML
5 SOLUTION INTRAVENOUS
Status: CANCELLED | OUTPATIENT
Start: 2020-08-24

## 2020-08-24 RX ORDER — HEPARIN SODIUM,PORCINE 10 UNIT/ML
5 VIAL (ML) INTRAVENOUS
Status: CANCELLED | OUTPATIENT
Start: 2020-08-24

## 2020-08-24 RX ADMIN — Medication 5 ML: at 10:20

## 2020-08-24 NOTE — PROGRESS NOTES
Infusion Nursing Note:  Lowell Arredondo presents today for port draw for chemo tomorrow.    Patient seen by provider today: No   present during visit today: Not Applicable.    Note: N/A.    Intravenous Access:  Labs drawn without difficulty.  Implanted Port.    Treatment Conditions:  Not Applicable.      Post Infusion Assessment:  Patient tolerated port draw without incident.  Blood return noted.  Site patent and intact, free from redness, edema or discomfort.  No evidence of extravasations.  Access discontinued per protocol.       Discharge Plan:     Patient will return 8/25 for next appointment at [facility] for treatment.  Patient discharged in stable condition accompanied by: self.  Departure Mode: Ambulatory.    Helena Felder RN

## 2020-08-25 ENCOUNTER — ONCOLOGY VISIT (OUTPATIENT)
Dept: ONCOLOGY | Facility: CLINIC | Age: 80
End: 2020-08-25
Attending: INTERNAL MEDICINE
Payer: COMMERCIAL

## 2020-08-25 ENCOUNTER — INFUSION THERAPY VISIT (OUTPATIENT)
Dept: INFUSION THERAPY | Facility: CLINIC | Age: 80
End: 2020-08-25
Attending: INTERNAL MEDICINE
Payer: COMMERCIAL

## 2020-08-25 ENCOUNTER — HOSPITAL ENCOUNTER (OUTPATIENT)
Facility: CLINIC | Age: 80
End: 2020-08-25
Payer: COMMERCIAL

## 2020-08-25 ENCOUNTER — TELEPHONE (OUTPATIENT)
Dept: ONCOLOGY | Facility: CLINIC | Age: 80
End: 2020-08-25

## 2020-08-25 VITALS
WEIGHT: 157 LBS | OXYGEN SATURATION: 96 % | SYSTOLIC BLOOD PRESSURE: 111 MMHG | HEIGHT: 69 IN | DIASTOLIC BLOOD PRESSURE: 68 MMHG | RESPIRATION RATE: 16 BRPM | TEMPERATURE: 98.1 F | HEART RATE: 76 BPM | BODY MASS INDEX: 23.25 KG/M2

## 2020-08-25 DIAGNOSIS — C25.2 MALIGNANT NEOPLASM OF TAIL OF PANCREAS (H): Primary | ICD-10-CM

## 2020-08-25 DIAGNOSIS — G89.3 CANCER ASSOCIATED PAIN: ICD-10-CM

## 2020-08-25 DIAGNOSIS — C25.9 PANCREAS CANCER (H): Primary | ICD-10-CM

## 2020-08-25 PROCEDURE — 96367 TX/PROPH/DG ADDL SEQ IV INF: CPT

## 2020-08-25 PROCEDURE — G0498 CHEMO EXTEND IV INFUS W/PUMP: HCPCS

## 2020-08-25 PROCEDURE — 25800030 ZZH RX IP 258 OP 636: Performed by: INTERNAL MEDICINE

## 2020-08-25 PROCEDURE — 99214 OFFICE O/P EST MOD 30 MIN: CPT | Performed by: INTERNAL MEDICINE

## 2020-08-25 PROCEDURE — 25000128 H RX IP 250 OP 636: Performed by: INTERNAL MEDICINE

## 2020-08-25 PROCEDURE — 96415 CHEMO IV INFUSION ADDL HR: CPT

## 2020-08-25 PROCEDURE — 96413 CHEMO IV INFUSION 1 HR: CPT

## 2020-08-25 PROCEDURE — G0463 HOSPITAL OUTPT CLINIC VISIT: HCPCS | Mod: 25

## 2020-08-25 RX ORDER — EPINEPHRINE 1 MG/ML
0.3 INJECTION, SOLUTION INTRAMUSCULAR; SUBCUTANEOUS EVERY 5 MIN PRN
Status: CANCELLED | OUTPATIENT
Start: 2020-08-25

## 2020-08-25 RX ORDER — HEPARIN SODIUM,PORCINE 10 UNIT/ML
5 VIAL (ML) INTRAVENOUS
Status: CANCELLED | OUTPATIENT
Start: 2020-08-25

## 2020-08-25 RX ORDER — NALOXONE HYDROCHLORIDE 0.4 MG/ML
.1-.4 INJECTION, SOLUTION INTRAMUSCULAR; INTRAVENOUS; SUBCUTANEOUS
Status: CANCELLED | OUTPATIENT
Start: 2020-09-08

## 2020-08-25 RX ORDER — ALBUTEROL SULFATE 0.83 MG/ML
2.5 SOLUTION RESPIRATORY (INHALATION)
Status: CANCELLED | OUTPATIENT
Start: 2020-09-08

## 2020-08-25 RX ORDER — HEPARIN SODIUM (PORCINE) LOCK FLUSH IV SOLN 100 UNIT/ML 100 UNIT/ML
5 SOLUTION INTRAVENOUS
Status: CANCELLED | OUTPATIENT
Start: 2020-08-26

## 2020-08-25 RX ORDER — HEPARIN SODIUM,PORCINE 10 UNIT/ML
5 VIAL (ML) INTRAVENOUS
Status: CANCELLED | OUTPATIENT
Start: 2020-09-10

## 2020-08-25 RX ORDER — MEPERIDINE HYDROCHLORIDE 25 MG/ML
25 INJECTION INTRAMUSCULAR; INTRAVENOUS; SUBCUTANEOUS EVERY 30 MIN PRN
Status: CANCELLED | OUTPATIENT
Start: 2020-08-25

## 2020-08-25 RX ORDER — HEPARIN SODIUM (PORCINE) LOCK FLUSH IV SOLN 100 UNIT/ML 100 UNIT/ML
5 SOLUTION INTRAVENOUS
Status: DISCONTINUED | OUTPATIENT
Start: 2020-08-25 | End: 2020-08-25 | Stop reason: HOSPADM

## 2020-08-25 RX ORDER — HEPARIN SODIUM,PORCINE 10 UNIT/ML
5 VIAL (ML) INTRAVENOUS
Status: CANCELLED | OUTPATIENT
Start: 2020-08-26

## 2020-08-25 RX ORDER — SODIUM CHLORIDE 9 MG/ML
1000 INJECTION, SOLUTION INTRAVENOUS CONTINUOUS PRN
Status: CANCELLED
Start: 2020-09-08

## 2020-08-25 RX ORDER — ALBUTEROL SULFATE 90 UG/1
1-2 AEROSOL, METERED RESPIRATORY (INHALATION)
Status: CANCELLED
Start: 2020-08-25

## 2020-08-25 RX ORDER — HEPARIN SODIUM (PORCINE) LOCK FLUSH IV SOLN 100 UNIT/ML 100 UNIT/ML
5 SOLUTION INTRAVENOUS
Status: CANCELLED | OUTPATIENT
Start: 2020-09-08

## 2020-08-25 RX ORDER — LORAZEPAM 2 MG/ML
0.5 INJECTION INTRAMUSCULAR EVERY 4 HOURS PRN
Status: CANCELLED
Start: 2020-09-08

## 2020-08-25 RX ORDER — OXYCODONE HCL 10 MG/1
10 TABLET, FILM COATED, EXTENDED RELEASE ORAL EVERY 12 HOURS
Qty: 60 TABLET | Refills: 0 | Status: SHIPPED | OUTPATIENT
Start: 2020-08-25 | End: 2020-09-03 | Stop reason: ALTCHOICE

## 2020-08-25 RX ORDER — METHYLPREDNISOLONE SODIUM SUCCINATE 125 MG/2ML
125 INJECTION, POWDER, LYOPHILIZED, FOR SOLUTION INTRAMUSCULAR; INTRAVENOUS
Status: CANCELLED
Start: 2020-08-25

## 2020-08-25 RX ORDER — DIPHENHYDRAMINE HYDROCHLORIDE 50 MG/ML
50 INJECTION INTRAMUSCULAR; INTRAVENOUS
Status: CANCELLED
Start: 2020-09-08

## 2020-08-25 RX ORDER — DIPHENHYDRAMINE HYDROCHLORIDE 50 MG/ML
50 INJECTION INTRAMUSCULAR; INTRAVENOUS
Status: CANCELLED
Start: 2020-08-25

## 2020-08-25 RX ORDER — ALBUTEROL SULFATE 90 UG/1
1-2 AEROSOL, METERED RESPIRATORY (INHALATION)
Status: CANCELLED
Start: 2020-09-08

## 2020-08-25 RX ORDER — HEPARIN SODIUM (PORCINE) LOCK FLUSH IV SOLN 100 UNIT/ML 100 UNIT/ML
5 SOLUTION INTRAVENOUS
Status: CANCELLED | OUTPATIENT
Start: 2020-08-25

## 2020-08-25 RX ORDER — OXYCODONE HYDROCHLORIDE 5 MG/1
5 TABLET ORAL EVERY 6 HOURS PRN
Qty: 60 TABLET | Refills: 0 | Status: SHIPPED | OUTPATIENT
Start: 2020-08-25 | End: 2020-09-03

## 2020-08-25 RX ORDER — HEPARIN SODIUM,PORCINE 10 UNIT/ML
5 VIAL (ML) INTRAVENOUS
Status: CANCELLED | OUTPATIENT
Start: 2020-09-08

## 2020-08-25 RX ORDER — MEPERIDINE HYDROCHLORIDE 25 MG/ML
25 INJECTION INTRAMUSCULAR; INTRAVENOUS; SUBCUTANEOUS EVERY 30 MIN PRN
Status: CANCELLED | OUTPATIENT
Start: 2020-09-08

## 2020-08-25 RX ORDER — METHYLPREDNISOLONE SODIUM SUCCINATE 125 MG/2ML
125 INJECTION, POWDER, LYOPHILIZED, FOR SOLUTION INTRAMUSCULAR; INTRAVENOUS
Status: CANCELLED
Start: 2020-09-08

## 2020-08-25 RX ORDER — NALOXONE HYDROCHLORIDE 0.4 MG/ML
.1-.4 INJECTION, SOLUTION INTRAMUSCULAR; INTRAVENOUS; SUBCUTANEOUS
Status: CANCELLED | OUTPATIENT
Start: 2020-08-25

## 2020-08-25 RX ORDER — LORAZEPAM 2 MG/ML
0.5 INJECTION INTRAMUSCULAR EVERY 4 HOURS PRN
Status: CANCELLED
Start: 2020-08-25

## 2020-08-25 RX ORDER — EPINEPHRINE 1 MG/ML
0.3 INJECTION, SOLUTION INTRAMUSCULAR; SUBCUTANEOUS EVERY 5 MIN PRN
Status: CANCELLED | OUTPATIENT
Start: 2020-09-08

## 2020-08-25 RX ORDER — HEPARIN SODIUM (PORCINE) LOCK FLUSH IV SOLN 100 UNIT/ML 100 UNIT/ML
5 SOLUTION INTRAVENOUS
Status: CANCELLED | OUTPATIENT
Start: 2020-09-10

## 2020-08-25 RX ORDER — ALBUTEROL SULFATE 0.83 MG/ML
2.5 SOLUTION RESPIRATORY (INHALATION)
Status: CANCELLED | OUTPATIENT
Start: 2020-08-25

## 2020-08-25 RX ORDER — SODIUM CHLORIDE 9 MG/ML
1000 INJECTION, SOLUTION INTRAVENOUS CONTINUOUS PRN
Status: CANCELLED
Start: 2020-08-25

## 2020-08-25 RX ADMIN — DEXTROSE MONOHYDRATE 250 ML: 50 INJECTION, SOLUTION INTRAVENOUS at 08:40

## 2020-08-25 RX ADMIN — OXALIPLATIN 150 MG: 100 INJECTION, SOLUTION, CONCENTRATE INTRAVENOUS at 09:09

## 2020-08-25 RX ADMIN — DEXAMETHASONE SODIUM PHOSPHATE: 10 INJECTION, SOLUTION INTRAMUSCULAR; INTRAVENOUS at 08:39

## 2020-08-25 ASSESSMENT — PAIN SCALES - GENERAL: PAINLEVEL: EXTREME PAIN (8)

## 2020-08-25 ASSESSMENT — MIFFLIN-ST. JEOR: SCORE: 1402.84

## 2020-08-25 NOTE — PATIENT INSTRUCTIONS
Your chemo Pump will infuse for 46 hours. If at any time the pump should malfunction or you have pump concerns, please contact [facility] after hours at [phone].You will need an appointment for the pump disconnect in our clinic or with [facility].

## 2020-08-25 NOTE — PROGRESS NOTES
Visit Date:   08/25/2020     ONCOLOGY HISTORY: Mr. Arredondo is a gentleman with metastatic pancreatic cancer.  -Prostate cancer [place] 7.   1.  Prostate biopsy on 09/09/2019 revealed patricia 7 prostate cancer.  -Bone scan on 09/26/2019 does not reveal any bone metastasis.  There is some cardiac uptake.   -CT abdomen and pelvis on 09/26/2019 reveals new soft tissue mesenteric mass in the region of pancreatic tail.      2.  EUS on 10/07/2019 revealed an irregular mass in the pancreatic tail measuring 3.8 x 2.2 cm.  There was some evidence of invasion into the portal vein.  No lymphadenopathy seen.  Based on the EUS, it was T3 Nx disease.    -FNA is positive for moderately differentiated pancreatic adenocarcinoma.      3. PET scan on 10/15/2019 reveals hypermetabolic 4.2 cm mass in the pancreatic tail and an adjacent 1 cm peripancreatic lymph node. No evidence of any metastatic disease. There are 2 foci of mild FDG uptake in the prostate gland from prostate cancer.      4. Neoadjuvant gemcitabine and Abraxane x 6 cycles between 11/07/2019 and 04/20/2020.  Abraxane stopped after cycle 4 because of toxicity.   -PET scan on 05/05/2020 reveals decrease in size of hypermetabolic mass at pancreatic tail and improvement in adjacent hypermetabolic lymph node.      5. Patient had diagnostic laparoscopy on 05/12/2020. He has unresectable pancreatic cancer. There are tumor implants.  -Peritoneal nodule biopsy is positive for adenocarcinoma.     SUBJECTIVE:  Mr. Arredondo is an 80-year-old gentleman with metastatic pancreatic cancer.  He is currently on palliative chemotherapy with modified FOLFOX-6, which was started on 07/28/2020.  No bolus 5-FU or leucovorin.      Overall, he is tolerating chemotherapy well.  Main problem is pain.  The patient has metastatic subcutaneous nodules in the abdominal wall.  That has been causing more pain.  He is on oxycodone which helps.  He has to take oxycodone frequently.      He has fatigue.  No  worsening.  No headache, dizziness or visual problem.  No neck pain.  No chest pain.  No shortness of breath.  No abdominal distention.  No vomiting.  Occasional nausea.  No urinary or bowel complaints.  No abnormal bleeding.  No fever or chills.  He has neuropathy. It is not getting worse.  He has not been falling down.      PHYSICAL EXAMINATION:   GENERAL:  The patient is alert, oriented x 3.   VITAL SIGNS:  Reviewed.  ECOG PS between 1 and 2.   EYES:  No icterus.  No redness.   THROAT:  Not examined.   NECK:  Supple.  No lymphadenopathy or thyromegaly.   AXILLAE:  No lymphadenopathy.   LUNGS:  Good air entry bilaterally.  No crackles or wheezing.   HEART:  Regular.   ABDOMEN:  Nondistended.  There are subcutaneous nodules, both in the right upper and left upper quadrant.  They are tender.  Deep palpation not done because of tenderness.  No skin lesions on the abdomen.   EXTREMITIES:  No pedal edema.  No calf swelling or tenderness.   SKIN:  No petechia.  No suspicious skin rash.      LABORATORY DATA:  Reviewed.      ASSESSMENT:   1.  An 80-year-old gentleman with metastatic pancreatic cancer.   2.  Abdominal pain from pancreatic cancer.  Subcutaneous malignant nodules have been causing increasing pain.   3.  Peripheral neuropathy.   4.  Fatigue.      PLAN:   1.  I discussed regarding pancreatic cancer.  He is on modified FOLFOX-6.  He will be continued on it.  He is tolerating it well.  I explained to the patient that his neuropathy can get worse.  Advised him to let us know when the neuropathy starts getting worse.  At this time, he can still do things with his hands, including buttoning his shirt.   2.  Discussed regarding his pain is because of malignancy.  I will start him on OxyContin 10 mg twice a day.  He will continue on oxycodone as needed.  Discussed regarding palliative radiation to the subcutaneous abdominal nodules.  That will help with the pain.  He is agreeable for it.  Radiation Oncology  appointment will be scheduled.   3.  Discussed regarding Palliative Care consult because of metastatic pancreatic cancer.  He is agreeable for it.  That will be scheduled.     4.  Discussed regarding scans.  We will get CT chest, abdomen and pelvis in a month to re-evaluate his disease.   5.  I will see him in a month.  In between, he will see our nurse practitioner.  I advised the patient to call us with any questions or concerns.         ROXY LANCASTER MD             D: 2020   T: 2020   MT: WEN      Name:     KARI YANG   MRN:      [id number]        Account:      [id number]   :      1940           Visit Date:   2020      Document: [id number]

## 2020-08-25 NOTE — LETTER
8/25/2020         RE: Lowell Arredondo  [address] [facility] [address]        Dear Colleague,    Thank you for referring your patient, Lowell Arredondo, to the [facility]. Please see a copy of my visit note below.    Visit Date:   08/25/2020     ONCOLOGY HISTORY: Mr. Arredondo is a gentleman with metastatic pancreatic cancer.  -Prostate cancer [place] 7.   1.  Prostate biopsy on 09/09/2019 revealed patricia 7 prostate cancer.  -Bone scan on 09/26/2019 does not reveal any bone metastasis.  There is some cardiac uptake.   -CT abdomen and pelvis on 09/26/2019 reveals new soft tissue mesenteric mass in the region of pancreatic tail.      2.  EUS on 10/07/2019 revealed an irregular mass in the pancreatic tail measuring 3.8 x 2.2 cm.  There was some evidence of invasion into the portal vein.  No lymphadenopathy seen.  Based on the EUS, it was T3 Nx disease.    -FNA is positive for moderately differentiated pancreatic adenocarcinoma.      3. PET scan on 10/15/2019 reveals hypermetabolic 4.2 cm mass in the pancreatic tail and an adjacent 1 cm peripancreatic lymph node. No evidence of any metastatic disease. There are 2 foci of mild FDG uptake in the prostate gland from prostate cancer.      4. Neoadjuvant gemcitabine and Abraxane x 6 cycles between 11/07/2019 and 04/20/2020.  Abraxane stopped after cycle 4 because of toxicity.   -PET scan on 05/05/2020 reveals decrease in size of hypermetabolic mass at pancreatic tail and improvement in adjacent hypermetabolic lymph node.      5. Patient had diagnostic laparoscopy on 05/12/2020. He has unresectable pancreatic cancer. There are tumor implants.  -Peritoneal nodule biopsy is positive for adenocarcinoma.     SUBJECTIVE:  Mr. Arredondo is an 80-year-old gentleman with metastatic pancreatic cancer.  He is currently on palliative chemotherapy with modified FOLFOX-6, which was started on 07/28/2020.  No bolus 5-FU or leucovorin.      Overall, he is tolerating  chemotherapy well.  Main problem is pain.  The patient has metastatic subcutaneous nodules in the abdominal wall.  That has been causing more pain.  He is on oxycodone which helps.  He has to take oxycodone frequently.      He has fatigue.  No worsening.  No headache, dizziness or visual problem.  No neck pain.  No chest pain.  No shortness of breath.  No abdominal distention.  No vomiting.  Occasional nausea.  No urinary or bowel complaints.  No abnormal bleeding.  No fever or chills.  He has neuropathy. It is not getting worse.  He has not been falling down.      PHYSICAL EXAMINATION:   GENERAL:  The patient is alert, oriented x 3.   VITAL SIGNS:  Reviewed.  ECOG PS between 1 and 2.   EYES:  No icterus.  No redness.   THROAT:  Not examined.   NECK:  Supple.  No lymphadenopathy or thyromegaly.   AXILLAE:  No lymphadenopathy.   LUNGS:  Good air entry bilaterally.  No crackles or wheezing.   HEART:  Regular.   ABDOMEN:  Nondistended.  There are subcutaneous nodules, both in the right upper and left upper quadrant.  They are tender.  Deep palpation not done because of tenderness.  No skin lesions on the abdomen.   EXTREMITIES:  No pedal edema.  No calf swelling or tenderness.   SKIN:  No petechia.  No suspicious skin rash.      LABORATORY DATA:  Reviewed.      ASSESSMENT:   1.  An 80-year-old gentleman with metastatic pancreatic cancer.   2.  Abdominal pain from pancreatic cancer.  Subcutaneous malignant nodules have been causing increasing pain.   3.  Peripheral neuropathy.   4.  Fatigue.      PLAN:   1.  I discussed regarding pancreatic cancer.  He is on modified FOLFOX-6.  He will be continued on it.  He is tolerating it well.  I explained to the patient that his neuropathy can get worse.  Advised him to let us know when the neuropathy starts getting worse.  At this time, he can still do things with his hands, including buttoning his shirt.   2.  Discussed regarding his pain is because of malignancy.  I will start  him on OxyContin 10 mg twice a day.  He will continue on oxycodone as needed.  Discussed regarding palliative radiation to the subcutaneous abdominal nodules.  That will help with the pain.  He is agreeable for it.  Radiation Oncology appointment will be scheduled.   3.  Discussed regarding Palliative Care consult because of metastatic pancreatic cancer.  He is agreeable for it.  That will be scheduled.     4.  Discussed regarding scans.  We will get CT chest, abdomen and pelvis in a month to re-evaluate his disease.   5.  I will see him in a month.  In between, he will see our nurse practitioner.  I advised the patient to call us with any questions or concerns.         ROXY LANCASTER MD             D: 2020   T: 2020   MT: WEN      Name:     KARI YANG   MRN:      [id number]        Account:      [id number]   :      1940           Visit Date:   2020      Document: [id number]      Visit Date:   2020      SUBJECTIVE:  Mr. Yang is an 80-year-old gentleman with metastatic pancreatic cancer.  He is currently on palliative chemotherapy with modified FOLVOX-6, which was started on 2020.  No bolus 5-FU or leucovorin.      Overall, he is tolerating chemotherapy well.  Main problem is pain.  The patient has metastatic subcutaneous nodules in the abdominal wall.  That has been causing more pain.  He is on oxycodone which helps.  He has to take oxycodone frequently.      He has fatigue.  No worsening.  No headache, no dizziness, no visual problem.  No neck pain.  No chest pain.  No shortness of breath.  No abdominal distention.  No vomiting.  Occasional nausea.  No urinary or bowel complaints.  No abnormal bleeding.  No fever or chills.  He has neuropathy; it is not getting worse.  He has not been falling down.      PHYSICAL EXAMINATION:   GENERAL:  The patient is alert, oriented x 3.   VITAL SIGNS:  Reviewed.  ECOG PS between 1 and 2.   EYES:  No icterus.  No redness.   THROAT:  Not  examined.   NECK:  Supple.  No lymphadenopathy or thyromegaly.   AXILLAE:  No lymphadenopathy.   LUNGS:  Good air entry bilaterally.  No crackles or wheezing.   HEART:  Regular.   ABDOMEN:  Nondistended.  There are subcutaneous nodules, both in the right upper and left upper quadrant.  They are tender.  Deep palpation not done because of tenderness.  No skin lesions on the abdomen.   EXTREMITIES:  No pedal edema.  No calf swelling or tenderness.   SKIN:  No petechia.  No suspicious skin rash.      LABORATORY DATA:  Reviewed.      ASSESSMENT:   1.  An 80-year-old gentleman with metastatic pancreatic cancer.   2.  Abdominal pain from pancreatic cancer.  Subcutaneous malignant nodules have been causing increasing pain.   3.  Peripheral neuropathy.   4.  Fatigue.      PLAN:   1.  I discussed regarding pancreatic cancer.  He is on modified FOLVOX-6.  He will be continued on it.  He is tolerating it well.  I explained to the patient that his neuropathy can get worse.  Advised him to let us know when the neuropathy starts getting worse.  At this time, he can still do things with his hands, including buttoning his shirt.   2.  Discussed regarding his pain is because of malignancy.  I will start him on OxyContin 10 mg twice a day.  He will continue on oxycodone as needed.  Discussed regarding palliative radiation to the subcutaneous abdominal nodules.  That will help with the pain.  He is agreeable for it.  A Radiation Oncology appointment is scheduled.   3.  Discussed regarding Palliative Care consult because of metastatic pancreatic cancer.  He is agreeable for it.  That will be scheduled.     4.  Discussed regarding scans.  We will get CT chest, abdomen and pelvis in a month to re-evaluate his disease.   5.  I will see him in a month.  In between, he will see our nurse practitioner.  I advised the patient to call us with any questions or concerns.         ROXY LANCASTER MD             D: 08/25/2020   T: 08/25/2020   MT:  LS      Name:     KARI YANG   MRN:      [id number]        Account:      [id number]   :      1940           Visit Date:   2020      Document: [id number]       Again, thank you for allowing me to participate in the care of your patient.        Sincerely,        Diego Segura MD

## 2020-08-25 NOTE — TELEPHONE ENCOUNTER
Question  Answer  Comment    Panel  Extended Solid Tumor Panel     Block ID or biopsy collection date for fixed tissue  Blood     Has Genetic Consent Form been signed?  Yes     Clinical indications for testing  Pancreatic cancer       According to order specimen to be done on blood and will need to be drawn with patient's pump disconnect on 8/27.    Will follow up on 8/27.    Kathe Coles RN

## 2020-08-25 NOTE — PROGRESS NOTES
"Infusion Nursing Note:  Lowell Arredondo presents today for C3D1 Folfox.    Patient seen by provider today: Yes: Dr. Segura   present during visit today: Not Applicable.    Note: N/A.    Intravenous Access:  Implanted Port.    Treatment Conditions:  Lab Results   Component Value Date    HGB 13.4 08/24/2020     Lab Results   Component Value Date    WBC 5.6 08/24/2020      Lab Results   Component Value Date    ANEU 2.5 08/24/2020     Lab Results   Component Value Date     08/24/2020      Lab Results   Component Value Date     08/24/2020                   Lab Results   Component Value Date    POTASSIUM 4.2 08/24/2020           Lab Results   Component Value Date    MAG 2.0 01/25/2020            Lab Results   Component Value Date    CR 0.93 08/24/2020                   Lab Results   Component Value Date    VICENTE 8.9 08/24/2020                Lab Results   Component Value Date    BILITOTAL 0.8 08/24/2020           Lab Results   Component Value Date    ALBUMIN 3.4 08/24/2020                    Lab Results   Component Value Date    ALT 27 08/24/2020           Lab Results   Component Value Date    AST 29 08/24/2020       Results reviewed, labs MET treatment parameters, ok to proceed with treatment.      Post Infusion Assessment:  Patient tolerated infusion without incident.  Blood return noted pre and post infusion.  Site patent and intact, free from redness, edema or discomfort.  No evidence of extravasations.     Prior to discharge: Port is secured in place with tegaderm and flushed with 10cc NS with positive blood return noted.  Continuous home infusion CADD pump connected.    All connectors secured in place and clamps taped open.    Pump started, \"running\" noted on display (CADD): YES.  Patient instructed to call our clinic or [facility] with any questions or concerns at home.  Patient verbalized understanding.    Patient set up for pump disconnect at [facility] on 8/27/20 at 08:30am.  "         Discharge Plan:   Prescription refills given for Oxycontin/Roxicodone.  Discharge instructions reviewed with: Patient.  Patient and/or family verbalized understanding of discharge instructions and all questions answered.  AVS to patient via Loku[employer].  Patient will return 9/8/20 for next appointment.   Patient discharged in stable condition accompanied by: self.  Departure Mode: Ambulatory.    Shekhar Smith RN

## 2020-08-25 NOTE — PATIENT INSTRUCTIONS
1. Continue chemotherapy.  2. Start oxycontin 10 mg twice a day.  -Continue oxycodone as needed.  3. CT scan in 4 weeks.  4. See Gerson Massey with next treatment.  5. See me in 1 month.  6. Radiation oncology consult. (The office will call the patient, but number given to patient if he hasnt heard back in a couple of days: [facility] - [place] [phone]    7. Palliative care consult.      Patient in [facility] [place]

## 2020-08-25 NOTE — PROGRESS NOTES
Visit Date:   07/23/2020     ONCOLOGY HISTORY: Mr. Arredondo is a gentleman with metastatic pancreatic cancer.  -Prostate cancer [place] 7.   1.  Prostate biopsy on 09/09/2019 revealed patricia 7 prostate cancer.  -Bone scan on 09/26/2019 does not reveal any bone metastasis.  There is some cardiac uptake.   -CT abdomen and pelvis on 09/26/2019 reveals new soft tissue mesenteric mass in the region of pancreatic tail.      2.  EUS on 10/07/2019 revealed an irregular mass in the pancreatic tail measuring 3.8 x 2.2 cm.  There was some evidence of invasion into the portal vein.  No lymphadenopathy seen.  Based on the EUS, it was T3 Nx disease.    -FNA is positive for moderately differentiated pancreatic adenocarcinoma.      3. PET scan on 10/15/2019 reveals hypermetabolic 4.2 cm mass in the pancreatic tail and an adjacent 1 cm peripancreatic lymph node. No evidence of any metastatic disease. There are 2 foci of mild FDG uptake in the prostate gland from prostate cancer.      4. Neoadjuvant gemcitabine and Abraxane x 6 cycles between 11/07/2019 and 04/20/2020.  Abraxane stopped after cycle 4 because of toxicity.   -PET scan on 05/05/2020 reveals decrease in size of hypermetabolic mass at pancreatic tail and improvement in adjacent hypermetabolic lymph node.      5. Patient had diagnostic laparoscopy on 05/12/2020. He has unresectable pancreatic cancer. There are tumor implants.  -Peritoneal nodule biopsy is positive for adenocarcinoma.     SUBJECTIVE:  Mr. Arredondo is an 80-year-old gentleman with metastatic pancreatic cancer.  He initially was felt to be resectable pancreatic cancer.  He had neoadjuvant gemcitabine and Abraxane.  He had diagnostic laparoscopy 05/2020.  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      We have been observing him.  The patient lately has been a little more symptomatic.  He is getting weaker.  He also has been having intermittent abdominal pain.      No headache.  No dizziness.  No chest pain  or shortness of breath.  No recurrent nausea or vomiting.  Appetite is fairly good.  No fever or chills.      Labs have been monitored.  His CA 19-9 has continued to increase and now is 5765.     CT abdomen and pelvis on 07/20/2020 reveals progression of pancreatic cancer.  There is also a new anterior abdominal wall tumor deposit.      PHYSICAL EXAMINATION:   GENERAL:  He is alert, oriented x 3.   VITAL SIGNS:  Reviewed.  ECOG PS of 1.   The rest of the systems not examined.      LABORATORY DATA:  Reviewed.      ASSESSMENT:   1.  An 80-year-old gentleman with metastatic pancreatic cancer, which is progressing.   2.  Abdominal pain from pancreatic cancer.   3.  Peripheral neuropathy, stable.      PLAN:   1.  CT was reviewed with the patient.  I explained to him that it reveals progression of disease.  Also, his CA 19-9 has increased.  His pancreatic cancer is progressing.     2.  Discussed with him regarding treatment.  Because of progression, I would recommend further chemotherapy.  The patient is agreeable for it.  Different options discussed.  We will start him on FOLFOX.  We will not give him bolus 5-FU and leucovorin.  Side effect of 5-FU and oxaliplatin discussed.  I advised the patient that his neuropathy can get worse.  Advised him to let us know if he has worsening numbness, tingling, or pain in the extremities.  Also discussed regarding cold sensitivity.  Advised him to avoid cold for 1-2 days after the chemotherapy.  With 5-FU, he can have diarrhea, mucositis and hand-foot syndrome.  These were all discussed.   3.  The patient had a few questions, which were all answered.  I will see him in 4-6 weeks.  In between, he will see our nurse practitioner.      Total face to face time spent, 30 minutes, more than 50% of the time spent in counseling and coordination of care.         ROXY LANCASTER MD             D: 08/25/2020   T: 08/25/2020   MT: CORRINE      Name:     KARI YANG   MRN:      [id number]         Account:      [id number]   :      1940           Visit Date:   2020      Document: [id number]

## 2020-08-25 NOTE — TELEPHONE ENCOUNTER
Melvi call lab statint that Next Generation sequencing was ordered at 0830 this mornong. Melvi is calling to verify if this is supposed to be done on  Patient;s blood or if this is a pathology request. Melvi has requested a call back at [phone] for clarification. Message will be routed to [facility].

## 2020-08-27 ENCOUNTER — TELEPHONE (OUTPATIENT)
Dept: PHARMACY | Facility: CLINIC | Age: 80
End: 2020-08-27

## 2020-08-27 ENCOUNTER — TELEPHONE (OUTPATIENT)
Dept: ONCOLOGY | Facility: CLINIC | Age: 80
End: 2020-08-27

## 2020-08-27 VITALS
SYSTOLIC BLOOD PRESSURE: 98 MMHG | RESPIRATION RATE: 16 BRPM | HEART RATE: 74 BPM | DIASTOLIC BLOOD PRESSURE: 62 MMHG | TEMPERATURE: 98 F

## 2020-08-27 NOTE — PROGRESS NOTES
Infusion Nursing Note:  Lowell Arredondo presents today for pump disconnect.    Patient seen by provider today: No   present during visit today: Not Applicable.    Note: N/A.    Intravenous Access:  Implanted Port.    Treatment Conditions:  Not Applicable.      Post Infusion Assessment:  Patient tolerated infusion without incident.  Blood return noted pre and post infusion.  Site patent and intact, free from redness, edema or discomfort.  No evidence of extravasations.  Access discontinued per protocol.       Discharge Plan:   Patient declined prescription refills.  Discharge instructions reviewed with: Patient.  Patient and/or family verbalized understanding of discharge instructions and all questions answered.  Patient discharged in stable condition accompanied by: self.  Departure Mode: Ambulatory.    Jo Ann Galo RN

## 2020-08-27 NOTE — TELEPHONE ENCOUNTER
PA Initiation    Medication: Oxycontin ER 10mg_PENDING  Insurance Company: [employer] - Phone [phone] Fax [phone]  Pharmacy Filling the Rx: [id number] - [place] - [address]  Filling Pharmacy Phone:    Filling Pharmacy Fax:    Start Date: 8/27/2020

## 2020-08-27 NOTE — TELEPHONE ENCOUNTER
Patient spouse called to request that patient have Radiation Therapy at [facility]. COLTEN Ontiveros called with pateint information. They will review chart and call patient to schedule.

## 2020-08-28 NOTE — TELEPHONE ENCOUNTER
PRIOR AUTHORIZATION DENIED    Medication: Oxycontin ER 10mg_DENIED    Denial Date: 8/27/2020    Denial Rational: ? Not sure, waiting for faxed denial letter    Appeal Information: waiting for denial letter

## 2020-08-31 ENCOUNTER — TRANSFERRED RECORDS (OUTPATIENT)
Dept: HEALTH INFORMATION MANAGEMENT | Facility: CLINIC | Age: 80
End: 2020-08-31

## 2020-09-01 ENCOUNTER — PATIENT OUTREACH (OUTPATIENT)
Dept: ONCOLOGY | Facility: CLINIC | Age: 80
End: 2020-09-01

## 2020-09-01 DIAGNOSIS — R52 PAIN: Primary | ICD-10-CM

## 2020-09-01 RX ORDER — MORPHINE SULFATE 15 MG/1
15 TABLET, FILM COATED, EXTENDED RELEASE ORAL EVERY 12 HOURS
Qty: 60 TABLET | Refills: 0 | Status: SHIPPED | OUTPATIENT
Start: 2020-09-01 | End: 2020-09-03

## 2020-09-01 NOTE — TELEPHONE ENCOUNTER
New prescription of MSCONTIN 15mg BID has been routed to Gerson Massey CNP to review and sign.    Kathe Coles RN

## 2020-09-01 NOTE — PROGRESS NOTES
Patient's OXYCONTIN 10 mg BID was denied.     New order for MSCONITN 15mg BID ordered.     Gerson RÍOS will review order and sign.    Writer called and spoke with patient's wife Melvi and discussed that [facility] . I discussed  With Melvi to watch for drowsiness and to keep patient on a bowel regimen.Melvi verbalized understanding.    Kathe Coles RN

## 2020-09-01 NOTE — TELEPHONE ENCOUNTER
Per call to [employer], the PA was denied due to patient needing to try and fail the formulary medications: fentanyl patch, Xtampza ER, morphine sulfate, or methadone. She is refaxing out the denial letter. Will need reason why the oxycontin is medically necessary or make a switch to one of the formulary  Medications.

## 2020-09-02 ENCOUNTER — TELEPHONE (OUTPATIENT)
Dept: PHARMACY | Facility: OTHER | Age: 80
End: 2020-09-02

## 2020-09-02 NOTE — TELEPHONE ENCOUNTER
Prior Authorization Approval    Authorization Effective Date: 6/4/2020  Authorization Expiration Date: 9/2/2021  Medication: Morphine ER 15mg tab-PA Approved  Approved Dose/Quantity: 60/30ds  Reference #:   N/a  Insurance Company: [employer] [place] - Phone [phone] Fax [phone]  Expected CoPay:       CoPay Card Available:      Foundation Assistance Needed:    Which Pharmacy is filling the prescription (Not needed for infusion/clinic administered): [id number] - [place] - [address] [facility]  Pharmacy Notified: Yes the order processed through insurance  Patient Notified: Yes I spoke to Melvi and let her know

## 2020-09-03 ENCOUNTER — VIRTUAL VISIT (OUTPATIENT)
Dept: ONCOLOGY | Facility: CLINIC | Age: 80
End: 2020-09-03
Attending: HOSPITALIST
Payer: COMMERCIAL

## 2020-09-03 VITALS — BODY MASS INDEX: 22.73 KG/M2 | WEIGHT: 154 LBS

## 2020-09-03 DIAGNOSIS — C25.2 MALIGNANT NEOPLASM OF TAIL OF PANCREAS (H): ICD-10-CM

## 2020-09-03 DIAGNOSIS — G89.3 CANCER ASSOCIATED PAIN: ICD-10-CM

## 2020-09-03 DIAGNOSIS — R52 PAIN: ICD-10-CM

## 2020-09-03 PROCEDURE — 40001009 ZZH VIDEO/TELEPHONE VISIT; NO CHARGE

## 2020-09-03 PROCEDURE — 99204 OFFICE O/P NEW MOD 45 MIN: CPT | Mod: 95 | Performed by: HOSPITALIST

## 2020-09-03 RX ORDER — OXYCODONE HYDROCHLORIDE 5 MG/1
5-10 TABLET ORAL EVERY 4 HOURS PRN
Qty: 60 TABLET | Refills: 0 | Status: ON HOLD | COMMUNITY
Start: 2020-09-03 | End: 2020-09-21

## 2020-09-03 RX ORDER — MORPHINE SULFATE 15 MG/1
15 TABLET, FILM COATED, EXTENDED RELEASE ORAL 2 TIMES DAILY
Qty: 60 TABLET | Refills: 0 | Status: ON HOLD | COMMUNITY
Start: 2020-09-03 | End: 2020-09-23

## 2020-09-03 ASSESSMENT — PAIN SCALES - GENERAL: PAINLEVEL: MODERATE PAIN (5)

## 2020-09-03 NOTE — PATIENT INSTRUCTIONS
Patient Instructions      Expand All Collapse All    Thank you for coming into the Palliative Care Clinic today.     1. Pain:    - take MS Contin 15mg in the evening only  - take oxycodone 5-10mg up to every 4 hours as needed for pain  - if you take at least 3 or 4 tablets of oxycodone per day, we can discuss adding a morning dose of MS Contin. Please give us a call if you feel that would help  - once the doses of MS Contin and oxycodone are stable for a few days, please call and we can start gradually increasing the gabapentin   - I prescribed naloxone (Narcan) nasal spray, which is a rescue medication for an opioid overdose. Taking the medications as prescribed is very safe.  - once the radiation treatments show effect, you'll notice a decrease in your pain and need for opioids. Please never hesitate to call if you'd like to discuss how to decrease doses safely.     2. Constipation  - please stop metamucil if you are not able to drink ample fluids for any reason  - please continue miralax  - please add senna 1-2 tablets up to twice daily    3. Treatment preferences:   - We completed a POLST form during your visit today. I will send the completed form to your house. Please look at it carefully. If the form accurately reflects your wishes, please attach it to the refrigerator. Please do not hesitate to call if you have any questions about this form or if it needs a correction.     Call if your symptoms change and the medications we have prescribed are not working. Do not take your medications at any other dose or frequently than how they are prescribed.     Call us at least a week in advance if you need a medication refill    Please bring all your pill bottles to your next appointment.     Failure to follow these instructions may result in the palliative care team not being able to prescribe your medications.    Return to clinic in 6 weeks for a follow up.     You can reach the Palliative Care Team during business  hours at the following number:    - [phone]    To reach the Palliative Care Provider on-call After-hours or on holidays and weekends, call: [phone].  Please note that we are not able to provide pain medication refills on evenings or weekends.

## 2020-09-03 NOTE — PROGRESS NOTES
Palliative Care Outpatient Clinic Consultation Note    Patient:  Lowell Arredondo    Chief Complaint:   Lowell Arredondo is a 80 year old male who is being evaluated via a billable video visit today at the request of Dr Segura for a palliative care consultation secondary to pancreatic cancer.      The patient's primary care provider is:  Diego Segura.     The patient is on the call with his wife    History of Present Illness:  Medical History:  - pancreatic cancer diagnosed Oct 2019, metastatic to abdominal wall   - on FOLFOX    - plan for palliative XRT to subcutaneous nodules  - prostate cancer    Introduced the scope of our practice. Discussed our potential roles for symptom management, support/coping, and decisional support (aka goals of care).    He has some abdominal pain. This is ok at rest, but becomes quite bothersome when he moves around. He does limit his activities due to this.     He takes oxycodone 5mg in the morning and evening. He sometimes takes tylenol in between. The effect of oxycodone doesn't last the fuull 6 hours.    He also has painful neuropathy in his feet, associated with numbness    He takes metamucil and miralax every morning    Patient's Disease Understanding: He realizes that his treatments are palliative in intent.    Coping:  He states he is in good spirits, likes to keep as active as possible.     Social History  Living Situation: with his wife    Support System: family, friends    Spiritual Background: attends [employer]    Social History     Tobacco Use     Smoking status: Never Smoker     Smokeless tobacco: Never Used   Substance Use Topics     Alcohol use: Not Currently     Alcohol/week: 10.0 - 14.0 standard drinks     Types: 10 - 14 Standard drinks or equivalent per week     Drug use: Never         Advance Care Planning:  Goals of Care: live with a good quality of life, be as active as possible  Discussed code status and he agrees to DNR/DNI.    Decision Making Capacity:  intact    Advance Directive:    Dated 2010: designated his wife Melvi as his agent with their daughter Jesi as alternate    POLST:   Completed today: DNR/DNI    Recommendations & Counseliny/o man with pancreatic cancer    Pain: related to metastatic disease. This limits his mobility/activity.   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  - eventually would like to increase gabapentin to assess benefit of higher doses      Constipation: discussed appropriate regimen with miralax and senna, likely increase in requirements with increasing opioids  - advised caution with fiber products      ACP: confirmed HCD today. Completed POLST: DNR/DNI      Return to clinic in 6 weeks.    Data and Chart Review:    REVIEW OF SYSTEMS:   ROS: 10 point ROS neg other than the symptoms noted above in the HPI and here.      Physical Exam:  Vital Signs not checked, virtual visit    CONSTIT: awake, appears comfortable  EENT: MMM, EOMI, no icterus  RESP: reg, nl effort, no cough  MSK: moves x4, sitting in chair  SKIN: no rash, no obvious lesions  NEURO: alert, oriented x3  PSYCH: appropriate affect, memory and thought process intact    The rest of a comprehensive physical examination is deferred  due to public health emergency video visit restrictions.    Medications, current, pertinent:  MS Contin 15mg q12h  Oxycontin 10mg q12h  Oxycodone 5mg q6h prn  acetaminophen   Gabapentin 300mg tid    psyllium      : hasn't filled MS Contin or Oxycontin    Allergies   Allergen Reactions     Demerol [Meperidine] Difficulty breathing     Past Medical History:   Diagnosis Date      Basal cell carcinoma of skin 9/19/2013    Overview:  Completely excised 5/13     BPH (benign prostatic hyperplasia) 5/2/2013     Cancer (H)      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) 10/10/2019     Peripheral neuropathy      Prostate cancer (H) 6/7/2016     Past Surgical History:   Procedure Laterality Date     FOOT SURGERY Left 1992     HERNIA REPAIR       IR CHEST PORT PLACEMENT > 5 YRS OF AGE  11/1/2019     LAPAROSCOPY DIAGNOSTIC (GENERAL) N/A 5/12/2020    Procedure: Diagnostic laparoscopy;  Surgeon: Sanjay Ryder MD;  Location: [facility] OR     PROSTATE SURGERY      biopsy       Family History  Family History   Problem Relation Age of Onset     Prostate Cancer Father      Prostate Cancer Brother      Prostate Cancer Brother          Lab and imaging data Reviewed:  Comments:             Opioid Risk Tool:   Opioid Risk Tool (ORT):    Family History of Substance Abuse:        Alcohol = 0 pt (no)       Illegal Drugs = 0 pt (no)       Prescription Drugs = 0 pt (no)      Personal History of Substance Abuse:       Alcohol = 0 pt (no)       Illegal Drugs = 0 pt (no)       Prescription Drugs = 0 pt (no)        Age: 0 pt (age < 16; age > 45)      Psychological Disease: 0 pt (none)      ORT Score = 0        0-3: Low risk for opioid abuse       4-7: Moderate risk for opioid abuse       >/= 8: High risk for opioid abuse    [facility] Outpatient Palliative Care Opioid Prescribing Safety Plan    Opioid Safety Education: Reviewed by Park Hyman MD on 9/3/2020  Opioid Risk Assessment: Performed by Park Hyman MD on 9/3/2020.  ORT score of 0.   Mood Disorder Assessment: Performed by Park Hyman MD on 9/3/2020.     reviewed with every prescription, last reviewed by Park Hyman MD on 09/03/2020     Additional recommendations based on patient's prognosis and indication for opioids include the following:        Expected prognosis: shorter  Risk: Low or Medium (ORT 0-7)  No further recommendations.        Video-Visit Details    Type of service:  Video Visit    Physician has received verbal consent for a Video Visit from the patient? Yes    Video Start Time: 1511    Video End Time (time video stopped): 1559      Originating Location (pt. Location): Home    Distant Location (provider location):  [facility]     Mode of Communication:  Video Conference via Doximity      Park Hyman MD  Palliative Medicine  Pager [phone]

## 2020-09-03 NOTE — LETTER
9/3/2020         RE: Lowell Arredondo  [address] [id number]        Dear Colleague,    Thank you for referring your patient, Lowell Arredondo, to the [facility]. Please see a copy of my visit note below.    Palliative Care Outpatient Clinic Consultation Note    Patient:  Lowell Arredondo    Chief Complaint:   Lowell Arredondo is a 80 year old male who is being evaluated via a billable video visit today at the request of Dr Segura for a palliative care consultation secondary to pancreatic cancer.      The patient's primary care provider is:  Diego Segura.     The patient is on the call with his wife    History of Present Illness:  Medical History:  - pancreatic cancer diagnosed Oct 2019, metastatic to abdominal wall   - on FOLFOX    - plan for palliative XRT to subcutaneous nodules  - prostate cancer    Introduced the scope of our practice. Discussed our potential roles for symptom management, support/coping, and decisional support (aka goals of care).    He has some abdominal pain. This is ok at rest, but becomes quite bothersome when he moves around. He does limit his activities due to this.     He takes oxycodone 5mg in the morning and evening. He sometimes takes tylenol in between. The effect of oxycodone doesn't last the fuull 6 hours.    He also has painful neuropathy in his feet, associated with numbness    He takes metamucil and miralax every morning    Patient's Disease Understanding: He realizes that his treatments are palliative in intent.    Coping:  He states he is in good spirits, likes to keep as active as possible.     Social History  Living Situation: with his wife    Support System: family, friends    Spiritual Background: attends [employer]    Social History     Tobacco Use     Smoking status: Never Smoker     Smokeless tobacco: Never Used   Substance Use Topics     Alcohol use: Not Currently     Alcohol/week: 10.0 - 14.0 standard drinks     Types: 10 - 14 Standard  drinks or equivalent per week     Drug use: Never         Advance Care Planning:  Goals of Care: live with a good quality of life, be as active as possible  Discussed code status and he agrees to DNR/DNI.    Decision Making Capacity: intact    Advance Directive:    Dated 2010: designated his wife Melvi as his agent with their daughter Jesi as alternate    POLST:   Completed today: DNR/DNI    Recommendations & Counseliny/o man with pancreatic cancer    Pain: related to metastatic disease. This limits his mobility/activity.   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  - eventually would like to increase gabapentin to assess benefit of higher doses      Constipation: discussed appropriate regimen with miralax and senna, likely increase in requirements with increasing opioids  - advised caution with fiber products      ACP: confirmed HCD today. Completed POLST: DNR/DNI      Return to clinic in 6 weeks.    Data and Chart Review:    REVIEW OF SYSTEMS:   ROS: 10 point ROS neg other than the symptoms noted above in the HPI and here.      Physical Exam:  Vital Signs not checked, virtual visit    CONSTIT: awake, appears comfortable  EENT: MMM, EOMI, no icterus  RESP: reg, nl effort, no cough  MSK: moves x4, sitting in chair  SKIN: no rash, no obvious lesions  NEURO: alert, oriented x3  PSYCH: appropriate affect, memory and thought process intact    The rest of a comprehensive physical examination is deferred  due to public health emergency video visit restrictions.    Medications, current, pertinent:  MS Contin 15mg q12h  Oxycontin 10mg q12h  Oxycodone  5mg q6h prn  acetaminophen   Gabapentin 300mg tid    psyllium      : hasn't filled MS Contin or Oxycontin    Allergies   Allergen Reactions     Demerol [Meperidine] Difficulty breathing     Past Medical History:   Diagnosis Date     Basal cell carcinoma of skin 9/19/2013    Overview:  Completely excised 5/13     BPH (benign prostatic hyperplasia) 5/2/2013     Cancer (H)      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) 10/10/2019     Peripheral neuropathy      Prostate cancer (H) 6/7/2016     Past Surgical History:   Procedure Laterality Date     FOOT SURGERY Left 1992     HERNIA REPAIR       IR CHEST PORT PLACEMENT > 5 YRS OF AGE  11/1/2019     LAPAROSCOPY DIAGNOSTIC (GENERAL) N/A 5/12/2020    Procedure: Diagnostic laparoscopy;  Surgeon: Sanjay Ryder MD;  Location: [facility] OR     PROSTATE SURGERY      biopsy       Family History  Family History   Problem Relation Age of Onset     Prostate Cancer Father      Prostate Cancer Brother      Prostate Cancer Brother          Lab and imaging data Reviewed:  Comments:             Opioid Risk Tool:   Opioid Risk Tool (ORT):    Family History of Substance Abuse:        Alcohol = 0 pt (no)       Illegal Drugs = 0 pt (no)       Prescription Drugs = 0 pt (no)      Personal History of Substance Abuse:       Alcohol = 0 pt (no)       Illegal Drugs = 0 pt (no)       Prescription Drugs = 0 pt (no)        Age: 0 pt (age < 16; age > 45)      Psychological Disease: 0 pt (none)      ORT Score = 0        0-3: Low risk for opioid abuse       4-7: Moderate risk for opioid abuse       >/= 8: High risk for opioid abuse    [facility] Outpatient Palliative Care Opioid Prescribing Safety Plan    Opioid Safety Education: Reviewed by Park Hyman MD on 9/3/2020  Opioid Risk Assessment: Performed by Park Hyman MD on 9/3/2020.  ORT score of 0.   Mood Disorder Assessment: Performed by Park Hyman MD on 9/3/2020.     reviewed with every prescription, last reviewed by Park BARCENAS  "MD Boogie on 09/03/2020     Additional recommendations based on patient's prognosis and indication for opioids include the following:        Expected prognosis: shorter  Risk: Low or Medium (ORT 0-7)  No further recommendations.       Video-Visit Details    Type of service:  Video Visit    Physician has received verbal consent for a Video Visit from the patient? Yes    Video Start Time: 1511    Video End Time (time video stopped): 1559      Originating Location (pt. Location): Home    Distant Location (provider location):  [facility]     Mode of Communication:  Video Conference via Doximity      Park Hyman MD  Palliative Medicine  Pager [phone]      Lowell Arredondo is a 80 year old male who is being evaluated via a billable video visit.      The patient has been notified of following:     \"This video visit will be conducted via a call between you and your physician/provider. We have found that certain health care needs can be provided without the need for an in-person physical exam.  This service lets us provide the care you need with a video conversation.  If a prescription is necessary we can send it directly to your pharmacy.  If lab work is needed we can place an order for that and you can then stop by our lab to have the test done at a later time.    Video visits are billed at different rates depending on your insurance coverage.  Please reach out to your insurance provider with any questions.    If during the course of the call the physician/provider feels a video visit is not appropriate, you will not be charged for this service.\"    Patient has given verbal consent for Video visit? Yes  How would you like to obtain your AVS? MyChart  If you are dropped from the video visit, the video invite should be resent to: Text to cell phone: [phone]  Will anyone else be joining your video visit? No        Video-Visit Details    Type of service:  Video Visit    Video Start Time:   Video End " Time:     Originating Location (pt. Location): Home    Distant Location (provider location):  [facility] [place]     Platform used for Video Visit: Doximity TEXT [phone]    NO REFILLS  5/10 PAIN, STOMACH    Sarah Cifuentes [facility]          Again, thank you for allowing me to participate in the care of your patient.        Sincerely,        Park Hyman MD

## 2020-09-03 NOTE — PROGRESS NOTES
"Lowell Arredondo is a 80 year old male who is being evaluated via a billable video visit.      The patient has been notified of following:     \"This video visit will be conducted via a call between you and your physician/provider. We have found that certain health care needs can be provided without the need for an in-person physical exam.  This service lets us provide the care you need with a video conversation.  If a prescription is necessary we can send it directly to your pharmacy.  If lab work is needed we can place an order for that and you can then stop by our lab to have the test done at a later time.    Video visits are billed at different rates depending on your insurance coverage.  Please reach out to your insurance provider with any questions.    If during the course of the call the physician/provider feels a video visit is not appropriate, you will not be charged for this service.\"    Patient has given verbal consent for Video visit? Yes  How would you like to obtain your AVS? MyChart  If you are dropped from the video visit, the video invite should be resent to: Text to cell phone: [phone]  Will anyone else be joining your video visit? No        Video-Visit Details    Type of service:  Video Visit    Video Start Time:   Video End Time:     Originating Location (pt. Location): Home    Distant Location (provider location):  [facility]     Platform used for Video Visit: Doximity TEXT [phone]    NO REFILLS  5/10 PAIN, STOMACH    Sarah Cifuentes, [facility]        "

## 2020-09-03 NOTE — LETTER
9/3/2020         RE: Lowell Arredondo  [address] [facility] [address]        Dear Colleague,    Thank you for referring your patient, Lowell Arredondo, to the [facility]. Please see a copy of my visit note below.    Palliative Care Outpatient Clinic Consultation Note    Patient:  Lowell Arredondo    Chief Complaint:   Lowell Arredondo is a 80 year old male who is being evaluated via a billable video visit today at the request of Dr Segura for a palliative care consultation secondary to pancreatic cancer.      The patient's primary care provider is:  Diego Segura.     The patient is on the call with his wife    History of Present Illness:  Medical History:  - pancreatic cancer diagnosed Oct 2019, metastatic to abdominal wall   - on FOLFOX    - plan for palliative XRT to subcutaneous nodules  - prostate cancer    Introduced the scope of our practice. Discussed our potential roles for symptom management, support/coping, and decisional support (aka goals of care).    He has some abdominal pain. This is ok at rest, but becomes quite bothersome when he moves around. He does limit his activities due to this.     He takes oxycodone 5mg in the morning and evening. He sometimes takes tylenol in between. The effect of oxycodone doesn't last the fuull 6 hours.    He also has painful neuropathy in his feet, associated with numbness    He takes metamucil and miralax every morning    Patient's Disease Understanding: He realizes that his treatments are palliative in intent.    Coping:  He states he is in good spirits, likes to keep as active as possible.     Social History  Living Situation: with his wife    Support System: family, friends    Spiritual Background: attends [employer]    Social History     Tobacco Use     Smoking status: Never Smoker     Smokeless tobacco: Never Used   Substance Use Topics     Alcohol use: Not Currently     Alcohol/week: 10.0 - 14.0 standard drinks     Types: 10 - 14 Standard  drinks or equivalent per week     Drug use: Never         Advance Care Planning:  Goals of Care: live with a good quality of life, be as active as possible  Discussed code status and he agrees to DNR/DNI.    Decision Making Capacity: intact    Advance Directive:    Dated 2010: designated his wife Mevli as his agent with their daughter Jesi as alternate    POLST:   Completed today: DNR/DNI    Recommendations & Counseliny/o man with pancreatic cancer    Pain: related to metastatic disease. This limits his mobility/activity.   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  - eventually would like to increase gabapentin to assess benefit of higher doses      Constipation: discussed appropriate regimen with miralax and senna, likely increase in requirements with increasing opioids  - advised caution with fiber products      ACP: confirmed HCD today. Completed POLST: DNR/DNI      Return to clinic in 6 weeks.    Data and Chart Review:    REVIEW OF SYSTEMS:   ROS: 10 point ROS neg other than the symptoms noted above in the HPI and here.      Physical Exam:  Vital Signs not checked, virtual visit    CONSTIT: awake, appears comfortable  EENT: MMM, EOMI, no icterus  RESP: reg, nl effort, no cough  MSK: moves x4, sitting in chair  SKIN: no rash, no obvious lesions  NEURO: alert, oriented x3  PSYCH: appropriate affect, memory and thought process intact    The rest of a comprehensive physical examination is deferred  due to public health emergency video visit restrictions.    Medications, current, pertinent:  MS Contin 15mg q12h  Oxycontin 10mg q12h  Oxycodone  5mg q6h prn  acetaminophen   Gabapentin 300mg tid    psyllium      : hasn't filled MS Contin or Oxycontin    Allergies   Allergen Reactions     Demerol [Meperidine] Difficulty breathing     Past Medical History:   Diagnosis Date     Basal cell carcinoma of skin 9/19/2013    Overview:  Completely excised 5/13     BPH (benign prostatic hyperplasia) 5/2/2013     Cancer (H)      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) 10/10/2019     Peripheral neuropathy      Prostate cancer (H) 6/7/2016     Past Surgical History:   Procedure Laterality Date     FOOT SURGERY Left 1992     HERNIA REPAIR       IR CHEST PORT PLACEMENT > 5 YRS OF AGE  11/1/2019     LAPAROSCOPY DIAGNOSTIC (GENERAL) N/A 5/12/2020    Procedure: Diagnostic laparoscopy;  Surgeon: Sanjay Ryder MD;  Location: [facility] OR     PROSTATE SURGERY      biopsy       Family History  Family History   Problem Relation Age of Onset     Prostate Cancer Father      Prostate Cancer Brother      Prostate Cancer Brother          Lab and imaging data Reviewed:  Comments:             Opioid Risk Tool:   Opioid Risk Tool (ORT):    Family History of Substance Abuse:        Alcohol = 0 pt (no)       Illegal Drugs = 0 pt (no)       Prescription Drugs = 0 pt (no)      Personal History of Substance Abuse:       Alcohol = 0 pt (no)       Illegal Drugs = 0 pt (no)       Prescription Drugs = 0 pt (no)        Age: 0 pt (age < 16; age > 45)      Psychological Disease: 0 pt (none)      ORT Score = 0        0-3: Low risk for opioid abuse       4-7: Moderate risk for opioid abuse       >/= 8: High risk for opioid abuse    [facility] Outpatient Palliative Care Opioid Prescribing Safety Plan    Opioid Safety Education: Reviewed by Park Hyman MD on 9/3/2020  Opioid Risk Assessment: Performed by Park Hyman MD on 9/3/2020.  ORT score of 0.   Mood Disorder Assessment: Performed by Park Hyman MD on 9/3/2020.     reviewed with every prescription, last reviewed by Park BARCENAS  "MD Boogie on 09/03/2020     Additional recommendations based on patient's prognosis and indication for opioids include the following:        Expected prognosis: shorter  Risk: Low or Medium (ORT 0-7)  No further recommendations.       Video-Visit Details    Type of service:  Video Visit    Physician has received verbal consent for a Video Visit from the patient? Yes    Video Start Time: 1511    Video End Time (time video stopped): 1559      Originating Location (pt. Location): Home    Distant Location (provider location):  [facility]     Mode of Communication:  Video Conference via Doximity      Park Hyman MD  Palliative Medicine  Pager [phone]      Lowell Arredondo is a 80 year old male who is being evaluated via a billable video visit.      The patient has been notified of following:     \"This video visit will be conducted via a call between you and your physician/provider. We have found that certain health care needs can be provided without the need for an in-person physical exam.  This service lets us provide the care you need with a video conversation.  If a prescription is necessary we can send it directly to your pharmacy.  If lab work is needed we can place an order for that and you can then stop by our lab to have the test done at a later time.    Video visits are billed at different rates depending on your insurance coverage.  Please reach out to your insurance provider with any questions.    If during the course of the call the physician/provider feels a video visit is not appropriate, you will not be charged for this service.\"    Patient has given verbal consent for Video visit? Yes  How would you like to obtain your AVS? MyChart  If you are dropped from the video visit, the video invite should be resent to: Text to cell phone: [phone]  Will anyone else be joining your video visit? No        Video-Visit Details    Type of service:  Video Visit    Video Start Time:   Video End " Time:     Originating Location (pt. Location): Home    Distant Location (provider location):  [facility]     Platform used for Video Visit: Doximity TEXT [phone]    NO REFILLS  5/10 PAIN, STOMACH    Sarah Cifuentes [facility]          Again, thank you for allowing me to participate in the care of your patient.        Sincerely,        Park Hyman MD

## 2020-09-05 NOTE — PROGRESS NOTES
Visit Date:   08/25/2020     ONCOLOGY HISTORY: Mr. Arredondo is a gentleman with metastatic pancreatic cancer.  -Prostate cancer [place] 7.   1.  Prostate biopsy on 09/09/2019 revealed patricia 7 prostate cancer.  -Bone scan on 09/26/2019 does not reveal any bone metastasis.  There is some cardiac uptake.   -CT abdomen and pelvis on 09/26/2019 reveals new soft tissue mesenteric mass in the region of pancreatic tail.      2.  EUS on 10/07/2019 revealed an irregular mass in the pancreatic tail measuring 3.8 x 2.2 cm.  There was some evidence of invasion into the portal vein.  No lymphadenopathy seen.  Based on the EUS, it was T3 Nx disease.    -FNA is positive for moderately differentiated pancreatic adenocarcinoma.      3. PET scan on 10/15/2019 reveals hypermetabolic 4.2 cm mass in the pancreatic tail and an adjacent 1 cm peripancreatic lymph node. No evidence of any metastatic disease. There are 2 foci of mild FDG uptake in the prostate gland from prostate cancer.      4. Neoadjuvant gemcitabine and Abraxane x 6 cycles between 11/07/2019 and 04/20/2020.  Abraxane stopped after cycle 4 because of toxicity.   -PET scan on 05/05/2020 reveals decrease in size of hypermetabolic mass at pancreatic tail and improvement in adjacent hypermetabolic lymph node.      5. Patient had diagnostic laparoscopy on 05/12/2020. He has unresectable pancreatic cancer. There are tumor implants.  -Peritoneal nodule biopsy is positive for adenocarcinoma.     SUBJECTIVE:  Mr. Arredondo is an 80-year-old gentleman with metastatic pancreatic cancer.  He is currently on palliative chemotherapy with modified FOLFOX-6, which was started on 07/28/2020.  No bolus 5-FU or leucovorin.      Overall, he is tolerating chemotherapy well.  Main problem is pain.  The patient has metastatic subcutaneous nodules in the abdominal wall.  That has been causing more pain.  He is on oxycodone which helps.  He has to take oxycodone frequently.      He has fatigue.  No  worsening.  No headache, dizziness or visual problem.  No neck pain.  No chest pain.  No shortness of breath.  No abdominal distention.  No vomiting.  Occasional nausea.  No urinary or bowel complaints.  No abnormal bleeding.  No fever or chills.  He has neuropathy. It is not getting worse.  He has not been falling down.      PHYSICAL EXAMINATION:   GENERAL:  The patient is alert, oriented x 3.   VITAL SIGNS:  Reviewed.  ECOG PS between 1 and 2.   EYES:  No icterus.  No redness.   THROAT:  Not examined.   NECK:  Supple.  No lymphadenopathy or thyromegaly.   AXILLAE:  No lymphadenopathy.   LUNGS:  Good air entry bilaterally.  No crackles or wheezing.   HEART:  Regular.   ABDOMEN:  Nondistended.  There are subcutaneous nodules, both in the right upper and left upper quadrant.  They are tender.  Deep palpation not done because of tenderness.  No skin lesions on the abdomen.   EXTREMITIES:  No pedal edema.  No calf swelling or tenderness.   SKIN:  No petechia.  No suspicious skin rash.      LABORATORY DATA:  Reviewed.      ASSESSMENT:   1.  An 80-year-old gentleman with metastatic pancreatic cancer.   2.  Abdominal pain from pancreatic cancer.  Subcutaneous malignant nodules have been causing increasing pain.   3.  Peripheral neuropathy.   4.  Fatigue.      PLAN:   1.  I discussed regarding pancreatic cancer.  He is on modified FOLFOX-6.  He will be continued on it.  He is tolerating it well.  I explained to the patient that his neuropathy can get worse.  Advised him to let us know when the neuropathy starts getting worse.  At this time, he can still do things with his hands, including buttoning his shirt.   2.  Discussed regarding his pain is because of malignancy.  I will start him on OxyContin 10 mg twice a day.  He will continue on oxycodone as needed.  Discussed regarding palliative radiation to the subcutaneous abdominal nodules.  That will help with the pain.  He is agreeable for it.  Radiation Oncology  appointment will be scheduled.   3.  Discussed regarding Palliative Care consult because of metastatic pancreatic cancer.  He is agreeable for it.  That will be scheduled.     4.  Discussed regarding scans.  We will get CT chest, abdomen and pelvis in a month to re-evaluate his disease.   5.  I will see him in a month.  In between, he will see our nurse practitioner.  I advised the patient to call us with any questions or concerns.         ROXY LANCASTER MD             D: 2020   T: 2020   MT: WEN      Name:     KARI YANG   MRN:      [id number]        Account:      [id number]   :      1940           Visit Date:   2020      Document: [id number]

## 2020-09-07 ENCOUNTER — INFUSION THERAPY VISIT (OUTPATIENT)
Dept: INFUSION THERAPY | Facility: CLINIC | Age: 80
End: 2020-09-07
Attending: INTERNAL MEDICINE
Payer: COMMERCIAL

## 2020-09-07 ENCOUNTER — HOSPITAL ENCOUNTER (OUTPATIENT)
Facility: CLINIC | Age: 80
Setting detail: SPECIMEN
Discharge: HOME | End: 2020-09-07
Attending: INTERNAL MEDICINE | Admitting: INTERNAL MEDICINE
Payer: COMMERCIAL

## 2020-09-07 DIAGNOSIS — Z95.828 PORT-A-CATH IN PLACE: ICD-10-CM

## 2020-09-07 DIAGNOSIS — D70.1 CHEMOTHERAPY-INDUCED NEUTROPENIA (H): ICD-10-CM

## 2020-09-07 DIAGNOSIS — T45.1X5A CHEMOTHERAPY-INDUCED NEUTROPENIA (H): ICD-10-CM

## 2020-09-07 DIAGNOSIS — C25.2 MALIGNANT NEOPLASM OF TAIL OF PANCREAS (H): Primary | ICD-10-CM

## 2020-09-07 LAB
ALBUMIN SERPL-MCNC: 3.4 G/DL (ref 3.4–5)
ALP SERPL-CCNC: 69 U/L (ref 40–150)
ALT SERPL W P-5'-P-CCNC: 24 U/L (ref 0–70)
ANION GAP SERPL CALCULATED.3IONS-SCNC: 3 MMOL/L (ref 3–14)
AST SERPL W P-5'-P-CCNC: 23 U/L (ref 0–45)
BASOPHILS # BLD AUTO: 0 10E9/L (ref 0–0.2)
BASOPHILS NFR BLD AUTO: 0.4 %
BILIRUB SERPL-MCNC: 0.5 MG/DL (ref 0.2–1.3)
BUN SERPL-MCNC: 15 MG/DL (ref 7–30)
CALCIUM SERPL-MCNC: 8.8 MG/DL (ref 8.5–10.1)
CHLORIDE SERPL-SCNC: 106 MMOL/L (ref 94–109)
CO2 SERPL-SCNC: 29 MMOL/L (ref 20–32)
CREAT SERPL-MCNC: 1 MG/DL (ref 0.66–1.25)
DIFFERENTIAL METHOD BLD: ABNORMAL
EOSINOPHIL # BLD AUTO: 0.2 10E9/L (ref 0–0.7)
EOSINOPHIL NFR BLD AUTO: 3.5 %
ERYTHROCYTE [DISTWIDTH] IN BLOOD BY AUTOMATED COUNT: 14.8 % (ref 10–15)
GFR SERPL CREATININE-BSD FRML MDRD: 70 ML/MIN/{1.73_M2}
GLUCOSE SERPL-MCNC: 96 MG/DL (ref 70–99)
HCT VFR BLD AUTO: 37.8 % (ref 40–53)
HGB BLD-MCNC: 12.9 G/DL (ref 13.3–17.7)
IMM GRANULOCYTES # BLD: 0 10E9/L (ref 0–0.4)
IMM GRANULOCYTES NFR BLD: 0.2 %
LYMPHOCYTES # BLD AUTO: 2.1 10E9/L (ref 0.8–5.3)
LYMPHOCYTES NFR BLD AUTO: 45.6 %
MCH RBC QN AUTO: 31.3 PG (ref 26.5–33)
MCHC RBC AUTO-ENTMCNC: 34.1 G/DL (ref 31.5–36.5)
MCV RBC AUTO: 92 FL (ref 78–100)
MONOCYTES # BLD AUTO: 0.8 10E9/L (ref 0–1.3)
MONOCYTES NFR BLD AUTO: 16.6 %
NEUTROPHILS # BLD AUTO: 1.5 10E9/L (ref 1.6–8.3)
NEUTROPHILS NFR BLD AUTO: 33.7 %
NRBC # BLD AUTO: 0 10*3/UL
NRBC BLD AUTO-RTO: 0 /100
PLATELET # BLD AUTO: 136 10E9/L (ref 150–450)
POTASSIUM SERPL-SCNC: 4.1 MMOL/L (ref 3.4–5.3)
PROT SERPL-MCNC: 7.1 G/DL (ref 6.8–8.8)
RBC # BLD AUTO: 4.12 10E12/L (ref 4.4–5.9)
SODIUM SERPL-SCNC: 138 MMOL/L (ref 133–144)
WBC # BLD AUTO: 4.6 10E9/L (ref 4–11)

## 2020-09-07 PROCEDURE — 86301 IMMUNOASSAY TUMOR CA 19-9: CPT | Performed by: INTERNAL MEDICINE

## 2020-09-07 PROCEDURE — 80053 COMPREHEN METABOLIC PANEL: CPT | Performed by: INTERNAL MEDICINE

## 2020-09-07 PROCEDURE — 85025 COMPLETE CBC W/AUTO DIFF WBC: CPT | Performed by: INTERNAL MEDICINE

## 2020-09-07 PROCEDURE — 25000128 H RX IP 250 OP 636: Performed by: NURSE PRACTITIONER

## 2020-09-07 PROCEDURE — 36591 DRAW BLOOD OFF VENOUS DEVICE: CPT

## 2020-09-07 RX ORDER — HEPARIN SODIUM,PORCINE 10 UNIT/ML
5 VIAL (ML) INTRAVENOUS
Status: CANCELLED | OUTPATIENT
Start: 2020-09-07

## 2020-09-07 RX ORDER — HEPARIN SODIUM (PORCINE) LOCK FLUSH IV SOLN 100 UNIT/ML 100 UNIT/ML
5 SOLUTION INTRAVENOUS
Status: DISCONTINUED | OUTPATIENT
Start: 2020-09-07 | End: 2020-09-07 | Stop reason: HOSPADM

## 2020-09-07 RX ORDER — HEPARIN SODIUM (PORCINE) LOCK FLUSH IV SOLN 100 UNIT/ML 100 UNIT/ML
5 SOLUTION INTRAVENOUS
Status: CANCELLED | OUTPATIENT
Start: 2020-09-07

## 2020-09-07 RX ADMIN — Medication 5 ML: at 14:38

## 2020-09-08 ENCOUNTER — INFUSION THERAPY VISIT (OUTPATIENT)
Dept: INFUSION THERAPY | Facility: CLINIC | Age: 80
End: 2020-09-08
Attending: INTERNAL MEDICINE
Payer: COMMERCIAL

## 2020-09-08 ENCOUNTER — HOSPITAL ENCOUNTER (OUTPATIENT)
Facility: CLINIC | Age: 80
Setting detail: SPECIMEN
Discharge: HOME | End: 2020-09-08
Attending: INTERNAL MEDICINE | Admitting: INTERNAL MEDICINE
Payer: COMMERCIAL

## 2020-09-08 VITALS
RESPIRATION RATE: 20 BRPM | SYSTOLIC BLOOD PRESSURE: 112 MMHG | DIASTOLIC BLOOD PRESSURE: 71 MMHG | HEART RATE: 78 BPM | WEIGHT: 154.8 LBS | BODY MASS INDEX: 22.85 KG/M2 | OXYGEN SATURATION: 99 % | TEMPERATURE: 97.5 F

## 2020-09-08 DIAGNOSIS — C25.9 PANCREAS CANCER (H): ICD-10-CM

## 2020-09-08 DIAGNOSIS — C25.2 MALIGNANT NEOPLASM OF TAIL OF PANCREAS (H): Primary | ICD-10-CM

## 2020-09-08 PROCEDURE — 81307 PALB2 GENE FULL GENE SEQ: CPT | Performed by: INTERNAL MEDICINE

## 2020-09-08 PROCEDURE — 25800030 ZZH RX IP 258 OP 636: Performed by: INTERNAL MEDICINE

## 2020-09-08 PROCEDURE — 25000128 H RX IP 250 OP 636: Performed by: INTERNAL MEDICINE

## 2020-09-08 PROCEDURE — 81404 MOPATH PROCEDURE LEVEL 5: CPT | Performed by: INTERNAL MEDICINE

## 2020-09-08 PROCEDURE — 81298 MSH6 GENE FULL SEQ: CPT | Performed by: INTERNAL MEDICINE

## 2020-09-08 PROCEDURE — 81405 MOPATH PROCEDURE LEVEL 6: CPT | Mod: GZ | Performed by: INTERNAL MEDICINE

## 2020-09-08 PROCEDURE — 81403 MOPATH PROCEDURE LEVEL 4: CPT | Performed by: INTERNAL MEDICINE

## 2020-09-08 PROCEDURE — 96415 CHEMO IV INFUSION ADDL HR: CPT

## 2020-09-08 PROCEDURE — 81162 BRCA1&2 GEN FULL SEQ DUP/DEL: CPT | Performed by: INTERNAL MEDICINE

## 2020-09-08 PROCEDURE — 81295 MSH2 GENE FULL SEQ: CPT | Performed by: INTERNAL MEDICINE

## 2020-09-08 PROCEDURE — 96367 TX/PROPH/DG ADDL SEQ IV INF: CPT

## 2020-09-08 PROCEDURE — 81408 MOPATH PROCEDURE LEVEL 9: CPT | Performed by: INTERNAL MEDICINE

## 2020-09-08 PROCEDURE — 96413 CHEMO IV INFUSION 1 HR: CPT

## 2020-09-08 PROCEDURE — G0498 CHEMO EXTEND IV INFUS W/PUMP: HCPCS

## 2020-09-08 PROCEDURE — 81292 MLH1 GENE FULL SEQ: CPT | Performed by: INTERNAL MEDICINE

## 2020-09-08 RX ADMIN — DEXTROSE MONOHYDRATE 250 ML: 50 INJECTION, SOLUTION INTRAVENOUS at 10:16

## 2020-09-08 RX ADMIN — DEXAMETHASONE SODIUM PHOSPHATE: 10 INJECTION, SOLUTION INTRAMUSCULAR; INTRAVENOUS at 10:16

## 2020-09-08 RX ADMIN — OXALIPLATIN 150 MG: 100 INJECTION, SOLUTION, CONCENTRATE INTRAVENOUS at 10:40

## 2020-09-08 ASSESSMENT — PAIN SCALES - GENERAL: PAINLEVEL: MODERATE PAIN (5)

## 2020-09-08 NOTE — PROGRESS NOTES
"Infusion Nursing Note:  Lowell Arredondo presents today for Cycle 4 Day 1 Oxaliplatin, Fluorouracil pump connect.    Patient seen by provider today: No   present during visit today: Not Applicable.    Note: N/A.    Intravenous Access:  Implanted Port.    Treatment Conditions:  Lab Results   Component Value Date    HGB 12.9 09/07/2020     Lab Results   Component Value Date    WBC 4.6 09/07/2020      Lab Results   Component Value Date    ANEU 1.5 09/07/2020     Lab Results   Component Value Date     09/07/2020      Lab Results   Component Value Date     09/07/2020                   Lab Results   Component Value Date    POTASSIUM 4.1 09/07/2020           Lab Results   Component Value Date    MAG 2.0 01/25/2020            Lab Results   Component Value Date    CR 1.00 09/07/2020                   Lab Results   Component Value Date    VICENTE 8.8 09/07/2020                Lab Results   Component Value Date    BILITOTAL 0.5 09/07/2020           Lab Results   Component Value Date    ALBUMIN 3.4 09/07/2020                    Lab Results   Component Value Date    ALT 24 09/07/2020           Lab Results   Component Value Date    AST 23 09/07/2020       Results reviewed, labs MET treatment parameters, ok to proceed with treatment.      Post Infusion Assessment:  Patient tolerated infusion without incident.  Blood return noted pre and post infusion.  Site patent and intact, free from redness, edema or discomfort.  No evidence of extravasations.     Prior to discharge: Port is secured in place with tegaderm and flushed with 10cc NS with positive blood return noted.  Continuous home infusion CADD pump connected.    All connectors secured in place and clamps taped open.    Pump started, \"running\" noted on display (CADD): YES.  Patient instructed to call our clinic or [facility] with any questions or concerns at home.  Patient verbalized understanding.    Patient set up for pump disconnect at [facility] on 9/10/20 at 10:45am.      Discharge Plan:   Patient declined prescription refills.  Discharge instructions reviewed with: Patient.  Patient verbalized understanding of discharge instructions and all questions answered.  AVS to patient via "[employer]  Patient will return 9/10/20 for next appointment.   Patient discharged in stable condition accompanied by: self.  Departure Mode: Ambulatory.    Sarika Gay RN

## 2020-09-09 LAB — CANCER AG19-9 SERPL-ACNC: ABNORMAL U/ML (ref 0–37)

## 2020-09-10 VITALS
HEART RATE: 86 BPM | DIASTOLIC BLOOD PRESSURE: 60 MMHG | OXYGEN SATURATION: 97 % | TEMPERATURE: 98 F | SYSTOLIC BLOOD PRESSURE: 103 MMHG

## 2020-09-10 DIAGNOSIS — C25.2 MALIGNANT NEOPLASM OF TAIL OF PANCREAS (H): Primary | ICD-10-CM

## 2020-09-10 PROCEDURE — 25000128 H RX IP 250 OP 636: Performed by: INTERNAL MEDICINE

## 2020-09-10 RX ORDER — HEPARIN SODIUM (PORCINE) LOCK FLUSH IV SOLN 100 UNIT/ML 100 UNIT/ML
5 SOLUTION INTRAVENOUS
Status: DISCONTINUED | OUTPATIENT
Start: 2020-09-10 | End: 2020-09-10 | Stop reason: HOSPADM

## 2020-09-10 RX ADMIN — Medication 5 ML: at 10:43

## 2020-09-10 NOTE — PROGRESS NOTES
Infusion Nursing Note:  Lowell Arredondo presents today for C4D3 pump disconnect.    Patient seen by provider today: No   present during visit today: Not Applicable.    Note: N/A.    Intravenous Access:  Implanted Port.    Treatment Conditions:  Not Applicable.      Post Infusion Assessment:  Patient tolerated infusion without incident.  Blood return noted pre and post infusion.  Site patent and intact, free from redness, edema or discomfort.  No evidence of extravasations.  Access discontinued per protocol.       Discharge Plan:   Discharge instructions reviewed with: Patient.  Patient and/or family verbalized understanding of discharge instructions and all questions answered.  AVS to patient via [employer].  Patient will return 9/21/20 for labs for next appointment.   Patient discharged in stable condition accompanied by: self.  Departure Mode: Ambulatory.    Helena Davenport RN

## 2020-09-18 ENCOUNTER — TRANSFERRED RECORDS (OUTPATIENT)
Dept: HEALTH INFORMATION MANAGEMENT | Facility: CLINIC | Age: 80
End: 2020-09-18

## 2020-09-19 ENCOUNTER — HOSPITAL ENCOUNTER (INPATIENT)
Facility: CLINIC | Age: 80
LOS: 4 days | Discharge: NURSING, REHAB OR LONG-TERM CARE | DRG: 481 | End: 2020-09-23
Attending: EMERGENCY MEDICINE | Admitting: INTERNAL MEDICINE
Payer: COMMERCIAL

## 2020-09-19 ENCOUNTER — APPOINTMENT (OUTPATIENT)
Dept: CT IMAGING | Facility: CLINIC | Age: 80
DRG: 481 | End: 2020-09-19
Payer: COMMERCIAL

## 2020-09-19 ENCOUNTER — APPOINTMENT (OUTPATIENT)
Dept: GENERAL RADIOLOGY | Facility: CLINIC | Age: 80
DRG: 481 | End: 2020-09-19
Attending: EMERGENCY MEDICINE
Payer: COMMERCIAL

## 2020-09-19 ENCOUNTER — APPOINTMENT (OUTPATIENT)
Dept: GENERAL RADIOLOGY | Facility: CLINIC | Age: 80
DRG: 481 | End: 2020-09-19
Attending: ORTHOPAEDIC SURGERY
Payer: COMMERCIAL

## 2020-09-19 DIAGNOSIS — S72.141A CLOSED DISPLACED INTERTROCHANTERIC FRACTURE OF RIGHT FEMUR, INITIAL ENCOUNTER (H): Primary | ICD-10-CM

## 2020-09-19 DIAGNOSIS — G89.3 CANCER ASSOCIATED PAIN: ICD-10-CM

## 2020-09-19 DIAGNOSIS — C25.2 MALIGNANT NEOPLASM OF TAIL OF PANCREAS (H): ICD-10-CM

## 2020-09-19 PROBLEM — S72.009A HIP FRACTURE (H): Status: ACTIVE | Noted: 2020-09-19

## 2020-09-19 LAB
ABO + RH BLD: NORMAL
ABO + RH BLD: NORMAL
ANION GAP SERPL CALCULATED.3IONS-SCNC: 6 MMOL/L (ref 3–14)
APTT PPP: 37 SEC (ref 22–37)
BASOPHILS # BLD AUTO: 0 10E9/L (ref 0–0.2)
BASOPHILS NFR BLD AUTO: 0.5 %
BLD GP AB SCN SERPL QL: NORMAL
BLOOD BANK CMNT PATIENT-IMP: NORMAL
BUN SERPL-MCNC: 16 MG/DL (ref 7–30)
CALCIUM SERPL-MCNC: 8.7 MG/DL (ref 8.5–10.1)
CHLORIDE SERPL-SCNC: 100 MMOL/L (ref 94–109)
CO2 SERPL-SCNC: 28 MMOL/L (ref 20–32)
CREAT SERPL-MCNC: 1 MG/DL (ref 0.66–1.25)
DIFFERENTIAL METHOD BLD: ABNORMAL
EOSINOPHIL # BLD AUTO: 0.1 10E9/L (ref 0–0.7)
EOSINOPHIL NFR BLD AUTO: 1.1 %
ERYTHROCYTE [DISTWIDTH] IN BLOOD BY AUTOMATED COUNT: 14.4 % (ref 10–15)
GFR SERPL CREATININE-BSD FRML MDRD: 70 ML/MIN/{1.73_M2}
GLUCOSE SERPL-MCNC: 118 MG/DL (ref 70–99)
HCT VFR BLD AUTO: 38.8 % (ref 40–53)
HGB BLD-MCNC: 12.6 G/DL (ref 13.3–17.7)
IMM GRANULOCYTES # BLD: 0 10E9/L (ref 0–0.4)
IMM GRANULOCYTES NFR BLD: 0.2 %
INR PPP: 1.16 (ref 0.86–1.14)
LABORATORY COMMENT REPORT: NORMAL
LYMPHOCYTES # BLD AUTO: 1 10E9/L (ref 0.8–5.3)
LYMPHOCYTES NFR BLD AUTO: 16.1 %
MCH RBC QN AUTO: 30.7 PG (ref 26.5–33)
MCHC RBC AUTO-ENTMCNC: 32.5 G/DL (ref 31.5–36.5)
MCV RBC AUTO: 95 FL (ref 78–100)
MONOCYTES # BLD AUTO: 1.2 10E9/L (ref 0–1.3)
MONOCYTES NFR BLD AUTO: 18.9 %
NEUTROPHILS # BLD AUTO: 3.9 10E9/L (ref 1.6–8.3)
NEUTROPHILS NFR BLD AUTO: 63.2 %
NRBC # BLD AUTO: 0 10*3/UL
NRBC BLD AUTO-RTO: 0 /100
PLATELET # BLD AUTO: 116 10E9/L (ref 150–450)
POTASSIUM SERPL-SCNC: 4.3 MMOL/L (ref 3.4–5.3)
RBC # BLD AUTO: 4.1 10E12/L (ref 4.4–5.9)
SARS-COV-2 RNA SPEC QL NAA+PROBE: NEGATIVE
SARS-COV-2 RNA SPEC QL NAA+PROBE: NORMAL
SODIUM SERPL-SCNC: 134 MMOL/L (ref 133–144)
SPECIMEN EXP DATE BLD: NORMAL
SPECIMEN SOURCE: NORMAL
SPECIMEN SOURCE: NORMAL
WBC # BLD AUTO: 6.1 10E9/L (ref 4–11)

## 2020-09-19 PROCEDURE — 85025 COMPLETE CBC W/AUTO DIFF WBC: CPT | Performed by: EMERGENCY MEDICINE

## 2020-09-19 PROCEDURE — 73552 X-RAY EXAM OF FEMUR 2/>: CPT | Mod: RT

## 2020-09-19 PROCEDURE — 86850 RBC ANTIBODY SCREEN: CPT | Performed by: EMERGENCY MEDICINE

## 2020-09-19 PROCEDURE — 99223 1ST HOSP IP/OBS HIGH 75: CPT | Mod: AI | Performed by: INTERNAL MEDICINE

## 2020-09-19 PROCEDURE — 86901 BLOOD TYPING SEROLOGIC RH(D): CPT | Performed by: EMERGENCY MEDICINE

## 2020-09-19 PROCEDURE — 72125 CT NECK SPINE W/O DYE: CPT

## 2020-09-19 PROCEDURE — 25000128 H RX IP 250 OP 636: Performed by: EMERGENCY MEDICINE

## 2020-09-19 PROCEDURE — 96374 THER/PROPH/DIAG INJ IV PUSH: CPT

## 2020-09-19 PROCEDURE — 25000132 ZZH RX MED GY IP 250 OP 250 PS 637: Performed by: INTERNAL MEDICINE

## 2020-09-19 PROCEDURE — C9803 HOPD COVID-19 SPEC COLLECT: HCPCS

## 2020-09-19 PROCEDURE — 73502 X-RAY EXAM HIP UNI 2-3 VIEWS: CPT

## 2020-09-19 PROCEDURE — 25000128 H RX IP 250 OP 636: Performed by: INTERNAL MEDICINE

## 2020-09-19 PROCEDURE — 85730 THROMBOPLASTIN TIME PARTIAL: CPT | Performed by: EMERGENCY MEDICINE

## 2020-09-19 PROCEDURE — 86900 BLOOD TYPING SEROLOGIC ABO: CPT | Performed by: EMERGENCY MEDICINE

## 2020-09-19 PROCEDURE — 71045 X-RAY EXAM CHEST 1 VIEW: CPT

## 2020-09-19 PROCEDURE — 12000000 ZZH R&B MED SURG/OB

## 2020-09-19 PROCEDURE — 99285 EMERGENCY DEPT VISIT HI MDM: CPT | Mod: 25

## 2020-09-19 PROCEDURE — 93005 ELECTROCARDIOGRAM TRACING: CPT

## 2020-09-19 PROCEDURE — 96375 TX/PRO/DX INJ NEW DRUG ADDON: CPT

## 2020-09-19 PROCEDURE — 85610 PROTHROMBIN TIME: CPT | Performed by: EMERGENCY MEDICINE

## 2020-09-19 PROCEDURE — 80048 BASIC METABOLIC PNL TOTAL CA: CPT | Performed by: EMERGENCY MEDICINE

## 2020-09-19 PROCEDURE — U0003 INFECTIOUS AGENT DETECTION BY NUCLEIC ACID (DNA OR RNA); SEVERE ACUTE RESPIRATORY SYNDROME CORONAVIRUS 2 (SARS-COV-2) (CORONAVIRUS DISEASE [COVID-19]), AMPLIFIED PROBE TECHNIQUE, MAKING USE OF HIGH THROUGHPUT TECHNOLOGIES AS DESCRIBED BY CMS-2020-01-R: HCPCS | Performed by: EMERGENCY MEDICINE

## 2020-09-19 RX ORDER — POLYETHYLENE GLYCOL 3350 17 G/17G
17 POWDER, FOR SOLUTION ORAL DAILY
Status: DISCONTINUED | OUTPATIENT
Start: 2020-09-19 | End: 2020-09-23 | Stop reason: HOSPADM

## 2020-09-19 RX ORDER — TAMSULOSIN HYDROCHLORIDE 0.4 MG/1
0.4 CAPSULE ORAL DAILY
Status: DISCONTINUED | OUTPATIENT
Start: 2020-09-20 | End: 2020-09-23 | Stop reason: HOSPADM

## 2020-09-19 RX ORDER — HYDROMORPHONE HYDROCHLORIDE 1 MG/ML
0.3 INJECTION, SOLUTION INTRAMUSCULAR; INTRAVENOUS; SUBCUTANEOUS
Status: DISCONTINUED | OUTPATIENT
Start: 2020-09-19 | End: 2020-09-20

## 2020-09-19 RX ORDER — ONDANSETRON 2 MG/ML
4 INJECTION INTRAMUSCULAR; INTRAVENOUS EVERY 6 HOURS PRN
Status: DISCONTINUED | OUTPATIENT
Start: 2020-09-19 | End: 2020-09-20

## 2020-09-19 RX ORDER — MAGNESIUM CARB/ALUMINUM HYDROX 105-160MG
148 TABLET,CHEWABLE ORAL
Status: DISCONTINUED | OUTPATIENT
Start: 2020-09-19 | End: 2020-09-23 | Stop reason: HOSPADM

## 2020-09-19 RX ORDER — LORATADINE 10 MG/1
10 TABLET ORAL DAILY PRN
Status: DISCONTINUED | OUTPATIENT
Start: 2020-09-19 | End: 2020-09-23 | Stop reason: HOSPADM

## 2020-09-19 RX ORDER — OXYCODONE HYDROCHLORIDE 5 MG/1
5-10 TABLET ORAL EVERY 4 HOURS PRN
Status: DISCONTINUED | OUTPATIENT
Start: 2020-09-19 | End: 2020-09-22

## 2020-09-19 RX ORDER — ONDANSETRON 2 MG/ML
4 INJECTION INTRAMUSCULAR; INTRAVENOUS EVERY 30 MIN PRN
Status: DISCONTINUED | OUTPATIENT
Start: 2020-09-19 | End: 2020-09-19

## 2020-09-19 RX ORDER — ACETAMINOPHEN 500 MG
500-1000 TABLET ORAL EVERY 8 HOURS PRN
Status: DISCONTINUED | OUTPATIENT
Start: 2020-09-19 | End: 2020-09-20

## 2020-09-19 RX ORDER — POLYETHYLENE GLYCOL 3350 17 G/17G
17 POWDER, FOR SOLUTION ORAL DAILY
Status: DISCONTINUED | OUTPATIENT
Start: 2020-09-20 | End: 2020-09-19

## 2020-09-19 RX ORDER — MORPHINE SULFATE 15 MG/1
15 TABLET, FILM COATED, EXTENDED RELEASE ORAL EVERY 12 HOURS SCHEDULED
Status: DISCONTINUED | OUTPATIENT
Start: 2020-09-19 | End: 2020-09-21

## 2020-09-19 RX ORDER — LORAZEPAM 0.5 MG/1
0.5 TABLET ORAL
Status: ON HOLD | COMMUNITY
End: 2020-09-23

## 2020-09-19 RX ORDER — LIDOCAINE 40 MG/G
CREAM TOPICAL
Status: DISCONTINUED | OUTPATIENT
Start: 2020-09-19 | End: 2020-09-20

## 2020-09-19 RX ORDER — GABAPENTIN 300 MG/1
300 CAPSULE ORAL 3 TIMES DAILY
Status: DISCONTINUED | OUTPATIENT
Start: 2020-09-19 | End: 2020-09-23 | Stop reason: HOSPADM

## 2020-09-19 RX ORDER — LORAZEPAM 0.5 MG/1
0.5 TABLET ORAL
Status: DISCONTINUED | OUTPATIENT
Start: 2020-09-19 | End: 2020-09-23 | Stop reason: HOSPADM

## 2020-09-19 RX ORDER — NALOXONE HYDROCHLORIDE 0.4 MG/ML
.1-.4 INJECTION, SOLUTION INTRAMUSCULAR; INTRAVENOUS; SUBCUTANEOUS
Status: DISCONTINUED | OUTPATIENT
Start: 2020-09-19 | End: 2020-09-20

## 2020-09-19 RX ORDER — ONDANSETRON 4 MG/1
4 TABLET, ORALLY DISINTEGRATING ORAL EVERY 6 HOURS PRN
Status: DISCONTINUED | OUTPATIENT
Start: 2020-09-19 | End: 2020-09-20

## 2020-09-19 RX ADMIN — OXYCODONE HYDROCHLORIDE 5 MG: 5 TABLET ORAL at 18:29

## 2020-09-19 RX ADMIN — HYDROMORPHONE HYDROCHLORIDE 1 MG: 1 INJECTION, SOLUTION INTRAMUSCULAR; INTRAVENOUS; SUBCUTANEOUS at 13:57

## 2020-09-19 RX ADMIN — GABAPENTIN 300 MG: 300 CAPSULE ORAL at 19:52

## 2020-09-19 RX ADMIN — MORPHINE SULFATE 15 MG: 15 TABLET, EXTENDED RELEASE ORAL at 19:52

## 2020-09-19 RX ADMIN — OXYCODONE HYDROCHLORIDE 5 MG: 5 TABLET ORAL at 22:53

## 2020-09-19 RX ADMIN — ACETAMINOPHEN 500 MG: 500 TABLET, FILM COATED ORAL at 18:28

## 2020-09-19 RX ADMIN — PSYLLIUM HUSK 1 PACKET: 3.4 POWDER ORAL at 18:58

## 2020-09-19 RX ADMIN — POLYETHYLENE GLYCOL 3350 17 G: 17 POWDER, FOR SOLUTION ORAL at 18:58

## 2020-09-19 RX ADMIN — ONDANSETRON 4 MG: 2 INJECTION INTRAMUSCULAR; INTRAVENOUS at 13:56

## 2020-09-19 RX ADMIN — HYDROMORPHONE HYDROCHLORIDE 0.3 MG: 1 INJECTION, SOLUTION INTRAMUSCULAR; INTRAVENOUS; SUBCUTANEOUS at 17:10

## 2020-09-19 ASSESSMENT — ACTIVITIES OF DAILY LIVING (ADL)
COGNITION: 0 - NO COGNITION ISSUES REPORTED
FALL_HISTORY_WITHIN_LAST_SIX_MONTHS: YES
WHICH_OF_THE_ABOVE_FUNCTIONAL_RISKS_HAD_A_RECENT_ONSET_OR_CHANGE?: AMBULATION;TRANSFERRING;TOILETING;BATHING;DRESSING
AMBULATION: 0-->INDEPENDENT
TRANSFERRING: 0-->INDEPENDENT
RETIRED_COMMUNICATION: 0-->UNDERSTANDS/COMMUNICATES WITHOUT DIFFICULTY
RETIRED_EATING: 0-->INDEPENDENT
SWALLOWING: 0-->SWALLOWS FOODS/LIQUIDS WITHOUT DIFFICULTY
NUMBER_OF_TIMES_PATIENT_HAS_FALLEN_WITHIN_LAST_SIX_MONTHS: 1
ADLS_ACUITY_SCORE: 12
DRESS: 0-->INDEPENDENT
BATHING: 0-->INDEPENDENT
TOILETING: 0-->INDEPENDENT

## 2020-09-19 ASSESSMENT — ENCOUNTER SYMPTOMS
ARTHRALGIAS: 1
LIGHT-HEADEDNESS: 0
NECK STIFFNESS: 1
DIZZINESS: 0
NECK PAIN: 0
HEADACHES: 0
PALPITATIONS: 0
NAUSEA: 0
CONSTIPATION: 0
SHORTNESS OF BREATH: 0
COUGH: 0
JOINT SWELLING: 0
WEAKNESS: 0
CHILLS: 0
FEVER: 0
VOMITING: 0
WOUND: 0
DIARRHEA: 0
BRUISES/BLEEDS EASILY: 0
BACK PAIN: 1

## 2020-09-19 NOTE — TREATMENT PLAN
[facility]    Orthopedics Preliminary Consult Note    Chart reviewed.    Lowell Arredondo is a 80 year old male with Right Intertrochanteric Hip Fracture..    Indicated for Long cephalo medullary nail.    Full length femur x-rays ordered for preop planning.    OR planned for 9/20/20 @ 1000    NWB  Hold DVTP  NPO after MN  Covid Testing  Medical Clearance    Will evaluate patient on 9/20/20    Full consult note to follow.    Please call with questions.    Jaret Marino MD  Orthopedic Trauma and Arthroplasty  [facility]  [phone]

## 2020-09-19 NOTE — ED TRIAGE NOTES
Patient going down stairs and fell down about 5 stairs w/o LOC on the right hip. Provider at bedside.

## 2020-09-19 NOTE — ED NOTES
[facility]  ED Nurse Handoff Report    Lowell Arredondo is a 80 year old male   ED Chief complaint: Fall  . ED Diagnosis:   Final diagnoses:   None     Allergies:   Allergies   Allergen Reactions     Demerol [Meperidine] Difficulty breathing       Code Status: advance directive  Activity level - Baseline/Home:  Independent. Activity Level - Current:   Total Care. Lift room needed: Yes. Bariatric: No   Needed: No   Isolation: No. Infection: Not Applicable.     Vital Signs:   Vitals:    09/19/20 1312 09/19/20 1345 09/19/20 1400 09/19/20 1415   BP: 129/63 121/69 111/61 107/59   Pulse: 100  89 83   Resp: 16      Temp: 98.2  F (36.8  C)      TempSrc: Oral      SpO2: 98%          Cardiac Rhythm:  ,      Pain level:    Patient confused: No. Patient Falls Risk: Yes.   Elimination Status: Has voided   Patient Report - Initial Complaint: fall. Focused Assessment: Pancreatitic patient present to ER via EMS after falling down about 5 steps.  Per report patient has been unsteady which is is normal.   Tests Performed: labs, imaging. Abnormal Results:   Labs Ordered and Resulted from Time of ED Arrival Up to the Time of Departure from the ED   CBC WITH PLATELETS DIFFERENTIAL - Abnormal; Notable for the following components:       Result Value    RBC Count 4.10 (*)     Hemoglobin 12.6 (*)     Hematocrit 38.8 (*)     Platelet Count 116 (*)     All other components within normal limits   INR - Abnormal; Notable for the following components:    INR 1.16 (*)     All other components within normal limits   BASIC METABOLIC PANEL - Abnormal; Notable for the following components:    Glucose 118 (*)     All other components within normal limits   PARTIAL THROMBOPLASTIN TIME   COVID-19 VIRUS (CORONAVIRUS) BY PCR   PERIPHERAL IV CATHETER   PULSE OXIMETRY NURSING   CARDIAC CONTINUOUS MONITORING   ABO/RH TYPE AND SCREEN     Cervical spine CT w/o contrast   Final Result   IMPRESSION:     1. No evidence for fracture or any  posterior malalignment.   2. Multilevel degenerative disc and facet disease most advanced at   C5-C6 where there is moderate central canal stenosis, mild cord   deformity, and moderate-to-severe bilateral neural foraminal stenosis.      URBANO VU MD      XR Pelvis w Hip Right 1 View   Final Result         Treatments provided: see MAR  Family Comments: Wife at bedside   OBS brochure/video discussed/provided to patient:  No  ED Medications:   Medications   HYDROmorphone (DILAUDID) injection 1 mg (1 mg Intravenous Given 9/19/20 1357)   ondansetron (ZOFRAN) injection 4 mg (4 mg Intravenous Given 9/19/20 1356)     Drips infusing:  No  For the majority of the shift, the patient's behavior Green. Interventions performed were na.    Sepsis treatment initiated: No     Patient tested for COVID 19 prior to admission: YES    ED Nurse Name/Phone Number: Pascale Rothman RN,   2:25 PM    RECEIVING UNIT ED HANDOFF REVIEW    Above ED Nurse Handoff Report was reviewed: YES  Reviewed by: Sylvia Bryson RN on September 19, 2020 at 3:46 PM   Did you vocera the ED RN: KAYA

## 2020-09-19 NOTE — ED NOTES
C-Collar removed denies numbness or tingling in extremities. Patient moving arms/hand and able to wiggle toes.

## 2020-09-19 NOTE — PHARMACY-ADMISSION MEDICATION HISTORY
.Admission medication history interview status for this patient is complete. See [facility] admission navigator for allergy information, prior to admission medications and immunization status.     Medication history interview done via telephone during Covid-19 pandemic, indicate source(s): Patient and Family  Medication history resources (including written lists, pill bottles, clinic record):None  Pharmacy: [facility]    Changes made to PTA medication list:  Added: lorazepam  Deleted: none  Changed: loratadine to prn, Miralax to 8.5gm    Actions taken by pharmacist (provider contacted, etc):Spoke to wife, Melvi.     Additional medication history information: Patient has been using MS Contin 1 tab bid instead of up to 2 tabs in pm.    Medication reconciliation/reorder completed by provider prior to medication history?  y     (Y/N)     For patients on insulin therapy: n  (Y/N)      Prior to Admission medications    Medication Sig Last Dose Taking? Auth Provider   acetaminophen (TYLENOL) 500 MG tablet Take 500-1,000 mg by mouth every 8 hours as needed for mild pain Past Month at Unknown time Yes Unknown, Entered By History   gabapentin (NEURONTIN) 300 MG capsule Take 1 capsule (300 mg) by mouth 3 times daily 9/19/2020 at am Yes Diego Segura MD   LORazepam (ATIVAN) 0.5 MG tablet Take 0.5 mg by mouth nightly as needed for anxiety 9/18/2020 at Unknown time Yes Unknown, Entered By History   morphine (MS CONTIN) 15 MG CR tablet Take 1 tablet (15 mg) by mouth every evening maximum 2 tablet(s) per day (patient is using 1 tablet BID) 9/19/2020 at 0530 Yes Park Hyman MD   ondansetron (ZOFRAN) 8 MG tablet Take 1 tablet (8 mg) by mouth every 8 hours as needed (Nausea/Vomiting) Past Month at Unknown time Yes Diego Segura MD   oxyCODONE (ROXICODONE) 5 MG tablet Take 1-2 tablets (5-10 mg) by mouth every 4 hours as needed for pain 9/18/2020 at Unknown time Yes Park Hyman MD   polyethylene glycol  (MIRALAX/GLYCOLAX) packet Take 17 g by mouth daily (patient is using 8.5gm) 9/19/2020 at Unknown time Yes Reported, Patient   psyllium (METAMUCIL) 28.3 % POWD Take 0.5 Tablespoonful by mouth daily  9/19/2020 at Unknown time Yes Reported, Patient   tamsulosin (FLOMAX) 0.4 MG capsule Take 1 capsule (0.4 mg) by mouth daily 9/19/2020 at Unknown time Yes Diego Segura MD   loratadine (CLARITIN) 10 MG tablet Take 10 mg by mouth daily as needed  More than a month at Unknown time  Reported, Patient   naloxone (NARCAN) 4 MG/0.1ML nasal spray Spray 1 spray (4 mg) into one nostril alternating nostrils once as needed for opioid reversal every 2-3 minutes until assistance arrives   Park Hyman MD

## 2020-09-19 NOTE — ED NOTES
Emergency Department Attending Supervision Note  9/19/2020  3:31 PM      I evaluated this patient in conjunction with ***      Briefly, the patient presented with  ***      On my exam, ***    Lowell Arredondo is a 80 year old male who presents to the emergency department today for evaluation after a fall at home. Patient did have right hip tenderness with extrenal rotation of the right leg. She did show intertrochanteric femur fractur. CTC sound clear after a negative CT. Patient admitted to Dr. Davenport *** the orthopedic. Patient family was also updated. Patient admitted in stable condition.    Diagnosis  No diagnosis found.      Kelsea Raines MD

## 2020-09-19 NOTE — ED PROVIDER NOTES
History     Chief Complaint:  Fall      HPI   Lowell Arredondo is a 80 year old male with PMHx for pancreatic cancer with chemo and radiation who presents with right hip pain after fall.     History provided by patient and EMS.    Patient had just gotten done with chemo today and was at home was at the top of the steps and going to make his way down he slipped and fell onto his right hip and bounced down at least 5 steps.  Per patient he had of head was bouncing against the steps as well as going down.  Patient denies being on any anticoagulants.  Patient notes that since getting chemo he has been a bit more unstable.  Per EMS blood sugar on arrival was 112 and he was given some fentanyl in the ambulance.  Patient states that he does not have any head pain headache double vision.  Does not feel that he has any neck pain.  Per patient his right hip does hurt and mid lower back.  Has not had any injuries prior.  Patient denies any palpitations, shortness of breath, or feelings of lightheadedness.  Patient denies any chest pain.  EMS placed him in c-collar given the type of fall and was concerned about his externally rotated right leg.    Allergies:  Demerol  Meperidine    Medications:    Neurontin  Claritin  Morphine  Narcan  Zofran  Oxycodone  Miralax/Glycolax  Metamucil  Flomax    Past Medical History:    Basal cell carcinoma of skin  BPH  Prostate cancer  Malignant neoplasm of tail of pancrease  Peripheral neuropathy  Hyperlipidemia    Past Surgical History:    Left foot surgery  Hernia repair  Prostate surgery    Family History:    Father: Prostate cancer  Brother: Prostate cancer    Social History:  Smoking Status: Never Smoker  Smokeless Tobacco: Never Used  Alcohol Use: Not currently  Drug Use: Negative  PCP: Diego Segura  Marital Status:   [2]      Review of Systems   Constitutional: Negative for chills and fever.   Respiratory: Negative for cough and shortness of breath.    Cardiovascular: Negative  for chest pain, palpitations and leg swelling.   Gastrointestinal: Negative for constipation, diarrhea, nausea and vomiting.   Musculoskeletal: Positive for arthralgias, back pain and neck stiffness. Negative for joint swelling and neck pain.   Skin: Negative for rash and wound.   Neurological: Negative for dizziness, weakness, light-headedness and headaches.   Hematological: Does not bruise/bleed easily.   All other systems reviewed and are negative.      Physical Exam     Patient Vitals for the past 24 hrs:   BP Temp Temp src Pulse Resp SpO2   09/19/20 1515 101/55 -- -- 82 -- 95 %   09/19/20 1500 124/65 -- -- 93 -- 97 %   09/19/20 1445 97/58 -- -- 85 -- 93 %   09/19/20 1430 100/59 -- -- 83 -- 92 %   09/19/20 1415 107/59 -- -- 83 -- --   09/19/20 1400 111/61 -- -- 89 -- --   09/19/20 1345 121/69 -- -- -- -- --   09/19/20 1312 129/63 98.2  F (36.8  C) Oral 100 16 98 %       Physical Exam  Vitals signs and nursing note reviewed.   Constitutional:       General: He is not in acute distress.     Appearance: Normal appearance. He is normal weight. He is not ill-appearing or diaphoretic.   HENT:      Head: Normocephalic and atraumatic.      Mouth/Throat:      Mouth: Mucous membranes are moist.      Pharynx: Oropharynx is clear.   Eyes:      General: No visual field deficit.     Extraocular Movements: Extraocular movements intact.      Conjunctiva/sclera: Conjunctivae normal.      Pupils: Pupils are equal, round, and reactive to light.   Neck:      Musculoskeletal: Neck supple. No neck rigidity or muscular tenderness.      Comments: Pain with turning head to left  Musculoskeletal:         General: No swelling.      Right hip: He exhibits decreased range of motion. He exhibits no tenderness, no swelling and no deformity.      Left hip: Normal.      Right lower leg: No edema.      Left lower leg: No edema.      Comments: Right hip externally rotated  Pain with leg left leg rotation   Skin:     General: Skin is warm and dry.       Capillary Refill: Capillary refill takes less than 2 seconds.      Findings: No bruising, lesion or rash.   Neurological:      Mental Status: He is alert and oriented to person, place, and time.      Cranial Nerves: No dysarthria or facial asymmetry.      Sensory: Sensation is intact.      Motor: Motor function is intact. No weakness.      Deep Tendon Reflexes: Babinski sign absent on the right side. Babinski sign absent on the left side.         Emergency Department Course     ECG:  ECG taken at 1356  Normal sinus rhythm  Normal ECG  Rate 89 bpm. NC interval 172 ms. QRS duration 82 ms. QT/QTc 360/438 ms. P-R-T axes 63 -8 26.    Imaging:  Radiology findings were communicated with the patient and wife who voiced understanding of the findings.    Cervical spine CT w/o contrast  1. No evidence for fracture or any posterior malalignment.  2. Multilevel degenerative disc and facet disease most advanced at  C5-C6 where there is moderate central canal stenosis, mild cord  deformity, and moderate-to-severe bilateral neural foraminal stenosis.  Reading per radiology    XR Femur Right 2 Views   Preliminary Result   IMPRESSION: There is a nondisplaced intertrochanteric fracture of the   proximal right femur. No other acute bony or soft tissue abnormality.   No significant degenerative changes at the right hip. Vascular   calcifications are seen.      Cervical spine CT w/o contrast   Final Result   IMPRESSION:     1. No evidence for fracture or any posterior malalignment.   2. Multilevel degenerative disc and facet disease most advanced at   C5-C6 where there is moderate central canal stenosis, mild cord   deformity, and moderate-to-severe bilateral neural foraminal stenosis.      URBANO VU MD      XR Pelvis w Hip Right 1 View   Final Result      XR Chest 1 View    (Results Pending)        Laboratory:  Laboratory findings were communicated with the patient who voiced understanding of the findings.    CBC: WBC 6.1, HGB  12.6 (L),  (L)  BMP: Glucose 118 (H) o/w WNL (Creatinine 1.00)    Partial thromboplastin time: 37    INR: 1.16 (H)    ABO/Rh type and screen ABO: A, Antibody Neg    Asymptomatic COVID-19 Virus (Coronavirus) by PCR Nasopharyngeal swab: Pending    Interventions:  1356 Zofran 4mg IV  1357 Dilaudid 1mg IV    Emergency Department Course:    Past medical records, nursing notes, and vitals reviewed.  I performed an exam of the patient and obtained history, as documented above.     IV was inserted and blood was drawn for laboratory testing, results above.    The patient was sent for a XR and CT while in the emergency department, findings above    1350: I spoke with patient's wife.     1354: Dr. Raines spoke with the patient's son about the patient.    1430: Dr. Raines spoke with orthopedics about the findings, and recommended surgery and an inpatient admission.  This was relayed to the patient and the family.    1445: Change discussed admission with Dr. Davenport.    1500: I rechecked and updated the patient.     I personally reviewed the laboratory and imaging results with the Patient and answered all related questions prior to admission.     Findings and plan explained to the Patient who consents to admission. Discussed the patient with Dr. Davenport, who will admit the patient to a adult aleksey/surg bed for further monitoring, evaluation, and treatment.    Impression & Plan      Medical Decision Making:  Lowell Arredondo is a 80 year old male who presents to the emergency department today for evaluation right leg pain post fall.  Patient has a known history of creatinine cancer status post chemoradiation, patient had a fall down 5 flights of steps today.  X-rays confirmed displaced intertrochanteric fracture of the right femur.  CT ruled out cervical fracture.  This case discussed with orthopedics and hospitalist.  Patient to be admitted with plans for surgery during stay.  Plan was discussed with patient who voiced  understanding and agreed.      Diagnosis:    ICD-10-CM    1. Closed displaced intertrochanteric fracture of right femur, initial encounter (H)  S72.141A          Disposition:   The patient is admitted into the care of Dr. Davenport.    Cate Stephens MD  PGY 2 FM    Scribe Disclosure:  I, Sean España, am serving as a scribe at 1:58 PM on 9/19/2020 to document services personally performed by Kelsea Raines MD based on my observations and the provider's statements to me.   [facility] EMERGENCY DEPARTMENT       Cate Stephens MD  Resident  09/19/20 1638  Emergency Department Attending Supervision Note  9/19/2020  5:29 PM      I evaluated this patient in conjunction with PGY2       Briefly, the patient presented with  Fall and right hip pain      On my exam, right hip deformity noted with mild C spine TTP.    Results: R intertroch femur fracture      My impression is right femur fracture needing surgical repair        Diagnosis    ICD-10-CM    1. Closed displaced intertrochanteric fracture of right femur, initial encounter (H)  S72.141A          MD Olu Cowart Wenlan, MD  09/19/20 7826

## 2020-09-20 ENCOUNTER — APPOINTMENT (OUTPATIENT)
Dept: GENERAL RADIOLOGY | Facility: CLINIC | Age: 80
DRG: 481 | End: 2020-09-20
Attending: ORTHOPAEDIC SURGERY
Payer: COMMERCIAL

## 2020-09-20 ENCOUNTER — ANESTHESIA EVENT (OUTPATIENT)
Dept: SURGERY | Facility: CLINIC | Age: 80
DRG: 481 | End: 2020-09-20
Payer: COMMERCIAL

## 2020-09-20 ENCOUNTER — APPOINTMENT (OUTPATIENT)
Dept: GENERAL RADIOLOGY | Facility: CLINIC | Age: 80
DRG: 481 | End: 2020-09-20
Attending: PHYSICIAN ASSISTANT
Payer: COMMERCIAL

## 2020-09-20 ENCOUNTER — ANESTHESIA (OUTPATIENT)
Dept: SURGERY | Facility: CLINIC | Age: 80
DRG: 481 | End: 2020-09-20
Payer: COMMERCIAL

## 2020-09-20 LAB
ALBUMIN UR-MCNC: NEGATIVE MG/DL
ANION GAP SERPL CALCULATED.3IONS-SCNC: 6 MMOL/L (ref 3–14)
APPEARANCE UR: CLEAR
BILIRUB UR QL STRIP: NEGATIVE
BUN SERPL-MCNC: 15 MG/DL (ref 7–30)
CALCIUM SERPL-MCNC: 8.5 MG/DL (ref 8.5–10.1)
CHLORIDE SERPL-SCNC: 102 MMOL/L (ref 94–109)
CO2 SERPL-SCNC: 26 MMOL/L (ref 20–32)
COLOR UR AUTO: YELLOW
CREAT SERPL-MCNC: 1.04 MG/DL (ref 0.66–1.25)
ERYTHROCYTE [DISTWIDTH] IN BLOOD BY AUTOMATED COUNT: 14.4 % (ref 10–15)
GFR SERPL CREATININE-BSD FRML MDRD: 66 ML/MIN/{1.73_M2}
GLUCOSE SERPL-MCNC: 106 MG/DL (ref 70–99)
GLUCOSE UR STRIP-MCNC: NEGATIVE MG/DL
HCT VFR BLD AUTO: 31.7 % (ref 40–53)
HGB BLD-MCNC: 10.5 G/DL (ref 13.3–17.7)
HGB UR QL STRIP: NEGATIVE
INR PPP: 1.25 (ref 0.86–1.14)
KETONES UR STRIP-MCNC: NEGATIVE MG/DL
LEUKOCYTE ESTERASE UR QL STRIP: NEGATIVE
MCH RBC QN AUTO: 30.5 PG (ref 26.5–33)
MCHC RBC AUTO-ENTMCNC: 33.1 G/DL (ref 31.5–36.5)
MCV RBC AUTO: 92 FL (ref 78–100)
MUCOUS THREADS #/AREA URNS LPF: PRESENT /LPF
NITRATE UR QL: NEGATIVE
PH UR STRIP: 5 PH (ref 5–7)
PLATELET # BLD AUTO: 103 10E9/L (ref 150–450)
POTASSIUM SERPL-SCNC: 4.2 MMOL/L (ref 3.4–5.3)
RBC # BLD AUTO: 3.44 10E12/L (ref 4.4–5.9)
RBC #/AREA URNS AUTO: 1 /HPF (ref 0–2)
SODIUM SERPL-SCNC: 134 MMOL/L (ref 133–144)
SP GR UR STRIP: 1.01 (ref 1–1.03)
UROBILINOGEN UR STRIP-MCNC: NORMAL MG/DL (ref 0–2)
WBC # BLD AUTO: 5.3 10E9/L (ref 4–11)
WBC #/AREA URNS AUTO: 3 /HPF (ref 0–5)

## 2020-09-20 PROCEDURE — 12000000 ZZH R&B MED SURG/OB

## 2020-09-20 PROCEDURE — 25000128 H RX IP 250 OP 636: Performed by: ANESTHESIOLOGY

## 2020-09-20 PROCEDURE — 36415 COLL VENOUS BLD VENIPUNCTURE: CPT | Performed by: INTERNAL MEDICINE

## 2020-09-20 PROCEDURE — 25000125 ZZHC RX 250: Performed by: NURSE ANESTHETIST, CERTIFIED REGISTERED

## 2020-09-20 PROCEDURE — 37000008 ZZH ANESTHESIA TECHNICAL FEE, 1ST 30 MIN: Performed by: ORTHOPAEDIC SURGERY

## 2020-09-20 PROCEDURE — 25000132 ZZH RX MED GY IP 250 OP 250 PS 637: Performed by: PHYSICIAN ASSISTANT

## 2020-09-20 PROCEDURE — 37000009 ZZH ANESTHESIA TECHNICAL FEE, EACH ADDTL 15 MIN: Performed by: ORTHOPAEDIC SURGERY

## 2020-09-20 PROCEDURE — 85610 PROTHROMBIN TIME: CPT | Performed by: INTERNAL MEDICINE

## 2020-09-20 PROCEDURE — 99232 SBSQ HOSP IP/OBS MODERATE 35: CPT | Performed by: INTERNAL MEDICINE

## 2020-09-20 PROCEDURE — 25000128 H RX IP 250 OP 636: Performed by: INTERNAL MEDICINE

## 2020-09-20 PROCEDURE — C1769 GUIDE WIRE: HCPCS | Performed by: ORTHOPAEDIC SURGERY

## 2020-09-20 PROCEDURE — 0QS636Z REPOSITION RIGHT UPPER FEMUR WITH INTRAMEDULLARY INTERNAL FIXATION DEVICE, PERCUTANEOUS APPROACH: ICD-10-PCS | Performed by: ORTHOPAEDIC SURGERY

## 2020-09-20 PROCEDURE — 99207 ZZC CDG-CODE CATEGORY CHANGED: CPT | Performed by: INTERNAL MEDICINE

## 2020-09-20 PROCEDURE — 40000277 XR SURGERY CARM FLUORO LESS THAN 5 MIN W STILLS: Mod: TC

## 2020-09-20 PROCEDURE — 36000065 ZZH SURGERY LEVEL 4 W FLUORO 1ST 30 MIN: Performed by: ORTHOPAEDIC SURGERY

## 2020-09-20 PROCEDURE — 87040 BLOOD CULTURE FOR BACTERIA: CPT | Performed by: INTERNAL MEDICINE

## 2020-09-20 PROCEDURE — 25800030 ZZH RX IP 258 OP 636: Performed by: ANESTHESIOLOGY

## 2020-09-20 PROCEDURE — 80048 BASIC METABOLIC PNL TOTAL CA: CPT | Performed by: INTERNAL MEDICINE

## 2020-09-20 PROCEDURE — 40000306 ZZH STATISTIC PRE PROC ASSESS II: Performed by: ORTHOPAEDIC SURGERY

## 2020-09-20 PROCEDURE — 27210794 ZZH OR GENERAL SUPPLY STERILE: Performed by: ORTHOPAEDIC SURGERY

## 2020-09-20 PROCEDURE — 36000063 ZZH SURGERY LEVEL 4 EA 15 ADDTL MIN: Performed by: ORTHOPAEDIC SURGERY

## 2020-09-20 PROCEDURE — 25000125 ZZHC RX 250: Performed by: ORTHOPAEDIC SURGERY

## 2020-09-20 PROCEDURE — 25800030 ZZH RX IP 258 OP 636: Performed by: PHYSICIAN ASSISTANT

## 2020-09-20 PROCEDURE — 25000132 ZZH RX MED GY IP 250 OP 250 PS 637: Performed by: INTERNAL MEDICINE

## 2020-09-20 PROCEDURE — 81001 URINALYSIS AUTO W/SCOPE: CPT | Performed by: INTERNAL MEDICINE

## 2020-09-20 PROCEDURE — 71000012 ZZH RECOVERY PHASE 1 LEVEL 1 FIRST HR: Performed by: ORTHOPAEDIC SURGERY

## 2020-09-20 PROCEDURE — 25000128 H RX IP 250 OP 636: Performed by: PHYSICIAN ASSISTANT

## 2020-09-20 PROCEDURE — 85027 COMPLETE CBC AUTOMATED: CPT | Performed by: INTERNAL MEDICINE

## 2020-09-20 PROCEDURE — 25000128 H RX IP 250 OP 636: Performed by: NURSE ANESTHETIST, CERTIFIED REGISTERED

## 2020-09-20 PROCEDURE — 40000986 XR PELVIS AD HIP PORTABLE RIGHT 1 VIEW

## 2020-09-20 PROCEDURE — 25800030 ZZH RX IP 258 OP 636: Performed by: NURSE ANESTHETIST, CERTIFIED REGISTERED

## 2020-09-20 PROCEDURE — C1713 ANCHOR/SCREW BN/BN,TIS/BN: HCPCS | Performed by: ORTHOPAEDIC SURGERY

## 2020-09-20 DEVICE — IMP NAIL SYN CAN FEM PROX TFNA 10X170MM 130D 04.037.042S: Type: IMPLANTABLE DEVICE | Site: FEMUR | Status: FUNCTIONAL

## 2020-09-20 DEVICE — IMP SCR SYN 5.0 TI LOCK T25 STARDRIVE 34MM 04.005.524S: Type: IMPLANTABLE DEVICE | Site: FEMUR | Status: FUNCTIONAL

## 2020-09-20 DEVICE — IMP SCR SYN TFNA FENESTRATED LAG 105MM 04.038.205S: Type: IMPLANTABLE DEVICE | Site: FEMUR | Status: FUNCTIONAL

## 2020-09-20 RX ORDER — LIDOCAINE HYDROCHLORIDE 10 MG/ML
INJECTION, SOLUTION INFILTRATION; PERINEURAL PRN
Status: DISCONTINUED | OUTPATIENT
Start: 2020-09-20 | End: 2020-09-20

## 2020-09-20 RX ORDER — MAGNESIUM HYDROXIDE 1200 MG/15ML
LIQUID ORAL PRN
Status: DISCONTINUED | OUTPATIENT
Start: 2020-09-20 | End: 2020-09-20 | Stop reason: HOSPADM

## 2020-09-20 RX ORDER — CEFAZOLIN SODIUM 2 G/100ML
2 INJECTION, SOLUTION INTRAVENOUS
Status: DISCONTINUED | OUTPATIENT
Start: 2020-09-20 | End: 2020-09-20

## 2020-09-20 RX ORDER — ONDANSETRON 2 MG/ML
4 INJECTION INTRAMUSCULAR; INTRAVENOUS EVERY 30 MIN PRN
Status: DISCONTINUED | OUTPATIENT
Start: 2020-09-20 | End: 2020-09-20 | Stop reason: HOSPADM

## 2020-09-20 RX ORDER — METOCLOPRAMIDE HYDROCHLORIDE 5 MG/ML
5 INJECTION INTRAMUSCULAR; INTRAVENOUS EVERY 6 HOURS PRN
Status: DISCONTINUED | OUTPATIENT
Start: 2020-09-20 | End: 2020-09-23 | Stop reason: HOSPADM

## 2020-09-20 RX ORDER — FENTANYL CITRATE 50 UG/ML
25-50 INJECTION, SOLUTION INTRAMUSCULAR; INTRAVENOUS
Status: DISCONTINUED | OUTPATIENT
Start: 2020-09-20 | End: 2020-09-20 | Stop reason: HOSPADM

## 2020-09-20 RX ORDER — GLYCOPYRROLATE 0.2 MG/ML
INJECTION, SOLUTION INTRAMUSCULAR; INTRAVENOUS PRN
Status: DISCONTINUED | OUTPATIENT
Start: 2020-09-20 | End: 2020-09-20

## 2020-09-20 RX ORDER — ACETAMINOPHEN 325 MG/1
975 TABLET ORAL EVERY 8 HOURS
Status: DISCONTINUED | OUTPATIENT
Start: 2020-09-20 | End: 2020-09-22

## 2020-09-20 RX ORDER — DEXAMETHASONE SODIUM PHOSPHATE 4 MG/ML
INJECTION, SOLUTION INTRA-ARTICULAR; INTRALESIONAL; INTRAMUSCULAR; INTRAVENOUS; SOFT TISSUE PRN
Status: DISCONTINUED | OUTPATIENT
Start: 2020-09-20 | End: 2020-09-20

## 2020-09-20 RX ORDER — HYDROMORPHONE HYDROCHLORIDE 1 MG/ML
.3-.5 INJECTION, SOLUTION INTRAMUSCULAR; INTRAVENOUS; SUBCUTANEOUS EVERY 5 MIN PRN
Status: DISCONTINUED | OUTPATIENT
Start: 2020-09-20 | End: 2020-09-20 | Stop reason: HOSPADM

## 2020-09-20 RX ORDER — ACETAMINOPHEN 325 MG/1
650 TABLET ORAL EVERY 4 HOURS PRN
Status: DISCONTINUED | OUTPATIENT
Start: 2020-09-23 | End: 2020-09-23 | Stop reason: HOSPADM

## 2020-09-20 RX ORDER — HYDROMORPHONE HYDROCHLORIDE 1 MG/ML
.3-.5 INJECTION, SOLUTION INTRAMUSCULAR; INTRAVENOUS; SUBCUTANEOUS
Status: DISCONTINUED | OUTPATIENT
Start: 2020-09-20 | End: 2020-09-22

## 2020-09-20 RX ORDER — ONDANSETRON 2 MG/ML
4 INJECTION INTRAMUSCULAR; INTRAVENOUS EVERY 6 HOURS PRN
Status: DISCONTINUED | OUTPATIENT
Start: 2020-09-20 | End: 2020-09-23 | Stop reason: HOSPADM

## 2020-09-20 RX ORDER — HYDRALAZINE HYDROCHLORIDE 20 MG/ML
2.5-5 INJECTION INTRAMUSCULAR; INTRAVENOUS EVERY 10 MIN PRN
Status: DISCONTINUED | OUTPATIENT
Start: 2020-09-20 | End: 2020-09-20 | Stop reason: HOSPADM

## 2020-09-20 RX ORDER — PROPOFOL 10 MG/ML
INJECTION, EMULSION INTRAVENOUS PRN
Status: DISCONTINUED | OUTPATIENT
Start: 2020-09-20 | End: 2020-09-20

## 2020-09-20 RX ORDER — ONDANSETRON 4 MG/1
4 TABLET, ORALLY DISINTEGRATING ORAL EVERY 30 MIN PRN
Status: DISCONTINUED | OUTPATIENT
Start: 2020-09-20 | End: 2020-09-20 | Stop reason: HOSPADM

## 2020-09-20 RX ORDER — CEFAZOLIN SODIUM 1 G/50ML
1 INJECTION, SOLUTION INTRAVENOUS EVERY 8 HOURS
Status: COMPLETED | OUTPATIENT
Start: 2020-09-20 | End: 2020-09-21

## 2020-09-20 RX ORDER — CEFAZOLIN SODIUM 1 G/50ML
1 INJECTION, SOLUTION INTRAVENOUS SEE ADMIN INSTRUCTIONS
Status: DISCONTINUED | OUTPATIENT
Start: 2020-09-20 | End: 2020-09-20

## 2020-09-20 RX ORDER — SODIUM CHLORIDE, SODIUM LACTATE, POTASSIUM CHLORIDE, CALCIUM CHLORIDE 600; 310; 30; 20 MG/100ML; MG/100ML; MG/100ML; MG/100ML
INJECTION, SOLUTION INTRAVENOUS CONTINUOUS
Status: DISCONTINUED | OUTPATIENT
Start: 2020-09-20 | End: 2020-09-20 | Stop reason: HOSPADM

## 2020-09-20 RX ORDER — NALOXONE HYDROCHLORIDE 0.4 MG/ML
.1-.4 INJECTION, SOLUTION INTRAMUSCULAR; INTRAVENOUS; SUBCUTANEOUS
Status: ACTIVE | OUTPATIENT
Start: 2020-09-20 | End: 2020-09-21

## 2020-09-20 RX ORDER — CEFAZOLIN SODIUM 2 G/100ML
INJECTION, SOLUTION INTRAVENOUS PRN
Status: DISCONTINUED | OUTPATIENT
Start: 2020-09-20 | End: 2020-09-20

## 2020-09-20 RX ORDER — METOCLOPRAMIDE 5 MG/1
5 TABLET ORAL EVERY 6 HOURS PRN
Status: DISCONTINUED | OUTPATIENT
Start: 2020-09-20 | End: 2020-09-23 | Stop reason: HOSPADM

## 2020-09-20 RX ORDER — DIMENHYDRINATE 50 MG/ML
25 INJECTION, SOLUTION INTRAMUSCULAR; INTRAVENOUS
Status: DISCONTINUED | OUTPATIENT
Start: 2020-09-20 | End: 2020-09-20 | Stop reason: HOSPADM

## 2020-09-20 RX ORDER — LIDOCAINE 40 MG/G
CREAM TOPICAL
Status: DISCONTINUED | OUTPATIENT
Start: 2020-09-20 | End: 2020-09-23 | Stop reason: HOSPADM

## 2020-09-20 RX ORDER — PROCHLORPERAZINE MALEATE 5 MG
5 TABLET ORAL EVERY 6 HOURS PRN
Status: DISCONTINUED | OUTPATIENT
Start: 2020-09-20 | End: 2020-09-23 | Stop reason: HOSPADM

## 2020-09-20 RX ORDER — ONDANSETRON 4 MG/1
4 TABLET, ORALLY DISINTEGRATING ORAL EVERY 6 HOURS PRN
Status: DISCONTINUED | OUTPATIENT
Start: 2020-09-20 | End: 2020-09-23 | Stop reason: HOSPADM

## 2020-09-20 RX ORDER — LABETALOL HYDROCHLORIDE 5 MG/ML
10 INJECTION, SOLUTION INTRAVENOUS
Status: DISCONTINUED | OUTPATIENT
Start: 2020-09-20 | End: 2020-09-20 | Stop reason: HOSPADM

## 2020-09-20 RX ORDER — NEOSTIGMINE METHYLSULFATE 1 MG/ML
VIAL (ML) INJECTION PRN
Status: DISCONTINUED | OUTPATIENT
Start: 2020-09-20 | End: 2020-09-20

## 2020-09-20 RX ORDER — SODIUM CHLORIDE, SODIUM LACTATE, POTASSIUM CHLORIDE, CALCIUM CHLORIDE 600; 310; 30; 20 MG/100ML; MG/100ML; MG/100ML; MG/100ML
INJECTION, SOLUTION INTRAVENOUS CONTINUOUS
Status: DISCONTINUED | OUTPATIENT
Start: 2020-09-20 | End: 2020-09-21

## 2020-09-20 RX ADMIN — GLYCOPYRROLATE 0.6 MG: 0.2 INJECTION, SOLUTION INTRAMUSCULAR; INTRAVENOUS at 12:42

## 2020-09-20 RX ADMIN — HYDROMORPHONE HYDROCHLORIDE 0.3 MG: 1 INJECTION, SOLUTION INTRAMUSCULAR; INTRAVENOUS; SUBCUTANEOUS at 00:34

## 2020-09-20 RX ADMIN — HYDROMORPHONE HYDROCHLORIDE 0.25 MG: 1 INJECTION, SOLUTION INTRAMUSCULAR; INTRAVENOUS; SUBCUTANEOUS at 12:27

## 2020-09-20 RX ADMIN — HYDROMORPHONE HYDROCHLORIDE 0.25 MG: 1 INJECTION, SOLUTION INTRAMUSCULAR; INTRAVENOUS; SUBCUTANEOUS at 12:24

## 2020-09-20 RX ADMIN — MORPHINE SULFATE 15 MG: 15 TABLET, EXTENDED RELEASE ORAL at 20:20

## 2020-09-20 RX ADMIN — FENTANYL CITRATE 50 MCG: 0.05 INJECTION, SOLUTION INTRAMUSCULAR; INTRAVENOUS at 13:19

## 2020-09-20 RX ADMIN — SODIUM CHLORIDE, POTASSIUM CHLORIDE, SODIUM LACTATE AND CALCIUM CHLORIDE: 600; 310; 30; 20 INJECTION, SOLUTION INTRAVENOUS at 11:53

## 2020-09-20 RX ADMIN — TAMSULOSIN HYDROCHLORIDE 0.4 MG: 0.4 CAPSULE ORAL at 08:00

## 2020-09-20 RX ADMIN — LIDOCAINE HYDROCHLORIDE 50 MG: 10 INJECTION, SOLUTION INFILTRATION; PERINEURAL at 12:01

## 2020-09-20 RX ADMIN — HYDROMORPHONE HYDROCHLORIDE 0.5 MG: 1 INJECTION, SOLUTION INTRAMUSCULAR; INTRAVENOUS; SUBCUTANEOUS at 12:35

## 2020-09-20 RX ADMIN — ACETAMINOPHEN 975 MG: 325 TABLET, FILM COATED ORAL at 20:20

## 2020-09-20 RX ADMIN — FENTANYL CITRATE 100 MCG: 0.05 INJECTION, SOLUTION INTRAMUSCULAR; INTRAVENOUS at 12:01

## 2020-09-20 RX ADMIN — HYDROMORPHONE HYDROCHLORIDE 0.3 MG: 1 INJECTION, SOLUTION INTRAMUSCULAR; INTRAVENOUS; SUBCUTANEOUS at 08:00

## 2020-09-20 RX ADMIN — GABAPENTIN 300 MG: 300 CAPSULE ORAL at 20:20

## 2020-09-20 RX ADMIN — ASPIRIN 325 MG: 325 TABLET, COATED ORAL at 20:20

## 2020-09-20 RX ADMIN — CEFAZOLIN SODIUM 1 G: 1 INJECTION, SOLUTION INTRAVENOUS at 20:20

## 2020-09-20 RX ADMIN — ROCURONIUM BROMIDE 35 MG: 10 INJECTION INTRAVENOUS at 12:01

## 2020-09-20 RX ADMIN — DEXAMETHASONE SODIUM PHOSPHATE 4 MG: 4 INJECTION, SOLUTION INTRA-ARTICULAR; INTRALESIONAL; INTRAMUSCULAR; INTRAVENOUS; SOFT TISSUE at 12:01

## 2020-09-20 RX ADMIN — ONDANSETRON 4 MG: 2 INJECTION INTRAMUSCULAR; INTRAVENOUS at 12:01

## 2020-09-20 RX ADMIN — PHENYLEPHRINE HYDROCHLORIDE 100 MCG: 10 INJECTION INTRAVENOUS at 12:11

## 2020-09-20 RX ADMIN — CEFAZOLIN SODIUM 2 G: 2 INJECTION, SOLUTION INTRAVENOUS at 11:55

## 2020-09-20 RX ADMIN — SODIUM CHLORIDE, POTASSIUM CHLORIDE, SODIUM LACTATE AND CALCIUM CHLORIDE: 600; 310; 30; 20 INJECTION, SOLUTION INTRAVENOUS at 20:24

## 2020-09-20 RX ADMIN — ACETAMINOPHEN 1000 MG: 500 TABLET, FILM COATED ORAL at 08:00

## 2020-09-20 RX ADMIN — GLYCOPYRROLATE 0.2 MG: 0.2 INJECTION, SOLUTION INTRAMUSCULAR; INTRAVENOUS at 12:01

## 2020-09-20 RX ADMIN — GABAPENTIN 300 MG: 300 CAPSULE ORAL at 15:45

## 2020-09-20 RX ADMIN — PROPOFOL 150 MG: 10 INJECTION, EMULSION INTRAVENOUS at 12:01

## 2020-09-20 RX ADMIN — Medication 4 MG: at 12:42

## 2020-09-20 RX ADMIN — PHENYLEPHRINE HYDROCHLORIDE 100 MCG: 10 INJECTION INTRAVENOUS at 12:06

## 2020-09-20 RX ADMIN — HYDROMORPHONE HYDROCHLORIDE 0.3 MG: 1 INJECTION, SOLUTION INTRAMUSCULAR; INTRAVENOUS; SUBCUTANEOUS at 15:45

## 2020-09-20 ASSESSMENT — COPD QUESTIONNAIRES: COPD: 0

## 2020-09-20 ASSESSMENT — ACTIVITIES OF DAILY LIVING (ADL)
ADLS_ACUITY_SCORE: 13
ADLS_ACUITY_SCORE: 12
ADLS_ACUITY_SCORE: 13
ADLS_ACUITY_SCORE: 12
ADLS_ACUITY_SCORE: 12

## 2020-09-20 ASSESSMENT — ENCOUNTER SYMPTOMS
STRIDOR: 0
SEIZURES: 0
DYSRHYTHMIAS: 0

## 2020-09-20 ASSESSMENT — LIFESTYLE VARIABLES: TOBACCO_USE: 0

## 2020-09-20 NOTE — PROVIDER NOTIFICATION
Web-Paged Dr. Garcia:  Hi. Surgery 1025AM today, high INR 1.25, do you want to do anything?  Thank you!

## 2020-09-20 NOTE — CONSULTS
[facility]    Orthopedics Consultation    Date of Admission:  9/19/2020    Assessment & Plan   Lowell Arredondo is a 80 year old male who was admitted on 9/19/2020. I was asked to see the patient for right intertrochanteric femur fracture.  Indicated for cephalo-medullary nailing.  Risks, benefits, side effects and intended purposes discussed. Risks of bleeding, infection, nerve damage, malunion, nonunion, hardware failure and possible need for hardware removal discusses. Possibility of post traumatic pian and stiffness discussed.    Patient and wife verbalize understanding and elects to proceed. This procedure has been fully reviewed with the patient and written informed consent has been obtained.     Active Problems:    Hip fracture (H)    Jaret Marino MD     Code Status    No CPR- Do NOT Intubate    Reason for Consult   Reason for consult: I was asked by the emergency department to evaluate this patient for right intertrochanteric femur fracture.    Primary Care Physician   Diego Segura    Chief Complaint   Right intertrochanteric femur fracture    History is obtained from the patient, electronic health record and patient's spouse    History of Present Illness   Lowell Arredondo is a 80 year old male with a history of nonresectable metastatic pancreatic adenocarcinoma on chemotherapy and radiation, history of prostate cancer, peripheral neuropathy, who presented to the ED for evaluation of hip pain.  Patient accompanied by his wife today, who corroborated the history.  Patient says that he has chronic issues with lightheadedness.  He has recently been undergoing chemotherapy and radiation for metastatic prostate cancer.  The treatments caused him to feel very fatigued and worn out.  He is quite weak at baseline.  He was walking down the stairs today when he attempted to reach out to the other side to grab the handrail and fell onto his right hip, causing him pain and prompting him to seek  attention in the ED.  In the ED, his vital signs were stable.  His labs were unremarkable.  He was complaining of right hip pain and x-rays showed comminuted intertrochanteric fracture of the right hip.  CT of the cervical spine showed no acute process. Patient says that other than this acute issue, he has just been dealing with a pancreatic cancer lately.  He has chronic pain and is chronically on narcotics.  He states his abdomen is tender all the time.  His bowel movements occur every 2 days or so.  He and wife note that he has been feeling generally weak.  He has been ambulating with a cane lately but his wife just obtained a walker for him.  Otherwise, denies any new symptoms, such as fevers, chills, cough, shortness of breath, chest.  He reports peripheral neuropathy in his fingers and toes secondary to chemotherapy but otherwise denies any new onset numbness, tingling or weakness.    Past Medical History   I have reviewed this patient's medical history and updated it with pertinent information if needed.   Past Medical History:   Diagnosis Date     Basal cell carcinoma of skin 9/19/2013    Overview:  Completely excised 5/13     BPH (benign prostatic hyperplasia) 5/2/2013     Cancer (H)      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) 10/10/2019     Peripheral neuropathy      Prostate cancer (H) 6/7/2016       Past Surgical History   I have reviewed this patient's surgical history and updated it with pertinent information if needed.  Past Surgical History:   Procedure Laterality Date     FOOT SURGERY Left 1992     HERNIA REPAIR       IR CHEST PORT PLACEMENT > 5 YRS OF AGE  11/1/2019     LAPAROSCOPY DIAGNOSTIC (GENERAL) N/A 5/12/2020    Procedure: Diagnostic laparoscopy;  Surgeon: Sanjay Ryder MD;  Location: [facility] OR     PROSTATE SURGERY      biopsy       Prior to Admission Medications   Prior to Admission Medications   Prescriptions Last Dose Informant Patient Reported? Taking?   LORazepam (ATIVAN) 0.5 MG tablet  9/18/2020 at Unknown time  Yes Yes   Sig: Take 0.5 mg by mouth nightly as needed for anxiety   acetaminophen (TYLENOL) 500 MG tablet Past Month at Unknown time  Yes Yes   Sig: Take 500-1,000 mg by mouth every 8 hours as needed for mild pain   gabapentin (NEURONTIN) 300 MG capsule 9/19/2020 at am  No Yes   Sig: Take 1 capsule (300 mg) by mouth 3 times daily   loratadine (CLARITIN) 10 MG tablet More than a month at Unknown time  Yes No   Sig: Take 10 mg by mouth daily as needed    morphine (MS CONTIN) 15 MG CR tablet 9/19/2020 at 0530  Yes Yes   Sig: Take 1 tablet (15 mg) by mouth every evening maximum 2 tablet(s) per day   naloxone (NARCAN) 4 MG/0.1ML nasal spray   No No   Sig: Spray 1 spray (4 mg) into one nostril alternating nostrils once as needed for opioid reversal every 2-3 minutes until assistance arrives   ondansetron (ZOFRAN) 8 MG tablet Past Month at Unknown time  No Yes   Sig: Take 1 tablet (8 mg) by mouth every 8 hours as needed (Nausea/Vomiting)   oxyCODONE (ROXICODONE) 5 MG tablet 9/18/2020 at Unknown time  Yes Yes   Sig: Take 1-2 tablets (5-10 mg) by mouth every 4 hours as needed for pain   polyethylene glycol (MIRALAX/GLYCOLAX) packet 9/19/2020 at Unknown time  Yes Yes   Sig: Take 17 g by mouth daily    psyllium (METAMUCIL) 28.3 % POWD 9/19/2020 at Unknown time  Yes Yes   Sig: Take 0.5 Tablespoonful by mouth daily    tamsulosin (FLOMAX) 0.4 MG capsule 9/19/2020 at Unknown time  No Yes   Sig: Take 1 capsule (0.4 mg) by mouth daily      Facility-Administered Medications: None     Allergies   Allergies   Allergen Reactions     Demerol [Meperidine] Difficulty breathing       Social History   I have reviewed this patient's social history and updated it with pertinent information if needed. Lowell Ramirezdustin  reports that he has never smoked. He has never used smokeless tobacco. He reports previous alcohol use of about 10.0 - 14.0 standard drinks of alcohol per week. He reports that he does not use  drugs.    Family History   I have reviewed this patient's family history and updated it with pertinent information if needed.   Family History   Problem Relation Age of Onset     Prostate Cancer Father      Prostate Cancer Brother      Prostate Cancer Brother        Review of Systems   The 10 point Review of Systems is negative other than noted in the HPI or here.     Physical Exam   Temp: 100.6  F (38.1  C) Temp src: Temporal BP: 112/57 Pulse: 90   Resp: 18 SpO2: 95 % O2 Device: None (Room air)    Vital Signs with Ranges  Temp:  [98.2  F (36.8  C)-100.6  F (38.1  C)] 100.6  F (38.1  C)  Pulse:  [] 90  Resp:  [14-20] 18  BP: ()/(55-69) 112/57  SpO2:  [92 %-98 %] 95 %  157 lbs 4.8 oz    Constitutional: awake, alert, cooperative, no apparent distress, and appears stated age  Eyes: extra-ocular muscles intact  ENT: normocepalic, atraumatic without obvious abnormality  Hematologic / Lymphatic: no lymphedema   Respiratory: no increased work of breathing  Skin: Clean, dry and intact.  No erythema, ecchymosis, swelling or warmth.  Normal turgor.  Musculoskeletal: Right lower extremity short external rotated.  Positive tenderness palpation of the greater trochanter.  No other tenderness to palpation.  Positive pain with logroll.  Neurologic: Mental Status Exam:  Level of Alertness:   awake  Orientation:   person, place, time  Memory:   normal  Fund of Knowledge:  normal  Cranial Nerves:  cranial nerves II-XII are grossly intact  CMS is intact distally right lower extremity.  DP sensation in toes.  Otherwise intact sensation in superficial peroneal, deep peroneal, sural and saphenous distributions.  Intact tibialis anterior and extensor houses longus.  Intact gastrocsoleus.  Neuropsychiatric: General: normal, calm and normal eye contact  Level of consciousness: alert / normal  Affect: normal  Orientation: oriented to self, place, time and situation  Memory and insight: normal, memory for past and recent events  intact and thought process normal    Data   Results for orders placed or performed during the hospital encounter of 09/19/20 (from the past 24 hour(s))   CBC with platelets differential   Result Value Ref Range    WBC 6.1 4.0 - 11.0 10e9/L    RBC Count 4.10 (L) 4.4 - 5.9 10e12/L    Hemoglobin 12.6 (L) 13.3 - 17.7 g/dL    Hematocrit 38.8 (L) 40.0 - 53.0 %    MCV 95 78 - 100 fl    MCH 30.7 26.5 - 33.0 pg    MCHC 32.5 31.5 - 36.5 g/dL    RDW 14.4 10.0 - 15.0 %    Platelet Count 116 (L) 150 - 450 10e9/L    Diff Method Automated Method     % Neutrophils 63.2 %    % Lymphocytes 16.1 %    % Monocytes 18.9 %    % Eosinophils 1.1 %    % Basophils 0.5 %    % Immature Granulocytes 0.2 %    Nucleated RBCs 0 0 /100    Absolute Neutrophil 3.9 1.6 - 8.3 10e9/L    Absolute Lymphocytes 1.0 0.8 - 5.3 10e9/L    Absolute Monocytes 1.2 0.0 - 1.3 10e9/L    Absolute Eosinophils 0.1 0.0 - 0.7 10e9/L    Absolute Basophils 0.0 0.0 - 0.2 10e9/L    Abs Immature Granulocytes 0.0 0 - 0.4 10e9/L    Absolute Nucleated RBC 0.0    INR   Result Value Ref Range    INR 1.16 (H) 0.86 - 1.14   Partial thromboplastin time   Result Value Ref Range    PTT 37 22 - 37 sec   ABO/Rh type and screen   Result Value Ref Range    ABO A     RH(D) Pos     Antibody Screen Neg     Test Valid Only At [facility]        Specimen Expires 09/22/2020    Basic metabolic panel   Result Value Ref Range    Sodium 134 133 - 144 mmol/L    Potassium 4.3 3.4 - 5.3 mmol/L    Chloride 100 94 - 109 mmol/L    Carbon Dioxide 28 20 - 32 mmol/L    Anion Gap 6 3 - 14 mmol/L    Glucose 118 (H) 70 - 99 mg/dL    Urea Nitrogen 16 7 - 30 mg/dL    Creatinine 1.00 0.66 - 1.25 mg/dL    GFR Estimate 70 >60 mL/min/[1.73_m2]    GFR Estimate If Black 81 >60 mL/min/[1.73_m2]    Calcium 8.7 8.5 - 10.1 mg/dL   XR Pelvis w Hip Right 1 View    Narrative    Examination:  XR PELVIS AND HIP RIGHT 1 VIEW    Date:  9/19/2020 1:39 PM     Clinical Information: Pain     Additional Information:  none    Comparison: none    Findings:     Comminuted intertrochanteric fracture of the right hip. No evidence  for dislocation. Left hip negative for fracture. Pelvis negative for  fracture.    CAROLINA COONEY MD   Cervical spine CT w/o contrast    Narrative    CT CERVICAL SPINE WITHOUT CONTRAST   9/19/2020 1:48 PM     HISTORY: Neck pain, initial exam. Neck pain after fall down stairs.     TECHNIQUE: Axial images of the cervical spine were obtained without  intravenous contrast. Multiplanar reformations were performed.  Radiation dose for this scan was reduced using automated exposure  control, adjustment of the mA and/or kV according to patient size, or  iterative reconstruction technique.     COMPARISON: None.    FINDINGS: There is no evidence of fracture.    Alignment: There is minimal spondylolisthesis of approximately 2 mm at  C3-C4 due to degenerative facet disease. There is 1 mm retrolisthesis  of C4 on C5 and 3 mm of retrolisthesis of C5 on C6. There is also  minimal degenerative spondylolisthesis at C7-T1. Posterior alignment  is otherwise normal. Paraspinal soft tissues are unremarkable as  visualized.    Craniocervical junction: Normal.    C1-C2:  Mild partially calcified pannus formation is seen at the  atlantoaxial joint but this is not causing any significant stenosis.     C2-C3:  Normal disc. Mild facet hypertrophy. No stenosis.     C3-C4:  Moderate right and mild left-sided facet hypertrophy, minimal  spondylolisthesis, and right-sided uncinate spurring is present  causing mild central and moderate-to-severe right-sided foraminal  stenosis.     C4-C5:  Posterior osteophyte formation, retrolisthesis and moderate  facet hypertrophy is present causing mild central canal stenosis,  moderate left-sided foraminal stenosis, and mild right-sided foraminal  stenosis.     C5-C6:  Retrolisthesis, posterior osteophyte formation and facet  arthropathy is present causing moderate central canal stenosis, mild  cord  deformity, and moderate-to-severe bilateral neural foraminal  stenosis.      C6-C7:  Posterior osteophyte formation, disc bulging, left-sided  uncinate spurring and facet hypertrophy is present causing some mild  central canal stenosis, moderate left-sided foraminal stenosis and  mild right-sided foraminal stenosis.      C7-T1:   Facet hypertrophy is present along with minimal  spondylolisthesis. Disc appears normal. There is mild bilateral neural  foraminal stenosis but no central canal stenosis.      Impression    IMPRESSION:    1. No evidence for fracture or any posterior malalignment.  2. Multilevel degenerative disc and facet disease most advanced at  C5-C6 where there is moderate central canal stenosis, mild cord  deformity, and moderate-to-severe bilateral neural foraminal stenosis.    URBANO VU MD   EKG 12-lead, tracing only   Result Value Ref Range    Interpretation ECG Click View Image link to view waveform and result    Asymptomatic COVID-19 Virus (Coronavirus) by PCR    Specimen: Nasopharyngeal   Result Value Ref Range    COVID-19 Virus PCR to [facility] - Source Nasopharyngeal     COVID-19 Virus PCR to [facility] - Result       Test received-See reflex to IDDL test SARS CoV2 (COVID-19) Virus RT-PCR   SARS-CoV-2 COVID-19 Virus (Coronavirus) RT-PCR Nasopharyngeal    Specimen: Nasopharyngeal   Result Value Ref Range    SARS-CoV-2 Virus Specimen Source Nasopharyngeal     SARS-CoV-2 PCR Result NEGATIVE     SARS-CoV-2 PCR Comment       Testing was performed using the Xpert Xpress SARS-CoV-2 Assay on the Cepheid Gene-Xpert   Instrument Systems. Additional information about this Emergency Use Authorization (EUA)   assay can be found via the Lab Guide.     XR Femur Right 2 Views    Narrative    FEMUR RIGHT TWO VIEW 9/19/2020 4:02 PM     HISTORY: Fracture.    COMPARISON: None.      Impression    IMPRESSION: There is a nondisplaced intertrochanteric fracture of the  proximal right femur. No other acute bony or soft  tissue abnormality.  No significant degenerative changes at the right hip. Vascular  calcifications are seen.    HERIBERTO WHITING MD   XR Chest 1 View    Narrative    CHEST ONE VIEW  9/19/2020 4:40 PM     HISTORY: Trauma.    COMPARISON: None.      Impression    IMPRESSION: Scattered linear atelectasis and/or fibrosis.    SABINO ROWLAND MD   Basic metabolic panel   Result Value Ref Range    Sodium 134 133 - 144 mmol/L    Potassium 4.2 3.4 - 5.3 mmol/L    Chloride 102 94 - 109 mmol/L    Carbon Dioxide 26 20 - 32 mmol/L    Anion Gap 6 3 - 14 mmol/L    Glucose 106 (H) 70 - 99 mg/dL    Urea Nitrogen 15 7 - 30 mg/dL    Creatinine 1.04 0.66 - 1.25 mg/dL    GFR Estimate 66 >60 mL/min/[1.73_m2]    GFR Estimate If Black 77 >60 mL/min/[1.73_m2]    Calcium 8.5 8.5 - 10.1 mg/dL   CBC with platelets   Result Value Ref Range    WBC 5.3 4.0 - 11.0 10e9/L    RBC Count 3.44 (L) 4.4 - 5.9 10e12/L    Hemoglobin 10.5 (L) 13.3 - 17.7 g/dL    Hematocrit 31.7 (L) 40.0 - 53.0 %    MCV 92 78 - 100 fl    MCH 30.5 26.5 - 33.0 pg    MCHC 33.1 31.5 - 36.5 g/dL    RDW 14.4 10.0 - 15.0 %    Platelet Count 103 (L) 150 - 450 10e9/L   INR   Result Value Ref Range    INR 1.25 (H) 0.86 - 1.14

## 2020-09-20 NOTE — ANESTHESIA POSTPROCEDURE EVALUATION
Patient: Lowell Arredondo    Procedure(s):  OPEN REDUCTION INTERNAL FIXATION, FRACTURE, FEMUR, USING INTRAMEDULLARY MELYSSA AND FRACTURE TABLE    Diagnosis:Femur fracture (H) [S72.90XA]  Diagnosis Additional Information: No value filed.    Anesthesia Type:  General    Note:  Anesthesia Post Evaluation    Patient location during evaluation: PACU  Patient participation: Able to fully participate in evaluation  Level of consciousness: awake  Pain management: adequate  Airway patency: patent  Cardiovascular status: acceptable  Respiratory status: acceptable  Hydration status: acceptable  PONV: controlled     Anesthetic complications: None          Last vitals:  Vitals:    09/20/20 1315 09/20/20 1319 09/20/20 1330   BP: (!) 137/93 (!) 137/93 (!) 140/84   Pulse: 102 101 98   Resp: 10 11 13   Temp:      SpO2: 100% 100% 100%         Electronically Signed By: Jarrett Hartmann MD  September 20, 2020  1:46 PM

## 2020-09-20 NOTE — CONSULTS
[facility]    Hematology/Oncology Consult Note      Date of Admission:  9/19/2020  Date of Consult:  09/20/20  Reason for Consult: pancreatic cancer      ASSESSMENT/PLAN:  Loewll Arredondo is a 80 year old male with:    1) Right intertrochanteric femur fracture: s/p thort cephalomedullary nailing of right femur  -orthopedic surgery following    2) Metastatic pancreatic cancer: follows with Dr. Segura.  S/p cycle 4 of FOLFOX on 9/8/20.  -follow-up with Dr. Segura after discharge.     3) Anemia and thrombocytopenia: likely secondary to recent chemotherapy, and now s/p surgery  -follow CBC        HISTORY OF PRESENT ILLNESS: Lowell Arredondo is a 80 year old male who presents with hip pain and fall.  He presented to the ED, and x-rays showed comminuted intertrochanteric fracture of the right hip.  Orthopedic surgery evaluated the patient, and today, he underwent short cephalomedullary nail of right femur.      He is a patient of Dr. Segura.  He has history of prostate cancer.  He also has history of metastatic pancreatic cancer.  He underwent neoadjuvant gemcitabine+Abraxane x 6 cycles.  He then had diagnostic laparoscopy on 5/12/20.  He was found to have unresectable pancreatic cancer.  There are tumor implants, and peritoneal nodule biopsy was positive for adenocarcinoma.  CT abdomen and pelvis on 07/20/2020 reveals progression of pancreatic cancer.  There is also a new anterior abdominal wall tumor deposit.   He as started on chemotherapy with FOLFOX, and last received cycle 4 on 9/8/20.  He also underwent palliative radiation.      REVIEW OF SYSTEMS:   14 point ROS was reviewed and is negative other than as noted above in HPI.       MEDICATIONS:  Current Facility-Administered Medications   Medication     acetaminophen (TYLENOL) tablet 500-1,000 mg     ceFAZolin (ANCEF) intermittent infusion 1 g     ceFAZolin (ANCEF) intermittent infusion 2 g in 100 mL dextrose PRE-MIX     gabapentin (NEURONTIN)  capsule 300 mg     HYDROmorphone (PF) (DILAUDID) injection 0.3 mg     lidocaine (LMX4) cream     lidocaine 1 % 0.1-1 mL     loratadine (CLARITIN) tablet 10 mg     LORazepam (ATIVAN) tablet 0.5 mg     magnesium citrate solution 148 mL     melatonin tablet 1 mg     morphine (MS CONTIN) 12 hr tablet 15 mg     naloxone (NARCAN) injection 0.1-0.4 mg     ondansetron (ZOFRAN-ODT) ODT tab 4 mg    Or     ondansetron (ZOFRAN) injection 4 mg     oxyCODONE (ROXICODONE) tablet 5-10 mg     polyethylene glycol (MIRALAX) Packet 17 g     psyllium (METAMUCIL/KONSYL) Packet 1 packet     sodium chloride (PF) 0.9% PF flush 3 mL     sodium chloride (PF) 0.9% PF flush 3 mL     tamsulosin (FLOMAX) capsule 0.4 mg         ALLERGIES:  Allergies   Allergen Reactions     Demerol [Meperidine] Difficulty breathing         PAST MEDICAL HISTORY:  Past Medical History:   Diagnosis Date     Basal cell carcinoma of skin 9/19/2013    Overview:  Completely excised 5/13     BPH (benign prostatic hyperplasia) 5/2/2013     Cancer (H)      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) 10/10/2019     Peripheral neuropathy      Prostate cancer (H) 6/7/2016         PAST SURGICAL HISTORY:  Past Surgical History:   Procedure Laterality Date     FOOT SURGERY Left 1992     HERNIA REPAIR       IR CHEST PORT PLACEMENT > 5 YRS OF AGE  11/1/2019     LAPAROSCOPY DIAGNOSTIC (GENERAL) N/A 5/12/2020    Procedure: Diagnostic laparoscopy;  Surgeon: Sanjay Ryder MD;  Location: [facility] OR     PROSTATE SURGERY      biopsy         SOCIAL HISTORY:  Social History     Socioeconomic History     Marital status:      Spouse name: Not on file     Number of children: Not on file     Years of education: Not on file     Highest education level: Not on file   Occupational History     Not on file   Social Needs     Financial resource strain: Not on file     Food insecurity     Worry: Not on file     Inability: Not on file     Transportation needs     Medical: Not on file     Non-medical:  "Not on file   Tobacco Use     Smoking status: Never Smoker     Smokeless tobacco: Never Used   Substance and Sexual Activity     Alcohol use: Not Currently     Alcohol/week: 10.0 - 14.0 standard drinks     Types: 10 - 14 Standard drinks or equivalent per week     Drug use: Never     Sexual activity: Not on file   Lifestyle     Physical activity     Days per week: Not on file     Minutes per session: Not on file     Stress: Not on file   Relationships     Social connections     Talks on phone: Not on file     Gets together: Not on file     Attends Spiritism service: Not on file     Active member of club or organization: Not on file     Attends meetings of clubs or organizations: Not on file     Relationship status: Not on file     Intimate partner violence     Fear of current or ex partner: Not on file     Emotionally abused: Not on file     Physically abused: Not on file     Forced sexual activity: Not on file   Other Topics Concern     Parent/sibling w/ CABG, MI or angioplasty before 65F 55M? Not Asked   Social History Narrative     Not on file         FAMILY HISTORY:  Family History   Problem Relation Age of Onset     Prostate Cancer Father      Prostate Cancer Brother      Prostate Cancer Brother          PHYSICAL EXAM:  Vital signs:  Temp: 97.9  F (36.6  C) Temp src: Oral BP: (!) 136/117 Pulse: 94   Resp: 11 SpO2: 94 % O2 Device: None (Room air) Oxygen Delivery: 6 LPM   Weight: 71.4 kg (157 lb 4.8 oz)  Estimated body mass index is 23.22 kg/m  as calculated from the following:    Height as of 8/25/20: 1.753 m (5' 9.02\").    Weight as of this encounter: 71.4 kg (157 lb 4.8 oz).    Patient just returned from surgery.  Not examined.      LABS:  CBC RESULTS:   Recent Labs   Lab Test 09/20/20  0707   WBC 5.3   RBC 3.44*   HGB 10.5*   HCT 31.7*   MCV 92   MCH 30.5   MCHC 33.1   RDW 14.4   *       Recent Labs   Lab Test 09/20/20  0707 09/19/20  1323    134   POTASSIUM 4.2 4.3   CHLORIDE 102 100   CO2 26 28 "   ANIONGAP 6 6   * 118*   BUN 15 16   CR 1.04 1.00   VICENTE 8.5 8.7       IMAGING:  Right hip x-ray 9/19/20:  Comminuted intertrochanteric fracture of the right hip. No evidence  for dislocation. Left hip negative for fracture. Pelvis negative for  fracture.    CT cervical spine 9/19/20:  1. No evidence for fracture or any posterior malalignment.  2. Multilevel degenerative disc and facet disease most advanced at  C5-C6 where there is moderate central canal stenosis, mild cord  deformity, and moderate-to-severe bilateral neural foraminal stenosis.    Right femur x-ray 9/19/20:  There is a nondisplaced intertrochanteric fracture of the  proximal right femur. No other acute bony or soft tissue abnormality.  No significant degenerative changes at the right hip. Vascular  calcifications are seen.    CXR 9/19/20:  Scattered linear atelectasis and/or fibrosis.      Thank you for the opportunity to participate in this patient's care.  Please call with any questions.    Hayley Mcleod MD  Hematology/Oncology  [facility]

## 2020-09-20 NOTE — ANESTHESIA CARE TRANSFER NOTE
Patient: Lowell Arredondo    Procedure(s):  OPEN REDUCTION INTERNAL FIXATION, FRACTURE, FEMUR, USING INTRAMEDULLARY MELYSSA AND FRACTURE TABLE    Diagnosis: Femur fracture (H) [S72.90XA]  Diagnosis Additional Information: No value filed.    Anesthesia Type:   General     Note:  Airway :Face Mask  Patient transferred to:PACU  Comments: VSS.  Spontaneously breathing O2 per simple face mask.  Report given to RNHandoff Report: Identifed the Patient, Identified the Reponsible Provider, Reviewed the pertinent medical history, Discussed the surgical course, Reviewed Intra-OP anesthesia mangement and issues during anesthesia, Set expectations for post-procedure period and Allowed opportunity for questions and acknowledgement of understanding      Vitals: (Last set prior to Anesthesia Care Transfer)    CRNA VITALS  9/20/2020 1229 - 9/20/2020 1305      9/20/2020             NIBP:  98/71    NIBP Mean:  80                Electronically Signed By: YAN Alvarez CRNA  September 20, 2020  1:05 PM

## 2020-09-20 NOTE — PLAN OF CARE
NPO @ midnight, surgery tomorrow morning 1025 Dr. Marino.  From ED at 1650, A/O, little anxious.  [place], pocket talker.  Pain managed with PRN pain meds + cold.  No nausea.  LS clear bilaterally, RA, encouraged CDB hourly.  CMS intact except baseline neuropathy.  Voided x1, constipated LBM 9/16, encouraged increased fluids + gave scheduled laxatives, monitor.  Bedrest, R leg elevated.  Oriented to room and call system, denies questions.  Educated on IS use hourly, need reinforcements.

## 2020-09-20 NOTE — ANESTHESIA PREPROCEDURE EVALUATION
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 Pt has had prior anesthetic. Type: General    No history of anesthetic complications          ROS/MED HX    ENT/Pulmonary:  - neg pulmonary ROS    (-) tobacco use, asthma, COPD, FELICIA risk factors and recent URI   Neurologic:     (+)neuropathy    (-) seizures and CVA   Cardiovascular:  - neg cardiovascular ROS   (+) ----. : . . . :. . Previous cardiac testing date:results:date: results:ECG reviewed date: results:NSR date: results:         (-) hypertension, CAD, arrhythmias and valvular problems/murmurs   METS/Exercise Tolerance:     Hematologic: Comments: Lab Test        20                       0707          1323          1439          WBC          5.3          6.1          4.6           HGB          10.5*        12.6*        12.9*         MCV          92           95           92            PLT          103*         116*         136*          INR          1.25*        1.16*         --            Lab Test        20                        0707          1323          1439          NA           134          134          138           POTASSIUM    4.2          4.3          4.1           CHLORIDE     102          100          106           CO2          26           28           29            BUN          15           16           15            CR           1.04         1.00         1.00          ANIONGAP     6            6            3             VICENTE          8.5          8.7          8.8           GLC          106*         118*         96           - neg hematologic  ROS       Musculoskeletal:  - neg musculoskeletal ROS       GI/Hepatic:  - neg GI/hepatic ROS       Renal/Genitourinary:  - ROS Renal section negative       Endo:  - neg endo ROS    (-) Type I DM, Type II DM, thyroid disease, chronic steroid usage and obesity   Psychiatric:  - neg psychiatric ROS       Infectious Disease:  - neg infectious disease ROS       Malignancy:   (+) Malignancy History of GI and Prostate          Other:    (+) H/O Chronic Pain,H/O chronic opiod use ,                         Physical Exam  Normal systems: cardiovascular, pulmonary and dental    Airway   Mallampati: II  TM distance: >3 FB  Neck ROM: full    Dental     Cardiovascular   Rhythm and rate: regular and normal  (-) no friction rub, no systolic click and no murmur    Pulmonary    breath sounds clear to auscultation(-) no rhonchi, no decreased breath sounds, no wheezes, no rales and no stridor            Lab Results   Component Value Date    WBC 5.3 09/20/2020    HGB 10.5 (L) 09/20/2020    HCT 31.7 (L) 09/20/2020     (L) 09/20/2020     09/20/2020    POTASSIUM 4.2 09/20/2020    CHLORIDE 102 09/20/2020    CO2 26 09/20/2020    BUN 15 09/20/2020    CR 1.04 09/20/2020     (H) 09/20/2020    VICENTE 8.5 09/20/2020    MAG 2.0 01/25/2020    ALBUMIN 3.4 09/07/2020    PROTTOTAL 7.1 09/07/2020    ALT 24 09/07/2020    AST 23 09/07/2020    ALKPHOS 69 09/07/2020    BILITOTAL 0.5 09/07/2020    LIPASE 19 (L)  "01/25/2020    PTT 37 09/19/2020    INR 1.25 (H) 09/20/2020    TSH 1.62 10/03/2019       Preop Vitals  BP Readings from Last 3 Encounters:   09/20/20 112/57   09/10/20 103/60   09/08/20 112/71    Pulse Readings from Last 3 Encounters:   09/20/20 90   09/10/20 86   09/08/20 78      Resp Readings from Last 3 Encounters:   09/20/20 18   09/08/20 20   08/27/20 16    SpO2 Readings from Last 3 Encounters:   09/20/20 95%   09/10/20 97%   09/08/20 99%      Temp Readings from Last 1 Encounters:   09/20/20 100.6  F (38.1  C) (Temporal)    Ht Readings from Last 1 Encounters:   08/25/20 1.753 m (5' 9.02\")      Wt Readings from Last 1 Encounters:   09/20/20 71.4 kg (157 lb 4.8 oz)    Estimated body mass index is 23.22 kg/m  as calculated from the following:    Height as of 8/25/20: 1.753 m (5' 9.02\").    Weight as of this encounter: 71.4 kg (157 lb 4.8 oz).       Anesthesia Plan      History & Physical Review  History and physical reviewed and following examination; no interval change.    ASA Status:  3 .    NPO Status:  > 8 hours    Plan for General with Intravenous induction. Maintenance will be Balanced.    PONV prophylaxis:  Ondansetron (or other 5HT-3) and Dexamethasone or Solumedrol  Additional equipment: Videolaryngoscope        Postoperative Care  Postoperative pain management:  IV analgesics.      Consents  Anesthetic plan, risks, benefits and alternatives discussed with:  Patient or representative and Patient..                 Jarrett Hartmann MD                    .  "

## 2020-09-20 NOTE — OP NOTE
[facility]  Orthopedic Operative Note    Short Cephalomedullary Nail    Lowell Arredondo MRN# [id number]   YOB: 1940  Procedure Date:  9/20/2020  Age: 80 year old     PREOPERATIVE DIAGNOSIS:  Intertrochanteric femur fracture right.    POSTOPERATIVE DIAGNOSIS:  Intertrochanteric femur fracture right.     PROCEDURE PERFORMED:  Short cephalomedullary nail right femur.     SURGEON:  Jaret Marino MD    FIRST ASSISTANT:  Ivanna Granados PA-C. Her assistance was critical during transfer, positioning, preparation, draping, surgical approach, fracture reduction, implant placement, closure and placement of dressings. Her assistance allowed me to operate efficiently, decreasing surgical time and risk.     ANESTHESIA:  General     EBL: 50 mL    COMPLICATIONS: None     DISPOSITION: Post Anesthesia Care Unit     CONDITION: Stable     INDICATIONS:  Lowell Arredondo  is a 80 year old male presenting with a intertrochanteric fractrure of the right hip. Discussed both operative and nonoperative management. Risks of surgery discussed included but not limited to bleeding, infection, damage to surrounding neurovascular structures, leg length inequality, nonunion, malunion, need for revision surgery, blood clots, pulmonary embolus. Patient  acknowledges risks and wishes to proceed. Signed and informed consent placed on the chart.    IMPLANTS:  Implant Name Type Inv. Item Serial No.  Lot No. LRB No. Used Action   IMP NAIL SYN CAN FEM PROX TFNA [id number] 130D 04.037.042S [employer] IMP NAIL SYN CAN FEM PROX TFNA [id number] 130D 04.037.042S  [employer] [id number] Right 1 Implanted   IMP SCR SYN TFNA FENESTRATED LAG 105MM 04.038.205S [employer] IMP SCR SYN TFNA FENESTRATED LAG 105MM 04.038.205S  [employer] [id number] Right 1 Implanted   IMP SCR SYN 5.0 TI LOCK T25 STARDRIVE 34MM 04.005.524S [employer]allic [employer]ware[employer] IMP SCR SYN 5.0 TI LOCK T25 STARDRIVE 34MM 04.005.524S   [employer] [id number] Right 1 Implanted       PROCEDURE: Patient was identified in the preoperative holding area and the right hip was marked with indelible marker.  Patient was brought in the operating room and underwent induction of general anesthesia.  Patient was subsequently transferred in a supine position to the fracture table.  Peroneal post placed.  Perineum engaged gently into the perineal post.  Bilateral feet wrapped with web roll.  Traction boots applied.  Patient placed in a scissored position with the fractured side and in-line traction and the well leg placed in approximately 30 degrees of hip extension.  Fluoroscopic unit was brought into the field and provisional reduction was achieved.  The right hip was prepped and draped in normal sterile fashion.  Antibiotic administration was confirmed timeout was completed.  Attention first turned toward obtaining starting point.  A 3 cm incision was made at the intersection of the anterior superior iliac spine and greater trochanter proceeding sharply skin and subtenons tissue down to fascia.  Fascia was incised in line with the plan nail trajectory.  Guidewire placed at the appropriate position on the greater trochanter and advanced to the level of the lesser trochanter.  Opening reamer was utilized to create a path for the nail.  We preoperatively determined that a 130 neck-shaft angle would be appropriate.  The implant was placed to the appropriate level and confirmed to be in the appropriate position on AP and lateral fluoroscopic views.  Fracture was once again confirmed to be appropriate on AP and lateral views.  We subsequently placed a guidewire through the jig utilizing biplanar fluoroscopy to ensure appropriate tip apex distance.  Once the guidewire was in the appropriate position utilized a lateral broach reamer followed by the triple reamer.  We subsequently measured and placed the appropriate lag screw.  We subsequent we applied the  compression device and released traction for the leg and compressed the fracture.  We then utilized the targeting jig to place a distal interlocking screw of appropriate length.  Fluoroscopic views were then obtained prior to jig removal for final confirmation and then final fluoroscopic views were obtained after jig removal and sent to PACS.  The wound was then copiously irrigated with sterile saline and closed in layers with 2-0 Vicryl subcutaneous tissues and 3-0 Monocryl for skin.  Island dressings applied.  Patient was then moved in supine position from the fracture table to the hospital stretcher and transferred in stable condition to the recovery room.  There were no complications and the patient tolerated well.    PLAN:  Weight-bear as tolerated  Range of motion as tolerated  24 hours IV antibiotics per standard postop protocol  DVT prophylaxis with  PO every day X 4 weeks  Pain management with Mulimodal approach using Narcotic for breakthrough only  Physical therapy and occupational therapy consults  Case management social work consult for placement  Follow-up in 2 weeks with repeat x-rays AP and lateral right hip    Jaret Marino MD  Orthopedic Trauma and Arthroplasty  [facility]  [phone]

## 2020-09-20 NOTE — PROGRESS NOTES
[facility]    Medicine Progress Note - Hospitalist Service       Date of Admission:  9/19/2020  Assessment & Plan       Lowell Arredondo is a 80 year old male with a history of nonresectable metastatic pancreatic adenocarcinoma on chemotherapy and radiation, history of prostate cancer, and peripheral neuropathy who is admitted to the hospitalist service with right intertrochanteric hip fracture after mechanical fall.    1.  Right femur fracture.  Status post repair by orthopedic surgery on 9/20/2020.  -Use incentive spirometry.    -Pain medications as needed.  -Work with therapy.    2.  Pancreatic and prostate cancers.  Oncology consult.    3.  Fever.  No obvious infection on chest x-ray from 9/19.  Check urine analysis and blood cultures.  Did get preop cefazolin.  Not neutropenic.    4.  Chronic kidney disease.  Creatinine at baseline.  Avoid nephrotoxins as able.    5.  Peripheral neuropathy.  Continue gabapentin.       Diet: NPO per Anesthesia Guidelines for Procedure/Surgery Except for: Meds    DVT Prophylaxis: Pneumatic Compression Devices  Rosaels Catheter: not present  Code Status: DNR/DNI           David Garcia,   Hospitalist Service  [facility]    ______________________________________________________________________    Interval History   Having right hip pain.  Did notice fevers overnight.  No chest pain, shortness of breath, nausea, vomiting.    Data reviewed today: I reviewed all medications, new labs and imaging results over the last 24 hours.     Physical Exam   Vital Signs: Temp: 97.9  F (36.6  C) Temp src: Oral BP: 123/68 Pulse: 102   Resp: 14 SpO2: 92 % O2 Device: None (Room air) Oxygen Delivery: 6 LPM  Weight: 157 lbs 4.8 oz  Gen:  NAD, A&Ox3.  Eyes:  PERRL, sclera anicteric.  OP:  MMM, no lesions.  Neck:  Supple.  CV:  Regular, no murmurs.  Lung:  CTA b/l, normal effort.  Ab:  +BS, soft.  Skin:  Warm, dry to touch.  No rash.  Ext:  No pitting edema LE  b/l.      Data   Recent Labs   Lab 09/20/20  0707 09/19/20  1323   WBC 5.3 6.1   HGB 10.5* 12.6*   MCV 92 95   * 116*   INR 1.25* 1.16*    134   POTASSIUM 4.2 4.3   CHLORIDE 102 100   CO2 26 28   BUN 15 16   CR 1.04 1.00   ANIONGAP 6 6   VICENTE 8.5 8.7   * 118*

## 2020-09-20 NOTE — PLAN OF CARE
Slept comfortably, NPO since midnight. Voiding in good amounts. Numbness/tingling in BLE- baseline per pt. Oxycodone for pain. Surgical bath done. Plan for surgery this morning

## 2020-09-21 ENCOUNTER — APPOINTMENT (OUTPATIENT)
Dept: PHYSICAL THERAPY | Facility: CLINIC | Age: 80
DRG: 481 | End: 2020-09-21
Attending: INTERNAL MEDICINE
Payer: COMMERCIAL

## 2020-09-21 LAB
ANION GAP SERPL CALCULATED.3IONS-SCNC: 5 MMOL/L (ref 3–14)
BUN SERPL-MCNC: 18 MG/DL (ref 7–30)
CALCIUM SERPL-MCNC: 8.6 MG/DL (ref 8.5–10.1)
CHLORIDE SERPL-SCNC: 104 MMOL/L (ref 94–109)
CO2 SERPL-SCNC: 26 MMOL/L (ref 20–32)
CREAT SERPL-MCNC: 1.02 MG/DL (ref 0.66–1.25)
ERYTHROCYTE [DISTWIDTH] IN BLOOD BY AUTOMATED COUNT: 14 % (ref 10–15)
GFR SERPL CREATININE-BSD FRML MDRD: 68 ML/MIN/{1.73_M2}
GLUCOSE SERPL-MCNC: 134 MG/DL (ref 70–99)
HCT VFR BLD AUTO: 29.4 % (ref 40–53)
HGB BLD-MCNC: 9.8 G/DL (ref 13.3–17.7)
INTERPRETATION ECG - MUSE: NORMAL
MCH RBC QN AUTO: 30.9 PG (ref 26.5–33)
MCHC RBC AUTO-ENTMCNC: 33.3 G/DL (ref 31.5–36.5)
MCV RBC AUTO: 93 FL (ref 78–100)
PLATELET # BLD AUTO: 114 10E9/L (ref 150–450)
POTASSIUM SERPL-SCNC: 4.4 MMOL/L (ref 3.4–5.3)
RBC # BLD AUTO: 3.17 10E12/L (ref 4.4–5.9)
SODIUM SERPL-SCNC: 135 MMOL/L (ref 133–144)
WBC # BLD AUTO: 4.1 10E9/L (ref 4–11)

## 2020-09-21 PROCEDURE — 25000132 ZZH RX MED GY IP 250 OP 250 PS 637: Performed by: PHYSICIAN ASSISTANT

## 2020-09-21 PROCEDURE — 25000132 ZZH RX MED GY IP 250 OP 250 PS 637: Performed by: INTERNAL MEDICINE

## 2020-09-21 PROCEDURE — 36415 COLL VENOUS BLD VENIPUNCTURE: CPT | Performed by: INTERNAL MEDICINE

## 2020-09-21 PROCEDURE — 97530 THERAPEUTIC ACTIVITIES: CPT | Mod: GP | Performed by: PHYSICAL THERAPIST

## 2020-09-21 PROCEDURE — 99233 SBSQ HOSP IP/OBS HIGH 50: CPT | Performed by: INTERNAL MEDICINE

## 2020-09-21 PROCEDURE — 25000128 H RX IP 250 OP 636: Performed by: PHYSICIAN ASSISTANT

## 2020-09-21 PROCEDURE — 12000000 ZZH R&B MED SURG/OB

## 2020-09-21 PROCEDURE — 85027 COMPLETE CBC AUTOMATED: CPT | Performed by: INTERNAL MEDICINE

## 2020-09-21 PROCEDURE — 80048 BASIC METABOLIC PNL TOTAL CA: CPT | Performed by: INTERNAL MEDICINE

## 2020-09-21 PROCEDURE — 97161 PT EVAL LOW COMPLEX 20 MIN: CPT | Mod: GP | Performed by: PHYSICAL THERAPIST

## 2020-09-21 PROCEDURE — 99232 SBSQ HOSP IP/OBS MODERATE 35: CPT | Performed by: PHYSICIAN ASSISTANT

## 2020-09-21 PROCEDURE — 25000132 ZZH RX MED GY IP 250 OP 250 PS 637: Performed by: HOSPITALIST

## 2020-09-21 RX ORDER — OXYCODONE HYDROCHLORIDE 5 MG/1
5-10 TABLET ORAL EVERY 4 HOURS PRN
Qty: 30 TABLET | Refills: 0 | Status: SHIPPED | OUTPATIENT
Start: 2020-09-21 | End: 2020-10-02

## 2020-09-21 RX ORDER — LANOLIN ALCOHOL/MO/W.PET/CERES
3 CREAM (GRAM) TOPICAL AT BEDTIME
Status: DISCONTINUED | OUTPATIENT
Start: 2020-09-21 | End: 2020-09-23 | Stop reason: HOSPADM

## 2020-09-21 RX ORDER — CYCLOBENZAPRINE HCL 10 MG
10 TABLET ORAL EVERY 8 HOURS PRN
Status: DISCONTINUED | OUTPATIENT
Start: 2020-09-21 | End: 2020-09-22

## 2020-09-21 RX ORDER — ASPIRIN 325 MG
325 TABLET, DELAYED RELEASE (ENTERIC COATED) ORAL DAILY
Qty: 28 TABLET | Refills: 0 | Status: SHIPPED | OUTPATIENT
Start: 2020-09-21 | End: 2020-10-19

## 2020-09-21 RX ORDER — ACETAMINOPHEN 500 MG
1000 TABLET ORAL 3 TIMES DAILY
Qty: 100 TABLET | Refills: 1 | Status: SHIPPED | OUTPATIENT
Start: 2020-09-21

## 2020-09-21 RX ORDER — HALOPERIDOL 5 MG/ML
0.5 INJECTION INTRAMUSCULAR EVERY 6 HOURS PRN
Status: DISCONTINUED | OUTPATIENT
Start: 2020-09-21 | End: 2020-09-23 | Stop reason: HOSPADM

## 2020-09-21 RX ADMIN — ACETAMINOPHEN 975 MG: 325 TABLET, FILM COATED ORAL at 03:49

## 2020-09-21 RX ADMIN — CYCLOBENZAPRINE HYDROCHLORIDE 10 MG: 10 TABLET, FILM COATED ORAL at 04:07

## 2020-09-21 RX ADMIN — ASPIRIN 325 MG: 325 TABLET, COATED ORAL at 07:53

## 2020-09-21 RX ADMIN — TAMSULOSIN HYDROCHLORIDE 0.4 MG: 0.4 CAPSULE ORAL at 08:16

## 2020-09-21 RX ADMIN — ACETAMINOPHEN 975 MG: 325 TABLET, FILM COATED ORAL at 19:47

## 2020-09-21 RX ADMIN — OXYCODONE HYDROCHLORIDE 5 MG: 5 TABLET ORAL at 09:54

## 2020-09-21 RX ADMIN — GABAPENTIN 300 MG: 300 CAPSULE ORAL at 13:27

## 2020-09-21 RX ADMIN — GABAPENTIN 300 MG: 300 CAPSULE ORAL at 19:47

## 2020-09-21 RX ADMIN — MORPHINE SULFATE 15 MG: 15 TABLET, EXTENDED RELEASE ORAL at 07:53

## 2020-09-21 RX ADMIN — LORAZEPAM 0.5 MG: 0.5 TABLET ORAL at 01:43

## 2020-09-21 RX ADMIN — CEFAZOLIN SODIUM 1 G: 1 INJECTION, SOLUTION INTRAVENOUS at 04:05

## 2020-09-21 RX ADMIN — ACETAMINOPHEN 975 MG: 325 TABLET, FILM COATED ORAL at 13:24

## 2020-09-21 RX ADMIN — HYDROMORPHONE HYDROCHLORIDE 0.5 MG: 1 INJECTION, SOLUTION INTRAMUSCULAR; INTRAVENOUS; SUBCUTANEOUS at 05:16

## 2020-09-21 RX ADMIN — OXYCODONE HYDROCHLORIDE 5 MG: 5 TABLET ORAL at 03:49

## 2020-09-21 RX ADMIN — GABAPENTIN 300 MG: 300 CAPSULE ORAL at 07:53

## 2020-09-21 RX ADMIN — POLYETHYLENE GLYCOL 3350 17 G: 17 POWDER, FOR SOLUTION ORAL at 07:53

## 2020-09-21 ASSESSMENT — ACTIVITIES OF DAILY LIVING (ADL)
ADLS_ACUITY_SCORE: 16
ADLS_ACUITY_SCORE: 13

## 2020-09-21 NOTE — PLAN OF CARE
Pt anxious, needs reassuring. 2L O2 with capno.Oxycodone, flexeril, scheduled MS Contin and IV Dilaudid x1 given for pain. Advanced diet to regular. Voiding in good amounts. Saline locked. Stood at bedside with assist of 2, gb/walker. Numbness/tingling baseline in hands and feet. Will continue to monitor.

## 2020-09-21 NOTE — PLAN OF CARE
1243 Orientated this morning- mild forgetful. At this time- orientated to self only. Unable to reason with patient. He is unable to follow directions. He is restless. Wife at bedside. He does know her name. He normally takes MS contin and oxycodone at home. Due to void. Bladder scan 479cc. Lungs clear. On room air. He took his IV out this AM. Pain appears controlled at rest. Declined PT this morning. Was able to stand at the bedside now with 2 assist, GB, walker. Able to take 1 step. Getting a sitter at the bedside for safety. Impulsive and unable to follow safety rules. No edema. Vitals stable. Ate a good breakfast. Has been resting between cares today. Bed alarm on zone 2 and wife at bedside now. Dressing clean and dry. 1345 sitter at bedside for safety. Calmer at this time. Ate 50% of lunch. Pain appears controlled. Dressing dry. 1210 able to stand at bedside. Took at few steps. Voided 180cc. 2 assist, gait belt, walker.

## 2020-09-21 NOTE — PROGRESS NOTES
[facility]    Hematology/Oncology Follow-up Note      Today's Date: 09/21/20  Date of Admission:  9/19/2020  Reason for Consult: pancreatic cancer        ASSESSMENT/PLAN:  Lowell Arredondo is a 80 year old male with:     1) Right intertrochanteric femur fracture: s/p thort cephalomedullary nailing of right femur 9/20.  -orthopedic surgery following     2) Metastatic pancreatic cancer: follows with Dr. Segura.  S/p cycle 4 of FOLFOX on 9/8/20.  -follow-up with Dr. Segura after discharge.      3) Anemia and thrombocytopenia: likely secondary to recent chemotherapy, and now s/p surgery. Hbg 9.8 today, no active bleeding.  -follow CBC      INTERIM HISTORY: Lowell was seen in his room, resting in bed. His wife, Billie, is with him. Billie has noticed more confusion since starting the MS Contin, but more confused since surgery. No recent fever. Pain is better controlled.        MEDICATIONS:  Current Facility-Administered Medications   Medication     [START ON 9/23/2020] acetaminophen (TYLENOL) tablet 650 mg     acetaminophen (TYLENOL) tablet 975 mg     aspirin (ASA) EC tablet 325 mg     benzocaine-menthol (CHLORASEPTIC) 6-10 MG lozenge 1-2 lozenge     cyclobenzaprine (FLEXERIL) tablet 10 mg     gabapentin (NEURONTIN) capsule 300 mg     HYDROmorphone (PF) (DILAUDID) injection 0.3-0.5 mg     influenza vac high-dose quad (FLUZONE HD) injection SELIN 0.7 mL     lactated ringers infusion     lidocaine (LMX4) cream     lidocaine 1 % 0.1-1 mL     loratadine (CLARITIN) tablet 10 mg     LORazepam (ATIVAN) tablet 0.5 mg     magnesium citrate solution 148 mL     melatonin tablet 1 mg     metoclopramide (REGLAN) tablet 5 mg    Or     metoclopramide (REGLAN) injection 5 mg     morphine (MS CONTIN) 12 hr tablet 15 mg     naloxone (NARCAN) injection 0.1-0.4 mg     ondansetron (ZOFRAN-ODT) ODT tab 4 mg    Or     ondansetron (ZOFRAN) injection 4 mg     oxyCODONE (ROXICODONE) tablet 5-10 mg     polyethylene glycol (MIRALAX)  "Packet 17 g     prochlorperazine (COMPAZINE) injection 5 mg    Or     prochlorperazine (COMPAZINE) tablet 5 mg     psyllium (METAMUCIL/KONSYL) Packet 1 packet     sodium chloride (PF) 0.9% PF flush 3 mL     sodium chloride (PF) 0.9% PF flush 3 mL     sodium chloride (PF) 0.9% PF flush 3 mL     sodium chloride (PF) 0.9% PF flush 3 mL     tamsulosin (FLOMAX) capsule 0.4 mg           ALLERGIES:  Allergies   Allergen Reactions     Demerol [Meperidine] Difficulty breathing         PHYSICAL EXAM:  Vital signs:  Temp: 98.7  F (37.1  C) Temp src: Temporal BP: 104/56 Pulse: 77   Resp: 20 SpO2: 92 % O2 Device: None (Room air) Oxygen Delivery: 1.5 LPM   Weight: 71.4 kg (157 lb 4.8 oz)  Estimated body mass index is 23.22 kg/m  as calculated from the following:    Height as of 8/25/20: 1.753 m (5' 9.02\").    Weight as of this encounter: 71.4 kg (157 lb 4.8 oz).    GENERAL:  Male, in no acute distress.  Alert. Not oriented to time today.   HEENT:  Normocephalic, atraumatic. No conjunctival injection or eye swelling.   LUNGS:  Nonlabored breathing, no cough or audible wheezing, able to speak full sentences.  MSK: Full ROM UE.    SKIN: No rash on exposed skin.   ABD: radiation tattoos noted, small lumps laterally, no tenderness  NEURO: CN grossly intact, speech normal  PSYCH: Appears a little confused, fidgety        LABS:  CBC RESULTS:   Recent Labs   Lab Test 09/21/20  0726   WBC 4.1   RBC 3.17*   HGB 9.8*   HCT 29.4*   MCV 93   MCH 30.9   MCHC 33.3   RDW 14.0   *       Recent Labs   Lab Test 09/21/20  0726 09/20/20  0707    134   POTASSIUM 4.4 4.2   CHLORIDE 104 102   CO2 26 26   ANIONGAP 5 6   * 106*   BUN 18 15   CR 1.02 1.04   VICENTE 8.6 8.5         Lianna Vyas PA-C  Hematology/Oncology  [facility]      "

## 2020-09-21 NOTE — PROGRESS NOTES
[facility]    Medicine Progress Note - Hospitalist Service       Date of Admission:  9/19/2020  Assessment & Plan       Lowell Arredondo is a 80 year old male with a history of nonresectable metastatic pancreatic adenocarcinoma on chemotherapy and radiation, history of prostate cancer, and peripheral neuropathy who is admitted to the hospitalist service with right intertrochanteric hip fracture after mechanical fall.     1.  Right femur fracture.  Status post repair by orthopedic surgery on 9/20/2020.  -Use incentive spirometry.    -Pain medications as needed.  -Work with therapy.     2.  Pancreatic and prostate cancers.  Oncology consult.     3.  Fever.  No obvious bacterial infection.  Did get preop cefazolin.  Not neutropenic.    -Continue to monitor.     4.  Chronic kidney disease.  Creatinine at baseline.  Avoid nephrotoxins as able.     5.  Peripheral neuropathy.  Continue gabapentin.    6.  Acute encephalopathy.  Did not sleep well last night.  No obvious infection.  Wife does state that he can get this problem with opiates.  -Stop scheduled MS Contin.  -Minimize opiates as possible.  -Scheduled melatonin 3 mg at bedtime.    -Bedside sitter.  -Have haloperidol 0.5 mg IV available every 6 hours as needed.     Diet: Regular Diet Adult  Advance Diet as Tolerated    DVT Prophylaxis: Pneumatic Compression Devices, ASA  Rosales Catheter: not present  Code Status: DNR/DNI         Disposition Plan   Expected discharge: 2 days    David Garcia DO  Hospitalist Service  [facility]    ______________________________________________________________________    Interval History   Does seem mildly agitated when I see him.  States he is not having any problems.  Wants me to get out of his room.  He does allow me to listen to his heart and lungs.    Data reviewed today: I reviewed all medications, new labs and imaging results over the last 24 hours.     Physical Exam   Vital Signs: Temp: 98.1   F (36.7  C) Temp src: Temporal BP: 109/56 Pulse: 88   Resp: 20 SpO2: 94 % O2 Device: None (Room air) Oxygen Delivery: 1.5 LPM  Weight: 157 lbs 4.8 oz  Gen:  NAD, A&Ox2 to person and maybe place.  Not oriented to time.  Eyes:  PERRL, sclera anicteric.  OP:  MMM, no lesions.  Neck:  Supple.  CV:  Regular, no murmurs.  Lung:  CTA b/l, normal effort.  Ab:  +BS, soft.  Skin:  Warm, dry to touch.  No rash.  Ext:  No pitting edema LE b/l.      Data   Recent Labs   Lab 09/21/20  0726 09/20/20  0707 09/19/20  1323   WBC 4.1 5.3 6.1   HGB 9.8* 10.5* 12.6*   MCV 93 92 95   * 103* 116*   INR  --  1.25* 1.16*    134 134   POTASSIUM 4.4 4.2 4.3   CHLORIDE 104 102 100   CO2 26 26 28   BUN 18 15 16   CR 1.02 1.04 1.00   ANIONGAP 5 6 6   VICENTE 8.6 8.5 8.7   * 106* 118*

## 2020-09-21 NOTE — PROGRESS NOTES
SPIRITUAL HEALTH SERVICES Progress Note   Ortho/Spine Unit    Attempted to see pt Lowell late in the afternoon per a staff referral requesting emotional support for pt.  Lowell was sleeping.    Plan: This author or unit  will follow up tomorrow.    Sushant Coleman M.Div., [facility]  Staff   Phone [phone]

## 2020-09-21 NOTE — PROGRESS NOTES
Care Transitions Team: Following for CC, discharge planning, and disposition.       Per [facility] Liaison Handoff:   Writer spoke with spouse (June) via phone, introduced myself and explained my role in Lowell's care.     Living situation:   Support:Lives with spouse (June)    Available transportation: Family will provide transport.   Home environment:    Stairs-had rails? 1 step to enter from garage and 5 steps to main level.     Equipment available : Walker and cane.    Increase service or equipment needs: Therapy to assess and make equipment recommendations.     Prior Function level:   Ambulation  Cane or walker   ADL's  Independent    Current home care services: None    Family/patient discharge goal:  TCU to work on strength and safety  Barriers to Discharge: Pain management and medically stable  Discharge Plan of care:  TCU short stay   TCU - Medicare compare TCU list provided - CM discussed Medicare compare website and star ratings.   1.  [facility]  2.  [facility]  3. [facility]    Orders needed for services: Post Op Plan of Care - TCU short rehab stay    Weight bearing status and Hip precautions: WBAT   Transportation: Family will transport if patient safe to pivot tranfers into vehicle.       Will follow in collaboration with [facility] CM  Asia Aguayo -[phone] [facility] for discharge planning.

## 2020-09-21 NOTE — PLAN OF CARE
PT: Orders received. PT evaluation completed and treatment initiated. Pt lethargic and confused during session, spouse provides PLOF. Pt lives in a split entry home. Pt has been ambulating with a cane due to neuropathy in B feet, and was set to transition to a walker his day of admission. Pt's spouse reports that his mobility has been clearly declining over the past several weeks, and notes that his cognition has been more concerning this past week. Pt is typically alert and oriented, but today is confused and has difficulty following directions.     Discharge Planner PT   Patient plan for discharge: Spouse identifies the need for TCU  Current status: Pt supine upon initiation, agreeable to session. Pt is lethargic and confused and follows about 50% of instructions. Pt able to initiate transfer to EOB with mod-maxA and verbal and tactile cuing. Pt begins falling asleep mid transfer, therefore returned pt to supine. Discussed potential discharge need with spouse. Spouse is aware that it is unlikely that she will be able to care for the pt at discharge and that he will require TCU placement. Pt's spouse reports multiple fears regarding COVID and TCU placement. Educated spouse on current safety precautions in place at most TCU settings, and encouraged her to call facilities for specifics once she has been able to narrow down the potential TCUs with SW. Spouse also with questions regarding expectations regarding mobility expectations in the next couple days. Answered all questions to her satisfaction.   Barriers to return to prior living situation: Stairs to enter and within home, limited tolerance for mobility at this time, confusion, heavy assist for transfers.   Recommendations for discharge: TCU  Rationale for recommendations: Pt is not currently at baseline for mobility, and is unsafe to discharge home. With continued PT, both IP and after discharge, pt is likely to obtain mobility goals.        Entered by: Nancy  Harini 09/21/2020 11:03 AM

## 2020-09-21 NOTE — CONSULTS
.Care Transition Initial Assessment - SW  Discharge planning: Chart reviewed noting surgery yesterday, noted PT recommendation for TCU on discharge. SW coordinating discharge along with [facility] care manager, Asia Aguayo ( [phone]). See [facility] trauma care plan in progress notes section in chart from today.. plan for TCU, [facility], [facility] and [facility] identified as facilities of consideration per care plan.      Noted per ortho PA the anticipation of pt's transfer to TCU in 1-2 days, noted hospitalist anticipated discharge in 2 days.     Active Problems:    Hip fracture (H)       DATA  Lives With: spouse   Living Arrangements: house  Quality of Family Relationships: involved, supportive  Description of Support System: Involved, Supportive  Who is your support system?: Wife, Children         Resources List: Skilled Nursing Facility     Quality of Family Relationships: involved, supportive  Transportation Anticipated: (to be determined)    INTERVENTION     Referrals sent to [facility], [facility] and [facility] as requested.     ASSESSMENT/PLAN    Following as noted above for TCU planning. Will await facility determination of assessment, continue planning accordingly. Will need to determine if chemo and radiation will be on hold during TCU stay.      .    HARINI Cardenas   Inpatient Care Coordination   [facility]     [phone]

## 2020-09-21 NOTE — PLAN OF CARE
Anxious at times, encouraged relaxation/rest.  Pain managed with minimal IV pain med + cold.  No nausea.  LS diminished bilaterally, RA, encouraged hourly CDB/IS x10.  CMS+ except baseline neuropathy.  Drsgs CDI.  Voided x1, UA this jean-pierre, monitor.

## 2020-09-21 NOTE — PROGRESS NOTES
09/21/20 1054   Quick Adds   Type of Visit Initial PT Evaluation   Living Environment   Lives With spouse   Living Arrangements house   Home Accessibility stairs to enter home;stairs within home   Number of Stairs, Main Entrance 1   Stair Railings, Main Entrance none   Number of Stairs, Within Home, Primary 7  (Split level)   Stair Railings, Within Home, Primary railing on right side (ascending)   Transportation Anticipated family or friend will provide   Self-Care   Usual Activity Tolerance moderate   Current Activity Tolerance poor   Regular Exercise No   Equipment Currently Used at Home cane, straight   Activity/Exercise/Self-Care Comment Pt's spouse reports that the had just obtained a walker for use the day he fell.    Functional Level Prior   Ambulation 1-->assistive equipment   Transferring 1-->assistive equipment   Toileting 0-->independent   Bathing 0-->independent   Fall history within last six months yes   Number of times patient has fallen within last six months 1   Which of the above functional risks had a recent onset or change? ambulation;transferring;toileting;bathing;dressing;fall history   Prior Functional Level Comment Pt's spouse reports that the pt has been getting more and more unstable at home due to neuropathy in B feet. Pt had been using a cane with plans to progress to walker day of admission.    General Information   Onset of Illness/Injury or Date of Surgery - Date 09/20/20   Referring Physician  Ivanna Granados PA-C     Patient/Family Goals Statement Discharge to TCU   Pertinent History of Current Problem (include personal factors and/or comorbidities that impact the POC) Lowell Arredondo is a 80 year old male with a history of nonresectable metastatic pancreatic adenocarcinoma on chemotherapy and radiation, history of prostate cancer, and peripheral neuropathy who is admitted to the hospitalist service with right intertrochanteric hip fracture after mechanical fall. Pt is now POD 1  ORIF   Weight-Bearing Status - RLE weight-bearing as tolerated   General Observations Pt supine upon initiation, lethargic   Cognitive Status Examination   Orientation orientation to person, place and time   Level of Consciousness lethargic/somnolent   Follows Commands and Answers Questions 50% of the time   Personal Safety and Judgment at risk behaviors demonstrated   Memory impaired   Cognitive Comment Pt's spouse reports that he is typically cognitively intact, and is concerned regarding confusion.    Pain Assessment   Patient Currently in Pain No   Integumentary/Edema   Integumentary/Edema Comments Not visualized   Posture    Posture Forward head position;Protracted shoulders   Range of Motion (ROM)   ROM Comment Limited at R LE due to pain   Strength   Strength Comments Pt's spouse reports good strength at baseline, unable to fully assess at this time but appears impaired at R LE   Bed Mobility   Bed Mobility Comments sit<>supine with modA   Transfer Skills   Transfer Comments Unable to assess due to lethargy   Gait   Gait Comments Unable to assess due to lethargy   Balance   Balance Comments Anticipate pt will require B UE support for safe dynamic mobility   Sensory Examination   Sensory Perception Comments Baseline neuropathy per spouse   Coordination   Coordination no deficits were identified   Muscle Tone   Muscle Tone no deficits were identified   Modality Interventions   Planned Modality Interventions Cryotherapy   Planned Modality Interventions Comments ice PRN   General Therapy Interventions   Planned Therapy Interventions balance training;bed mobility training;gait training;ROM;strengthening;stretching;transfer training;home program guidelines;progressive activity/exercise   Clinical Impression   Criteria for Skilled Therapeutic Intervention yes, treatment indicated   PT Diagnosis Impaired functional mobility   Influenced by the following impairments Pain, weakness, deconditioning,    Functional  "limitations due to impairments Difficulty with bed mobility, transfers, ambulation, stairs   Clinical Presentation Stable/Uncomplicated   Clinical Presentation Rationale medically stable, clear POC   Clinical Decision Making (Complexity) Low complexity   Therapy Frequency Daily   Predicted Duration of Therapy Intervention (days/wks) 5 days   Anticipated Discharge Disposition Transitional Care Facility   Risk & Benefits of therapy have been explained Yes   Patient, Family & other staff in agreement with plan of care Yes   [facility] TM \"6 Clicks\"   2016, Trustees of [facility], under license to [employer].  All rights reserved.   6 Clicks Short Forms Basic Mobility Inpatient Short Form   [facility] AM-PAC  \"6 Clicks\" V.2 Basic Mobility Inpatient Short Form   1. Turning from your back to your side while in a flat bed without using bedrails? 2 - A Lot   2. Moving from lying on your back to sitting on the side of a flat bed without using bedrails? 2 - A Lot   3. Moving to and from a bed to a chair (including a wheelchair)? 2 - A Lot   4. Standing up from a chair using your arms (e.g., wheelchair, or bedside chair)? 2 - A Lot   5. To walk in hospital room? 1 - Total   6. Climbing 3-5 steps with a railing? 1 - Total   Basic Mobility Raw Score (Score out of 24.Lower scores equate to lower levels of function) 10   Total Evaluation Time   Total Evaluation Time (Minutes) 5     "

## 2020-09-21 NOTE — PROGRESS NOTES
Orthopedic Surgery Progress Note  Lowell Arredondo  2020  Admit Date:  2020  POD 1 Day Post-Op  S/P Procedure(s):  Short cephalomedullary nail right femur    Subjective: No acute events overnight. Pain is well controlled with current pain medications at rest, but he is complaining of pain with ambulation. Patient denies chest pain, shortness of breath, new numbness/tingling, or new weakness.     Objective:    Vital Sign Ranges  Temperature Temp  Av.9  F (37.2  C)  Min: 97.9  F (36.6  C)  Max: 99.5  F (37.5  C)   Blood pressure Systolic (24hrs), Av , Min:97 , Max:140        Diastolic (24hrs), Av, Min:53, Max:117      Pulse Pulse  Av.3  Min: 77  Max: 106   Respirations Resp  Av.2  Min: 8  Max: 30   Pulse oximetry SpO2  Av.9 %  Min: 92 %  Max: 100 %     Labs:  Recent Labs   Lab Test 20  0726 20  0707 20  1323   POTASSIUM 4.4 4.2 4.3     Recent Labs   Lab Test 20  0726 20  0707 20  1323   HGB 9.8* 10.5* 12.6*     Recent Labs   Lab Test 20  0707 20  1323   INR 1.25* 1.16*     Recent Labs   Lab Test 20  0726 20  0707 20  1323   * 103* 116*       Exam:    Gen: No distress, alert, oriented  Resp: Non labored respirations on room air  CV: RRR, extremities warm  MSK:   RLE:   - Surgical dressing clean, dry, and intact with no shadowing or strikethrough  - Sensation and motor intact in dp, sp, sural, saph, and tibial nerve distributions  - 2+ DP pulses with brisk capillary refill    A/P: Lowell Arredondo is a 80 year old male with right IT femur fracture POD#1 s/p short CMN R femur with Dr. Marino on 2020 - doing well from an orthopedic perspective.     -Weight bearing status: WBAT  -Activity: As tolerated with therapies  -Antibiotics: x 24 hours post-op  -Pain control with PO and IV pain medication for breakthrough  -Bowel regimen  -DVT PPx:  daily and mechanical  -PT/OT  -Dressings: keep clean, dry,  intact until POD#5 unless saturate  -Follow up: 2 weeks with Ivanna Granados PA-C   -Disposition: Pending PO pain control, clearance from primary team, and progress with PT/OT - likely discharge to TCU in 1-2 days. Orthopedic discharge orders to TCU have been placed.     Ivanna Granados PA-C  Orthopedic Trauma and Arthroplasty  [facility]

## 2020-09-22 ENCOUNTER — PATIENT OUTREACH (OUTPATIENT)
Dept: ONCOLOGY | Facility: CLINIC | Age: 80
End: 2020-09-22

## 2020-09-22 ENCOUNTER — APPOINTMENT (OUTPATIENT)
Dept: PHYSICAL THERAPY | Facility: CLINIC | Age: 80
DRG: 481 | End: 2020-09-22
Payer: COMMERCIAL

## 2020-09-22 ENCOUNTER — APPOINTMENT (OUTPATIENT)
Dept: OCCUPATIONAL THERAPY | Facility: CLINIC | Age: 80
DRG: 481 | End: 2020-09-22
Attending: INTERNAL MEDICINE
Payer: COMMERCIAL

## 2020-09-22 LAB
GLUCOSE SERPL-MCNC: 105 MG/DL (ref 70–99)
HGB BLD-MCNC: 10 G/DL (ref 13.3–17.7)

## 2020-09-22 PROCEDURE — 25000132 ZZH RX MED GY IP 250 OP 250 PS 637: Performed by: INTERNAL MEDICINE

## 2020-09-22 PROCEDURE — 97535 SELF CARE MNGMENT TRAINING: CPT | Mod: GO | Performed by: REHABILITATION PRACTITIONER

## 2020-09-22 PROCEDURE — 85018 HEMOGLOBIN: CPT | Performed by: PHYSICIAN ASSISTANT

## 2020-09-22 PROCEDURE — 25000132 ZZH RX MED GY IP 250 OP 250 PS 637: Performed by: NURSE PRACTITIONER

## 2020-09-22 PROCEDURE — 99223 1ST HOSP IP/OBS HIGH 75: CPT | Performed by: NURSE PRACTITIONER

## 2020-09-22 PROCEDURE — 82947 ASSAY GLUCOSE BLOOD QUANT: CPT | Performed by: PHYSICIAN ASSISTANT

## 2020-09-22 PROCEDURE — 99232 SBSQ HOSP IP/OBS MODERATE 35: CPT | Performed by: INTERNAL MEDICINE

## 2020-09-22 PROCEDURE — 25000132 ZZH RX MED GY IP 250 OP 250 PS 637: Performed by: PHYSICIAN ASSISTANT

## 2020-09-22 PROCEDURE — 12000000 ZZH R&B MED SURG/OB

## 2020-09-22 PROCEDURE — 97530 THERAPEUTIC ACTIVITIES: CPT | Mod: GP | Performed by: PHYSICAL THERAPY ASSISTANT

## 2020-09-22 PROCEDURE — 97530 THERAPEUTIC ACTIVITIES: CPT | Mod: GO | Performed by: REHABILITATION PRACTITIONER

## 2020-09-22 PROCEDURE — 97166 OT EVAL MOD COMPLEX 45 MIN: CPT | Mod: GO | Performed by: REHABILITATION PRACTITIONER

## 2020-09-22 PROCEDURE — 36415 COLL VENOUS BLD VENIPUNCTURE: CPT | Performed by: PHYSICIAN ASSISTANT

## 2020-09-22 RX ORDER — GABAPENTIN 300 MG/1
300 CAPSULE ORAL DAILY PRN
Status: DISCONTINUED | OUTPATIENT
Start: 2020-09-22 | End: 2020-09-23 | Stop reason: HOSPADM

## 2020-09-22 RX ORDER — OXYCODONE HYDROCHLORIDE 5 MG/1
5-10 TABLET ORAL
Status: DISCONTINUED | OUTPATIENT
Start: 2020-09-22 | End: 2020-09-23 | Stop reason: HOSPADM

## 2020-09-22 RX ORDER — HYDROMORPHONE HYDROCHLORIDE 1 MG/ML
.2-.4 INJECTION, SOLUTION INTRAMUSCULAR; INTRAVENOUS; SUBCUTANEOUS
Status: DISCONTINUED | OUTPATIENT
Start: 2020-09-22 | End: 2020-09-23 | Stop reason: HOSPADM

## 2020-09-22 RX ORDER — AMOXICILLIN 250 MG
1-2 CAPSULE ORAL 2 TIMES DAILY
Status: DISCONTINUED | OUTPATIENT
Start: 2020-09-22 | End: 2020-09-23 | Stop reason: HOSPADM

## 2020-09-22 RX ORDER — ACETAMINOPHEN 325 MG/1
650 TABLET ORAL EVERY 8 HOURS
Status: COMPLETED | OUTPATIENT
Start: 2020-09-22 | End: 2020-09-23

## 2020-09-22 RX ORDER — BISACODYL 10 MG
10 SUPPOSITORY, RECTAL RECTAL DAILY PRN
Status: DISCONTINUED | OUTPATIENT
Start: 2020-09-22 | End: 2020-09-23 | Stop reason: HOSPADM

## 2020-09-22 RX ORDER — LIDOCAINE 4 G/G
1 PATCH TOPICAL
Status: DISCONTINUED | OUTPATIENT
Start: 2020-09-22 | End: 2020-09-23 | Stop reason: HOSPADM

## 2020-09-22 RX ADMIN — ASPIRIN 325 MG: 325 TABLET, COATED ORAL at 08:10

## 2020-09-22 RX ADMIN — GABAPENTIN 300 MG: 300 CAPSULE ORAL at 14:44

## 2020-09-22 RX ADMIN — GABAPENTIN 300 MG: 300 CAPSULE ORAL at 20:03

## 2020-09-22 RX ADMIN — DOCUSATE SODIUM AND SENNOSIDES 1 TABLET: 8.6; 5 TABLET ORAL at 20:03

## 2020-09-22 RX ADMIN — OXYCODONE HYDROCHLORIDE 5 MG: 5 TABLET ORAL at 04:46

## 2020-09-22 RX ADMIN — LIDOCAINE 1 PATCH: 560 PATCH PERCUTANEOUS; TOPICAL; TRANSDERMAL at 22:23

## 2020-09-22 RX ADMIN — ACETAMINOPHEN 975 MG: 325 TABLET, FILM COATED ORAL at 04:41

## 2020-09-22 RX ADMIN — MELATONIN TAB 3 MG 3 MG: 3 TAB at 22:23

## 2020-09-22 RX ADMIN — OXYCODONE HYDROCHLORIDE 5 MG: 5 TABLET ORAL at 18:14

## 2020-09-22 RX ADMIN — TAMSULOSIN HYDROCHLORIDE 0.4 MG: 0.4 CAPSULE ORAL at 11:19

## 2020-09-22 RX ADMIN — ACETAMINOPHEN 975 MG: 325 TABLET, FILM COATED ORAL at 11:21

## 2020-09-22 RX ADMIN — POLYETHYLENE GLYCOL 3350 17 G: 17 POWDER, FOR SOLUTION ORAL at 08:10

## 2020-09-22 RX ADMIN — DICLOFENAC SODIUM 2 G: 10 GEL TOPICAL at 20:10

## 2020-09-22 RX ADMIN — GABAPENTIN 300 MG: 300 CAPSULE ORAL at 08:10

## 2020-09-22 RX ADMIN — OXYCODONE HYDROCHLORIDE 5 MG: 5 TABLET ORAL at 09:08

## 2020-09-22 RX ADMIN — ACETAMINOPHEN 650 MG: 325 TABLET, FILM COATED ORAL at 20:03

## 2020-09-22 ASSESSMENT — ACTIVITIES OF DAILY LIVING (ADL)
ADLS_ACUITY_SCORE: 18
IADL_COMMENTS: SPOUSE TO A AS NEEDED
ADLS_ACUITY_SCORE: 18

## 2020-09-22 NOTE — CONSULTS
[facility]  Pain Service Consultation   Text Page    Date of Admission:  9/19/2020    Assessment & Plan   Lowell Arredondo is a 80 year old male who was admitted on 9/19/2020. I was asked by  Sofia Scherer DO to see the patient for pain management.    1)  Acute on chronic pain s/p right femoral fracture s/p ORIF 9/20    2)  Patient with chronic cancer pain, on chronic opioid therapy managed by  [facility] hematology oncology Senait Segura MD  Baseline 60 mg Daily Morphine Equivalent as dispensed and as reported daily use.  Patient has an expected opioid tolerance.     Patient's opioid use in past 24 hours: MsContin 15 mg, Oxycodone 10 mg  = 30 mg Daily Morphine Equivalent    3)  Risk factors for opioid related harms  -Concurrent benzodiazepine use  - Age > 65 years old  -Opioid has recently been changed    4)  Opioid induced side-effects:  -Constipation  Yes by history  -Nausea/Vomit no   -Sedation no  -Urinary Retention no, hx of BPH and prostate cancer.    -recent encephalopathy after surgery     5)  Other/Related:    - malignant pancreatic cancer under going chemotherapy and radiation. Has receive 5/10 fractions    PLAN:   1)  The pain plan was discussed with the patient,emphasizing multimodal and complimentary pain therapies.      2)Non-opioid multimodal medication therapy  -Topical:Voltaren 1% Topical Gel 2 grams tid, Lidocaine Patch 4%  1 patch,cut in half. Toicals to both sides of right hip incision  -N-SAIDS:Avoid due to age   -Muscle Relaxants:None indicated discharge cyclobenzaprine   -Adjuvants:Acetaminophen  Scheduled to 1950 mg per day, Gabapentin  As chronic   -Antidepresants/anxiolytics:none     3)  Non-medication interventions  Positioning, ICE    4)  Opioids: change Oxycodone to 5-10 mg every 3 hrs prn, Dilaudid IV reduce to 0.2-0.4 mg every 2 hrs prn  Opioids Treatment Goal: -Improvement in function  -Participate in PT    5)  Constipation Prophylaxis    Continue to  Monitor, Bowel Meds PRN per Constipation Order Set, Senna-S 1-2 tablet(s) bid, Polyethylene Glycol  Daily prn, Bisacodyl daily prn, continue metamucil as chronic    6)  Pain Education  -Opioid safe use, storage and disposal information included in DC AVS    7)  DC Planning   Discussed goal of Opioid therapy as above with  Patient and bedside nurse Yael Cuevas RN   Recommend short supply of opioids only (3 days) per CDC guidelines for acute pain management.  Continued outpatient management of pain per  Per oncology for chronic needs PCp for acute  Disposition:  TCU in am   Support systems:  Spouse and adult children   Outpatient Referrals: TBD   The following risk factors have been identified for unintentional overdose: patient is on multiple sedating medications  and patient is > 65 years old. Discharge with intra-nasal naloxone if discharged to home with opioids  >40 mg MME/day.  Plan for education prior to discharge.    Time Spent on this Encounter   Total unit/floor time 70  minutes, time consisted of the following, examination of the patient, reviewing the record and completing documentation. >50% of time spent in counseling and coordination of care.  Time spend counseling with patient consisted of the following topics, symptom management.  Time spent in coordination of care with persons note above.     Inna Stewart RN-C,APRN,CNP,ACHPN   Pain Management and Palliative Care  [facility]  Pgr: [phone]      Reason for Consult   Reason for consult: I was asked by David Ramon DO  to evaluate this patient for  Acute post operative pain.    Primary Care Physician   Primary Care Physician:iDego Segura  Pain Specialist:  Zen Segura MD oncology     Chief Complaint    acute postoperative pain     History is obtained from the patient and electronic health record    History of Present Illness   Lowell Arredondo is a 80 year old male who presents after a fall at home  "sustaining a right intertrochanteric fracture navigating the steps inside his home.  The patient was \"rounding the corner at the base of the stair and had a brief syncopal episode.  He does not recall what happened as he :\"turned the corner of the step and went down.\" His medication history with the narcotic is vague.  He is s/p ORIF on 9/20/20, on 9/21 he has acute mental status changes with confusion and agitation.  He has a past medical history of BBC 2013, prostate cancer 2016, peripheral neuropathy, and cancer of the pancreas diagnosed 10/10/2019.  He is under the care of Tommy Segura MD at [facility] Hematology/Oncology.  He was seen as a virtual visit by palliative care 09/03/20. He is on chronic opiates of Oxycodone and MsContin.  Currently he is receiving chemotherapy and has had 5/10 fractions of radiation.    He continues to c/o right hip pain aggravated by movement and chronic feet pain which started with his chemotherapy. The latter is a searing jolting pain, unpredictable and constant.  He denies headache, dizziness, near fainting, chest pain ,lgiht headedness, shortness of breath, calve pain, abdominal pain, nausea, vomiting dysuria.  He has constipation, poor appetite, and poor recall of events after his fall.     CURRENT PAIN:  His pain is located in the  right hip and feet   It is described as Sharp, Shooting, Tender, Unbearable and Other searing, jolting   He rates it as ranging between 3/10 and  10/10  The average is 5/10 on a scale of 0-10  Currently it is rated as  5 /10  It improves by  Medication, ice rest   It worsens by  Movement   He  been compliant with the recommendations while in the hospital.      PAIN HISTORY:  The pain is mainly located in the  Feet, abdomen   It is described as Sharp, Shooting, Tender, Unbearable and Other  Rates it as ranging between 4/10 and 10/10  Currently it is rated as  5/10  It improves by  gabapentin not helping much radiation has " helpedabdominal pain  It worsens by walking touch  He been compliant with the recommendations while in the hospital.      PAST PAIN TREATMENT:   Medications:gabapentin, MS Contin, OxyContin, Oxycodone   Non-phamacologic modalities:   Previous interventions/surgeries: radiation     Opioid Risk Tool   This tool should be administered to patients upon an initial visit prior to beginning opioid therapy for pain management. A score of 3 or lower indicates low risk for future opioid abuse, a score of 4 to 7 indicates moderate risk for opioid abuse, and a score of 8 or higher indicates a high risk for opioid abuse. [place] each box that applies  Female  Male    Family history of substance abuse    Alcohol   0   Illegal drugs    0   Rx drugs   0   Personal history of substance abuse    Alcohol   0   Illegal drugs    0   Rx drugs    0   Age between 16--45 years   0   History of preadolescent sexual abuse   0    Psychological disease    ADD, OCD, bipolar, schizophrenia   0   Depression   0       total score      ___ ________0________    Questionnaire developed by Teresa Amos MD to asses risk of opioid addiction.   Dandre WILKINSON, Dandre STACY. Predicting aberrant behaviors in Opioid?treated patients: preliminary validation of the Opioid risk too. Pain Med. 2005; 6 (6) : 432    Past Medical History   I have reviewed this patient's medical history and updated it with pertinent information if needed.   Past Medical History:   Diagnosis Date     Basal cell carcinoma of skin 9/19/2013    Overview:  Completely excised 5/13     BPH (benign prostatic hyperplasia) 5/2/2013     Cancer (H)      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) 10/10/2019     Peripheral neuropathy      Prostate cancer (H) 6/7/2016       Past Surgical History   I have reviewed this patient's surgical history and updated it with pertinent information if needed.  Past Surgical History:   Procedure Laterality Date     FOOT SURGERY Left 1992     HERNIA REPAIR       IR  CHEST PORT PLACEMENT > 5 YRS OF AGE  11/1/2019     LAPAROSCOPY DIAGNOSTIC (GENERAL) N/A 5/12/2020    Procedure: Diagnostic laparoscopy;  Surgeon: Sanjay Ryder MD;  Location: [facility] OR     OPEN REDUCTION INTERNAL FIXATION RODDING INTRAMEDULLAR FEMUR FRACTURE TABLE Right 9/20/2020    Procedure: Short cephalomedullary nail right femur;  Surgeon: Jaret Marino MD;  Location: [facility] OR     PROSTATE SURGERY      biopsy         Prior to Admission Medications   Prior to Admission Medications   Prescriptions Last Dose Informant Patient Reported? Taking?   LORazepam (ATIVAN) 0.5 MG tablet 9/18/2020 at Unknown time  Yes Yes   Sig: Take 0.5 mg by mouth nightly as needed for anxiety   acetaminophen (TYLENOL) 500 MG tablet Past Month at Unknown time  Yes No   Sig: Take 500-1,000 mg by mouth every 8 hours as needed for mild pain   gabapentin (NEURONTIN) 300 MG capsule 9/19/2020 at am  No Yes   Sig: Take 1 capsule (300 mg) by mouth 3 times daily   loratadine (CLARITIN) 10 MG tablet More than a month at Unknown time  Yes No   Sig: Take 10 mg by mouth daily as needed    morphine (MS CONTIN) 15 MG CR tablet 9/19/2020 at 0530  Yes Yes   Sig: Take 15 mg by mouth 2 times daily maximum 2 tablet(s) per day   naloxone (NARCAN) 4 MG/0.1ML nasal spray   No No   Sig: Spray 1 spray (4 mg) into one nostril alternating nostrils once as needed for opioid reversal every 2-3 minutes until assistance arrives   ondansetron (ZOFRAN) 8 MG tablet Past Month at Unknown time  No Yes   Sig: Take 1 tablet (8 mg) by mouth every 8 hours as needed (Nausea/Vomiting)   oxyCODONE (ROXICODONE) 5 MG tablet 9/18/2020 at Unknown time  Yes No   Sig: Take 1-2 tablets (5-10 mg) by mouth every 4 hours as needed for pain   polyethylene glycol (MIRALAX/GLYCOLAX) packet 9/19/2020 at Unknown time  Yes Yes   Sig: Take 17 g by mouth daily    psyllium (METAMUCIL) 28.3 % POWD 9/19/2020 at Unknown time  Yes Yes   Sig: Take 0.5 Tablespoonful by mouth daily    tamsulosin (FLOMAX) 0.4  MG capsule 9/19/2020 at Unknown time  No Yes   Sig: Take 1 capsule (0.4 mg) by mouth daily      Facility-Administered Medications: None     Allergies   Allergies   Allergen Reactions     Demerol [Meperidine] Difficulty breathing       Social History   I have reviewed this patient's social history and updated it with pertinent information if needed. Lowell Arredondo  reports that he has never smoked. He has never used smokeless tobacco. He reports previous alcohol use of about 10.0 - 14.0 standard drinks of alcohol per week. He reports that he does not use drugs.    Family History   I have reviewed this patient's family history and updated it with pertinent information if needed.   Family History   Problem Relation Age of Onset     Prostate Cancer Father      Prostate Cancer Brother      Prostate Cancer Brother      Family history of addiction  None     Review of Systems   The 10 point Review of Systems is negative other than noted in the HPI or here.   Denies Bowel or bladder dysfunction    Physical Exam   Temp:  [97.4  F (36.3  C)-98.7  F (37.1  C)] 97.4  F (36.3  C)  Pulse:  [75-96] 78  Resp:  [14-20] 20  BP: ()/(53-63) 113/59  SpO2:  [92 %-98 %] 95 %  157 lbs 4.8 oz  GEN:  Alert, oriented x 3, appears comfortable, No apparent distress.  HEENT:  Normocephalic/atraumatic, no scleral icterus, no nasal discharge, mouth moist.  CV:  RRR, S1, S2; no murmurs or other irregularities noted.  +3 DP/PT pulses bilaterally; no edema bilateral lower extremeties.  RESP:  Clear to auscultation bilaterally without rales/rhonchi/wheezing/retractions.  Symmetric chest rise on inhalation noted.  Normal respiratory effort.  ABD:  Rounded, soft, non-tender/non-distended.  +BS  EXT:  Edema & pulses as noted above.  Color, moisture and sensation intact x 4.     M/S:   Tender to palpation feet.    SKIN:  Dry to touch, no exanthems noted in the visualized areas.    NEURO: Symmetric strength +5/5.  Sensation to touch intact all  extremities.   There is no area of allodynia or hyperesthesia.  PAIN BEHAVIOR: Cooperative  Psych:  Normal affect.  Calm, cooperative, conversant appropriately.       Data   Results for orders placed or performed during the hospital encounter of 09/19/20 (from the past 24 hour(s))   Hemoglobin   Result Value Ref Range    Hemoglobin 10.0 (L) 13.3 - 17.7 g/dL   Glucose   Result Value Ref Range    Glucose 105 (H) 70 - 99 mg/dL

## 2020-09-22 NOTE — PLAN OF CARE
Discharge Planner PT   Patient plan for discharge: per notes wife agreeable to TCU  Current status: bed mobility with Mod A sit-stand with bed higher and Min A gait to 7 feet with RW and 100% for each step of sequence. In chair at end of session alarm on and active able to demo use of call light.  Barriers to return to prior living situation: Stairs to enter and within home, limited tolerance for mobility at this time, confusion, heavy assist for transfers.   Recommendations for discharge: TCU per plan established by the PT.   Rationale for recommendations: Pt is not currently at baseline for mobility, and is unsafe to discharge home. With continued PT, both IP and after discharge, pt is likely to obtain mobility goals. Currently would recommend medical transport given need for more assist for mobility       Entered by: Precious Aparicio 09/22/2020 9:24 AM

## 2020-09-22 NOTE — PLAN OF CARE
OT- eval completed and treatment initiated. Patient  is a 80 year old male with a history of nonresectable metastatic pancreatic adenocarcinoma on chemotherapy and radiation, history of prostate cancer, and peripheral neuropathy who is admitted to the hospitalist service with right intertrochanteric hip fracture after mechanical fall. Pt is now POD 2 ORIF.    Discharge Planner OT   Patient plan for discharge: TCU  Current status: Patient in chair, fatigued, requesting to return to bed to rest. Mod Ax2, fww sit<>stand, with increased effort, slow pace and heavy reliance on fww, patient ambulated ~6-7 feet to EOB. Patient receptive to and performed to consistent cueing to ensure safe mobility. A of 2 to return to supine and reposition in bed as needed. Increased time addressing discontinue plan, what to expect from rehab and recommendations for transportation to facility as well as what spouse can pack for patient when at rehab. Patient requesting medical transport d/t increased effort, instability  and limited mobility status.   Barriers to return to prior living situation: Stairs to enter and within home, limited tolerance for mobility at this time, confusion, heavy assist for transfers, well below baseline for ADls/IADLs/functional mobility  Recommendations for discharge: TCU   Rationale for recommendations: Pt is not currently at baseline for ADls or functional mobility, and is unsafe to discharge home. Patient would benefit from TCU to advance ADls and mobility for safe return home with spouse A as needed as patient was I prior to admission and injury. Currently would recommend medical transport given need for more assist for mobility.        Entered by: Precious Shelton 09/22/2020 11:25 AM

## 2020-09-22 NOTE — PROGRESS NOTES
09/22/20 1048   Quick Adds   Type of Visit Initial Occupational Therapy Evaluation   Living Environment   Lives With spouse   Living Arrangements house   Home Accessibility stairs to enter home;stairs within home   Number of Stairs, Main Entrance 1   Number of Stairs, Within Home, Primary 7  (split level)   Transportation Anticipated family or friend will provide   Self-Care   Usual Activity Tolerance good   Current Activity Tolerance fair   Regular Exercise No   Equipment Currently Used at Home cane, straight   Activity/Exercise/Self-Care Comment Pt's spouse reports that the had just obtained a walker for use the day he fell.    Functional Level   Ambulation 1-->assistive equipment   Transferring 1-->assistive equipment   Toileting 0-->independent   Bathing 0-->independent   Dressing 0-->independent   Eating 0-->independent   Communication 0-->understands/communicates without difficulty   Swallowing 0-->swallows foods/liquids without difficulty   Cognition 0 - no cognition issues reported   Fall history within last six months yes   Number of times patient has fallen within last six months 1   Which of the above functional risks had a recent onset or change? ambulation;transferring;toileting;bathing;dressing;fall history   Prior Functional Level Comment Pt's spouse reports that the pt has been getting more and more unstable at home due to neuropathy in B feet. Pt had been using a cane with plans to progress to walker day of admission.    General Information   Onset of Illness/Injury or Date of Surgery - Date 09/19/20   Referring Physician Ivanna Granados PA-C   Patient/Family Goals Statement agreeable to rehab stay   Additional Occupational Profile Info/Pertinent History of Current Problem Patient  is a 80 year old male with a history of nonresectable metastatic pancreatic adenocarcinoma on chemotherapy and radiation, history of prostate cancer, and peripheral neuropathy who is admitted to the hospitalist service  with right intertrochanteric hip fracture after mechanical fall. Pt is now POD 2 ORIF   Precautions/Limitations fall precautions   Weight-Bearing Status - RLE weight-bearing as tolerated   General Observations Patient was in chiar, fatigued from sitting up, requested to return to bed.    Cognitive Status Examination   Orientation orientation to person, place and time   Level of Consciousness alert   Follows Commands (Cognition) WFL   Attention No deficits were identified   Cognitive Comment spouse reports cognition is close to baseline, much improved from previous day   Visual Perception   Visual Perception Wears glasses   Sensory Examination   Sensory Comments neuropathy in B feet   Pain Assessment   Patient Currently in Pain Yes, see Vital Sign flowsheet  (rating not provided, pain consult ordered)   Posture   Posture not impaired   Strength   Strength Comments general weakness noted   Hand Strength   Hand Strength Comments intact   Mobility   Bed Mobility Comments A of 2- defer to OT daily note for details   Transfer Skill: Bed to Chair/Chair to Bed   Level of [place]: Bed to Chair   (A of 2- defer to OT daily note for details)   Transfer Skill: Sit to Stand   Level of [place]: Sit/Stand moderate assist (50% patients effort)   Physical Assist/Nonphysical Assist: Sit/Stand 2 persons   Transfer Skill: Sit to Stand weight-bearing as tolerated   Assistive Device for Transfer: Sit/Stand rolling walker   Balance   Balance Comments decreased balance with mobility, A of 2 to maintain balance with functional mobility   Instrumental Activities of Daily Living (IADL)   IADL Comments spouse to A as needed   Activities of Daily Living Analysis   Impairments Contributing to Impaired Activities of Daily Living balance impaired;pain;ROM decreased;strength decreased   General Therapy Interventions   Planned Therapy Interventions ADL retraining;progressive activity/exercise;strengthening   Clinical Impression   Criteria  "for Skilled Therapeutic Interventions Met yes, treatment indicated   OT Diagnosis decreased ADls   Influenced by the following impairments balance impaired;pain;ROM decreased;strength decreased   Assessment of Occupational Performance 5 or more Performance Deficits   Identified Performance Deficits decreased ADLs/IADL-s dsg, toileting, bathing, functional/coimmunity mobility, driving, household chores, recreational activities   Clinical Decision Making (Complexity) Moderate complexity   Therapy Frequency 3x/week   Predicted Duration of Therapy Intervention (days/wks) 2-3 days   Anticipated Discharge Disposition Transitional Care Facility   Risks and Benefits of Treatment have been explained. Yes   Patient, Family & other staff in agreement with plan of care Yes   [facility] TM \"6 Clicks\"   2016, Trustees of [facility], under license to [employer].  All rights reserved.   6 Clicks Short Forms Daily Activity Inpatient Short Form   [facility]  \"6 Clicks\" Daily Activity Inpatient Short Form   1. Putting on and taking off regular lower body clothing? 2 - A Lot   2. Bathing (including washing, rinsing, drying)? 2 - A Lot   3. Toileting, which includes using toilet, bedpan or urinal? 2 - A Lot   4. Putting on and taking off regular upper body clothing? 3 - A Little   5. Taking care of personal grooming such as brushing teeth? 2 - A Lot   6. Eating meals? 4 - None   Daily Activity Raw Score (Score out of 24.Lower scores equate to lower levels of function) 15   Total Evaluation Time   Total Evaluation Time (Minutes) 10     "

## 2020-09-22 NOTE — PLAN OF CARE
Much more orientated this morning, able to answer questions and be safe alone in the room with bed alarm on.   Dressing dry. Numbness baseline to extremities.   Unable to lift right leg off bed per self. Up in chair with 1 assist, moderate assist. Painful with activity. Oxycodone sparingly. Tylenol.   Sitter discontinued at this time 0930- safety intact.   Food and lfuids well. Voiding hesitant.  BP hypotension. No IV access. Encouraged fluids orally. No edema. No cough. Alarms for safety.   1320 much more orientated today. Pain very tolerable at rest, increased pain with activity. 1-2 assist moderate assist, walker, GB for ambulation to go from bed to recliner. Up in recliner today. Right quad sore- swelling to right quad muscle area. Oxycodone for pain. Resting with eyes closed now- sleeping. Ate fair today. Drinks water well.   Plan is TCU per patient and wife.   1600 up in chair. Void x1 today. BM yes. 2 assist, walker, GB. Orientated. Mild forgetful. Cooperative.

## 2020-09-22 NOTE — PLAN OF CARE
Pt A/O x 2, disoriented to place and situation. VSS, afebrile. Tolerating regular diet. Up with Ax2, gait belt, and walker, unable to ambulate successfully d/t instability. Aquacel dressing CDI, Baseline neuropathy. Voiding in adequate amounts. Pain managed with Oxy 5mg and Tylenol 975mg. Plans d/c to TCU.

## 2020-09-22 NOTE — PLAN OF CARE
Pain well controlled with scheduled tylenol. Pt resting comfortbly between cares. Disoriented only to time- with forgetfullness. Dayana regular diet & voiding without difficulty. Baseline neuropathy otherwise cma intact. Drsg CD&I.

## 2020-09-22 NOTE — PROGRESS NOTES
SWS     D: Discharge planning continuing.. follow-up calls today with [facility] and [facility] noting of the chemo and radiation therapies that pt is receiving. Awaiting MD determination on whether these will be on hold or ongoing during pt's recovery.      I: SW spoke by phone with pt's wife, discussed above. She notes that the radiation therapy is at the [facility] location, and he goes to both the [facility] and [facility] locations for the chemo, next chemo would be planned for next Tuesday.     A/P: Continue planning as noted above, pending MD determination of chemo/radiation treatment, facility determinations.       .    HARINI Cardenas   Inpatient Care Coordination   [facility]     [phone]

## 2020-09-22 NOTE — PROGRESS NOTES
[facility]    Medicine Progress Note - Hospitalist Service       Date of Admission:  9/19/2020  Assessment & Plan       Lowell Arredondo is a 80 year old male with a history of nonresectable metastatic pancreatic adenocarcinoma on chemotherapy and radiation, history of prostate cancer, and peripheral neuropathy who is admitted to the hospitalist service with right intertrochanteric hip fracture after mechanical fall.     1.  Right femur fracture.  Status post repair by orthopedic surgery on 9/20/2020.  -Use incentive spirometry.    -Pain medications as needed.  -Work with therapy.     2.  Pancreatic and prostate cancers.  Oncology consult.     3.  Fever.  No obvious bacterial infection.  Did get preop cefazolin.  Not neutropenic.  Currently afebrile for greater than 36 hours.  -Continue to monitor.     4.  Chronic kidney disease.  Creatinine at baseline.  Avoid nephrotoxins as able.     5.  Peripheral neuropathy.  Continue gabapentin.     6.  Acute encephalopathy.  Developed on 9/21.  Symptoms significantly improved on 9/22.   Seems back to baseline at this point.  -Minimize opiates as possible.  -Continue scheduled melatonin 3 mg at bedtime.    -Have haloperidol 0.5 mg IV available every 6 hours as needed.  -Bedside sitter discontinued.       Diet: Regular Diet Adult  Advance Diet as Tolerated    DVT Prophylaxis: ASA  Rosales Catheter: not present  Code Status: DNR/DNI         Disposition Plan   Expected discharge: Tomorrow, recommended to transitional care unit     David Garcia,   Hospitalist Service  [facility]    ______________________________________________________________________    Interval History   Having right leg pain.  Denies chest pain, shortness of breath, fevers, chills, nausea, or vomiting.    Data reviewed today: I reviewed all medications, new labs and imaging results over the last 24 hours.     Physical Exam   Vital Signs: Temp: 98.1  F (36.7  C) Temp src:  Temporal BP: 109/56 Pulse: 80   Resp: 16 SpO2: 98 % O2 Device: None (Room air)    Weight: 157 lbs 4.8 oz  Gen:  NAD, A&Ox3.  Eyes:  PERRL, sclera anicteric.  OP:  MMM, no lesions.  Neck:  Supple.  CV:  Regular, no murmurs.  Lung:  CTA b/l, normal effort.  Ab:  +BS, soft.  Skin:  Warm, dry to touch.  No rash.  Ext:  No pitting edema LE b/l.      Data   Recent Labs   Lab 09/22/20  0616 09/21/20  0726 09/20/20  0707 09/19/20  1323   WBC  --  4.1 5.3 6.1   HGB 10.0* 9.8* 10.5* 12.6*   MCV  --  93 92 95   PLT  --  114* 103* 116*   INR  --   --  1.25* 1.16*   NA  --  135 134 134   POTASSIUM  --  4.4 4.2 4.3   CHLORIDE  --  104 102 100   CO2  --  26 26 28   BUN  --  18 15 16   CR  --  1.02 1.04 1.00   ANIONGAP  --  5 6 6   VICENTE  --  8.6 8.5 8.7   * 134* 106* 118*

## 2020-09-22 NOTE — PROGRESS NOTES
Orthopedic Surgery  Lowell Arredondo  2020  Admit Date:  2020  POD: 2 Days Post-Op   Procedure(s):  Short cephalomedullary nail right femur    Patient resting comfortably in chair.    Pain controlled.  Tolerating oral intake.    Denies nausea or vomiting  Denies chest pain or shortness of breath.    Vital Sign Ranges  Temperature Temp  Av.2  F (36.8  C)  Min: 97.8  F (36.6  C)  Max: 98.7  F (37.1  C)   Blood pressure Systolic (24hrs), Av , Min:91 , Max:116        Diastolic (24hrs), Av, Min:53, Max:63      Pulse Pulse  Av.8  Min: 75  Max: 96   Respirations Resp  Av.3  Min: 14  Max: 20   Pulse oximetry SpO2  Av.2 %  Min: 92 %  Max: 94 %       Dressing is clean, dry, and intact.   Minimal erythema of the surrounding skin.   Bilateral calves are soft, non-tender.  Right  lower extremity is NVI.  Sensation intact bilateral lower extremities  Patient able to resist dorsi and plantar flexion bilaterally  +Dp pulse    Labs:  Recent Labs   Lab Test 20  0726 20  0707 20  1323   POTASSIUM 4.4 4.2 4.3     Recent Labs   Lab Test 20  0616 20  0726 20  0707   HGB 10.0* 9.8* 10.5*     Recent Labs   Lab Test 20  0707 20  1323   INR 1.25* 1.16*     Recent Labs   Lab Test 20  0726 20  0707 20  1323   * 103* 116*       1. PLAN:   Continue ASA for DVT prophylaxis.     Mobilize with PT/OT    WBAT Right LE with walker.     Continue current pain regiment.   Dressings: Keep intact.  Change if >60% saturated    2. Disposition   Anticipate d/c to TCU when medically cleared and progressing in PT.  Ok from ortho standpoint.     Destiny Deras PA-C

## 2020-09-22 NOTE — PROGRESS NOTES
"SPIRITUAL HEALTH SERVICES Progress Note  [facility]    Saw pt Lowell Arredondo per staff referral requesting emotional support for pt.  His spouse Billie was present.  Offered reflective conversation to facilitate the processing of thoughts and feelings.  Lowell and Billie engaged in conversation and shared the following.      Illness Narrative - Lowell shared that he has been undergoing treatment for pancreatic cancer since last November.  He recently became \"light headed,\" fell down some stairs, and broke his right hip.  Lowell expects to discharge tomorrow for a TCU.      Distress - Lowell denied have any concerns but acknowledged feeling \"down\" about continuing chemo and living with the side-effects; he reflected no further about it.      Coping -   o Lowell named Billie and their six children as being core to his support network.  They are a blended family since their marriage 35 years ago.  o Billie also named their grandchildren, friends, neighbors, and their Quaker at [facility].  Billie reported that their Quaker  is available for support; however, Lowell shared that Holiness does not play an important role in his life.  o Billie reported that she is coping with Lowell's cancer treatment, shared that she recently underwent treatment for lymphoma (now in remission), and described herself as \"a strong woman.\"  o Lowell enjoys fixing things around the house, yard work, woodworking, and painting.  Billie showed this author photos of his woodworking and painting.  o Bradley and Billie winter in [place].      Plan - Informed them on how they can request further  support.  This author and other chaplains remain available per pt/family request.    Sushant Coleman M.Div., [facility]  Staff   Phone [phone]  "

## 2020-09-22 NOTE — PROGRESS NOTES
Writer received a note that patient is inpatient for right hip fracture and will be discharged to a TCU for rehab. Treatment , CT , and office visit with Dr. Segura to be cancelled for next week and to be rescheduled for when he discharged from the TCU.    Writer called and spoke with patient's significant other, Melvi, and reviewed that patient's appointments will be cancelled until patient completes rehab.    Melvi, verbalized understanding.    Kathe Coles RN

## 2020-09-22 NOTE — PROGRESS NOTES
Oncology:     Chart check: Metastatic pancreatic cancer, pt of Dr Segura's.  Completed 4 cycles of FOLFOX, most recently on 89/8/2020.  Admitted for intratrochanteric fracture.  He will likely need rehab once discharged, but we will work on follow-up with Dr. Segrua and currently scheduled on 9/29.  No new oncology recommendations.  We will sign off, but please call us with any questions.    Addendum 2:25 PM: Spoke to Bev .  Patient still has radiation treatments to complete and may not be covered when discharged to rehab.  It would be beneficial to obtain radiation oncology input as he has completed 5/10 fractions.  Radiation is for palliative reasons, so may be more beneficial for him to complete his Ortho rehab and then complete radiation.  Discussed with Dr. Mcleod.    Addendum 3:07 PM: Spoke to Dr. Segura and his nurse at [facility] cancer [facility].  We will cancel his chemotherapy and follow-up appt with Dr. Segura next week.  Dr. Segura will plan on seeing him once he is discharged from the rehab center.      Lianna Vyas PA-C  Hematology/Oncology  [facility]

## 2020-09-23 ENCOUNTER — APPOINTMENT (OUTPATIENT)
Dept: PHYSICAL THERAPY | Facility: CLINIC | Age: 80
DRG: 481 | End: 2020-09-23
Payer: COMMERCIAL

## 2020-09-23 VITALS
HEART RATE: 81 BPM | DIASTOLIC BLOOD PRESSURE: 54 MMHG | SYSTOLIC BLOOD PRESSURE: 103 MMHG | BODY MASS INDEX: 23.22 KG/M2 | TEMPERATURE: 98.3 F | WEIGHT: 157.3 LBS | RESPIRATION RATE: 14 BRPM | OXYGEN SATURATION: 96 %

## 2020-09-23 PROCEDURE — 97116 GAIT TRAINING THERAPY: CPT | Mod: GP

## 2020-09-23 PROCEDURE — 25000132 ZZH RX MED GY IP 250 OP 250 PS 637: Performed by: NURSE PRACTITIONER

## 2020-09-23 PROCEDURE — 97530 THERAPEUTIC ACTIVITIES: CPT | Mod: GP

## 2020-09-23 PROCEDURE — 99231 SBSQ HOSP IP/OBS SF/LOW 25: CPT | Performed by: INTERNAL MEDICINE

## 2020-09-23 PROCEDURE — 25000132 ZZH RX MED GY IP 250 OP 250 PS 637: Performed by: PHYSICIAN ASSISTANT

## 2020-09-23 PROCEDURE — 25000132 ZZH RX MED GY IP 250 OP 250 PS 637: Performed by: INTERNAL MEDICINE

## 2020-09-23 PROCEDURE — 90662 IIV NO PRSV INCREASED AG IM: CPT | Performed by: INTERNAL MEDICINE

## 2020-09-23 PROCEDURE — 25000128 H RX IP 250 OP 636: Performed by: INTERNAL MEDICINE

## 2020-09-23 RX ORDER — METHOCARBAMOL 500 MG/1
500 TABLET, FILM COATED ORAL 3 TIMES DAILY PRN
Status: DISCONTINUED | OUTPATIENT
Start: 2020-09-23 | End: 2020-09-23 | Stop reason: HOSPADM

## 2020-09-23 RX ADMIN — OXYCODONE HYDROCHLORIDE 5 MG: 5 TABLET ORAL at 13:59

## 2020-09-23 RX ADMIN — POLYETHYLENE GLYCOL 3350 17 G: 17 POWDER, FOR SOLUTION ORAL at 08:18

## 2020-09-23 RX ADMIN — ACETAMINOPHEN 650 MG: 325 TABLET, FILM COATED ORAL at 04:41

## 2020-09-23 RX ADMIN — INFLUENZA A VIRUS A/MICHIGAN/45/2015 X-275 (H1N1) ANTIGEN (FORMALDEHYDE INACTIVATED), INFLUENZA A VIRUS A/SINGAPORE/INFIMH-16-0019/2016 IVR-186 (H3N2) ANTIGEN (FORMALDEHYDE INACTIVATED), INFLUENZA B VIRUS B/PHUKET/3073/2013 ANTIGEN (FORMALDEHYDE INACTIVATED), AND INFLUENZA B VIRUS B/MARYLAND/15/2016 BX-69A ANTIGEN (FORMALDEHYDE INACTIVATED) 0.7 ML: 60; 60; 60; 60 INJECTION, SUSPENSION INTRAMUSCULAR at 11:12

## 2020-09-23 RX ADMIN — TAMSULOSIN HYDROCHLORIDE 0.4 MG: 0.4 CAPSULE ORAL at 08:17

## 2020-09-23 RX ADMIN — ASPIRIN 325 MG: 325 TABLET, COATED ORAL at 08:17

## 2020-09-23 RX ADMIN — DICLOFENAC SODIUM 2 G: 10 GEL TOPICAL at 08:17

## 2020-09-23 RX ADMIN — DICLOFENAC SODIUM 2 G: 10 GEL TOPICAL at 14:01

## 2020-09-23 RX ADMIN — ACETAMINOPHEN 650 MG: 325 TABLET, FILM COATED ORAL at 17:09

## 2020-09-23 RX ADMIN — GABAPENTIN 300 MG: 300 CAPSULE ORAL at 13:59

## 2020-09-23 RX ADMIN — OXYCODONE HYDROCHLORIDE 10 MG: 5 TABLET ORAL at 17:09

## 2020-09-23 RX ADMIN — GABAPENTIN 300 MG: 300 CAPSULE ORAL at 17:10

## 2020-09-23 RX ADMIN — DOCUSATE SODIUM AND SENNOSIDES 1 TABLET: 8.6; 5 TABLET ORAL at 08:16

## 2020-09-23 RX ADMIN — OXYCODONE HYDROCHLORIDE 5 MG: 5 TABLET ORAL at 08:17

## 2020-09-23 RX ADMIN — OXYCODONE HYDROCHLORIDE 5 MG: 5 TABLET ORAL at 01:03

## 2020-09-23 RX ADMIN — OXYCODONE HYDROCHLORIDE 5 MG: 5 TABLET ORAL at 04:41

## 2020-09-23 RX ADMIN — ACETAMINOPHEN 650 MG: 325 TABLET, FILM COATED ORAL at 08:19

## 2020-09-23 RX ADMIN — GABAPENTIN 300 MG: 300 CAPSULE ORAL at 08:16

## 2020-09-23 RX ADMIN — GABAPENTIN 300 MG: 300 CAPSULE ORAL at 04:48

## 2020-09-23 RX ADMIN — ACETAMINOPHEN 650 MG: 325 TABLET, FILM COATED ORAL at 13:58

## 2020-09-23 ASSESSMENT — ACTIVITIES OF DAILY LIVING (ADL)
ADLS_ACUITY_SCORE: 17

## 2020-09-23 NOTE — PLAN OF CARE
A & O x 4 pleasant. Up with assist of 1-2, gait belt and walker. Dressings to right hip c,d and I.  Pain to right hip, medicated with oxy, gabapentin and tylenol. CMS intact to right leg. History of neuropathy to feet and some to hands. On RA. Plan to discontinue to [facility] TCU later today.

## 2020-09-23 NOTE — DISCHARGE SUMMARY
[facility]  Hospitalist Discharge Summary      Date of Admission:  9/19/2020  Date of Discharge:  9/23/2020  Discharging Provider: David Garcia DO      Discharge Diagnoses   1.  Right femur fracture.  2.  Pancreatic cancer.  3.  Peripheral neuropathy.  4.  Chronic kidney disease stage II.  5.  Acute encephalopathy.      Follow-ups Needed After Discharge   Follow-up Appointments     Follow Up and recommended labs and tests      Your first clinic appointment with Orthopedics will be with Ivanna Granados PA-C, approximately 2 weeks after surgery. Appointment locations   include [facility] in [place] or [place]. Call Dolly at   [phone] to set up your appointment.         Follow Up and recommended labs and tests      Follow up with primary care provider in 7 days.  The following labs/tests   are recommended: BMP in 7 days.             Discharge Disposition   Discharged to rehabilitation facility  Condition at discharge: Stable      Hospital Course   Lowell Arredondo is a 80 year old male with a history of nonresectable metastatic pancreatic adenocarcinoma on chemotherapy and radiation, history of prostate cancer, and peripheral neuropathy who is admitted to the hospitalist service with right intertrochanteric hip fracture after mechanical fall.  He underwent repair of his femur fracture by orthopedic surgery on 9/22.  He did develop acute encephalopathy on 9/21.  This was suspected to be due to medications.  He had previously been on MS Contin.  MS Contin was stopped.  Opiates were minimized as possible.  Encephalopathy has completely resolved by time of discharge.  He had previously been on chemotherapy and radiation.  These are currently going to be on hold while he recovers from his femur fracture and surgery.  He will need to continue following up with oncology in the outpatient setting as scheduled.    Consultations This Hospital Stay   SOCIAL WORK IP CONSULT  ORTHOPEDIC  SURGERY IP CONSULT  PHYSICAL THERAPY ADULT IP CONSULT  OCCUPATIONAL THERAPY ADULT IP CONSULT  HEMATOLOGY & ONCOLOGY IP CONSULT  OCCUPATIONAL THERAPY ADULT IP CONSULT  PHYSICAL THERAPY ADULT IP CONSULT  PHYSICAL THERAPY ADULT IP CONSULT  OCCUPATIONAL THERAPY ADULT IP CONSULT  SPIRITUAL HEALTH SERVICES IP CONSULT  PAIN MANAGEMENT ADULT IP CONSULT    Code Status   No CPR- Do NOT Intubate    Time Spent on this Encounter   I spent 40 minutes with Mr. Arredondo and working on discharge on 9/23/2020.       David Garcia, DO  [facility]  ______________________________________________________________________    Physical Exam   Vital Signs: Temp: 98  F (36.7  C) Temp src: Temporal BP: 97/54 Pulse: 80   Resp: 16 SpO2: 95 % O2 Device: None (Room air)    Weight: 157 lbs 4.8 oz  Gen:  NAD, A&Ox3.  Eyes:  PERRL, sclera anicteric.  OP:  MMM, no lesions.  Neck:  Supple.  CV:  Regular, no murmurs.  Lung:  CTA b/l, normal effort.  Ab:  +BS, soft.  Skin:  Warm, dry to touch.  No rash.  Ext:  No pitting edema LE b/l.         Primary Care Physician   Diego Segura    Discharge Orders      General info for SNF    Length of Stay Estimate: Short Term Care: Estimated # of Days <30  Condition at Discharge: Stable  Level of care:skilled   Rehabilitation Potential: Fair  Admission H&P remains valid and up-to-date: Yes  Use Nursing Home Standing Orders: Yes     Mantoux instructions    Give two-step Mantoux (PPD) Per Facility Policy Yes     Reason for your hospital stay    Right hip fracture     Wound care (specify)    Site:   Right hip  Instructions:  Keep dressing clean, dry and intact for 5 days after surgery. OK to remove and replace as desired after that time. OK to shower once dressing has been removed, but no soaking or scrubbing the incision.     Follow Up and recommended labs and tests    Your first clinic appointment with Orthopedics will be with Ivanna Granados PA-C, approximately 2 weeks after surgery. Appointment  "locations include [facility] in [place] or [place]. Call Dolly at [phone] to set up your appointment.     Activity - Up with assistive device     Weight bearing status    WBAT RLE     Reason for your hospital stay    Femur fracture     Follow Up and recommended labs and tests    Follow up with primary care provider in 7 days.  The following labs/tests are recommended: BMP in 7 days.     Physical Therapy Adult Consult    Evaluate and treat as clinically indicated.    Reason:  Right hip fracture s/p fixation. WBAT. ROMAT. Gait training. Mobilization with assistive devices.     Occupational Therapy Adult Consult    Evaluate and treat as clinically indicated.    Reason:  Right hip fracture s/p surgical fixation     Fall precautions     Advance Diet as Tolerated    Follow this diet upon discharge: Orders Placed This Encounter      Regular Diet Adult     Advance Diet as Tolerated    Follow this diet upon discharge: Regular           Discharge Medications   Current Discharge Medication List      START taking these medications    Details   aspirin (ASA) 325 MG EC tablet Take 1 tablet (325 mg) by mouth daily for 28 days  Qty: 28 tablet, Refills: 0    Associated Diagnoses: Closed displaced intertrochanteric fracture of right femur, initial encounter (H)         CONTINUE these medications which have CHANGED    Details   acetaminophen (TYLENOL) 500 MG tablet Take 2 tablets (1,000 mg) by mouth 3 times daily  Qty: 100 tablet, Refills: 1    Associated Diagnoses: Closed displaced intertrochanteric fracture of right femur, initial encounter (H)      oxyCODONE (ROXICODONE) 5 MG tablet Take 1-2 tablets (5-10 mg) by mouth every 4 hours as needed for pain or moderate to severe pain 1 tab po q 4 hrs prn pain scale \"1-5\"  2 tabs po q 4 hrs prn pain scale \"6-10\"  Qty: 30 tablet, Refills: 0    Associated Diagnoses: 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); Cancer associated pain         CONTINUE these medications " which have NOT CHANGED    Details   gabapentin (NEURONTIN) 300 MG capsule Take 1 capsule (300 mg) by mouth 3 times daily  Qty: 90 capsule, Refills: 3    Associated Diagnoses: Polyneuropathy following chemotherapy (H)      ondansetron (ZOFRAN) 8 MG tablet Take 1 tablet (8 mg) by mouth every 8 hours as needed (Nausea/Vomiting)  Qty: 10 tablet, Refills: 2    Associated Diagnoses: 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)      polyethylene glycol (MIRALAX/GLYCOLAX) packet Take 17 g by mouth daily       psyllium (METAMUCIL) 28.3 % POWD Take 0.5 Tablespoonful by mouth daily       tamsulosin (FLOMAX) 0.4 MG capsule Take 1 capsule (0.4 mg) by mouth daily  Qty: 90 capsule, Refills: 3    Associated Diagnoses: Benign prostatic hyperplasia with lower urinary tract symptoms, symptom details unspecified      loratadine (CLARITIN) 10 MG tablet Take 10 mg by mouth daily as needed          STOP taking these medications       LORazepam (ATIVAN) 0.5 MG tablet Comments:   Reason for Stopping:         morphine (MS CONTIN) 15 MG CR tablet Comments:   Reason for Stopping:         naloxone (NARCAN) 4 MG/0.1ML nasal spray Comments:   Reason for Stopping:             Allergies   Allergies   Allergen Reactions     Demerol [Meperidine] Difficulty breathing

## 2020-09-23 NOTE — PROGRESS NOTES
"Orthopedic Surgery  Lowell Arredondo  2020  Admit Date:  2020  POD: 3 Days Post-Op   Procedure(s):  Short cephalomedullary nail right femur    Patient resting comfortably in chair.    Pain controlled.  Reports shooting leg pain from his \"feet up\" overnight.    Tolerating oral intake.    Denies nausea or vomiting  Denies chest pain or shortness of breath    Vital Sign Ranges  Temperature Temp  Av.9  F (36.6  C)  Min: 97.4  F (36.3  C)  Max: 98.2  F (36.8  C)   Blood pressure Systolic (24hrs), Av , Min:90 , Max:113        Diastolic (24hrs), Av, Min:40, Max:59      Pulse Pulse  Av.3  Min: 68  Max: 80   Respirations Resp  Av.3  Min: 16  Max: 20   Pulse oximetry SpO2  Av %  Min: 95 %  Max: 95 %       Dressing is clean, dry, and intact.   Minimal erythema of the surrounding skin.   Bilateral calves are soft, non-tender.  Right lower extremity is NVI.  Sensation intact bilateral lower extremities  Patient able to resist dorsi and plantar flexion bilaterally  +Dp pulse    Labs:  Recent Labs   Lab Test 20  0726 20  0707 20  1323   POTASSIUM 4.4 4.2 4.3     Recent Labs   Lab Test 20  0616 20  0726 20  0707   HGB 10.0* 9.8* 10.5*     Recent Labs   Lab Test 20  0707 20  1323   INR 1.25* 1.16*     Recent Labs   Lab Test 20  0726 20  0707 20  1323   * 103* 116*     1. Plan:              Continue ASA for DVT prophylaxis.                Mobilize with PT/OT               WBAT Right LE with walker.                Continue current pain regiment.              Dressings: Keep intact.  Change if >60% saturated     2. Disposition              Anticipate d/c to TCU when medically cleared and progressing in PT.  Ok from ortho standpoint.     Destiny Deras PA-C    "

## 2020-09-23 NOTE — PLAN OF CARE
Up with Ax2. Bedrest overnight using urinal, appropriately output. Pain managed with PRN oxy 5 x1, PRN gabapentin, voltaren gel, lido patch and scheduled Tylenol. Tolerating a regular diet. Dressing is clean, dry, and intact. Plans to discharge to TCU.

## 2020-09-23 NOTE — PLAN OF CARE
Discharge Planner PT   Patient plan for discharge: TCU  Current status: Performs supine and seated exercises this session; encouraged self AROM throughout the day. Supine>sit with Anali, increased time and HOB slightly elevated. Sit>stand with Anali, cues for safe hand transition. Mild posterior lean initially in standing. Pt ambulates 18' with FWW, CGA/Anali with wheelchair follow. Pt cued for posture, step-to gait to increase tolerance and increased UE use when stepping on surgical extremity. Pt needs standing rest after after each step. Max fatigue with this distance.   Barriers to return to prior living situation: Stairs to enter and within home, limited tolerance for mobility at this time, not ambulating functional distances.   Recommendations for discharge: TCU  Rationale for recommendations: Pt is not currently at baseline for mobility, and is unsafe to discharge home. With continued PT, both IP and after discharge, pt is likely to obtain mobility goals. Currently would recommend medical transport given levell of assist needed for mobility.       Entered by: Kelly Muhammad 09/23/2020 10:44 AM

## 2020-09-23 NOTE — PROGRESS NOTES
SWS    D: Discharge planning continuing.. per discussion with MD pt will be ready for discharge today. [facility] has been notified, able to accept pt today.     I: Met with pt and spoke by phone with wife, discussed above. They have asked that planning continue for pt's transfer to [facility], [facility] has [facility] requested[facility] SW has discussed with wife that the transport is not covered by Medicare, private pay cost of $75/base and $5/mi which has been accepted. Additional questions of wife addressed.    A/P: Anticipate no problem with arrangements made for transfer today, with discharge no further SWS.              Bev Machado, HARINI   Inpatient Care Coordination   [facility]     [phone]

## 2020-09-23 NOTE — PROGRESS NOTES
Your information has been submitted on September 23rd, 2020 at 02:04:08 PM CDT. The confirmation number is [id number]

## 2020-09-24 ENCOUNTER — PATIENT OUTREACH (OUTPATIENT)
Dept: ONCOLOGY | Facility: CLINIC | Age: 80
End: 2020-09-24

## 2020-09-24 ASSESSMENT — MIFFLIN-ST. JEOR
SCORE: 1363.97
SCORE: 1363.97

## 2020-09-24 NOTE — PROGRESS NOTES
Tres, RN from [facility] called in to clinic reporting Lowell is currently a patient there and he is scheduled to come to [facility] infusion lab for a lab draw on Oct 1st.     He is wondering if Dr. Segura would like labs drawn at the TCU, since the patient will most likely still be there next week.     Tres can be called back at [phone]. Or you can fax lab orders to [phone], attn: Tres. Please add comment to send lab results back to _______ (include fax number) on cover sheet.      Per chart review, Lowell is only scheduled for pump disconnect on 10/1.     Writer will route to the [facility] cancer RN pool for further follow-up.     Cate Florentino, RN, BSN, INESSA  RN Care Coordinator  [facility]  [phone]

## 2020-09-24 NOTE — PROGRESS NOTES
Physical Therapy Discharge Summary    Reason for therapy discharge:    Discharged to transitional care facility.    Progress towards therapy goal(s). See goals on Care Plan in [facility] electronic health record for goal details.  Goals not met.  Barriers to achieving goals:   discharge from facility.    Therapy recommendation(s):    TCU was recommended.      **Pt not seen by discharging therapist on this date, note written based on previous treating therapist's notes and recommendations.

## 2020-09-24 NOTE — PLAN OF CARE
Occupational Therapy Discharge Summary    Reason for therapy discharge:    Discharged to transitional care facility.    Progress towards therapy goal(s). See goals on Care Plan in [facility] electronic health record for goal details.  Goals not met.  Barriers to achieving goals:   discharge from facility.    Therapy recommendation(s):    Continued therapy is recommended.  Rationale/Recommendations:  OT eval and treat at TCU .      **Pt not seen by discharging therapist on this date, note written based on previous treating therapist's notes and recommendations

## 2020-09-24 NOTE — PROGRESS NOTES
[facility]  PRIMARY CARE PROVIDER AND CLINIC:  Diego Segura MD, [facility] [address] / JANETH THOMASON*  Chief Complaint   Patient presents with     Hospital F/U     [place] Medical Record Number:  [id number]  Place of Service where encounter took place:  [facility] (S) [[id number]]    Lowell Arredondo  is a 80 year old  (1940), admitted to the above facility from  [facility]. Hospital stay 9/19/20 through 9/23/20..  Admitted to this facility for  rehab, medical management and nursing care.    HPI:    HPI information obtained from: facility chart records, facility staff, patient report and [facility] chart review.   Brief Summary of Hospital Course: recent hospitalization due to fall with head strike.  PMH includes pancreatic cancer- currently being treated with chemotherapy and radiation, prostate cancer, peripheral neuropathy. Work up in ED revealed intertrochanteric fracture of proximal right femur. In addition CT of cervical spine showed multilevel degenerative disc and facet disease at C5-C6. Orthopedics was consulted. Patient underwent an IM nailing of right femur on 9/20. Post operative complications include fever, ABLA (12.6> 9.8) and confusion.   Updates on Status Since Skilled nursing Admission: nursing reports low blood pressures. Patient reports some pain. He did not walk with therapy today due to orthostatic drop in blood pressure. He did not have light headedness with drop in BP.     CODE STATUS/ADVANCE DIRECTIVES DISCUSSION:   No CPR- Do NOT Intubate  Patient's living condition: lives with spouse in house with stairs  ALLERGIES: Demerol [meperidine]  PAST MEDICAL HISTORY:  has a past medical history of Basal cell carcinoma of skin (9/19/2013), BPH (benign prostatic hyperplasia) (5/2/2013), Cancer (H), 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) (10/10/2019), Peripheral neuropathy, and Prostate cancer (H) (6/7/2016). He also has no  past medical history of Asymptomatic human immunodeficiency virus (HIV) infection status (H), Blood in semen, Cerebral palsy (H), Complication of anesthesia, Congenital renal agenesis and dysgenesis, Epididymitis, bilateral, Epididymitis, left, Epididymitis, right, Goiter, Gout, Hernia, abdominal, History of spinal cord injury, History of thrombophlebitis, Horseshoe kidney, Hydrocephalus (H), Malignant hyperthermia, Mumps, Orchitis, epididymitis, and epididymo-orchitis, with abscess, Palpitations, Parkinsons disease (H), Penile discharge, Prostate infection, Spider veins, Spina bifida (H), STD (sexually transmitted disease), Swelling of testicle, Tethered cord (H), or Tuberculosis.  PAST SURGICAL HISTORY:   has a past surgical history that includes Foot surgery (Left, 1992); Prostate surgery; IR Chest Port Placement > 5 Yrs of Age (11/1/2019); hernia repair; Laparoscopy diagnostic (general) (N/A, 5/12/2020); and Open reduction internal fixation rodding intramedullar femur fracture table (Right, 9/20/2020).  FAMILY HISTORY: family history includes Prostate Cancer in his brother, brother, and father.  SOCIAL HISTORY:   reports that he has never smoked. He has never used smokeless tobacco. He reports previous alcohol use of about 10.0 - 14.0 standard drinks of alcohol per week. He reports that he does not use drugs.    Post Discharge Medication Reconciliation Status: discharge medications reconciled, continue medications without change    Current Outpatient Medications   Medication Sig Dispense Refill     acetaminophen (TYLENOL) 500 MG tablet Take 2 tablets (1,000 mg) by mouth 3 times daily 100 tablet 1     aspirin (ASA) 325 MG EC tablet Take 1 tablet (325 mg) by mouth daily for 28 days 28 tablet 0     gabapentin (NEURONTIN) 300 MG capsule Take 1 capsule (300 mg) by mouth 3 times daily 90 capsule 3     loratadine (CLARITIN) 10 MG tablet Take 10 mg by mouth daily as needed        Nutritional Supplement LIQD Mighty Shake  "Plus one time a day for poor appetite, wt loss; 4 oz. (1 carton) vanilla flavor qd. Record % consumed.       ondansetron (ZOFRAN) 8 MG tablet Take 1 tablet (8 mg) by mouth every 8 hours as needed (Nausea/Vomiting) 10 tablet 2     oxyCODONE (ROXICODONE) 5 MG tablet Take 1-2 tablets (5-10 mg) by mouth every 4 hours as needed for pain or moderate to severe pain 1 tab po q 4 hrs prn pain scale \"1-5\"  2 tabs po q 4 hrs prn pain scale \"6-10\" 30 tablet 0     polyethylene glycol (MIRALAX/GLYCOLAX) packet Take 17 g by mouth daily        tamsulosin (FLOMAX) 0.4 MG capsule Take 1 capsule (0.4 mg) by mouth daily 90 capsule 3     psyllium (METAMUCIL) 28.3 % POWD Take 0.5 Tablespoonful by mouth daily            ROS:  10 point ROS of systems including Constitutional, Eyes, Respiratory, Cardiovascular, Gastroenterology, Genitourinary, Integumentary, Musculoskeletal, Psychiatric were all negative except for pertinent positives noted in my HPI.    Vitals:  /64   Pulse 87   Temp 96.6  F (35.9  C)   Resp 16   Ht 1.727 m (5' 8\")   Wt 68.9 kg (152 lb)   SpO2 96%   BMI 23.11 kg/m    Exam: limited due to covid precautions  GENERAL APPEARANCE:  Alert, in no distress  ENT:  Mouth and posterior oropharynx normal, moist mucous membranes  EYES:  EOM, conjunctivae, lids, pupils and irises normal  RESP:  respiratory effort t normal  CV:  no edema    M/S:   Gait and station not observed  Digits and nails normal  SKIN:  Inspection of skin and subcutaneous tissue baseline,right hip incision is covered. No drainage  NEURO:   Cranial nerves 2-12 are normal tested and grossly at patient's baseline,   PSYCH:  oriented X 3, affect and mood normal    Lab/Diagnostic data:  CBC RESULTS:   Recent Labs   Lab Test 09/22/20  0616 09/21/20  0726 09/20/20  0707   WBC  --  4.1 5.3   RBC  --  3.17* 3.44*   HGB 10.0* 9.8* 10.5*   HCT  --  29.4* 31.7*   MCV  --  93 92   MCH  --  30.9 30.5   MCHC  --  33.3 33.1   RDW  --  14.0 14.4   PLT  --  114* 103* "       Last Basic Metabolic Panel:  Recent Labs   Lab Test 09/22/20  0616 09/21/20  0726 09/20/20  0707   NA  --  135 134   POTASSIUM  --  4.4 4.2   CHLORIDE  --  104 102   VICENTE  --  8.6 8.5   CO2  --  26 26   BUN  --  18 15   CR  --  1.02 1.04   * 134* 106*       Liver Function Studies -   Recent Labs   Lab Test 09/07/20  1439 08/24/20  1017   PROTTOTAL 7.1 7.1   ALBUMIN 3.4 3.4   BILITOTAL 0.5 0.8   ALKPHOS 69 71   AST 23 29   ALT 24 27       TSH   Date Value Ref Range Status   10/03/2019 1.62 0.40 - 4.00 mU/L Final   ]    No results found for: A1C        ASSESSMENT/PLAN:  Closed fracture of neck of right femur with delayed healing, subsequent encounter  Recent hospitalization due to fall and right hip fracture. He underwent an uneventful IM nailing on 9/20. Post op complications included ABLA, confusion. He continues to have some hip pain. He is taking oxycodone once a day  -WBAT  -physical therapy   -DVT prophylaxis  ASA 325mg q day for 28d  -keep dressing in place for 5 days  -follow up with ortho in 2 weeks  -pain management with oxycodone 5-10mg q 4 h prn  -continue acetaminophen 1000mg TID    Malignant neoplasm of pancreas, unspecified location of malignancy (H)  Patient is currently undergoing treatment with radiation. He is  Followed by Dr Segura. He was taking MS contin 15mg q hs at home. During hospitalization palliative care recommended changing meds to oxycodone due to acute pain in right hip.  -follow up with Dr Segura once discharged from TCU.     Idiopathic peripheral neuropathy  likely due to chemotherapy and or due to degenerative disc disease  -continue gabapentin 300mg TID    Orthostatic hypotension  Nursing reports BPs 85/58, 86/64. Therapy reports sitting 91/61 and standing 67/40.  Discussed with patient and nursing. Patient will try to drink 300cc of water or gatorade every hour while awake.   -hold tamsulosin  -BMP hgb in am  H/O prostate cancer  Takes tamsulosin daily  -hold tamsulosin  due to low BPs    Physical deconditioning  Due to recent hip fracture.lives with wife in split level home. He is anxious to get home. He did not walk with therapy today due to low BPS.   -physical therapy and OCCUPATIONAL THERAPY       Orders written by provider at facility  WBAT  Water or gatorade 300cc q hour while awake  BMP, hgb 9/28  Hold tamsuolsin    Total time spent with patient visit at the skilled nursing facility was 35 min including patient visit and review of past records. Greater than 50% of total time spent with counseling and coordinating care due to recent hip fracture complicated by pancreatic cancer. Now with low BPs. We did discuss IV fluids but he is going to try to drink more fluids. We are also going to hold tamsulosin due to side effects. The ortho static hypotension will impair his rehabilitation and increase his fall risk     Electronically signed by:  YAN Delong CNP

## 2020-09-25 ENCOUNTER — NURSING HOME VISIT (OUTPATIENT)
Dept: GERIATRICS | Facility: CLINIC | Age: 80
End: 2020-09-25
Payer: COMMERCIAL

## 2020-09-25 VITALS
RESPIRATION RATE: 16 BRPM | DIASTOLIC BLOOD PRESSURE: 64 MMHG | WEIGHT: 152 LBS | HEIGHT: 68 IN | BODY MASS INDEX: 23.04 KG/M2 | SYSTOLIC BLOOD PRESSURE: 106 MMHG | TEMPERATURE: 96.6 F | OXYGEN SATURATION: 96 % | HEART RATE: 87 BPM

## 2020-09-25 DIAGNOSIS — S72.001G CLOSED FRACTURE OF NECK OF RIGHT FEMUR WITH DELAYED HEALING, SUBSEQUENT ENCOUNTER: Primary | ICD-10-CM

## 2020-09-25 DIAGNOSIS — G60.9 IDIOPATHIC PERIPHERAL NEUROPATHY: ICD-10-CM

## 2020-09-25 DIAGNOSIS — Z85.46 H/O PROSTATE CANCER: ICD-10-CM

## 2020-09-25 DIAGNOSIS — R53.81 PHYSICAL DECONDITIONING: ICD-10-CM

## 2020-09-25 DIAGNOSIS — I95.1 ORTHOSTATIC HYPOTENSION: ICD-10-CM

## 2020-09-25 DIAGNOSIS — C25.9 MALIGNANT NEOPLASM OF PANCREAS, UNSPECIFIED LOCATION OF MALIGNANCY (H): ICD-10-CM

## 2020-09-25 DIAGNOSIS — N40.1 BENIGN PROSTATIC HYPERPLASIA WITH LOWER URINARY TRACT SYMPTOMS, SYMPTOM DETAILS UNSPECIFIED: ICD-10-CM

## 2020-09-25 PROCEDURE — 99310 SBSQ NF CARE HIGH MDM 45: CPT | Performed by: NURSE PRACTITIONER

## 2020-09-25 RX ORDER — TAMSULOSIN HYDROCHLORIDE 0.4 MG/1
0.4 CAPSULE ORAL DAILY
Qty: 90 CAPSULE | Refills: 3
Start: 2020-09-25 | End: 2020-10-06

## 2020-09-25 RX ORDER — ZINC OXIDE 216 MG/ML
LOTION TOPICAL
COMMUNITY

## 2020-09-26 LAB
BACTERIA SPEC CULT: NO GROWTH
BACTERIA SPEC CULT: NO GROWTH
SPECIMEN SOURCE: NORMAL
SPECIMEN SOURCE: NORMAL

## 2020-09-28 ENCOUNTER — NURSING HOME VISIT (OUTPATIENT)
Dept: GERIATRICS | Facility: CLINIC | Age: 80
End: 2020-09-28
Payer: COMMERCIAL

## 2020-09-28 VITALS
OXYGEN SATURATION: 96 % | WEIGHT: 152 LBS | SYSTOLIC BLOOD PRESSURE: 124 MMHG | HEIGHT: 68 IN | RESPIRATION RATE: 16 BRPM | HEART RATE: 71 BPM | TEMPERATURE: 97.5 F | DIASTOLIC BLOOD PRESSURE: 73 MMHG | BODY MASS INDEX: 23.04 KG/M2

## 2020-09-28 DIAGNOSIS — G47.00 INSOMNIA, UNSPECIFIED TYPE: ICD-10-CM

## 2020-09-28 DIAGNOSIS — I95.1 ORTHOSTATIC HYPOTENSION: ICD-10-CM

## 2020-09-28 DIAGNOSIS — R53.81 PHYSICAL DECONDITIONING: ICD-10-CM

## 2020-09-28 DIAGNOSIS — C25.9 MALIGNANT NEOPLASM OF PANCREAS, UNSPECIFIED LOCATION OF MALIGNANCY (H): ICD-10-CM

## 2020-09-28 DIAGNOSIS — Z85.46 H/O PROSTATE CANCER: ICD-10-CM

## 2020-09-28 DIAGNOSIS — S72.001G CLOSED FRACTURE OF NECK OF RIGHT FEMUR WITH DELAYED HEALING, SUBSEQUENT ENCOUNTER: Primary | ICD-10-CM

## 2020-09-28 DIAGNOSIS — N40.1 BENIGN PROSTATIC HYPERPLASIA WITH LOWER URINARY TRACT SYMPTOMS, SYMPTOM DETAILS UNSPECIFIED: ICD-10-CM

## 2020-09-28 PROCEDURE — 99310 SBSQ NF CARE HIGH MDM 45: CPT | Performed by: NURSE PRACTITIONER

## 2020-09-28 NOTE — PROGRESS NOTES
[facility]  [place] Medical Record Number:  [id number]  Place of Service where encounter took place:  [facility] (S) [[id number]]  Chief Complaint   Patient presents with     Nursing Home Acute       HPI:    Lowell Arredondo  is a 80 year old (1940), who is being seen today for an episodic care visit.  HPI information obtained from: facility chart records, facility staff, patient report and [facility] chart review. Today's concern is:  Right femur Fx: On exam today patient is sitting up in WC, states pain in right hip is rated 5/10 at rest, some increased pain with weight bearing  Hx of Prostate cancer/BPH: no c/o urinary retention  Pancreatic cancer: currently recieving chemotherapy and radiation, followed by Dr. Segura, takes MS contin 15mg at hs for pain at home, switched to oxycodone for acute pain, patient states he has ongoing abdominal pain, no increase in pain from PTA  Orthostatic hypotension: BP stable 124/73, 108/63, 115/65 with HR in 70's denies dizziness, SOB, CP  Insomnia patient states he has not slept well the last 3 nights, he is interested in a sleep aid, states he lay awake last night well past midnight  Past Medical and Surgical History reviewed in Epic today.    MEDICATIONS:    Current Outpatient Medications   Medication Sig Dispense Refill     acetaminophen (TYLENOL) 500 MG tablet Take 2 tablets (1,000 mg) by mouth 3 times daily 100 tablet 1     aspirin (ASA) 325 MG EC tablet Take 1 tablet (325 mg) by mouth daily for 28 days 28 tablet 0     gabapentin (NEURONTIN) 300 MG capsule Take 1 capsule (300 mg) by mouth 3 times daily 90 capsule 3     loratadine (CLARITIN) 10 MG tablet Take 10 mg by mouth daily as needed        Nutritional Supplement LIQD Mighty Shake Plus one time a day for poor appetite, wt loss; 4 oz. (1 carton) vanilla flavor qd. Record % consumed.       ondansetron (ZOFRAN) 8 MG tablet Take 1 tablet (8 mg) by mouth every 8 hours as needed  "(Nausea/Vomiting) 10 tablet 2     oxyCODONE (ROXICODONE) 5 MG tablet Take 1-2 tablets (5-10 mg) by mouth every 4 hours as needed for pain or moderate to severe pain 1 tab po q 4 hrs prn pain scale \"1-5\"  2 tabs po q 4 hrs prn pain scale \"6-10\" 30 tablet 0     polyethylene glycol (MIRALAX/GLYCOLAX) packet Take 17 g by mouth daily        tamsulosin (FLOMAX) 0.4 MG capsule Take 1 capsule (0.4 mg) by mouth daily 90 capsule 3         REVIEW OF SYSTEMS:  10 point ROS of systems including Constitutional, Eyes, Respiratory, Cardiovascular, Gastroenterology, Genitourinary, Integumentary, Musculoskeletal, Psychiatric were all negative except for pertinent positives noted in my HPI.    Objective:  /73   Pulse 71   Temp 97.5  F (36.4  C)   Resp 16   Ht 1.727 m (5' 8\")   Wt 68.9 kg (152 lb)   SpO2 96%   BMI 23.11 kg/m    Exam:  GENERAL APPEARANCE:  Alert, in no distress  ENT:  Mouth and posterior oropharynx normal, moist mucous membranes, normal hearing acuity  EYES:  EOM, conjunctivae, lids, pupils and irises normal, PERRL  RESP:  no respiratory distress  CV:  no edema  ABDOMEN:  abdomen round  M/S:   patient sitting up in WC, able to move all 4 extremities  SKIN:  Inspection of skin and subcutaneous tissue baseline, did not visualize right hip incision  NEURO:   Cranial nerves 2-12 are normal tested and grossly at patient's baseline, speech WNL  PSYCH:  affect and mood normal    Labs:     Most Recent 3 CBC's:  Recent Labs   Lab Test 09/22/20  0616 09/21/20  0726 09/20/20  0707 09/19/20  1323   WBC  --  4.1 5.3 6.1   HGB 10.0* 9.8* 10.5* 12.6*   MCV  --  93 92 95   PLT  --  114* 103* 116*     Most Recent 3 BMP's:  Recent Labs   Lab Test 09/22/20  0616 09/21/20  0726 09/20/20  0707 09/19/20  1323   NA  --  135 134 134   POTASSIUM  --  4.4 4.2 4.3   CHLORIDE  --  104 102 100   CO2  --  26 26 28   BUN  --  18 15 16   CR  --  1.02 1.04 1.00   ANIONGAP  --  5 6 6   VICENTE  --  8.6 8.5 8.7   * 134* 106* 118* "       ASSESSMENT/PLAN:  Closed fracture of neck of right femur with delayed healing, subsequent encounter  Physical deconditioning  Acute/ongoing: PT and OT, continue tylenol 1000mg TID, neurontin 300mg TID, oxycodone 5-10mg q 4 hours prn,   F/u with ortho as directed    Malignant neoplasm of pancreas, unspecified location of malignancy (H)  Ongoing: followed by oncology Dr. Segura, currently in [place] with  chemotherapy and radiation      Orthostatic hypotension  Improved: follow BP, encourage fluids, BMP follow    Benign prostatic hyperplasia with lower urinary tract symptoms, symptom details unspecified  H/O prostate cancer  Ongoing: restart flomax 0.4mg qd    Insomnia, unspecified type  Ongoing: start melatonin 6mg qhs      Orders written by provider at facility  Melatonin 6mg qhs    Total time spent with patient visit at the skilled nursing facility was 35 min including patient visit and review of past records. Greater than 50% of total time spent with counseling and coordinating care due to discussed pain control, education on modalities for pain, discussed POC and insomnia, will start melatonin.  Electronically signed by:  Tonya Lynn Haase, APRN CNP

## 2020-09-29 ENCOUNTER — NURSING HOME VISIT (OUTPATIENT)
Dept: GERIATRICS | Facility: CLINIC | Age: 80
End: 2020-09-29
Payer: COMMERCIAL

## 2020-09-29 DIAGNOSIS — C25.9 MALIGNANT NEOPLASM OF PANCREAS, UNSPECIFIED LOCATION OF MALIGNANCY (H): ICD-10-CM

## 2020-09-29 DIAGNOSIS — I95.1 ORTHOSTATIC HYPOTENSION: ICD-10-CM

## 2020-09-29 DIAGNOSIS — S72.001G CLOSED FRACTURE OF NECK OF RIGHT FEMUR WITH DELAYED HEALING, SUBSEQUENT ENCOUNTER: Primary | ICD-10-CM

## 2020-09-29 PROCEDURE — 99304 1ST NF CARE SF/LOW MDM 25: CPT | Performed by: INTERNAL MEDICINE

## 2020-09-29 ASSESSMENT — MIFFLIN-ST. JEOR: SCORE: 1353.53

## 2020-10-01 ENCOUNTER — PATIENT OUTREACH (OUTPATIENT)
Dept: ONCOLOGY | Facility: CLINIC | Age: 80
End: 2020-10-01

## 2020-10-01 NOTE — PROGRESS NOTES
Writer called patient's wife, June, to get an update on how Lowell is doing in TCU and anticipated discharge. Patient once we know discharge will need to resume chemotherapy and be rescheduled.    Writer NIMCO requesting a return call.    Kathe Coles RN

## 2020-10-01 NOTE — PROGRESS NOTES
"[facility]  [place] Medical Record Number:  [id number]  Place of Service where encounter took place:  [facility] (S) [[id number]]  Chief Complaint   Patient presents with     Nursing Home Acute       HPI:    Lowell Arredondo  is a 80 year old (1940), who is being seen today for an episodic care visit.  HPI information obtained from: facility chart records, facility staff, patient report and [facility] chart review. Today's concern is:  Right hip Fx: patient is working with therapy, states pain in right hip is controlled with current pain medication, rates pain as 5/10.   Pancreatic cancer: ongoing abdominal pain, patient takes MS contin at home for pain control, states pain is ongoing rates as 5/10, denies N/V  Orthostatic hypotension: BP as follows 99/68, 92/56, 103/63 with HR in 70-80 range    Past Medical and Surgical History reviewed in Epic today.    MEDICATIONS:    Current Outpatient Medications   Medication Sig Dispense Refill     acetaminophen (TYLENOL) 500 MG tablet Take 2 tablets (1,000 mg) by mouth 3 times daily 100 tablet 1     aspirin (ASA) 325 MG EC tablet Take 1 tablet (325 mg) by mouth daily for 28 days 28 tablet 0     gabapentin (NEURONTIN) 300 MG capsule Take 1 capsule (300 mg) by mouth 3 times daily 90 capsule 3     loratadine (CLARITIN) 10 MG tablet Take 10 mg by mouth daily as needed        Nutritional Supplement LIQD Mighty Shake Plus one time a day for poor appetite, wt loss; 4 oz. (1 carton) vanilla flavor qd. Record % consumed.       ondansetron (ZOFRAN) 8 MG tablet Take 1 tablet (8 mg) by mouth every 8 hours as needed (Nausea/Vomiting) 10 tablet 2     oxyCODONE (ROXICODONE) 5 MG tablet Take 1-2 tablets (5-10 mg) by mouth every 4 hours as needed for pain or moderate to severe pain 1 tab po q 4 hrs prn pain scale \"1-5\"  2 tabs po q 4 hrs prn pain scale \"6-10\" 30 tablet 0     polyethylene glycol (MIRALAX/GLYCOLAX) packet Take 17 g by mouth daily        " "tamsulosin (FLOMAX) 0.4 MG capsule Take 1 capsule (0.4 mg) by mouth daily 90 capsule 3         REVIEW OF SYSTEMS:  10 point ROS of systems including Constitutional, Eyes, Respiratory, Cardiovascular, Gastroenterology, Genitourinary, Integumentary, Musculoskeletal, Psychiatric were all negative except for pertinent positives noted in my HPI.    Objective:  BP 92/56   Pulse 84   Temp 97.3  F (36.3  C)   Resp 16   Ht 1.727 m (5' 8\")   Wt 67.9 kg (149 lb 11.2 oz)   SpO2 98%   BMI 22.76 kg/m    Exam:  GENERAL APPEARANCE:  Alert, in no distress  ENT:  Mouth and posterior oropharynx normal, moist mucous membranes, normal hearing acuity  EYES:  EOM, conjunctivae, lids, pupils and irises normal, PERRL  RESP:  no respiratory distress  CV:  no edema  ABDOMEN:  abdomen round  M/S:   patient standing at time of exam, able to move all 4 extremities  SKIN:  Inspection of skin and subcutaneous tissue baseline  NEURO:   speech WNL  PSYCH:  affect and mood normal    Labs:     Most Recent 3 CBC's:  Recent Labs   Lab Test 09/22/20  0616 09/21/20  0726 09/20/20  0707 09/19/20  1323   WBC  --  4.1 5.3 6.1   HGB 10.0* 9.8* 10.5* 12.6*   MCV  --  93 92 95   PLT  --  114* 103* 116*     Most Recent 3 BMP's:  Recent Labs   Lab Test 09/22/20  0616 09/21/20  0726 09/20/20  0707 09/19/20  1323   NA  --  135 134 134   POTASSIUM  --  4.4 4.2 4.3   CHLORIDE  --  104 102 100   CO2  --  26 26 28   BUN  --  18 15 16   CR  --  1.02 1.04 1.00   ANIONGAP  --  5 6 6   VICENTE  --  8.6 8.5 8.7   * 134* 106* 118*       ASSESSMENT/PLAN:  Closed fracture of neck of right femur with delayed healing, subsequent encounter  Physical deconditioning  Acute/ongoing: PT and OT, continue tylenol 1000mg TID, neurontin 300mg TID,   Restart PTA MS contin 15mg q 12 hours  Decrease oxycodone 5mg q 4 hours prn   F/u with ortho as directed     Malignant neoplasm of pancreas, unspecified location of malignancy (H)  Ongoing: followed by oncology Dr. Segura, currently " in [place] with  chemotherapy and radiation  Restart MS contin 15mg q 12 hours        Orthostatic hypotension  Improved: follow BP, encourage fluids, BMP follow     Benign prostatic hyperplasia with lower urinary tract symptoms, symptom details unspecified  H/O prostate cancer  Ongoing: continue flomax 0.4mg qd     Insomnia, unspecified type  Ongoing: continue melatonin 6mg   Patient states sleep improved with use of melatonin    Orders written by provider at facility  MS contin 15mg q 12 hours  Decrease oxycodone to 5mg q 4 hoursprn    Total time spent with patient visit at the [facility] nursing facility was 40 minutes including patient visit and review of past records. Greater than 50% of total time spent with counseling and coordinating care due to discussed pain control to right hip, acute pain control vs. chronic abdominal pain from pancreatic cancer. education on decreasing use of oxycodone, will restart PTA MS contine for pancreatic pain, continue to monitor, encourage patient to continue therapy.  Electronically signed by:  Tonya Lynn Haase, APRN CNP

## 2020-10-01 NOTE — PROGRESS NOTES
[facility] [facility]  PRIMARY CARE PROVIDER AND CLINIC:  Diego Segura MD, [facility] [address] / JANETH M*      Patient seen by Dr. Salazar on September 20, 2020, for initial TCU visit.    Patient is a 80 year old  (1940), admitted to the above facility from  [facility]. Hospital stay 9/19/20 through 9/23/20.  Hospitalization was for the treatment of a right femoral neck fracture.  Patient underwent IM nailing of the right femur fracture 9/20/2020.  Postop course was remarkable for fever, confusion, mild acute blood loss anemia.    Admitted to this facility for  rehab, medical management and nursing care.    Past medical history remarkable for pancreatic cancer for which the patient is receiving chemotherapy and radiation, prostate cancer and peripheral neuropathy.    Patient notes moderate hip pain.  He is ambulating with therapy.  He has had episodic mild orthostatic hypotension.  He denies chest pain, cough, shortness of breath, dysuria.    He does complain of ongoing abdominal pain in a bandlike distribution around his mid abdominal area.  He has been constipated and thinks some of his pain may be related to constipation though this pain has also been attributed to his pancreatic cancer.  He denies nausea or vomiting.    Prior to hospitalization, he was receiving MS Contin.  This is been changed to PRN oxycodone to manage acute postop pain as well.      CODE STATUS/ADVANCE DIRECTIVES DISCUSSION:   No CPR- Do NOT Intubate  Patient's living condition: lives with spouse in house with stairs  ALLERGIES: Demerol [meperidine]  PAST MEDICAL HISTORY:  has a past medical history of Basal cell carcinoma of skin (9/19/2013), BPH (benign prostatic hyperplasia) (5/2/2013), Cancer (H), 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) (10/10/2019), Peripheral neuropathy, and Prostate cancer (H) (6/7/2016). He also has no past medical history of Asymptomatic human  immunodeficiency virus (HIV) infection status (H), Blood in semen, Cerebral palsy (H), Complication of anesthesia, Congenital renal agenesis and dysgenesis, Epididymitis, bilateral, Epididymitis, left, Epididymitis, right, Goiter, Gout, Hernia, abdominal, History of spinal cord injury, History of thrombophlebitis, Horseshoe kidney, Hydrocephalus (H), Malignant hyperthermia, Mumps, Orchitis, epididymitis, and epididymo-orchitis, with abscess, Palpitations, Parkinsons disease (H), Penile discharge, Prostate infection, Spider veins, Spina bifida (H), STD (sexually transmitted disease), Swelling of testicle, Tethered cord (H), or Tuberculosis.  PAST SURGICAL HISTORY:   has a past surgical history that includes Foot surgery (Left, 1992); Prostate surgery; IR Chest Port Placement > 5 Yrs of Age (11/1/2019); hernia repair; Laparoscopy diagnostic (general) (N/A, 5/12/2020); and Open reduction internal fixation rodding intramedullar femur fracture table (Right, 9/20/2020).  FAMILY HISTORY: family history includes Prostate Cancer in his brother, brother, and father.  SOCIAL HISTORY:   reports that he has never smoked. He has never used smokeless tobacco. He reports previous alcohol use of about 10.0 - 14.0 standard drinks of alcohol per week. He reports that he does not use drugs.        Current Outpatient Medications   Medication Sig Dispense Refill     acetaminophen (TYLENOL) 500 MG tablet Take 2 tablets (1,000 mg) by mouth 3 times daily 100 tablet 1     aspirin (ASA) 325 MG EC tablet Take 1 tablet (325 mg) by mouth daily for 28 days 28 tablet 0     gabapentin (NEURONTIN) 300 MG capsule Take 1 capsule (300 mg) by mouth 3 times daily 90 capsule 3     loratadine (CLARITIN) 10 MG tablet Take 10 mg by mouth daily as needed        Nutritional Supplement LIQD Mighty Shake Plus one time a day for poor appetite, wt loss; 4 oz. (1 carton) vanilla flavor qd. Record % consumed.       ondansetron (ZOFRAN) 8 MG tablet Take 1 tablet (8  "mg) by mouth every 8 hours as needed (Nausea/Vomiting) 10 tablet 2     oxyCODONE (ROXICODONE) 5 MG tablet Take 1-2 tablets (5-10 mg) by mouth every 4 hours as needed for pain or moderate to severe pain 1 tab po q 4 hrs prn pain scale \"1-5\"  2 tabs po q 4 hrs prn pain scale \"6-10\" 30 tablet 0     polyethylene glycol (MIRALAX/GLYCOLAX) packet Take 17 g by mouth daily        tamsulosin (FLOMAX) 0.4 MG capsule Take 1 capsule (0.4 mg) by mouth daily 90 capsule 3         ROS:  10 point ROS neg except as noted above      GENERAL APPEARANCE:  Alert, in no distress, thin appearing, sitting at the bedside, working with therapist  ENT: oral mucosa moist  Neck supple  EYES:  No eye redness or drainage  RESP: Lungs clear  CV:  RRR  Abd soft, non-distended  M/S:   No LE edema  R hip incision was not examined by me  NEURO:   Alert, fully oriented, pleasant. CN intact        Lab/Diagnostic data:  CBC RESULTS:   Recent Labs   Lab Test 09/22/20  0616 09/21/20  0726 09/20/20  0707   WBC  --  4.1 5.3   RBC  --  3.17* 3.44*   HGB 10.0* 9.8* 10.5*   HCT  --  29.4* 31.7*   MCV  --  93 92   MCH  --  30.9 30.5   MCHC  --  33.3 33.1   RDW  --  14.0 14.4   PLT  --  114* 103*       Last Basic Metabolic Panel:  Recent Labs   Lab Test 09/22/20  0616 09/21/20  0726 09/20/20  0707   NA  --  135 134   POTASSIUM  --  4.4 4.2   CHLORIDE  --  104 102   VICENTE  --  8.6 8.5   CO2  --  26 26   BUN  --  18 15   CR  --  1.02 1.04   * 134* 106*       Liver Function Studies -   Recent Labs   Lab Test 09/07/20  1439 08/24/20  1017   PROTTOTAL 7.1 7.1   ALBUMIN 3.4 3.4   BILITOTAL 0.5 0.8   ALKPHOS 69 71   AST 23 29   ALT 24 27       TSH   Date Value Ref Range Status   10/03/2019 1.62 0.40 - 4.00 mU/L Final   ]    No results found for: A1C        ASSESSMENT/PLAN:    Closed fracture of neck of right femur with delayed healing, subsequent encounter  Physical deconditioning  Acute/ongoing:   Hip pain controlled  Plan continue current tx. Therapies. ASA for " dVT proph         Malignant neoplasm of pancreas, unspecified location of malignancy (H)  Ongoing: followed by oncology Dr. Segura, currently in Tx with  chemotherapy and radiation  Ongoing abd pain, likely secondary to pancreatic cancer, possible component of constipation  Plan Bowel regimen.  Consider resumption of MS contin, with reduction of prn oxycodone        Orthostatic hypotension  Improved  Plan encourage fluids.  Flomax has been restarted for BPH, should be given at night       Jude Salazar MD

## 2020-10-01 NOTE — PROGRESS NOTES
Writer spoke with patient's wife and she is anticipating a 7 to 10 day TCU stay.    Writer will follow up in 7 to 10 days.    Kathe Coles RN

## 2020-10-02 ENCOUNTER — NURSING HOME VISIT (OUTPATIENT)
Dept: GERIATRICS | Facility: CLINIC | Age: 80
End: 2020-10-02
Payer: COMMERCIAL

## 2020-10-02 VITALS
TEMPERATURE: 97.3 F | SYSTOLIC BLOOD PRESSURE: 92 MMHG | OXYGEN SATURATION: 98 % | HEART RATE: 84 BPM | BODY MASS INDEX: 22.69 KG/M2 | HEIGHT: 68 IN | RESPIRATION RATE: 16 BRPM | WEIGHT: 149.7 LBS | DIASTOLIC BLOOD PRESSURE: 56 MMHG

## 2020-10-02 DIAGNOSIS — C25.9 MALIGNANT NEOPLASM OF PANCREAS, UNSPECIFIED LOCATION OF MALIGNANCY (H): ICD-10-CM

## 2020-10-02 DIAGNOSIS — G89.3 CANCER ASSOCIATED PAIN: ICD-10-CM

## 2020-10-02 DIAGNOSIS — I95.1 ORTHOSTATIC HYPOTENSION: ICD-10-CM

## 2020-10-02 DIAGNOSIS — C25.2 MALIGNANT NEOPLASM OF TAIL OF PANCREAS (H): ICD-10-CM

## 2020-10-02 DIAGNOSIS — R53.81 PHYSICAL DECONDITIONING: ICD-10-CM

## 2020-10-02 DIAGNOSIS — S72.001G CLOSED FRACTURE OF NECK OF RIGHT FEMUR WITH DELAYED HEALING, SUBSEQUENT ENCOUNTER: Primary | ICD-10-CM

## 2020-10-02 DIAGNOSIS — G47.00 INSOMNIA, UNSPECIFIED TYPE: ICD-10-CM

## 2020-10-02 PROCEDURE — 99310 SBSQ NF CARE HIGH MDM 45: CPT | Performed by: NURSE PRACTITIONER

## 2020-10-02 RX ORDER — MORPHINE SULFATE 15 MG/1
15 TABLET, FILM COATED, EXTENDED RELEASE ORAL EVERY 12 HOURS
Qty: 60 TABLET | Refills: 0
Start: 2020-10-02 | End: 2020-10-21

## 2020-10-02 RX ORDER — OXYCODONE HYDROCHLORIDE 5 MG/1
5 TABLET ORAL EVERY 4 HOURS PRN
Qty: 30 TABLET | Refills: 0
Start: 2020-10-02 | End: 2020-10-08

## 2020-10-02 NOTE — LETTER
"    10/2/2020        RE: Lowell Arredondo  [address]  [facility] [address]        [place]  [place] Medical Record Number:  [id number]  Place of Service where encounter took place:  [facility] (S) [[id number]]  Chief Complaint   Patient presents with     Nursing Home Acute       HPI:    Lowell Arredondo  is a 80 year old (1940), who is being seen today for an episodic care visit.  HPI information obtained from: facility chart records, facility staff, patient report and [facility] chart review. Today's concern is:  Right hip Fx: patient is working with therapy, states pain in right hip is controlled with current pain medication, rates pain as 5/10.   Pancreatic cancer: ongoing abdominal pain, patient takes MS contin at home for pain control, states pain is ongoing rates as 5/10, denies N/V  Orthostatic hypotension: BP as follows 99/68, 92/56, 103/63 with HR in 70-80 range    Past Medical and Surgical History reviewed in Epic today.    MEDICATIONS:    Current Outpatient Medications   Medication Sig Dispense Refill     acetaminophen (TYLENOL) 500 MG tablet Take 2 tablets (1,000 mg) by mouth 3 times daily 100 tablet 1     aspirin (ASA) 325 MG EC tablet Take 1 tablet (325 mg) by mouth daily for 28 days 28 tablet 0     gabapentin (NEURONTIN) 300 MG capsule Take 1 capsule (300 mg) by mouth 3 times daily 90 capsule 3     loratadine (CLARITIN) 10 MG tablet Take 10 mg by mouth daily as needed        Nutritional Supplement LIQD Mighty Shake Plus one time a day for poor appetite, wt loss; 4 oz. (1 carton) vanilla flavor qd. Record % consumed.       ondansetron (ZOFRAN) 8 MG tablet Take 1 tablet (8 mg) by mouth every 8 hours as needed (Nausea/Vomiting) 10 tablet 2     oxyCODONE (ROXICODONE) 5 MG tablet Take 1-2 tablets (5-10 mg) by mouth every 4 hours as needed for pain or moderate to severe pain 1 tab po q 4 hrs prn pain scale \"1-5\"  2 tabs po q 4 hrs prn pain scale \"6-10\" 30 " "tablet 0     polyethylene glycol (MIRALAX/GLYCOLAX) packet Take 17 g by mouth daily        tamsulosin (FLOMAX) 0.4 MG capsule Take 1 capsule (0.4 mg) by mouth daily 90 capsule 3         REVIEW OF SYSTEMS:  10 point ROS of systems including Constitutional, Eyes, Respiratory, Cardiovascular, Gastroenterology, Genitourinary, Integumentary, Musculoskeletal, Psychiatric were all negative except for pertinent positives noted in my HPI.    Objective:  BP 92/56   Pulse 84   Temp 97.3  F (36.3  C)   Resp 16   Ht 1.727 m (5' 8\")   Wt 67.9 kg (149 lb 11.2 oz)   SpO2 98%   BMI 22.76 kg/m    Exam:  GENERAL APPEARANCE:  Alert, in no distress  ENT:  Mouth and posterior oropharynx normal, moist mucous membranes, normal hearing acuity  EYES:  EOM, conjunctivae, lids, pupils and irises normal, PERRL  RESP:  no respiratory distress  CV:  no edema  ABDOMEN:  abdomen round  M/S:   patient standing at time of exam, able to move all 4 extremities  SKIN:  Inspection of skin and subcutaneous tissue baseline  NEURO:   speech WNL  PSYCH:  affect and mood normal    Labs:     Most Recent 3 CBC's:  Recent Labs   Lab Test 09/22/20  0616 09/21/20  0726 09/20/20  0707 09/19/20  1323   WBC  --  4.1 5.3 6.1   HGB 10.0* 9.8* 10.5* 12.6*   MCV  --  93 92 95   PLT  --  114* 103* 116*     Most Recent 3 BMP's:  Recent Labs   Lab Test 09/22/20  0616 09/21/20  0726 09/20/20  0707 09/19/20  1323   NA  --  135 134 134   POTASSIUM  --  4.4 4.2 4.3   CHLORIDE  --  104 102 100   CO2  --  26 26 28   BUN  --  18 15 16   CR  --  1.02 1.04 1.00   ANIONGAP  --  5 6 6   VICENTE  --  8.6 8.5 8.7   * 134* 106* 118*       ASSESSMENT/PLAN:  Closed fracture of neck of right femur with delayed healing, subsequent encounter  Physical deconditioning  Acute/ongoing: PT and OT, continue tylenol 1000mg TID, neurontin 300mg TID,   Restart PTA MS contin 15mg q 12 hours  Decrease oxycodone 5mg q 4 hours prn   F/u with ortho as directed     Malignant neoplasm of pancreas, " unspecified location of malignancy (H)  Ongoing: followed by oncology Dr. Segura, currently in Tx with  chemotherapy and radiation  Restart MS contin 15mg q 12 hours        Orthostatic hypotension  Improved: follow BP, encourage fluids, BMP follow     Benign prostatic hyperplasia with lower urinary tract symptoms, symptom details unspecified  H/O prostate cancer  Ongoing: continue flomax 0.4mg qd     Insomnia, unspecified type  Ongoing: continue melatonin 6mg   Patient states sleep improved with use of melatonin    Orders written by provider at facility  MS contin 15mg q 12 hours  Decrease oxycodone to 5mg q 4 hoursprn    Total time spent with patient visit at the [facility] nursing [facility] was 40 minutes including patient visit and review of past records. Greater than 50% of total time spent with counseling and coordinating care due to discussed pain control to right hip, acute pain control vs. chronic abdominal pain from pancreatic cancer. education on decreasing use of oxycodone, will restart PTA MS contine for pancreatic pain, continue to monitor, encourage patient to continue therapy.  Electronically signed by:  Tonya Lynn Haase, APRN CNP               Sincerely,        Tonya Lynn Haase, APRN CNP

## 2020-10-03 ENCOUNTER — TRANSFERRED RECORDS (OUTPATIENT)
Dept: HEALTH INFORMATION MANAGEMENT | Facility: CLINIC | Age: 80
End: 2020-10-03

## 2020-10-03 ENCOUNTER — TELEPHONE (OUTPATIENT)
Dept: GERIATRICS | Facility: CLINIC | Age: 80
End: 2020-10-03

## 2020-10-03 LAB
ANION GAP SERPL CALCULATED.3IONS-SCNC: 6 MMOL/L (ref 7–16)
BUN SERPL-MCNC: 15 MG/DL (ref 7–26)
CALCIUM SERPL-MCNC: 9 MG/DL (ref 8.4–10.4)
CHLORIDE SERPLBLD-SCNC: 99 MMOL/L (ref 98–109)
CO2 SERPL-SCNC: 27 MMOL/L (ref 20–29)
CREAT SERPL-MCNC: 1.03 MG/DL (ref 0.73–1.18)
DIFFERENTIAL: ABNORMAL
ERYTHROCYTE [DISTWIDTH] IN BLOOD BY AUTOMATED COUNT: 15.9 % (ref 11.9–15.5)
GFR SERPL CREATININE-BSD FRML MDRD: >60 ML/MIN/1.73M2
GLUCOSE SERPL-MCNC: 144 MG/DL (ref 70–100)
HCT VFR BLD AUTO: 32.2 % (ref 38.8–50)
HEMOGLOBIN: 10.5 G/DL (ref 13.5–17.5)
MCH RBC QN AUTO: 30.7 PG (ref 27.6–33.3)
MCHC RBC AUTO-ENTMCNC: 32.6 G/DL (ref 31.5–35.2)
MCV RBC AUTO: 94.2 FL (ref 80–100)
PLATELET # BLD AUTO: 273 X10(9)/L (ref 150–450)
POTASSIUM SERPL-SCNC: 4.4 MMOL/L (ref 3.5–5.1)
RBC # BLD AUTO: 3.42 X10(12)/L (ref 4.32–5.72)
SODIUM SERPL-SCNC: 132 MMOL/L (ref 136–145)
WBC # BLD AUTO: 5.9 X10(9)/L (ref 3.5–10.5)

## 2020-10-04 ENCOUNTER — TRANSFERRED RECORDS (OUTPATIENT)
Dept: HEALTH INFORMATION MANAGEMENT | Facility: CLINIC | Age: 80
End: 2020-10-04

## 2020-10-04 ENCOUNTER — MEDICAL CORRESPONDENCE (OUTPATIENT)
Dept: HEALTH INFORMATION MANAGEMENT | Facility: CLINIC | Age: 80
End: 2020-10-04

## 2020-10-04 ENCOUNTER — TELEPHONE (OUTPATIENT)
Dept: GERIATRICS | Facility: CLINIC | Age: 80
End: 2020-10-04

## 2020-10-04 DIAGNOSIS — J18.9 PNEUMONIA OF BOTH LOWER LOBES DUE TO INFECTIOUS ORGANISM: Primary | ICD-10-CM

## 2020-10-04 NOTE — TELEPHONE ENCOUNTER
Staff paged re: fever  Patient swabbed for COVID-19 on Tuesday, results neg. Re swabbed again this evening d/t fever. Temp was 101.2 earlier today, most recent check up to 102.3. Normal WBC. He did have once episode diarrhea today otherwise no accompanying symptoms.     Orders:  1. CXR tonight or in AM  2. UA/UC tonight     OXANA Henriquez  10/03/20

## 2020-10-05 ENCOUNTER — TELEPHONE (OUTPATIENT)
Dept: ONCOLOGY | Facility: CLINIC | Age: 80
End: 2020-10-05

## 2020-10-05 NOTE — TELEPHONE ENCOUNTER
Patient's spouse calling with an update for care team:     Pt currently at [facility]. He was supposed to be discharged Wednesday this week,  but he is now being treated for Pneumonia and on ABX for 7 days. Spouse is unsure if this will change his potential discharge date. Will route to care team as update.     Lou Box, JANNYN, RN, PHN  Oncology Care Coordinator  [facility]

## 2020-10-05 NOTE — TELEPHONE ENCOUNTER
[facility] TELEPHONE ENCOUNTER    Chief Complaint   Patient presents with     X-ray Results     Lowell Arredondo is a 80 year old  (1940),Nurse called today to report: Chest x-ray results revealed bilateral infiltrates of the lower lobes. Temperature 101 F. Blood pressures soft, but has been recent baseline. COVID-19 swab negative. Have been encouraging incentive spirometer. Has also received Tylenol.     ASSESSMENT/PLAN  Pneumonia    Augmentin 875 mg PO BID x 7 days. Ok to remove from e-kit to give first dose tonight.    Electronically signed by:   YAN Goldberg CNP

## 2020-10-06 ENCOUNTER — NURSING HOME VISIT (OUTPATIENT)
Dept: GERIATRICS | Facility: CLINIC | Age: 80
End: 2020-10-06
Payer: COMMERCIAL

## 2020-10-06 VITALS
HEIGHT: 68 IN | TEMPERATURE: 97.3 F | DIASTOLIC BLOOD PRESSURE: 51 MMHG | BODY MASS INDEX: 22.08 KG/M2 | OXYGEN SATURATION: 94 % | SYSTOLIC BLOOD PRESSURE: 84 MMHG | HEART RATE: 72 BPM | WEIGHT: 145.7 LBS | RESPIRATION RATE: 18 BRPM

## 2020-10-06 DIAGNOSIS — C25.9 MALIGNANT NEOPLASM OF PANCREAS, UNSPECIFIED LOCATION OF MALIGNANCY (H): ICD-10-CM

## 2020-10-06 DIAGNOSIS — R53.81 PHYSICAL DECONDITIONING: ICD-10-CM

## 2020-10-06 DIAGNOSIS — J18.9 PNEUMONIA OF BOTH LOWER LOBES DUE TO INFECTIOUS ORGANISM: Primary | ICD-10-CM

## 2020-10-06 DIAGNOSIS — S72.001G CLOSED FRACTURE OF NECK OF RIGHT FEMUR WITH DELAYED HEALING, SUBSEQUENT ENCOUNTER: ICD-10-CM

## 2020-10-06 DIAGNOSIS — I95.1 ORTHOSTATIC HYPOTENSION: ICD-10-CM

## 2020-10-06 DIAGNOSIS — N40.1 BENIGN PROSTATIC HYPERPLASIA WITH LOWER URINARY TRACT SYMPTOMS, SYMPTOM DETAILS UNSPECIFIED: ICD-10-CM

## 2020-10-06 PROCEDURE — 99309 SBSQ NF CARE MODERATE MDM 30: CPT | Performed by: NURSE PRACTITIONER

## 2020-10-06 ASSESSMENT — MIFFLIN-ST. JEOR: SCORE: 1335.39

## 2020-10-06 NOTE — PROGRESS NOTES
"[facility]  [place] Medical Record Number:  [id number]  Place of Service where encounter took place:  [facility] (S) [[id number]]  Chief Complaint   Patient presents with     Nursing Home Acute       HPI:    Lowell Arredondo  is a 80 year old (1940), who is being seen today for an episodic care visit.  HPI information obtained from: facility chart records, facility staff, patient report and [facility] chart review. Today's concern is:  Bilateral pneumonia: patient started on augmentin 875mg BID for 7 days for fever and CXR showing bilateral infiltates, on exam today patient states he is feeling a little more fatigued, denies fever, chills, cough, congestion, SOB, SaO2 > 90% on room air.   Orthostatic hypotension: patient BP dropped from 100 systolically to 70's when standing from sitting position. Patient c/o dizziness and vertigo with standing. BP has been soft throughout TCU stay.   Pancreatic cancer: states abdominal pain is improved with use of MS contin, patient was on MS contin PTA, describes abdominal pain as a \"burning\" pain at time of exam.   Right hip Fx: patient having less energy with new Dx of pneumonia, orthostasis, has not been walking much with therapy, states he is having some pain in right hip, taking oxycodone 1-2 times per day    Past Medical and Surgical History reviewed in Epic today.    MEDICATIONS:  Current Outpatient Medications   Medication Sig Dispense Refill     acetaminophen (TYLENOL) 500 MG tablet Take 2 tablets (1,000 mg) by mouth 3 times daily 100 tablet 1     amoxicillin-clavulanate (AUGMENTIN) 875-125 MG tablet Take 1 tablet by mouth 2 times daily for 7 days 14 tablet 0     aspirin (ASA) 325 MG EC tablet Take 1 tablet (325 mg) by mouth daily for 28 days 28 tablet 0     gabapentin (NEURONTIN) 300 MG capsule Take 1 capsule (300 mg) by mouth 3 times daily 90 capsule 3     loratadine (CLARITIN) 10 MG tablet Take 10 mg by mouth daily as needed        " "morphine (MS CONTIN) 15 MG CR tablet Take 1 tablet (15 mg) by mouth every 12 hours maximum 2 tablet(s) per day 60 tablet 0     Nutritional Supplement LIQD Mighty Shake Plus one time a day for poor appetite, wt loss; 4 oz. (1 carton) vanilla flavor qd. Record % consumed.       ondansetron (ZOFRAN) 8 MG tablet Take 1 tablet (8 mg) by mouth every 8 hours as needed (Nausea/Vomiting) 10 tablet 2     oxyCODONE (ROXICODONE) 5 MG tablet Take 1 tablet (5 mg) by mouth every 4 hours as needed for pain or moderate to severe pain 1 tab po q 4 hrs prn pain scale \"1-5\"  2 tabs po q 4 hrs prn pain scale \"6-10\" 30 tablet 0     polyethylene glycol (MIRALAX/GLYCOLAX) packet Take 17 g by mouth daily        tamsulosin (FLOMAX) 0.4 MG capsule Take 1 capsule (0.4 mg) by mouth daily 90 capsule 3         REVIEW OF SYSTEMS:  10 point ROS of systems including Constitutional, Eyes, Respiratory, Cardiovascular, Gastroenterology, Genitourinary, Integumentary, Musculoskeletal, Psychiatric were all negative except for pertinent positives noted in my HPI.    Objective:  BP (!) 84/51   Pulse 72   Temp 97.3  F (36.3  C)   Resp 18   Ht 1.727 m (5' 8\")   Wt 66.1 kg (145 lb 11.2 oz)   SpO2 94%   BMI 22.15 kg/m    Exam:  GENERAL APPEARANCE:  Alert, in no distress  ENT:  Mouth and posterior oropharynx normal, moist mucous membranes, normal hearing acuity  EYES:  EOM, conjunctivae, lids, pupils and irises normal, PERRL  RESP:  no respiratory distress, lungs CTA on auscultation  CV:  no edema  ABDOMEN:  abdomen round  M/S:   patient standing at time of exam, able to move all 4 extremities  SKIN:  Inspection of skin and subcutaneous tissue baseline  NEURO:   speech WNL  PSYCH:  affect and mood normal       Labs:     Most Recent 3 CBC's:  Recent Labs   Lab Test 10/03/20 09/22/20  0616 09/21/20  0726 09/20/20  0707   WBC 5.9  --  4.1 5.3   HGB 10.5* 10.0* 9.8* 10.5*   MCV 94.2  --  93 92     --  114* 103*     Most Recent 3 BMP's:  Recent Labs "   Lab Test 10/03/20 09/22/20  0616 09/21/20  0726 09/20/20  0707   *  --  135 134   POTASSIUM 4.4  --  4.4 4.2   CHLORIDE 99  --  104 102   CO2 27  --  26 26   BUN 15  --  18 15   CR 1.03  --  1.02 1.04   ANIONGAP 6*  --  5 6   VICENTE 9.0  --  8.6 8.5   * 105* 134* 106*       ASSESSMENT/PLAN:  Bilateral Pneumonia:   Acute/ongoing: augmentin 875mg BID for 7 days started on 10/4/20, IS QID and prn    Closed fracture of neck of right femur with delayed healing, subsequent encounter  Physical deconditioning  Acute/ongoing: PT and OT, continue tylenol 1000mg TID, neurontin 300mg TID,   Restart PTA MS contin 15mg q 12 hours  continue oxycodone 5mg q 4 hours prn   F/u with ortho as directed     Malignant neoplasm of pancreas, unspecified location of malignancy (H)  Ongoing: followed by oncology Dr. Segura, currently in Tx with  chemotherapy and radiation  continue MS contin 15mg q 12 hours     Orthostatic hypotension  Improved: follow BP, encourage fluids, DC flomax     Benign prostatic hyperplasia with lower urinary tract symptoms, symptom details unspecified  H/O prostate cancer  Ongoing: DC flomax, monitor       Orders written by provider at facility  DC flomax    Electronically signed by:  Tonya Lynn Haase, APRN CNP

## 2020-10-06 NOTE — LETTER
"    10/6/2020        RE: Lowell Arredondo  [address]  [facility] [address]        [place] [facility]  [place] Medical Record Number:  [id number]  Place of Service where encounter took place:  [facility] (S) [[id number]]  Chief Complaint   Patient presents with     Nursing Home Acute       HPI:    Lowell Arredondo  is a 80 year old (1940), who is being seen today for an episodic care visit.  HPI information obtained from: facility chart records, facility staff, patient report and [facility] chart review. Today's concern is:  Bilateral pneumonia: patient started on augmentin 875mg BID for 7 days for fever and CXR showing bilateral infiltates, on exam today patient states he is feeling a little more fatigued, denies fever, chills, cough, congestion, SOB, SaO2 > 90% on room air.   Orthostatic hypotension: patient BP dropped from 100 systolically to 70's when standing from sitting position. Patient c/o dizziness and vertigo with standing. BP has been soft throughout TCU stay.   Pancreatic cancer: states abdominal pain is improved with use of MS contin, patient was on MS contin PTA, describes abdominal pain as a \"burning\" pain at time of exam.   Right hip Fx: patient having less energy with new Dx of pneumonia, orthostasis, has not been walking much with therapy, states he is having some pain in right hip, taking oxycodone 1-2 times per day    Past Medical and Surgical History reviewed in Epic today.    MEDICATIONS:  Current Outpatient Medications   Medication Sig Dispense Refill     acetaminophen (TYLENOL) 500 MG tablet Take 2 tablets (1,000 mg) by mouth 3 times daily 100 tablet 1     amoxicillin-clavulanate (AUGMENTIN) 875-125 MG tablet Take 1 tablet by mouth 2 times daily for 7 days 14 tablet 0     aspirin (ASA) 325 MG EC tablet Take 1 tablet (325 mg) by mouth daily for 28 days 28 tablet 0     gabapentin (NEURONTIN) 300 MG capsule Take 1 capsule (300 mg) by mouth 3 times daily 90 " "capsule 3     loratadine (CLARITIN) 10 MG tablet Take 10 mg by mouth daily as needed        morphine (MS CONTIN) 15 MG CR tablet Take 1 tablet (15 mg) by mouth every 12 hours maximum 2 tablet(s) per day 60 tablet 0     Nutritional Supplement LIQD Mighty Shake Plus one time a day for poor appetite, wt loss; 4 oz. (1 carton) vanilla flavor qd. Record % consumed.       ondansetron (ZOFRAN) 8 MG tablet Take 1 tablet (8 mg) by mouth every 8 hours as needed (Nausea/Vomiting) 10 tablet 2     oxyCODONE (ROXICODONE) 5 MG tablet Take 1 tablet (5 mg) by mouth every 4 hours as needed for pain or moderate to severe pain 1 tab po q 4 hrs prn pain scale \"1-5\"  2 tabs po q 4 hrs prn pain scale \"6-10\" 30 tablet 0     polyethylene glycol (MIRALAX/GLYCOLAX) packet Take 17 g by mouth daily        tamsulosin (FLOMAX) 0.4 MG capsule Take 1 capsule (0.4 mg) by mouth daily 90 capsule 3         REVIEW OF SYSTEMS:  10 point ROS of systems including Constitutional, Eyes, Respiratory, Cardiovascular, Gastroenterology, Genitourinary, Integumentary, Musculoskeletal, Psychiatric were all negative except for pertinent positives noted in my HPI.    Objective:  BP (!) 84/51   Pulse 72   Temp 97.3  F (36.3  C)   Resp 18   Ht 1.727 m (5' 8\")   Wt 66.1 kg (145 lb 11.2 oz)   SpO2 94%   BMI 22.15 kg/m    Exam:  GENERAL APPEARANCE:  Alert, in no distress  ENT:  Mouth and posterior oropharynx normal, moist mucous membranes, normal hearing acuity  EYES:  EOM, conjunctivae, lids, pupils and irises normal, PERRL  RESP:  no respiratory distress, lungs CTA on auscultation  CV:  no edema  ABDOMEN:  abdomen round  M/S:   patient standing at time of exam, able to move all 4 extremities  SKIN:  Inspection of skin and subcutaneous tissue baseline  NEURO:   speech WNL  PSYCH:  affect and mood normal       Labs:     Most Recent 3 CBC's:  Recent Labs   Lab Test 10/03/20 09/22/20  0616 09/21/20  0726 09/20/20  0707   WBC 5.9  --  4.1 5.3   HGB 10.5* 10.0* 9.8* " 10.5*   MCV 94.2  --  93 92     --  114* 103*     Most Recent 3 BMP's:  Recent Labs   Lab Test 10/03/20 09/22/20  0616 09/21/20  0726 09/20/20  0707   *  --  135 134   POTASSIUM 4.4  --  4.4 4.2   CHLORIDE 99  --  104 102   CO2 27  --  26 26   BUN 15  --  18 15   CR 1.03  --  1.02 1.04   ANIONGAP 6*  --  5 6   VICENTE 9.0  --  8.6 8.5   * 105* 134* 106*       ASSESSMENT/PLAN:  Bilateral Pneumonia:   Acute/ongoing: augmentin 875mg BID for 7 days started on 10/4/20, IS QID and prn    Closed fracture of neck of right femur with delayed healing, subsequent encounter  Physical deconditioning  Acute/ongoing: PT and OT, continue tylenol 1000mg TID, neurontin 300mg TID,   Restart PTA MS contin 15mg q 12 hours  continue oxycodone 5mg q 4 hours prn   F/u with ortho as directed     Malignant neoplasm of pancreas, unspecified location of malignancy (H)  Ongoing: followed by oncology Dr. Segura, currently in Tx with  chemotherapy and radiation  continue MS contin 15mg q 12 hours     Orthostatic hypotension  Improved: follow BP, encourage fluids, DC flomax     Benign prostatic hyperplasia with lower urinary tract symptoms, symptom details unspecified  H/O prostate cancer  Ongoing: DC flomax, monitor       Orders written by provider at facility  DC flomax    Electronically signed by:  Tonya Lynn Haase, APRN CNP               Sincerely,        Tonya Lynn Haase, APRN CNP

## 2020-10-08 ENCOUNTER — DISCHARGE SUMMARY NURSING HOME (OUTPATIENT)
Dept: GERIATRICS | Facility: CLINIC | Age: 80
End: 2020-10-08
Payer: COMMERCIAL

## 2020-10-08 ENCOUNTER — TELEPHONE (OUTPATIENT)
Dept: ONCOLOGY | Facility: CLINIC | Age: 80
End: 2020-10-08

## 2020-10-08 VITALS
OXYGEN SATURATION: 97 % | WEIGHT: 145.7 LBS | SYSTOLIC BLOOD PRESSURE: 116 MMHG | BODY MASS INDEX: 22.08 KG/M2 | TEMPERATURE: 97.9 F | RESPIRATION RATE: 16 BRPM | DIASTOLIC BLOOD PRESSURE: 66 MMHG | HEART RATE: 78 BPM | HEIGHT: 68 IN

## 2020-10-08 DIAGNOSIS — S72.001G CLOSED FRACTURE OF NECK OF RIGHT FEMUR WITH DELAYED HEALING, SUBSEQUENT ENCOUNTER: ICD-10-CM

## 2020-10-08 DIAGNOSIS — G47.00 INSOMNIA, UNSPECIFIED TYPE: ICD-10-CM

## 2020-10-08 DIAGNOSIS — C25.9 MALIGNANT NEOPLASM OF PANCREAS, UNSPECIFIED LOCATION OF MALIGNANCY (H): ICD-10-CM

## 2020-10-08 DIAGNOSIS — J18.9 PNEUMONIA OF BOTH LOWER LOBES DUE TO INFECTIOUS ORGANISM: Primary | ICD-10-CM

## 2020-10-08 DIAGNOSIS — I95.1 ORTHOSTATIC HYPOTENSION: ICD-10-CM

## 2020-10-08 DIAGNOSIS — G89.3 CANCER ASSOCIATED PAIN: ICD-10-CM

## 2020-10-08 DIAGNOSIS — C25.2 MALIGNANT NEOPLASM OF TAIL OF PANCREAS (H): ICD-10-CM

## 2020-10-08 DIAGNOSIS — R53.81 PHYSICAL DECONDITIONING: ICD-10-CM

## 2020-10-08 DIAGNOSIS — N40.1 BENIGN PROSTATIC HYPERPLASIA WITH LOWER URINARY TRACT SYMPTOMS, SYMPTOM DETAILS UNSPECIFIED: ICD-10-CM

## 2020-10-08 PROCEDURE — 99316 NF DSCHRG MGMT 30 MIN+: CPT | Performed by: NURSE PRACTITIONER

## 2020-10-08 RX ORDER — LANOLIN ALCOHOL/MO/W.PET/CERES
6 CREAM (GRAM) TOPICAL AT BEDTIME
COMMUNITY

## 2020-10-08 RX ORDER — OXYCODONE HYDROCHLORIDE 5 MG/1
5 TABLET ORAL EVERY 6 HOURS PRN
Qty: 20 TABLET | Refills: 0 | Status: SHIPPED | OUTPATIENT
Start: 2020-10-08 | End: 2020-10-28

## 2020-10-08 RX ORDER — METOCLOPRAMIDE 5 MG/1
5 TABLET ORAL EVERY 8 HOURS PRN
COMMUNITY

## 2020-10-08 ASSESSMENT — MIFFLIN-ST. JEOR: SCORE: 1335.39

## 2020-10-08 NOTE — PROGRESS NOTES
[place] [facility] DISCHARGE SUMMARY  PATIENT'S NAME: Lowell Arredondo  YOB: 1940  MEDICAL RECORD NUMBER:  [id number]  Place of Service where encounter took place:  [facility] ([facility]) [[id number]]    PRIMARY CARE PROVIDER AND CLINIC RESPONSIBLE AFTER TRANSFER:   Diego Segura MD, [place] [id number] / [place] M*    [facility] Provider     Transferring providers: Tonya Lynn Haase, APRN CNP, Dr. uJde Salazar MD  Recent Hospitalization/ED:  [facility] Hospital stay 9/19/20 to 9/23/20.  Date of SNF Admission: September / 23 / 2020  Date of SNF (anticipated) Discharge: October / 08 / 2020  Discharged to: previous independent home  Cognitive Scores: BIMS: 15/15 and SBT 0/28  Physical Function: Ambulating 120' feet w/ 2WW xCGAx1 ft with w/c trailing behind  DME: Wheelchair    CODE STATUS/ADVANCE DIRECTIVES DISCUSSION:  No CPR- Do NOT Intubate   ALLERGIES: Demerol [meperidine]    DISCHARGE DIAGNOSIS/NURSING FACILITY COURSE:   Patient progressed to walking up to 120 feet using a RW with CTG Assist, assist with ADL's, patient will DC home to live with wife with home PT, OT, RN and HHA through [facility].     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 He also has no past medical history of Asymptomatic human immunodeficiency virus (HIV) infection status (H), Blood in semen, Cerebral palsy (H), Complication of anesthesia, Congenital renal agenesis and dysgenesis, Epididymitis, bilateral, Epididymitis, left, Epididymitis, right, Goiter, Gout, Hernia, abdominal, History of spinal cord injury, History of thrombophlebitis, Horseshoe kidney, Hydrocephalus (H), Malignant hyperthermia, Mumps, Orchitis, epididymitis, and epididymo-orchitis, with  "abscess, Palpitations, Parkinsons disease (H), Penile discharge, Prostate infection, Spider veins, Spina bifida (H), STD (sexually transmitted disease), Swelling of testicle, Tethered cord (H), or Tuberculosis.    Discharge Medications:    Current Outpatient Medications   Medication Sig Dispense Refill     acetaminophen (TYLENOL) 325 MG suppository Place 650 mg rectally every 4 hours as needed for fever       acetaminophen (TYLENOL) 500 MG tablet Take 2 tablets (1,000 mg) by mouth 3 times daily 100 tablet 1     amoxicillin-clavulanate (AUGMENTIN) 875-125 MG tablet Take 1 tablet by mouth 2 times daily for 7 days 14 tablet 0     aspirin (ASA) 325 MG EC tablet Take 1 tablet (325 mg) by mouth daily for 28 days 28 tablet 0     gabapentin (NEURONTIN) 300 MG capsule Take 1 capsule (300 mg) by mouth 3 times daily 90 capsule 3     loratadine (CLARITIN) 10 MG tablet Take 10 mg by mouth daily as needed        melatonin 3 MG tablet Take 6 mg by mouth At Bedtime       metoclopramide (REGLAN) 5 MG tablet Take 5 mg by mouth every 8 hours as needed NAUSEA       morphine (MS CONTIN) 15 MG CR tablet Take 1 tablet (15 mg) by mouth every 12 hours maximum 2 tablet(s) per day 60 tablet 0     Nutritional Supplement LIQD Mighty Shake Plus one time a day for poor appetite, wt loss; 4 oz. (1 carton) vanilla flavor qd. Record % consumed.       ondansetron (ZOFRAN) 8 MG tablet Take 1 tablet (8 mg) by mouth every 8 hours as needed (Nausea/Vomiting) 10 tablet 2     oxyCODONE (ROXICODONE) 5 MG tablet Take 1 tablet (5 mg) by mouth every 4 hours as needed for pain or moderate to severe pain 1 tab po q 4 hrs prn pain scale \"1-5\"  2 tabs po q 4 hrs prn pain scale \"6-10\" 30 tablet 0     oxyCODONE HCl (OXAYDO) 5 MG TABA Take 5 mg by mouth every 4 hours as needed       polyethylene glycol (MIRALAX/GLYCOLAX) packet Take 17 g by mouth daily as needed for constipation          Medication Changes/Rationale:     DC flomax due to hypotension, attempted to " "restart but BP soft again, patient also had orthostasis with use of flomax    Started on augmentin 875mg BID for 7 days for pneumonia, medication started 10/4/20 will finish 10/11/20    Restarted PTA MS contin 15mg BID for pancreatic cancer.     Controlled medications sent with patient:   Medication oxycodone 5mg q 6 hours prn electronically prescribed to [facility] pharmacy     ROS:   10 point ROS of systems including Constitutional, Eyes, Respiratory, Cardiovascular, Gastroenterology, Genitourinary, Integumentary, Musculoskeletal, Psychiatric were all negative except for pertinent positives noted in my HPI.    Physical Exam:   Vitals: /66   Pulse 78   Temp 97.9  F (36.6  C)   Resp 16   Ht 1.727 m (5' 8\")   Wt 66.1 kg (145 lb 11.2 oz)   SpO2 97%   BMI 22.15 kg/m    BMI= Body mass index is 22.15 kg/m .  GENERAL APPEARANCE:  Alert, in no distress  ENT:  Mouth and posterior oropharynx normal, moist mucous membranes, [place]  EYES:  EOM, conjunctivae, lids, pupils and irises normal, PERRL  NECK:  .  RESP:  lungs clear to auscultation , no respiratory distress  CV:  Palpation and auscultation of heart done , regular rate and rhythm, no murmur, rub, or gallop, no edema  ABDOMEN:  normal bowel sounds, soft, nontender, no hepatosplenomegaly or other masses  M/S:   patient resting in bed, able to move all 4 extremities  SKIN:  Inspection of skin and subcutaneous tissue baseline, Palpation of skin and subcutaneous tissue did ot visualize right hip incision  NEURO:   Cranial nerves 2-12 are normal tested and grossly at patient's baseline, speech WNL  PSYCH:  affect and mood normal     SNF labs:   Most Recent 3 CBC's:  Recent Labs   Lab Test 10/03/20 09/22/20  0616 09/21/20  0726 09/20/20  0707   WBC 5.9  --  4.1 5.3   HGB 10.5* 10.0* 9.8* 10.5*   MCV 94.2  --  93 92     --  114* 103*     Most Recent 3 BMP's:  Recent Labs   Lab Test 10/03/20 09/22/20  0616 09/21/20  0726 09/20/20  0707   *  --  135 134 "   POTASSIUM 4.4  --  4.4 4.2   CHLORIDE 99  --  104 102   CO2 27  --  26 26   BUN 15  --  18 15   CR 1.03  --  1.02 1.04   ANIONGAP 6*  --  5 6   VICENTE 9.0  --  8.6 8.5   * 105* 134* 106*     ASSESSMENT/PLAN:  Bilateral Pneumonia:   Acute/ongoing: augmentin 875mg BID for 7 days started on 10/4/20 finish 10/11/20  Home RN for monitoring     Closed fracture of neck of right femur with delayed healing, subsequent encounter  Physical deconditioning  Acute/ongoing, DC home with home PT, OT, RN, A through [facility], continue tylenol 1000mg TID, neurontin 300mg TID,    MS contin 15mg q 12 hours  continue oxycodone 5mg q 6 hours prn   F/u with ortho as directed     Malignant neoplasm of pancreas, unspecified location of malignancy (H)  Ongoing: followed by oncology Dr. Segura, currently in [place] with  chemotherapy and radiation  continue MS contin 15mg q 12 hours     Orthostatic hypotension  Improved:  DC flomax     Benign prostatic hyperplasia with lower urinary tract symptoms, symptom details unspecified  H/O prostate cancer  Ongoing: DC flomax, monitor         DISCHARGE PLAN:    Follow up labs: No labs orders/due    Medical Follow Up:      Follow up with primary care provider in 1 weeks    MTM referral needed and placed by this provider: No    Current [place] scheduled appointments:     Future Appointments   Date Time Provider Department Center   No future appts.      Discharge Services: Home Care:  Occupational Therapy, Physical Therapy, Registered Nurse and Home Health Aide    Discharge Instructions Verbalized to Patient at Discharge:     None      TOTAL DISCHARGE TIME:   Greater than 30 minutes  Electronically signed by:  Tonya Lynn Haase, APRN CNP

## 2020-10-08 NOTE — TELEPHONE ENCOUNTER
Dr. Segura has not evaluated patient and can not speak to if he is ready for discharge. Patient currently at  [facility] receiving multidisciplinary team evaluation and care.    Will follow up on Monday  To see if patient has been discharged.     Kathe Coles RN

## 2020-10-08 NOTE — TELEPHONE ENCOUNTER
Billie calling requesting to speak with Dr. Segura about Lowell.  He is currently at [facility] because of a fall.   He has been there since 9/23, plan is to discontinue sat 10/10/20.    Billie is feeling is is too weak for this and wants to know if Dr. Segura has been corresponding with the [facility] team.     Advised her to call [facility] and pass this information to them as they are driving the discharge process.     She would still like to speak with Dr. Segura . [phone]

## 2020-10-09 ENCOUNTER — PATIENT OUTREACH (OUTPATIENT)
Dept: ONCOLOGY | Facility: CLINIC | Age: 80
End: 2020-10-09

## 2020-10-09 NOTE — PROGRESS NOTES
Writer called and spoke with patient's wife, Melvi, to follow up on patient's discharge. I was updated that patient will be discharged on 10/10. Melvi verbalizes on concerns of how patitent will do at home. I reviewed the services HRN, Pt/OT, and HHA.    Writer will follow up with patient and his wife on Monday.    Kathe Coles RN

## 2020-10-19 ENCOUNTER — PATIENT OUTREACH (OUTPATIENT)
Dept: ONCOLOGY | Facility: CLINIC | Age: 80
End: 2020-10-19

## 2020-10-19 NOTE — PROGRESS NOTES
Writer called and spoke with patient and is wife Melvi. They report patient is doing well at home . Both patient and the wife do not wan to start back up treatment at this time. They feel he isn't quite ready and the stairs in the house cause some concerns with going to appointments. I discussed Pt/OT and HCRN. They do not believe they need any further services, I advised that they continue them to assist with navigating the stairs,strengthening exercises, labs and vitals.  Patient verbalized he is doing the exercises and did the stairs at [facility] prior to discharge and does not believe there would be benefits to any further services. In further discussion they agreed to schedule exam and treatment in a few weeks. A message was sent to scheduling.    Kathe Coles RN

## 2020-10-21 ENCOUNTER — PATIENT OUTREACH (OUTPATIENT)
Dept: ONCOLOGY | Facility: CLINIC | Age: 80
End: 2020-10-21

## 2020-10-21 DIAGNOSIS — C25.9 MALIGNANT NEOPLASM OF PANCREAS, UNSPECIFIED LOCATION OF MALIGNANCY (H): ICD-10-CM

## 2020-10-21 RX ORDER — MORPHINE SULFATE 15 MG/1
15 TABLET, FILM COATED, EXTENDED RELEASE ORAL EVERY 12 HOURS
Qty: 60 TABLET | Refills: 0 | Status: SHIPPED | OUTPATIENT
Start: 2020-10-21 | End: 2020-11-11

## 2020-10-21 NOTE — PROGRESS NOTES
Writer received a call from patient's wife, June, who states patient is doing well and is requesting a new script for MSCONTIN.    Writer will route new script to Dr. Segura to review and sign.    Kathe Coles RN

## 2020-11-03 ENCOUNTER — TRANSFERRED RECORDS (OUTPATIENT)
Dept: HEALTH INFORMATION MANAGEMENT | Facility: CLINIC | Age: 80
End: 2020-11-03

## 2020-11-04 ENCOUNTER — MEDICAL CORRESPONDENCE (OUTPATIENT)
Dept: HEALTH INFORMATION MANAGEMENT | Facility: CLINIC | Age: 80
End: 2020-11-04

## 2020-11-10 ENCOUNTER — HOSPITAL ENCOUNTER (OUTPATIENT)
Facility: CLINIC | Age: 80
Setting detail: SPECIMEN
Discharge: HOME | End: 2020-11-10
Attending: INTERNAL MEDICINE | Admitting: INTERNAL MEDICINE
Payer: COMMERCIAL

## 2020-11-10 DIAGNOSIS — C25.2 MALIGNANT NEOPLASM OF TAIL OF PANCREAS (H): Primary | ICD-10-CM

## 2020-11-10 LAB
ALBUMIN SERPL-MCNC: 3 G/DL (ref 3.4–5)
ALP SERPL-CCNC: 88 U/L (ref 40–150)
ALT SERPL W P-5'-P-CCNC: 12 U/L (ref 0–70)
ANION GAP SERPL CALCULATED.3IONS-SCNC: 8 MMOL/L (ref 3–14)
AST SERPL W P-5'-P-CCNC: 15 U/L (ref 0–45)
BASOPHILS # BLD AUTO: 0.1 10E9/L (ref 0–0.2)
BASOPHILS NFR BLD AUTO: 1 %
BILIRUB SERPL-MCNC: 0.5 MG/DL (ref 0.2–1.3)
BUN SERPL-MCNC: 16 MG/DL (ref 7–30)
CALCIUM SERPL-MCNC: 9 MG/DL (ref 8.5–10.1)
CHLORIDE SERPL-SCNC: 106 MMOL/L (ref 94–109)
CO2 SERPL-SCNC: 25 MMOL/L (ref 20–32)
CREAT SERPL-MCNC: 0.79 MG/DL (ref 0.66–1.25)
DIFFERENTIAL METHOD BLD: ABNORMAL
EOSINOPHIL # BLD AUTO: 0.2 10E9/L (ref 0–0.7)
EOSINOPHIL NFR BLD AUTO: 3 %
ERYTHROCYTE [DISTWIDTH] IN BLOOD BY AUTOMATED COUNT: 15.9 % (ref 10–15)
GFR SERPL CREATININE-BSD FRML MDRD: 83 ML/MIN/{1.73_M2}
GLUCOSE SERPL-MCNC: 126 MG/DL (ref 70–99)
HCT VFR BLD AUTO: 34 % (ref 40–53)
HGB BLD-MCNC: 10.6 G/DL (ref 13.3–17.7)
IMM GRANULOCYTES # BLD: 0 10E9/L (ref 0–0.4)
IMM GRANULOCYTES NFR BLD: 0.4 %
LYMPHOCYTES # BLD AUTO: 1.4 10E9/L (ref 0.8–5.3)
LYMPHOCYTES NFR BLD AUTO: 28.1 %
MCH RBC QN AUTO: 29.4 PG (ref 26.5–33)
MCHC RBC AUTO-ENTMCNC: 31.2 G/DL (ref 31.5–36.5)
MCV RBC AUTO: 94 FL (ref 78–100)
MONOCYTES # BLD AUTO: 0.5 10E9/L (ref 0–1.3)
MONOCYTES NFR BLD AUTO: 10.1 %
NEUTROPHILS # BLD AUTO: 2.9 10E9/L (ref 1.6–8.3)
NEUTROPHILS NFR BLD AUTO: 57.4 %
NRBC # BLD AUTO: 0 10*3/UL
NRBC BLD AUTO-RTO: 0 /100
PLATELET # BLD AUTO: 252 10E9/L (ref 150–450)
POTASSIUM SERPL-SCNC: 4 MMOL/L (ref 3.4–5.3)
PROT SERPL-MCNC: 7.2 G/DL (ref 6.8–8.8)
RBC # BLD AUTO: 3.6 10E12/L (ref 4.4–5.9)
SODIUM SERPL-SCNC: 139 MMOL/L (ref 133–144)
WBC # BLD AUTO: 5.1 10E9/L (ref 4–11)

## 2020-11-10 PROCEDURE — 85025 COMPLETE CBC W/AUTO DIFF WBC: CPT | Performed by: INTERNAL MEDICINE

## 2020-11-10 PROCEDURE — 99207 PR NO CHARGE LOS: CPT

## 2020-11-10 PROCEDURE — 36591 DRAW BLOOD OFF VENOUS DEVICE: CPT

## 2020-11-10 PROCEDURE — 80053 COMPREHEN METABOLIC PANEL: CPT | Performed by: INTERNAL MEDICINE

## 2020-11-10 PROCEDURE — 250N000011 HC RX IP 250 OP 636: Performed by: INTERNAL MEDICINE

## 2020-11-10 RX ORDER — HEPARIN SODIUM,PORCINE 10 UNIT/ML
5 VIAL (ML) INTRAVENOUS
Status: DISCONTINUED | OUTPATIENT
Start: 2020-11-10 | End: 2020-11-10 | Stop reason: HOSPADM

## 2020-11-10 RX ADMIN — Medication 5 ML: at 09:21

## 2020-11-10 NOTE — PROGRESS NOTES
Infusion Nursing Note:  Lowell Arredondo presents today for port labs.     present during visit today: Not Applicable.    Note: N/A.    Intravenous Access:  Lab draw site right chest, Needle type barreto, Gauge 20  Remains accessed for infusion tomorrow.    Treatment Conditions:  NA    Post Lab Assessment:  Patient tolerated blood collection   Site patent and intact, free from redness, edema or discomfort.  No evidence of extravasations.  Access (remains for infusion use tomorrow    Discharge Plan:   Patient and/or family verbalized understanding of  instructions and all questions answered.  Patient  to lobby in stable condition accompanied by: self.  Patient to see provider today: No  Departure Mode: Ambulatory with walker.  Roya Hawthorne, RN, RN

## 2020-11-11 ENCOUNTER — INFUSION THERAPY VISIT (OUTPATIENT)
Dept: INFUSION THERAPY | Facility: CLINIC | Age: 80
End: 2020-11-11
Attending: INTERNAL MEDICINE
Payer: COMMERCIAL

## 2020-11-11 ENCOUNTER — HOME INFUSION (PRE-WILLOW HOME INFUSION) (OUTPATIENT)
Dept: PHARMACY | Facility: CLINIC | Age: 80
End: 2020-11-11

## 2020-11-11 ENCOUNTER — VIRTUAL VISIT (OUTPATIENT)
Dept: ONCOLOGY | Facility: CLINIC | Age: 80
End: 2020-11-11
Attending: INTERNAL MEDICINE
Payer: COMMERCIAL

## 2020-11-11 VITALS
HEART RATE: 87 BPM | RESPIRATION RATE: 16 BRPM | WEIGHT: 145.4 LBS | OXYGEN SATURATION: 97 % | DIASTOLIC BLOOD PRESSURE: 88 MMHG | TEMPERATURE: 98 F | BODY MASS INDEX: 22.11 KG/M2 | SYSTOLIC BLOOD PRESSURE: 107 MMHG

## 2020-11-11 DIAGNOSIS — C25.2 MALIGNANT NEOPLASM OF TAIL OF PANCREAS (H): Primary | ICD-10-CM

## 2020-11-11 DIAGNOSIS — C25.2 MALIGNANT NEOPLASM OF TAIL OF PANCREAS (H): ICD-10-CM

## 2020-11-11 DIAGNOSIS — G89.3 CANCER ASSOCIATED PAIN: ICD-10-CM

## 2020-11-11 DIAGNOSIS — C25.9 MALIGNANT NEOPLASM OF PANCREAS, UNSPECIFIED LOCATION OF MALIGNANCY (H): Primary | ICD-10-CM

## 2020-11-11 DIAGNOSIS — N40.1 BENIGN PROSTATIC HYPERPLASIA WITH LOWER URINARY TRACT SYMPTOMS, SYMPTOM DETAILS UNSPECIFIED: ICD-10-CM

## 2020-11-11 PROCEDURE — 96413 CHEMO IV INFUSION 1 HR: CPT

## 2020-11-11 PROCEDURE — G0498 CHEMO EXTEND IV INFUS W/PUMP: HCPCS

## 2020-11-11 PROCEDURE — 99214 OFFICE O/P EST MOD 30 MIN: CPT | Mod: 95 | Performed by: INTERNAL MEDICINE

## 2020-11-11 PROCEDURE — 96367 TX/PROPH/DG ADDL SEQ IV INF: CPT

## 2020-11-11 PROCEDURE — 250N000011 HC RX IP 250 OP 636: Performed by: INTERNAL MEDICINE

## 2020-11-11 PROCEDURE — 258N000003 HC RX IP 258 OP 636: Performed by: INTERNAL MEDICINE

## 2020-11-11 PROCEDURE — 96375 TX/PRO/DX INJ NEW DRUG ADDON: CPT

## 2020-11-11 PROCEDURE — 999N001193 HC VIDEO/TELEPHONE VISIT; NO CHARGE

## 2020-11-11 PROCEDURE — 96415 CHEMO IV INFUSION ADDL HR: CPT

## 2020-11-11 RX ORDER — SODIUM CHLORIDE 9 MG/ML
1000 INJECTION, SOLUTION INTRAVENOUS CONTINUOUS PRN
Status: CANCELLED
Start: 2020-11-11

## 2020-11-11 RX ORDER — TAMSULOSIN HYDROCHLORIDE 0.4 MG/1
0.4 CAPSULE ORAL
COMMUNITY
Start: 2020-10-19 | End: 2020-11-11

## 2020-11-11 RX ORDER — HEPARIN SODIUM (PORCINE) LOCK FLUSH IV SOLN 100 UNIT/ML 100 UNIT/ML
5 SOLUTION INTRAVENOUS
Status: CANCELLED | OUTPATIENT
Start: 2020-11-11

## 2020-11-11 RX ORDER — TAMSULOSIN HYDROCHLORIDE 0.4 MG/1
0.4 CAPSULE ORAL DAILY
Qty: 90 CAPSULE | Refills: 3 | Status: SHIPPED | OUTPATIENT
Start: 2020-11-11

## 2020-11-11 RX ORDER — DIPHENHYDRAMINE HYDROCHLORIDE 50 MG/ML
50 INJECTION INTRAMUSCULAR; INTRAVENOUS
Status: CANCELLED
Start: 2020-11-11

## 2020-11-11 RX ORDER — EPINEPHRINE 1 MG/ML
0.3 INJECTION, SOLUTION INTRAMUSCULAR; SUBCUTANEOUS EVERY 5 MIN PRN
Status: CANCELLED | OUTPATIENT
Start: 2020-11-11

## 2020-11-11 RX ORDER — OXYCODONE HYDROCHLORIDE 5 MG/1
5 TABLET ORAL EVERY 6 HOURS PRN
Qty: 6 TABLET | Refills: 0 | Status: SHIPPED | OUTPATIENT
Start: 2020-11-11 | End: 2020-11-12

## 2020-11-11 RX ORDER — NALOXONE HYDROCHLORIDE 0.4 MG/ML
.1-.4 INJECTION, SOLUTION INTRAMUSCULAR; INTRAVENOUS; SUBCUTANEOUS
Status: CANCELLED | OUTPATIENT
Start: 2020-11-11

## 2020-11-11 RX ORDER — MEPERIDINE HYDROCHLORIDE 25 MG/ML
25 INJECTION INTRAMUSCULAR; INTRAVENOUS; SUBCUTANEOUS EVERY 30 MIN PRN
Status: CANCELLED | OUTPATIENT
Start: 2020-11-11

## 2020-11-11 RX ORDER — METHYLPREDNISOLONE SODIUM SUCCINATE 125 MG/2ML
125 INJECTION, POWDER, LYOPHILIZED, FOR SOLUTION INTRAMUSCULAR; INTRAVENOUS
Status: CANCELLED
Start: 2020-11-11

## 2020-11-11 RX ORDER — HEPARIN SODIUM (PORCINE) LOCK FLUSH IV SOLN 100 UNIT/ML 100 UNIT/ML
5 SOLUTION INTRAVENOUS
Status: CANCELLED | OUTPATIENT
Start: 2020-11-12

## 2020-11-11 RX ORDER — MORPHINE SULFATE 15 MG/1
15 TABLET, FILM COATED, EXTENDED RELEASE ORAL EVERY 12 HOURS
Qty: 60 TABLET | Refills: 0 | Status: SHIPPED | OUTPATIENT
Start: 2020-11-11 | End: 2021-04-14

## 2020-11-11 RX ORDER — LORAZEPAM 2 MG/ML
0.5 INJECTION INTRAMUSCULAR EVERY 4 HOURS PRN
Status: CANCELLED
Start: 2020-11-11

## 2020-11-11 RX ORDER — ALBUTEROL SULFATE 90 UG/1
1-2 AEROSOL, METERED RESPIRATORY (INHALATION)
Status: CANCELLED
Start: 2020-11-11

## 2020-11-11 RX ORDER — ALBUTEROL SULFATE 0.83 MG/ML
2.5 SOLUTION RESPIRATORY (INHALATION)
Status: CANCELLED | OUTPATIENT
Start: 2020-11-11

## 2020-11-11 RX ORDER — HEPARIN SODIUM,PORCINE 10 UNIT/ML
5 VIAL (ML) INTRAVENOUS
Status: CANCELLED | OUTPATIENT
Start: 2020-11-12

## 2020-11-11 RX ADMIN — DEXAMETHASONE SODIUM PHOSPHATE: 10 INJECTION, SOLUTION INTRAMUSCULAR; INTRAVENOUS at 12:36

## 2020-11-11 RX ADMIN — OXALIPLATIN 150 MG: 5 INJECTION, SOLUTION, CONCENTRATE INTRAVENOUS at 12:55

## 2020-11-11 RX ADMIN — DEXTROSE MONOHYDRATE 250 ML: 50 INJECTION, SOLUTION INTRAVENOUS at 12:36

## 2020-11-11 ASSESSMENT — PAIN SCALES - GENERAL: PAINLEVEL: SEVERE PAIN (6)

## 2020-11-11 NOTE — PROGRESS NOTES
Infusion Nursing Note:  Lowell Arredondo presents today for C5D1 oxaliplatin and 5FU.    Patient seen by provider today: Yes: Dr. Segura.   present during visit today: Not Applicable.    Note: Patient has been off chemo since 9/8/20 due to right hip fracture.  He has now recovered and walking well with a walker.      Patient previously did not have coverage for a home disconnect for his chemo pump.  Now that he is considered home bound he should have coverage.  [place] [facility] is working on finding an agency to be able to do this.  Will follow up on this tomorrow.  Did send message to scheduling to set up for a pump discontinue here on Friday in case they are unable to set it up at home.    Intravenous Access:  Implanted Port, accessed yesterday.      Treatment Conditions:  Lab Results   Component Value Date    HGB 10.6 11/10/2020     Lab Results   Component Value Date    WBC 5.1 11/10/2020      Lab Results   Component Value Date    ANEU 2.9 11/10/2020     Lab Results   Component Value Date     11/10/2020      Lab Results   Component Value Date     11/10/2020                   Lab Results   Component Value Date    POTASSIUM 4.0 11/10/2020           Lab Results   Component Value Date    MAG 2.0 01/25/2020            Lab Results   Component Value Date    CR 0.79 11/10/2020                   Lab Results   Component Value Date    VICENTE 9.0 11/10/2020                Lab Results   Component Value Date    BILITOTAL 0.5 11/10/2020           Lab Results   Component Value Date    ALBUMIN 3.0 11/10/2020                    Lab Results   Component Value Date    ALT 12 11/10/2020           Lab Results   Component Value Date    AST 15 11/10/2020       Results reviewed, labs MET treatment parameters, ok to proceed with treatment.      Post Infusion Assessment:  Patient tolerated infusion without incident.  Blood return noted pre and post infusion.  Site patent and intact, free from redness, edema or  "discomfort.  No evidence of extravasations.       Discharge Plan:   Discharge instructions reviewed with: Patient.  Patient discharged in stable condition accompanied by: self.  Departure Mode: Ambulatory.      Prior to discharge: Port is secured in place with tegaderm and flushed with 10cc NS with positive blood return noted.  Continuous home infusion CADD pump connected.    All connectors secured in place and clamps taped open.    Pump started, \"running\" noted on display (CADD): YES.  Pump Connection double checked with Nicolasa STACY and Precious TOMAS.  Patient instructed to call our clinic or [facility] with any questions or concerns at home.  Patient verbalized understanding.    Patient set up for pump disconnect at our clinic on 11/13/2020.  See note above for possible home discontinue.         Danay Carvajal RN                        "

## 2020-11-11 NOTE — LETTER
"    11/11/2020         RE: Lowell Arredondo  [address] [address]        Dear Colleague,    Thank you for referring your patient, Lowell Arredondo, to the [facility]. Please see a copy of my visit note below.    Lowell Arredondo is a 80 year old male who is being evaluated via a billable video visit.      The patient has been notified of following:     \"This video visit will be conducted via a call between you and your physician/provider. We have found that certain health care needs can be provided without the need for an in-person physical exam.  This service lets us provide the care you need with a video conversation.  If a prescription is necessary we can send it directly to your pharmacy.  If lab work is needed we can place an order for that and you can then stop by our lab to have the test done at a later time.    Video visits are billed at different rates depending on your insurance coverage.  Please reach out to your insurance provider with any questions.    If during the course of the call the physician/provider feels a video visit is not appropriate, you will not be charged for this service.\"    Patient has given verbal consent for Video visit? Yes  How would you like to obtain your AVS? MyChart  If you are dropped from the video visit, the video invite should be resent to: Text to cell phone: [phone]   Will anyone else be joining your video visit? No      Video-Visit Details    Type of service:  Video Visit      Originating Location (pt. Location): Home    Distant Location (provider location):  [facility]     Platform used for Video Visit: AmWell Shari J. Schoenberger, CMA        Visit Date:   11/11/2020     ONCOLOGY HISTORY: Mr. Arredondo is a gentleman with metastatic pancreatic cancer.  -Prostate cancer Patricia 7.   1.  Prostate biopsy on 09/09/2019 revealed patricia 7 prostate cancer.  -Bone scan on 09/26/2019 does not " reveal any bone metastasis.  There is some cardiac uptake.   -CT abdomen and pelvis on 09/26/2019 reveals new soft tissue mesenteric mass in the region of pancreatic tail.      2.  EUS on 10/07/2019 revealed an irregular mass in the pancreatic tail measuring 3.8 x 2.2 cm.  There was some evidence of invasion into the portal vein.  No lymphadenopathy seen.  Based on the EUS, it was T3 Nx disease.    -FNA is positive for moderately differentiated pancreatic adenocarcinoma.      3. PET scan on 10/15/2019 reveals hypermetabolic 4.2 cm mass in the pancreatic tail and an adjacent 1 cm peripancreatic lymph node. No evidence of any metastatic disease. There are 2 foci of mild FDG uptake in the prostate gland from prostate cancer.      4. Neoadjuvant gemcitabine and Abraxane x 6 cycles between 11/07/2019 and 04/20/2020.  Abraxane stopped after cycle 4 because of toxicity.   -PET scan on 05/05/2020 reveals decrease in size of hypermetabolic mass at pancreatic tail and improvement in adjacent hypermetabolic lymph node.      5. Patient had diagnostic laparoscopy on 05/12/2020. He has unresectable pancreatic cancer. There are tumor implants.  -Peritoneal nodule biopsy is positive for adenocarcinoma.    6. FOLFOX without bolus 5-F U. started on 07/28/2020.  -Held after 09/08/2020 for hip fracture.  -Restarted on 11/11/2020     SUBJECTIVE:  Mr. Arredondo is an 80-year-old gentleman with metastatic pancreatic cancer.  The patient was on palliative chemotherapy with FOLFOX.  Treatment is on hold, as the patient had hip fracture on 09/19/2020.  He had surgery for right intertrochanteric femur fracture on 09/20/2020.  He is recovering well.      Overall, he is getting better.  He is stronger.  He is ambulating with the help of cane.      No headache.  No dizziness.  No chest pain.  No shortness of breath.  No nausea or vomiting.  Appetite is improving.  No fever or chills.      All other review of systems negative.      PHYSICAL  EXAMINATION:   GENERAL:  The patient is alert, oriented x 3.  He is not in any distress.   RESPIRATORY:  No cough.  No shortness of breath.   The rest of a comprehensive physical examination is deferred due to public health emergency video visit restrictions.      LABORATORY DATA:  Reviewed.      ASSESSMENT:   1.  Metastatic pancreatic cancer.   2.  Right hip fracture on 2020, status post surgery.   3.  Fatigue.   4.  Peripheral neuropathy, stable.      PLAN:   1.  The patient clinically is better.  He is getting stronger.  He is not in pain.      Discussed regarding resuming chemotherapy.  He is agreeable for it.  FOLFOX will be resumed.     2.  Discussed regarding getting a CT scan, as he has not had for some time.  He is agreeable for it.  CT chest, abdomen and pelvis will be ordered.     3.  I will see him with the next treatment.  Advised him to see a physician if he has any fever, chills, worsening weakness, cough, shortness of breath or any other concerns.         ROXY LANCASTER MD             D: 2020   T: 2020   MT: DANIEL      Name:     KARI YANG   MRN:      [id number]        Account:      [id number]   :      1940           Visit Date:   2020      Document: [id number]      Video visit time of 40 minutes.    This office note has been dictated.          Again, thank you for allowing me to participate in the care of your patient.        Sincerely,        Roxy Lancaster MD

## 2020-11-11 NOTE — PROGRESS NOTES
Visit Date:   11/11/2020     ONCOLOGY HISTORY: Mr. Arredondo is a gentleman with metastatic pancreatic cancer.  -Prostate cancer [place] 7.   1.  Prostate biopsy on 09/09/2019 revealed patricia 7 prostate cancer.  -Bone scan on 09/26/2019 does not reveal any bone metastasis.  There is some cardiac uptake.   -CT abdomen and pelvis on 09/26/2019 reveals new soft tissue mesenteric mass in the region of pancreatic tail.      2.  EUS on 10/07/2019 revealed an irregular mass in the pancreatic tail measuring 3.8 x 2.2 cm.  There was some evidence of invasion into the portal vein.  No lymphadenopathy seen.  Based on the EUS, it was T3 Nx disease.    -FNA is positive for moderately differentiated pancreatic adenocarcinoma.      3. PET scan on 10/15/2019 reveals hypermetabolic 4.2 cm mass in the pancreatic tail and an adjacent 1 cm peripancreatic lymph node. No evidence of any metastatic disease. There are 2 foci of mild FDG uptake in the prostate gland from prostate cancer.      4. Neoadjuvant gemcitabine and Abraxane x 6 cycles between 11/07/2019 and 04/20/2020.  Abraxane stopped after cycle 4 because of toxicity.   -PET scan on 05/05/2020 reveals decrease in size of hypermetabolic mass at pancreatic tail and improvement in adjacent hypermetabolic lymph node.      5. Patient had diagnostic laparoscopy on 05/12/2020. He has unresectable pancreatic cancer. There are tumor implants.  -Peritoneal nodule biopsy is positive for adenocarcinoma.    6. FOLFOX without bolus 5-F U. started on 07/28/2020.  -Held after 09/08/2020 for hip fracture.  -Restarted on 11/11/2020     SUBJECTIVE:  Mr. Arredondo is an 80-year-old gentleman with metastatic pancreatic cancer.  The patient was on palliative chemotherapy with FOLFOX.  Treatment is on hold, as the patient had hip fracture on 09/19/2020.  He had surgery for right intertrochanteric femur fracture on 09/20/2020.  He is recovering well.      Overall, he is getting better.  He is stronger.  He  is ambulating with the help of cane.      No headache.  No dizziness.  No chest pain.  No shortness of breath.  No nausea or vomiting.  Appetite is improving.  No fever or chills.      All other review of systems negative.      PHYSICAL EXAMINATION:   GENERAL:  The patient is alert, oriented x 3.  He is not in any distress.   RESPIRATORY:  No cough.  No shortness of breath.   The rest of a comprehensive physical examination is deferred due to public health emergency video visit restrictions.      LABORATORY DATA:  Reviewed.      ASSESSMENT:   1.  Metastatic pancreatic cancer.   2.  Right hip fracture on 2020, status post surgery.   3.  Fatigue.   4.  Peripheral neuropathy, stable.      PLAN:   1.  The patient clinically is better.  He is getting stronger.  He is not in pain.      Discussed regarding resuming chemotherapy.  He is agreeable for it.  FOLFOX will be resumed.     2.  Discussed regarding getting a CT scan, as he has not had for some time.  He is agreeable for it.  CT chest, abdomen and pelvis will be ordered.     3.  I will see him with the next treatment.  Advised him to see a physician if he has any fever, chills, worsening weakness, cough, shortness of breath or any other concerns.         ROXY LANCASTER MD             D: 2020   T: 2020   MT: DANIEL      Name:     KARI YANG   MRN:      [id number]        Account:      [id number]   :      1940           Visit Date:   2020      Document: [id number]      Video visit time of 40 minutes.

## 2020-11-11 NOTE — PROGRESS NOTES
"Lowell Arredondo is a 80 year old male who is being evaluated via a billable video visit.      The patient has been notified of following:     \"This video visit will be conducted via a call between you and your physician/provider. We have found that certain health care needs can be provided without the need for an in-person physical exam.  This service lets us provide the care you need with a video conversation.  If a prescription is necessary we can send it directly to your pharmacy.  If lab work is needed we can place an order for that and you can then stop by our lab to have the test done at a later time.    Video visits are billed at different rates depending on your insurance coverage.  Please reach out to your insurance provider with any questions.    If during the course of the call the physician/provider feels a video visit is not appropriate, you will not be charged for this service.\"    Patient has given verbal consent for Video visit? Yes  How would you like to obtain your AVS? MyChart  If you are dropped from the video visit, the video invite should be resent to: Text to cell phone: [phone]   Will anyone else be joining your video visit? No      Video-Visit Details    Type of service:  Video Visit      Originating Location (pt. Location): Home    Distant Location (provider location):  [facility] [place]     Platform used for Video Visit: AmWell Shari J. Schoenberger, [facility]      "

## 2020-11-12 ENCOUNTER — HOSPITAL ENCOUNTER (OUTPATIENT)
Facility: CLINIC | Age: 80
End: 2020-11-12
Admitting: INTERNAL MEDICINE
Payer: COMMERCIAL

## 2020-11-12 ENCOUNTER — TELEPHONE (OUTPATIENT)
Dept: ONCOLOGY | Facility: CLINIC | Age: 80
End: 2020-11-12

## 2020-11-12 ENCOUNTER — HOME INFUSION (PRE-WILLOW HOME INFUSION) (OUTPATIENT)
Dept: PHARMACY | Facility: CLINIC | Age: 80
End: 2020-11-12

## 2020-11-12 ENCOUNTER — VIRTUAL VISIT (OUTPATIENT)
Dept: ONCOLOGY | Facility: CLINIC | Age: 80
End: 2020-11-12
Attending: STUDENT IN AN ORGANIZED HEALTH CARE EDUCATION/TRAINING PROGRAM
Payer: COMMERCIAL

## 2020-11-12 DIAGNOSIS — G62.0 POLYNEUROPATHY FOLLOWING CHEMOTHERAPY (H): ICD-10-CM

## 2020-11-12 DIAGNOSIS — G89.3 CANCER ASSOCIATED PAIN: Primary | ICD-10-CM

## 2020-11-12 DIAGNOSIS — C25.9 MALIGNANT NEOPLASM OF PANCREAS, UNSPECIFIED LOCATION OF MALIGNANCY (H): ICD-10-CM

## 2020-11-12 DIAGNOSIS — T40.2X5A THERAPEUTIC OPIOID INDUCED CONSTIPATION: ICD-10-CM

## 2020-11-12 DIAGNOSIS — K59.03 THERAPEUTIC OPIOID INDUCED CONSTIPATION: ICD-10-CM

## 2020-11-12 DIAGNOSIS — Z79.891 ENCOUNTER FOR LONG-TERM USE OF OPIATE ANALGESIC: ICD-10-CM

## 2020-11-12 DIAGNOSIS — T45.1X5A POLYNEUROPATHY FOLLOWING CHEMOTHERAPY (H): ICD-10-CM

## 2020-11-12 DIAGNOSIS — Z51.5 ENCOUNTER FOR PALLIATIVE CARE: ICD-10-CM

## 2020-11-12 PROCEDURE — 999N001193 HC VIDEO/TELEPHONE VISIT; NO CHARGE

## 2020-11-12 PROCEDURE — 99214 OFFICE O/P EST MOD 30 MIN: CPT | Mod: 95 | Performed by: STUDENT IN AN ORGANIZED HEALTH CARE EDUCATION/TRAINING PROGRAM

## 2020-11-12 RX ORDER — GABAPENTIN 300 MG/1
CAPSULE ORAL
Qty: 120 CAPSULE | Refills: 3 | Status: SHIPPED | OUTPATIENT
Start: 2020-11-12

## 2020-11-12 RX ORDER — OXYCODONE HYDROCHLORIDE 5 MG/1
5 TABLET ORAL EVERY 6 HOURS PRN
Qty: 60 TABLET | Refills: 0 | Status: SHIPPED | OUTPATIENT
Start: 2020-11-12 | End: 2021-05-11

## 2020-11-12 ASSESSMENT — PAIN SCALES - GENERAL: PAINLEVEL: NO PAIN (0)

## 2020-11-12 NOTE — PROGRESS NOTES
This is a recent snapshot of the patient's [facility] medical record.  For current drug dose and complete information and questions, call [phone] or In Basket pool, fv home infusion ([id number])  CSN Number:  [id number]

## 2020-11-12 NOTE — PROGRESS NOTES
"Palliative Care Progress Note    Patient Name: Lowell Arredondo  Primary Provider: Diego Segura    Chief Complaint/Patient ID: 80-year-old male with prostate cancer.  Medical History:      - pancreatic cancer diagnosed Oct 2019, metastatic to abdominal wall              - Was on FOLFOX, currently on hold 2/2 hip fx 9/19              - plan for palliative XRT to subcutaneous nodules      - prostate cancer  -Neuropathy   -Constipation    Last Palliative care appointment: 9/3/2020 with Dr. Hyman- Recommendations:   \"Pain: related to metastatic disease. This limits his mobility/activity.   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  - eventually would like to increase gabapentin to assess benefit of higher doses     Constipation: discussed appropriate regimen with miralax and senna, likely increase in requirements with increasing opioids  - advised caution with fiber products     ACP: confirmed HCD today. Completed POLST: DNR/DNI\"     Reviewed: Yes.  10/21/2020-filled #60 tabs of MS Contin 15 mg and  #90 tabs of gabapentin 300mg. 10/8- filled #20 tabs of oxycodone 5mg.    Interim History:  Lowell Arredondo 80 year old male is seen today for follow up with Palliative Care via billable video visit.     Since last visit, suffered right intertrochanteric femur fracture, s/p surgery 9/20/20.  He is now home after some time in SNF. Has been doing his exercises daily.     Overall pain is doing well.  Taking MS Contin in the morning and at night.  Has been taking 1 oxycodone at noon, and they are planning to have one at about 3 AM as he does have pain overnight.  " Currently taking gabapentin 300 mg 3 times daily.  States the neuropathy is still bothersome to him.    Bowels have been more normal.  Taking 1 dose of MiraLAX and 1 senna tablet daily.  Currently having a bowel movement on average every day to every other day.    Restarted chemo yesterday, seems to tolerate well.    He and his wife may go to [place] in December for part of the winter.  They have discussed this with oncology.     Social History:  Back home after time spent in SNF after hip fracture.  Social History     Tobacco Use     Smoking status: Never Smoker     Smokeless tobacco: Never Used   Substance Use Topics     Alcohol use: Not Currently     Alcohol/week: 10.0 - 14.0 standard drinks     Types: 10 - 14 Standard drinks or equivalent per week     Drug use: Never     Family History- Reviewed in Epic.    Allergies   Allergen Reactions     Demerol [Meperidine] Difficulty breathing       Advanced Care Planning: [facility] dated July 2010: designated his wife Melvi as his agent with their daughter Jesi as alternate. POLSt completed 9/3/20- DNR/DNI.    Medications- Reviewed in Epic.    Past Medical History- Reviewed in [facility].    Past Surgical History- Reviewed in Epic.    Review of Systems:   ROS: 10 point ROS neg other than the symptoms noted above in the HPI.      Physical Exam:   Vitals- patient reported BP from today-121/79.  Constitutional: Alert, pleasant, no apparent distress. Sitting up in chair.  Eyes: Sclera non-icteric, no eye discharge.  ENT: No nasal discharge. Ears grossly normal.  Respiratory: Unlabored respirations. Speaking in full sentences.  Musculoskeletal: Extremities appear normal- no gross deformities noted. No edema noted on upper body.   Skin: No suspicious lesions or rashes on visible skin.  Neurologic: Clear speech, no aphasia. No facial droop.  Psychiatric: Mentation appears normal, appropriate attention. Affect normal/bright. Does not appear anxious or depressed.      Key Data  Reviewed:  LABS: 11/10/2020- Cr 0.79, Albumin 3.0, LFTs WNL. WBC 5.1, Hgb 10.6, Plts 252.     IMAGING: XR pelvis right 9/20/2020- Personally reviewed.  Evidence of ORIF R femur with nail and screw fixation.  No issues with alignment.    Opioid Risk Tool:   Opioid Risk Tool (ORT):    Family History of Substance Abuse:        Alcohol = 0 pt (no)       Illegal Drugs = 0 pt (no)       Prescription Drugs = 0 pt (no)       Personal History of Substance Abuse:       Alcohol = 0 pt (no)       Illegal Drugs = 0 pt (no)       Prescription Drugs = 0 pt (no)        Age: 0 pt (age < 16; age > 45)       Psychological Disease: 0 pt (none)       ORT Score = 0        0-3: Low risk for opioid abuse       4-7: Moderate risk for opioid abuse       >/= 8: High risk for opioid abuse     [facility] Outpatient Palliative Care Opioid Prescribing Safety Plan     Opioid Safety Education: Reviewed by Park Hyman MD on 9/3/2020  Opioid Risk Assessment: Performed by Park Hyman MD on 9/3/2020.  ORT score of 0.   Mood Disorder Assessment: Performed by Park Hyman MD on 9/3/2020.     reviewed with every prescription, last reviewed by Park Hyman MD on 09/03/2020      Additional recommendations based on patient's prognosis and indication for opioids include the following:  Expected prognosis: shorter  Risk: Low or Medium (ORT 0-7)  No further recommendations.     Impression & Recommendations & Counseling:  Lowell Arredondo is a 80 year old male with history of pancreatic cancer.  He additionally had a hip fracture in September, status post ORIF of R femur 9/20/2020.    Pain - currently well controlled with MS Contin 15 mg every 12 hours.  Agree with addition of 1 dose oxycodone overnight for breakthrough pain.    Neuropathy - improved with gabapentin but still present and bothersome.  Will increase bedtime dose to 600 mg and believed to daytime doses at 300 mg.    Bowels - no issues with current bowel regimen.  Continue  MiraLAX and senna daily.  Discussed that if he gets constipated with treatments, he may need to increase to 2 or 3 senna tabs daily.    Follow-up 3 to 4 months, sooner if questions or concerns.    Video-Visit Details  Video Start Time:  9:19 AM  Video End Time:  9:39 AM    Originating Location (pt. Location): Home     Distant Location (provider location): [facility]      Platform used for Video Visit: Stacey Crews, DO  Palliative Medicine   Pager [phone], [facility] ID [id number]

## 2020-11-12 NOTE — PATIENT INSTRUCTIONS
Recommendations:  -Agree with adding a dose of oxycodone overnight for breakthrough pain.  -Increase gabapentin at bedtime to 2 capsules.  Monitor for symptoms of low blood pressure, such as lightheadedness overnight.  Continue taking 1 gabapentin capsule in the morning and 1 midday.  -Continue your current bowel regimen as it seems to be working.  If you become more constipated with chemo, recommend increasing senna to 2 or 3 tablets daily.  Goal is to have an easy to pass bowel movement at least every 3 days.    Follow up: 3-4 months, sooner if questions or concerns.    Reasons to Call    If you are having worsening/uncontrolled symptoms we want you to call!    You or your other physicians make any changes to medications we have prescribed.    Important Phone Numbers    ISIDRO Eldridge, RN palliative care nurse clinician [phone]    Silvia Ang. LPN, Palliative care coordinator [phone]    Isela Roldan. Palliative Care RN. Answered 8 am to 4 pm . [phone]    Appointment Line.[phone]   *After hours or on weekends. Will connect you with on call MD. [phone]

## 2020-11-12 NOTE — TELEPHONE ENCOUNTER
Writer returned call and spoke with Rajendra of [facility] and they will fax orders for home disconnect.    Kathe Coles RN

## 2020-11-12 NOTE — LETTER
"    11/12/2020         RE: Lowell Arredondo  [address]  [place] [address]        Dear Colleague,    Thank you for referring your patient, Lowell Arredondo, to the [facility]. Please see a copy of my visit note below.    Palliative Care Progress Note    Patient Name: Lowell Arredondo  Primary Provider: Diego Segura    Chief Complaint/Patient ID: 80-year-old male with prostate cancer.  Medical History:      - pancreatic cancer diagnosed Oct 2019, metastatic to abdominal wall              - Was on FOLFOX, currently on hold 2/2 hip fx 9/19              - plan for palliative XRT to subcutaneous nodules      - prostate cancer  -Neuropathy   -Constipation    Last Palliative care appointment: 9/3/2020 with Dr. Hyman- Recommendations:   \"Pain: related to metastatic disease. This limits his mobility/activity.   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  - eventually would like to increase gabapentin to assess benefit of higher doses     Constipation: discussed appropriate regimen with miralax and senna, likely increase in requirements with increasing opioids  - advised caution with fiber products     ACP: confirmed HCD today. Completed POLST: DNR/DNI\"     Reviewed: Yes.  10/21/2020-filled #60 tabs of MS Contin 15 mg and  #90 tabs of gabapentin 300mg. 10/8- filled #20 tabs of oxycodone 5mg.    Interim History:  Lowell Arredondo 80 year old male is seen today for follow up with Palliative Care via billable video visit.     Since last visit, suffered right intertrochanteric femur fracture, s/p surgery 9/20/20.  He is now " home after some time in SNF. Has been doing his exercises daily.     Overall pain is doing well.  Taking MS Contin in the morning and at night.  Has been taking 1 oxycodone at noon, and they are planning to have one at about 3 AM as he does have pain overnight.  Currently taking gabapentin 300 mg 3 times daily.  States the neuropathy is still bothersome to him.    Bowels have been more normal.  Taking 1 dose of MiraLAX and 1 senna tablet daily.  Currently having a bowel movement on average every day to every other day.    Restarted chemo yesterday, seems to tolerate well.    He and his wife may go to [place] in December for part of the winter.  They have discussed this with oncology.     Social History:  Back home after time spent in SNF after hip fracture.  Social History     Tobacco Use     Smoking status: Never Smoker     Smokeless tobacco: Never Used   Substance Use Topics     Alcohol use: Not Currently     Alcohol/week: 10.0 - 14.0 standard drinks     Types: 10 - 14 Standard drinks or equivalent per week     Drug use: Never     Family History- Reviewed in Epic.    Allergies   Allergen Reactions     Demerol [Meperidine] Difficulty breathing       Advanced Care Planning: Psychiatric dated July 2010: designated his wife Melvi as his agent with their daughter Jesi as alternate. POLSt completed 9/3/20- DNR/DNI.    Medications- Reviewed in Epic.    Past Medical History- Reviewed in [employer].    Past Surgical History- Reviewed in Epic.    Review of Systems:   ROS: 10 point ROS neg other than the symptoms noted above in the HPI.      Physical Exam:   Vitals- patient reported BP from today-121/79.  Constitutional: Alert, pleasant, no apparent distress. Sitting up in chair.  Eyes: Sclera non-icteric, no eye discharge.  ENT: No nasal discharge. Ears grossly normal.  Respiratory: Unlabored respirations. Speaking in full sentences.  Musculoskeletal: Extremities appear normal- no gross deformities noted. No edema noted on upper body.    Skin: No suspicious lesions or rashes on visible skin.  Neurologic: Clear speech, no aphasia. No facial droop.  Psychiatric: Mentation appears normal, appropriate attention. Affect normal/bright. Does not appear anxious or depressed.      Key Data Reviewed:  LABS: 11/10/2020- Cr 0.79, Albumin 3.0, LFTs WNL. WBC 5.1, Hgb 10.6, Plts 252.     IMAGING: XR pelvis right 9/20/2020- Personally reviewed.  Evidence of ORIF R femur with nail and screw fixation.  No issues with alignment.    Opioid Risk Tool:   Opioid Risk Tool (ORT):    Family History of Substance Abuse:        Alcohol = 0 pt (no)       Illegal Drugs = 0 pt (no)       Prescription Drugs = 0 pt (no)       Personal History of Substance Abuse:       Alcohol = 0 pt (no)       Illegal Drugs = 0 pt (no)       Prescription Drugs = 0 pt (no)        Age: 0 pt (age < 16; age > 45)       Psychological Disease: 0 pt (none)       ORT Score = 0        0-3: Low risk for opioid abuse       4-7: Moderate risk for opioid abuse       >/= 8: High risk for opioid abuse     [facility] Outpatient Palliative Care Opioid Prescribing Safety Plan     Opioid Safety Education: Reviewed by Park Hyman MD on 9/3/2020  Opioid Risk Assessment: Performed by Park Hyman MD on 9/3/2020.  ORT score of 0.   Mood Disorder Assessment: Performed by Park Hyman MD on 9/3/2020.     reviewed with every prescription, last reviewed by Park Hyman MD on 09/03/2020      Additional recommendations based on patient's prognosis and indication for opioids include the following:  Expected prognosis: shorter  Risk: Low or Medium (ORT 0-7)  No further recommendations.     Impression & Recommendations & Counseling:  Lowell Arredondo is a 80 year old male with history of pancreatic cancer.  He additionally had a hip fracture in September, status post ORIF of R femur 9/20/2020.    Pain - currently well controlled with MS Contin 15 mg every 12 hours.  Agree with addition of 1 dose  "oxycodone overnight for breakthrough pain.    Neuropathy - improved with gabapentin but still present and bothersome.  Will increase bedtime dose to 600 mg and believed to daytime doses at 300 mg.    Bowels - no issues with current bowel regimen.  Continue MiraLAX and senna daily.  Discussed that if he gets constipated with treatments, he may need to increase to 2 or 3 senna tabs daily.    Follow-up 3 to 4 months, sooner if questions or concerns.    Video-Visit Details  Video Start Time:  9:19 AM  Video End Time:  9:39 AM    Originating Location (pt. Location): Home     Distant Location (provider location): [facility]      Platform used for Video Visit: Stacey Crews,   Palliative Medicine   Pager [phone], [facility] ID [id number]      Lowell Arredondo is a 80 year old male who is being evaluated via a billable video visit.      The patient has been notified of following:     \"This video visit will be conducted via a call between you and your physician/provider. We have found that certain health care needs can be provided without the need for an in-person physical exam.  This service lets us provide the care you need with a video conversation.  If a prescription is necessary we can send it directly to your pharmacy.  If lab work is needed we can place an order for that and you can then stop by our lab to have the test done at a later time.    Video visits are billed at different rates depending on your insurance coverage.  Please reach out to your insurance provider with any questions.    If during the course of the call the physician/provider feels a video visit is not appropriate, you will not be charged for this service.\"    Patient has given verbal consent for Video visit? Yes  How would you like to obtain your AVS? MyChart  If you are dropped from the video visit, the video invite should be resent to: Text to cell phone: [phone]   Will anyone else be joining your video " visit? No      Video-Visit Details    Type of service:  Video Visit      Originating Location (pt. Location): Home    Distant Location (provider location):  [facility]     Platform used for Video Visit: [employer] Shari J. Schoenberger, CMA          Again, thank you for allowing me to participate in the care of your patient.        Sincerely,        Leatha Crews, DO

## 2020-11-12 NOTE — PROGRESS NOTES
"Lowell Arredondo is a 80 year old male who is being evaluated via a billable video visit.      The patient has been notified of following:     \"This video visit will be conducted via a call between you and your physician/provider. We have found that certain health care needs can be provided without the need for an in-person physical exam.  This service lets us provide the care you need with a video conversation.  If a prescription is necessary we can send it directly to your pharmacy.  If lab work is needed we can place an order for that and you can then stop by our lab to have the test done at a later time.    Video visits are billed at different rates depending on your insurance coverage.  Please reach out to your insurance provider with any questions.    If during the course of the call the physician/provider feels a video visit is not appropriate, you will not be charged for this service.\"    Patient has given verbal consent for Video visit? Yes  How would you like to obtain your AVS? MyChart  If you are dropped from the video visit, the video invite should be resent to: Text to cell phone: [phone]   Will anyone else be joining your video visit? No      Video-Visit Details    Type of service:  Video Visit      Originating Location (pt. Location): Home    Distant Location (provider location):  [facility]     Platform used for Video Visit: [employer] Shari J. Schoenberger, [facility]      "

## 2020-11-12 NOTE — TELEPHONE ENCOUNTER
Called and spoke with patient's wife and confirmed a home infusion nurse is coming to their home tomorrow to disconnect 5FU pump.  She said they are coming at 1PM and a box of supplies was delivered today.  Appointment here cancelled for pump discontinue.

## 2020-11-12 NOTE — LETTER
"    11/12/2020         RE: Lowell Arredondo  [address]  [facility] [address]        Dear Colleague,    Thank you for referring your patient, Lowell Arredondo, to the [facility]. Please see a copy of my visit note below.    Palliative Care Progress Note    Patient Name: Lowell Arredondo  Primary Provider: Diego Segura    Chief Complaint/Patient ID: 80-year-old male with prostate cancer.  Medical History:      - pancreatic cancer diagnosed Oct 2019, metastatic to abdominal wall              - Was on FOLFOX, currently on hold 2/2 hip fx 9/19              - plan for palliative XRT to subcutaneous nodules      - prostate cancer  -Neuropathy   -Constipation    Last Palliative care appointment: 9/3/2020 with Dr. Hyman- Recommendations:   \"Pain: related to metastatic disease. This limits his mobility/activity.   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  - eventually would like to increase gabapentin to assess benefit of higher doses     Constipation: discussed appropriate regimen with miralax and senna, likely increase in requirements with increasing opioids  - advised caution with fiber products     ACP: confirmed HCD today. Completed POLST: DNR/DNI\"     Reviewed: Yes.  10/21/2020-filled #60 tabs of MS Contin 15 mg and  #90 tabs of gabapentin 300mg. 10/8- filled #20 tabs of oxycodone 5mg.    Interim History:  Lowell Arredondo 80 year old male is seen today for follow up with Palliative Care via billable video visit.     Since last visit, suffered right intertrochanteric femur fracture, s/p surgery 9/20/20.  He is now " home after some time in SNF. Has been doing his exercises daily.     Overall pain is doing well.  Taking MS Contin in the morning and at night.  Has been taking 1 oxycodone at noon, and they are planning to have one at about 3 AM as he does have pain overnight.  Currently taking gabapentin 300 mg 3 times daily.  States the neuropathy is still bothersome to him.    Bowels have been more normal.  Taking 1 dose of MiraLAX and 1 senna tablet daily.  Currently having a bowel movement on average every day to every other day.    Restarted chemo yesterday, seems to tolerate well.    He and his wife may go to [place] in December for part of the winter.  They have discussed this with oncology.     Social History:  Back home after time spent in SNF after hip fracture.  Social History     Tobacco Use     Smoking status: Never Smoker     Smokeless tobacco: Never Used   Substance Use Topics     Alcohol use: Not Currently     Alcohol/week: 10.0 - 14.0 standard drinks     Types: 10 - 14 Standard drinks or equivalent per week     Drug use: Never     Family History- Reviewed in Epic.    Allergies   Allergen Reactions     Demerol [Meperidine] Difficulty breathing       Advanced Care Planning: [facility] dated July 2010: designated his wife Melvi as his agent with their daughter Jesi as alternate. POLSt completed 9/3/20- DNR/DNI.    Medications- Reviewed in Epic.    Past Medical History- Reviewed in [place].    Past Surgical History- Reviewed in Epic.    Review of Systems:   ROS: 10 point ROS neg other than the symptoms noted above in the HPI.      Physical Exam:   Vitals- patient reported BP from today-121/79.  Constitutional: Alert, pleasant, no apparent distress. Sitting up in chair.  Eyes: Sclera non-icteric, no eye discharge.  ENT: No nasal discharge. Ears grossly normal.  Respiratory: Unlabored respirations. Speaking in full sentences.  Musculoskeletal: Extremities appear normal- no gross deformities noted. No edema noted on upper body.    Skin: No suspicious lesions or rashes on visible skin.  Neurologic: Clear speech, no aphasia. No facial droop.  Psychiatric: Mentation appears normal, appropriate attention. Affect normal/bright. Does not appear anxious or depressed.      Key Data Reviewed:  LABS: 11/10/2020- Cr 0.79, Albumin 3.0, LFTs WNL. WBC 5.1, Hgb 10.6, Plts 252.     IMAGING: XR pelvis right 9/20/2020- Personally reviewed.  Evidence of ORIF R femur with nail and screw fixation.  No issues with alignment.    Opioid Risk Tool:   Opioid Risk Tool (ORT):    Family History of Substance Abuse:        Alcohol = 0 pt (no)       Illegal Drugs = 0 pt (no)       Prescription Drugs = 0 pt (no)       Personal History of Substance Abuse:       Alcohol = 0 pt (no)       Illegal Drugs = 0 pt (no)       Prescription Drugs = 0 pt (no)        Age: 0 pt (age < 16; age > 45)       Psychological Disease: 0 pt (none)       ORT Score = 0        0-3: Low risk for opioid abuse       4-7: Moderate risk for opioid abuse       >/= 8: High risk for opioid abuse     [facility] Outpatient Palliative Care Opioid Prescribing Safety Plan     Opioid Safety Education: Reviewed by Park Hyman MD on 9/3/2020  Opioid Risk Assessment: Performed by Park Hyman MD on 9/3/2020.  ORT score of 0.   Mood Disorder Assessment: Performed by Park Hyman MD on 9/3/2020.     reviewed with every prescription, last reviewed by Park Hyman MD on 09/03/2020      Additional recommendations based on patient's prognosis and indication for opioids include the following:  Expected prognosis: shorter  Risk: Low or Medium (ORT 0-7)  No further recommendations.     Impression & Recommendations & Counseling:  Lowell Arredondo is a 80 year old male with history of pancreatic cancer.  He additionally had a hip fracture in September, status post ORIF of R femur 9/20/2020.    Pain - currently well controlled with MS Contin 15 mg every 12 hours.  Agree with addition of 1 dose  "oxycodone overnight for breakthrough pain.    Neuropathy - improved with gabapentin but still present and bothersome.  Will increase bedtime dose to 600 mg and believed to daytime doses at 300 mg.    Bowels - no issues with current bowel regimen.  Continue MiraLAX and senna daily.  Discussed that if he gets constipated with treatments, he may need to increase to 2 or 3 senna tabs daily.    Follow-up 3 to 4 months, sooner if questions or concerns.    Video-Visit Details  Video Start Time:  9:19 AM  Video End Time:  9:39 AM    Originating Location (pt. Location): Home     Distant Location (provider location): [facility]      Platform used for Video Visit: Stacey Crews,   Palliative Medicine   Pager [phone], [id number]      Lowell Arredondo is a 80 year old male who is being evaluated via a billable video visit.      The patient has been notified of following:     \"This video visit will be conducted via a call between you and your physician/provider. We have found that certain health care needs can be provided without the need for an in-person physical exam.  This service lets us provide the care you need with a video conversation.  If a prescription is necessary we can send it directly to your pharmacy.  If lab work is needed we can place an order for that and you can then stop by our lab to have the test done at a later time.    Video visits are billed at different rates depending on your insurance coverage.  Please reach out to your insurance provider with any questions.    If during the course of the call the physician/provider feels a video visit is not appropriate, you will not be charged for this service.\"    Patient has given verbal consent for Video visit? Yes  How would you like to obtain your AVS? MyChart  If you are dropped from the video visit, the video invite should be resent to: Text to cell phone: [phone]   Will anyone else be joining your video " visit? No      Video-Visit Details    Type of service:  Video Visit      Originating Location (pt. Location): Home    Distant Location (provider location):  [facility]     Platform used for Video Visit: [employer] Shari J. Schoenberger, CMA          Again, thank you for allowing me to participate in the care of your patient.        Sincerely,        Leatha Crews, DO

## 2020-11-13 NOTE — PROGRESS NOTES
This is a recent snapshot of the patient's [facility] medical record.  For current drug dose and complete information and questions, call [phone] or In Basket pool, fv home infusion ([id number])  CSN Number:  [id number]

## 2020-11-15 ENCOUNTER — TELEPHONE (OUTPATIENT)
Dept: ONCOLOGY | Facility: CLINIC | Age: 80
End: 2020-11-15

## 2020-11-16 NOTE — TELEPHONE ENCOUNTER
JULIETTE CorbetteLuis   June Called to update on where to send records .  [facility]  [address]  [place] [address]    Phone [phone]  Fax [phone]    Attn: Dr. Phu Carreon    She would like [facility] records sent down there so she can arrange an appointment for him.

## 2020-11-19 ENCOUNTER — HOSPITAL ENCOUNTER (OUTPATIENT)
Dept: CT IMAGING | Facility: CLINIC | Age: 80
Discharge: HOME | End: 2020-11-19
Attending: INTERNAL MEDICINE | Admitting: INTERNAL MEDICINE
Payer: COMMERCIAL

## 2020-11-19 DIAGNOSIS — C25.9 MALIGNANT NEOPLASM OF PANCREAS, UNSPECIFIED LOCATION OF MALIGNANCY (H): ICD-10-CM

## 2020-11-19 PROCEDURE — 250N000011 HC RX IP 250 OP 636

## 2020-11-19 PROCEDURE — 250N000009 HC RX 250: Performed by: INTERNAL MEDICINE

## 2020-11-19 PROCEDURE — 74177 CT ABD & PELVIS W/CONTRAST: CPT

## 2020-11-19 PROCEDURE — 250N000011 HC RX IP 250 OP 636: Performed by: INTERNAL MEDICINE

## 2020-11-19 RX ORDER — IOPAMIDOL 755 MG/ML
500 INJECTION, SOLUTION INTRAVASCULAR ONCE
Status: COMPLETED | OUTPATIENT
Start: 2020-11-19 | End: 2020-11-19

## 2020-11-19 RX ORDER — HEPARIN SODIUM (PORCINE) LOCK FLUSH IV SOLN 100 UNIT/ML 100 UNIT/ML
SOLUTION INTRAVENOUS
Status: COMPLETED
Start: 2020-11-19 | End: 2020-11-19

## 2020-11-19 RX ORDER — HEPARIN SODIUM (PORCINE) LOCK FLUSH IV SOLN 100 UNIT/ML 100 UNIT/ML
5 SOLUTION INTRAVENOUS ONCE
Status: COMPLETED | OUTPATIENT
Start: 2020-11-19 | End: 2020-11-19

## 2020-11-19 RX ADMIN — IOPAMIDOL 71 ML: 755 INJECTION, SOLUTION INTRAVENOUS at 11:03

## 2020-11-19 RX ADMIN — HEPARIN 5 ML: 100 SYRINGE at 11:09

## 2020-11-19 RX ADMIN — HEPARIN SODIUM (PORCINE) LOCK FLUSH IV SOLN 100 UNIT/ML 5 ML: 100 SOLUTION at 11:09

## 2020-11-19 RX ADMIN — SODIUM CHLORIDE 58 ML: 9 INJECTION, SOLUTION INTRAVENOUS at 11:03

## 2020-11-24 ENCOUNTER — VIRTUAL VISIT (OUTPATIENT)
Dept: ONCOLOGY | Facility: CLINIC | Age: 80
End: 2020-11-24
Attending: INTERNAL MEDICINE
Payer: COMMERCIAL

## 2020-11-24 DIAGNOSIS — C25.2 MALIGNANT NEOPLASM OF TAIL OF PANCREAS (H): Primary | ICD-10-CM

## 2020-11-24 PROCEDURE — 99213 OFFICE O/P EST LOW 20 MIN: CPT | Mod: 95 | Performed by: INTERNAL MEDICINE

## 2020-11-24 PROCEDURE — 999N001193 HC VIDEO/TELEPHONE VISIT; NO CHARGE

## 2020-11-24 RX ORDER — EPINEPHRINE 1 MG/ML
0.3 INJECTION, SOLUTION INTRAMUSCULAR; SUBCUTANEOUS EVERY 5 MIN PRN
Status: CANCELLED | OUTPATIENT
Start: 2020-11-25

## 2020-11-24 RX ORDER — HEPARIN SODIUM (PORCINE) LOCK FLUSH IV SOLN 100 UNIT/ML 100 UNIT/ML
5 SOLUTION INTRAVENOUS
Status: CANCELLED | OUTPATIENT
Start: 2020-11-26

## 2020-11-24 RX ORDER — ALBUTEROL SULFATE 90 UG/1
1-2 AEROSOL, METERED RESPIRATORY (INHALATION)
Status: CANCELLED
Start: 2020-11-25

## 2020-11-24 RX ORDER — HEPARIN SODIUM,PORCINE 10 UNIT/ML
5 VIAL (ML) INTRAVENOUS
Status: CANCELLED | OUTPATIENT
Start: 2020-11-26

## 2020-11-24 RX ORDER — LORAZEPAM 2 MG/ML
0.5 INJECTION INTRAMUSCULAR EVERY 4 HOURS PRN
Status: CANCELLED
Start: 2020-11-25

## 2020-11-24 RX ORDER — HEPARIN SODIUM,PORCINE 10 UNIT/ML
5 VIAL (ML) INTRAVENOUS
Status: CANCELLED | OUTPATIENT
Start: 2020-11-25

## 2020-11-24 RX ORDER — METHYLPREDNISOLONE SODIUM SUCCINATE 125 MG/2ML
125 INJECTION, POWDER, LYOPHILIZED, FOR SOLUTION INTRAMUSCULAR; INTRAVENOUS
Status: CANCELLED
Start: 2020-11-25

## 2020-11-24 RX ORDER — HEPARIN SODIUM (PORCINE) LOCK FLUSH IV SOLN 100 UNIT/ML 100 UNIT/ML
5 SOLUTION INTRAVENOUS
Status: CANCELLED | OUTPATIENT
Start: 2020-11-25

## 2020-11-24 RX ORDER — SODIUM CHLORIDE 9 MG/ML
1000 INJECTION, SOLUTION INTRAVENOUS CONTINUOUS PRN
Status: CANCELLED
Start: 2020-11-25

## 2020-11-24 RX ORDER — DIPHENHYDRAMINE HYDROCHLORIDE 50 MG/ML
50 INJECTION INTRAMUSCULAR; INTRAVENOUS
Status: CANCELLED
Start: 2020-11-25

## 2020-11-24 RX ORDER — MEPERIDINE HYDROCHLORIDE 25 MG/ML
25 INJECTION INTRAMUSCULAR; INTRAVENOUS; SUBCUTANEOUS EVERY 30 MIN PRN
Status: CANCELLED | OUTPATIENT
Start: 2020-11-25

## 2020-11-24 RX ORDER — ALBUTEROL SULFATE 0.83 MG/ML
2.5 SOLUTION RESPIRATORY (INHALATION)
Status: CANCELLED | OUTPATIENT
Start: 2020-11-25

## 2020-11-24 RX ORDER — NALOXONE HYDROCHLORIDE 0.4 MG/ML
.1-.4 INJECTION, SOLUTION INTRAMUSCULAR; INTRAVENOUS; SUBCUTANEOUS
Status: CANCELLED | OUTPATIENT
Start: 2020-11-25

## 2020-11-24 ASSESSMENT — PAIN SCALES - GENERAL: PAINLEVEL: MODERATE PAIN (4)

## 2020-11-24 NOTE — LETTER
"    11/24/2020         RE: Lowell Arredondo  [address] [address]        Dear Colleague,    Thank you for referring your patient, Lowell Arredondo, to the [facility]. Please see a copy of my visit note below.    Lowell Arredondo is a 80 year old male who is being evaluated via a billable video visit.      The patient has been notified of following:     \"This video visit will be conducted via a call between you and your physician/provider. We have found that certain health care needs can be provided without the need for an in-person physical exam.  This service lets us provide the care you need with a video conversation.  If a prescription is necessary we can send it directly to your pharmacy.  If lab work is needed we can place an order for that and you can then stop by our lab to have the test done at a later time.    Video visits are billed at different rates depending on your insurance coverage.  Please reach out to your insurance provider with any questions.    If during the course of the call the physician/provider feels a video visit is not appropriate, you will not be charged for this service.\"    Patient has given verbal consent for Video visit? Yes  How would you like to obtain your AVS? MyChart     Will anyone else be joining your video visit? Wife June in video visit    {If patient encounters technical issues they should call [phone] :[id number]}    Patient in virtual lobby    Video-Visit Details    Type of service:  Video Visit    Originating Location (pt. Location): Home    Distant Location (provider location):  [facility]     Platform used for Video Visit: Stacey Zuluaga MA        Visit Date:   11/24/2020     ONCOLOGY HISTORY: Mr. Arredondo is a gentleman with metastatic pancreatic cancer.  -Prostate cancer [place] 7.   1.  Prostate biopsy on 09/09/2019 revealed patricia 7 prostate cancer.  -Bone scan on 09/26/2019 " does not reveal any bone metastasis.  There is some cardiac uptake.   -CT abdomen and pelvis on 09/26/2019 reveals new soft tissue mesenteric mass in the region of pancreatic tail.      2.  EUS on 10/07/2019 revealed an irregular mass in the pancreatic tail measuring 3.8 x 2.2 cm.  There was some evidence of invasion into the portal vein.  No lymphadenopathy seen.  Based on the EUS, it was T3 Nx disease.    -FNA is positive for moderately differentiated pancreatic adenocarcinoma.      3. PET scan on 10/15/2019 reveals hypermetabolic 4.2 cm mass in the pancreatic tail and an adjacent 1 cm peripancreatic lymph node. No evidence of any metastatic disease. There are 2 foci of mild FDG uptake in the prostate gland from prostate cancer.      4. Neoadjuvant gemcitabine and Abraxane x 6 cycles between 11/07/2019 and 04/20/2020.  Abraxane stopped after cycle 4 because of toxicity.   -PET scan on 05/05/2020 reveals decrease in size of hypermetabolic mass at pancreatic tail and improvement in adjacent hypermetabolic lymph node.      5. Patient had diagnostic laparoscopy on 05/12/2020. He has unresectable pancreatic cancer. There are tumor implants.  -Peritoneal nodule biopsy is positive for adenocarcinoma.     6. FOLFOX without bolus 5-F U. started on 07/28/2020.  -Held after 09/08/2020 for hip fracture.  -Restarted on 11/11/2020.     SUBJECTIVE:  Mr. Arredondo is an 80-year-old gentleman with metastatic pancreatic cancer.  He started his chemotherapy with FOLFOX on 11/11/2020.  Overall, he tolerated first cycle well.      CT chest, abdomen and pelvis was done on 11/19/2020.  It reveals progression of disease.      Overall, his condition is stable.  No headache.  No dizziness.  No neck pain.  No chest pain.  No shortness of breath.  No nausea or vomiting.  Appetite is fairly good.  No fever, chills or night sweats.      All other review of systems negative.      PHYSICAL EXAMINATION:    The patient is alert, oriented x 3.  This  was a telephone visit.      ASSESSMENT:   1.  Metastatic pancreatic cancer which is progressing.   2.  Peripheral neuropathy, stable.   3.  Wedge-shaped area of hyper-enhancement within the spleen. 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      PLAN:   1.  CT scan was reviewed with the patient.  I explained to him it reveals progression of disease. He started chemotherapy with infusional 5-FU and oxaliplatin.  Hopefully, it will help to control his cancer for the next few months.  He will continue on it.  He will let us know if his neuropathy gets worse.   2.  The patient is going to [place] for winter months.  He will continue his chemotherapy there.   3.  The patient advised to have a followup CT scan in [place] in 2-3 months   4.  Discussed regarding abnormality in the spleen.  Could be a tumor thrombus.  He is asymptomatic.  It will be monitored on next CT scan.  Advised him to see a physician if he has worsening abdominal pain.   5.  He and his wife had a few questions, which were all answered.  I will see him when he returns from [place].         ROXY LANCASTER MD             D: 2020   T: 2020   MT: DANIEL      Name:     KARI YANG   MRN:      [id number]        Account:      [id number]   :      1940           Visit Date:   2020      Document: [id number]      Telephone visit for 11 minutes.    Visit Date:   2020      SUBJECTIVE:  Mr. Yang is an 80-year-old gentleman with metastatic pancreatic cancer.  He started his chemotherapy with FOLFOX on 2020.  Overall, he tolerated first cycle well.      CT chest, abdomen and pelvis was done on 2020.  It reveals progression of disease.      Overall, his condition is stable.  No headache.  No dizziness.  No neck pain.  No chest pain.  No shortness of breath.  No nausea or vomiting.  Appetite fairly good.  No fever, chills or night sweats.      All other review of systems negative.      PHYSICAL EXAMINATION:  The patient is  alert, oriented x 3.  This was a telephone visit.      ASSESSMENT:   1.  Metastatic pancreatic cancer which is progressing.   2.  Peripheral neuropathy, stable.   3.  Wedge-shaped area of hyper-enhancement within the spleen. 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      PLAN:   1.  CT scan was reviewed with the patient.  I explained to him it reveals progression of disease.      He started chemotherapy with infusional 5-FU and oxaliplatin.  Hopefully, it will help to control his cancer for the next few months.  He will continue on it.  He will let us know if his neuropathy gets worse.   2.  The patient is going to [place] for winter months.  He will continue his chemotherapy there.   3.  The patient advised to have a followup CT scan in [place] in 2-3 months   4.  Discussed regarding abnormality in the spleen.  Could be a tumor thrombus.  He is asymptomatic.  It will be monitored on next CT scan.  Advised him to see a physician if he has worsening abdominal pain.   5.  He and his wife had a few questions, which were all answered.  I will see him when he returns from [place].         ROXY LANCASTER MD             D: 2020   T: 2020   MT: DANIEL      Name:     KARI YANG   MRN:      [id number]        Account:      [id number]   :      1940           Visit Date:   2020      Document: [id number]        Again, thank you for allowing me to participate in the care of your patient.        Sincerely,        Roxy Lancaster MD

## 2020-11-24 NOTE — PROGRESS NOTES
"Lowell Arredondo is a 80 year old male who is being evaluated via a billable video visit.      The patient has been notified of following:     \"This video visit will be conducted via a call between you and your physician/provider. We have found that certain health care needs can be provided without the need for an in-person physical exam.  This service lets us provide the care you need with a video conversation.  If a prescription is necessary we can send it directly to your pharmacy.  If lab work is needed we can place an order for that and you can then stop by our lab to have the test done at a later time.    Video visits are billed at different rates depending on your insurance coverage.  Please reach out to your insurance provider with any questions.    If during the course of the call the physician/provider feels a video visit is not appropriate, you will not be charged for this service.\"    Patient has given verbal consent for Video visit? Yes  How would you like to obtain your AVS? MyChart     Will anyone else be joining your video visit? Wife June in video visit        Patient in virtual lobby    Video-Visit Details    Type of service:  Video Visit    Originating Location (pt. Location): Home    Distant Location (provider location):  [facility]     Platform used for Video Visit: Stacey Zuluaga MA      "

## 2020-11-24 NOTE — PROGRESS NOTES
Visit Date:   11/24/2020     ONCOLOGY HISTORY: Mr. Arredondo is a gentleman with metastatic pancreatic cancer.  -Prostate cancer [place] 7.   1.  Prostate biopsy on 09/09/2019 revealed patricia 7 prostate cancer.  -Bone scan on 09/26/2019 does not reveal any bone metastasis.  There is some cardiac uptake.   -CT abdomen and pelvis on 09/26/2019 reveals new soft tissue mesenteric mass in the region of pancreatic tail.      2.  EUS on 10/07/2019 revealed an irregular mass in the pancreatic tail measuring 3.8 x 2.2 cm.  There was some evidence of invasion into the portal vein.  No lymphadenopathy seen.  Based on the EUS, it was T3 Nx disease.    -FNA is positive for moderately differentiated pancreatic adenocarcinoma.      3. PET scan on 10/15/2019 reveals hypermetabolic 4.2 cm mass in the pancreatic tail and an adjacent 1 cm peripancreatic lymph node. No evidence of any metastatic disease. There are 2 foci of mild FDG uptake in the prostate gland from prostate cancer.      4. Neoadjuvant gemcitabine and Abraxane x 6 cycles between 11/07/2019 and 04/20/2020.  Abraxane stopped after cycle 4 because of toxicity.   -PET scan on 05/05/2020 reveals decrease in size of hypermetabolic mass at pancreatic tail and improvement in adjacent hypermetabolic lymph node.      5. Patient had diagnostic laparoscopy on 05/12/2020. He has unresectable pancreatic cancer. There are tumor implants.  -Peritoneal nodule biopsy is positive for adenocarcinoma.     6. FOLFOX without bolus 5-F U. started on 07/28/2020.  -Held after 09/08/2020 for hip fracture.  -Restarted on 11/11/2020.     SUBJECTIVE:  Mr. Arredondo is an 80-year-old gentleman with metastatic pancreatic cancer.  He started his chemotherapy with FOLFOX on 11/11/2020.  Overall, he tolerated first cycle well.      CT chest, abdomen and pelvis was done on 11/19/2020.  It reveals progression of disease.      Overall, his condition is stable.  No headache.  No dizziness.  No neck pain.  No  chest pain.  No shortness of breath.  No nausea or vomiting.  Appetite is fairly good.  No fever, chills or night sweats.      All other review of systems negative.      PHYSICAL EXAMINATION:    The patient is alert, oriented x 3.  This was a telephone visit.      ASSESSMENT:   1.  Metastatic pancreatic cancer which is progressing.   2.  Peripheral neuropathy, stable.   3.  Wedge-shaped area of hyper-enhancement within the spleen. 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      PLAN:   1.  CT scan was reviewed with the patient.  I explained to him it reveals progression of disease. He started chemotherapy with infusional 5-FU and oxaliplatin.  Hopefully, it will help to control his cancer for the next few months.  He will continue on it.  He will let us know if his neuropathy gets worse.   2.  The patient is going to [place] for winter months.  He will continue his chemotherapy there.   3.  The patient advised to have a followup CT scan in [place] in 2-3 months   4.  Discussed regarding abnormality in the spleen.  Could be a tumor thrombus.  He is asymptomatic.  It will be monitored on next CT scan.  Advised him to see a physician if he has worsening abdominal pain.   5.  He and his wife had a few questions, which were all answered.  I will see him when he returns from [place].         ROXY LANCASTER MD             D: 2020   T: 2020   MT: DANIEL      Name:     KARI YANG   MRN:      [id number]        Account:      [id number]   :      1940           Visit Date:   2020      Document: [id number]      Telephone visit for 11 minutes.

## 2020-11-24 NOTE — PATIENT INSTRUCTIONS
1.  Continue chemotherapy  2.  Follow-up when he comes back from [place].    Spoke with patient spouse, June. They will call us when they are sure of return from [place] date for scheduling/Sydni

## 2020-11-25 ENCOUNTER — INFUSION THERAPY VISIT (OUTPATIENT)
Dept: INFUSION THERAPY | Facility: CLINIC | Age: 80
End: 2020-11-25
Attending: INTERNAL MEDICINE
Payer: COMMERCIAL

## 2020-11-25 ENCOUNTER — HOME INFUSION (PRE-WILLOW HOME INFUSION) (OUTPATIENT)
Dept: PHARMACY | Facility: CLINIC | Age: 80
End: 2020-11-25

## 2020-11-25 VITALS
TEMPERATURE: 98.3 F | SYSTOLIC BLOOD PRESSURE: 93 MMHG | DIASTOLIC BLOOD PRESSURE: 58 MMHG | RESPIRATION RATE: 16 BRPM | BODY MASS INDEX: 22.35 KG/M2 | OXYGEN SATURATION: 96 % | HEART RATE: 83 BPM | WEIGHT: 147 LBS

## 2020-11-25 DIAGNOSIS — C25.2 MALIGNANT NEOPLASM OF TAIL OF PANCREAS (H): Primary | ICD-10-CM

## 2020-11-25 LAB
ALBUMIN SERPL-MCNC: 2.8 G/DL (ref 3.4–5)
ALP SERPL-CCNC: 93 U/L (ref 40–150)
ALT SERPL W P-5'-P-CCNC: 12 U/L (ref 0–70)
ANION GAP SERPL CALCULATED.3IONS-SCNC: 5 MMOL/L (ref 3–14)
AST SERPL W P-5'-P-CCNC: 21 U/L (ref 0–45)
BASOPHILS # BLD AUTO: 0 10E9/L (ref 0–0.2)
BASOPHILS NFR BLD AUTO: 0.6 %
BILIRUB SERPL-MCNC: 0.4 MG/DL (ref 0.2–1.3)
BUN SERPL-MCNC: 17 MG/DL (ref 7–30)
CALCIUM SERPL-MCNC: 9.2 MG/DL (ref 8.5–10.1)
CHLORIDE SERPL-SCNC: 107 MMOL/L (ref 94–109)
CO2 SERPL-SCNC: 27 MMOL/L (ref 20–32)
CREAT SERPL-MCNC: 0.9 MG/DL (ref 0.66–1.25)
DIFFERENTIAL METHOD BLD: ABNORMAL
EOSINOPHIL # BLD AUTO: 0.2 10E9/L (ref 0–0.7)
EOSINOPHIL NFR BLD AUTO: 4.4 %
ERYTHROCYTE [DISTWIDTH] IN BLOOD BY AUTOMATED COUNT: 16.6 % (ref 10–15)
GFR SERPL CREATININE-BSD FRML MDRD: 79 ML/MIN/{1.73_M2}
GLUCOSE SERPL-MCNC: 117 MG/DL (ref 70–99)
HCT VFR BLD AUTO: 31.4 % (ref 40–53)
HGB BLD-MCNC: 9.9 G/DL (ref 13.3–17.7)
IMM GRANULOCYTES # BLD: 0 10E9/L (ref 0–0.4)
IMM GRANULOCYTES NFR BLD: 0.3 %
LYMPHOCYTES # BLD AUTO: 1.3 10E9/L (ref 0.8–5.3)
LYMPHOCYTES NFR BLD AUTO: 37.1 %
MCH RBC QN AUTO: 29.7 PG (ref 26.5–33)
MCHC RBC AUTO-ENTMCNC: 31.5 G/DL (ref 31.5–36.5)
MCV RBC AUTO: 94 FL (ref 78–100)
MONOCYTES # BLD AUTO: 0.5 10E9/L (ref 0–1.3)
MONOCYTES NFR BLD AUTO: 12.7 %
NEUTROPHILS # BLD AUTO: 1.6 10E9/L (ref 1.6–8.3)
NEUTROPHILS NFR BLD AUTO: 44.9 %
NRBC # BLD AUTO: 0 10*3/UL
NRBC BLD AUTO-RTO: 0 /100
PLATELET # BLD AUTO: 195 10E9/L (ref 150–450)
POTASSIUM SERPL-SCNC: 3.7 MMOL/L (ref 3.4–5.3)
PROT SERPL-MCNC: 6.9 G/DL (ref 6.8–8.8)
RBC # BLD AUTO: 3.33 10E12/L (ref 4.4–5.9)
SODIUM SERPL-SCNC: 139 MMOL/L (ref 133–144)
WBC # BLD AUTO: 3.6 10E9/L (ref 4–11)

## 2020-11-25 PROCEDURE — 258N000003 HC RX IP 258 OP 636: Performed by: INTERNAL MEDICINE

## 2020-11-25 PROCEDURE — 80053 COMPREHEN METABOLIC PANEL: CPT | Performed by: INTERNAL MEDICINE

## 2020-11-25 PROCEDURE — 250N000011 HC RX IP 250 OP 636: Performed by: INTERNAL MEDICINE

## 2020-11-25 PROCEDURE — 96415 CHEMO IV INFUSION ADDL HR: CPT

## 2020-11-25 PROCEDURE — 96413 CHEMO IV INFUSION 1 HR: CPT

## 2020-11-25 PROCEDURE — G0498 CHEMO EXTEND IV INFUS W/PUMP: HCPCS

## 2020-11-25 PROCEDURE — 96367 TX/PROPH/DG ADDL SEQ IV INF: CPT

## 2020-11-25 PROCEDURE — 86301 IMMUNOASSAY TUMOR CA 19-9: CPT | Performed by: INTERNAL MEDICINE

## 2020-11-25 PROCEDURE — 85025 COMPLETE CBC W/AUTO DIFF WBC: CPT | Performed by: INTERNAL MEDICINE

## 2020-11-25 RX ADMIN — OXALIPLATIN 150 MG: 5 INJECTION, SOLUTION, CONCENTRATE INTRAVENOUS at 09:48

## 2020-11-25 RX ADMIN — DEXAMETHASONE SODIUM PHOSPHATE: 10 INJECTION, SOLUTION INTRAMUSCULAR; INTRAVENOUS at 09:17

## 2020-11-25 RX ADMIN — DEXTROSE MONOHYDRATE 250 ML: 50 INJECTION, SOLUTION INTRAVENOUS at 09:17

## 2020-11-25 NOTE — PROGRESS NOTES
"Infusion Nursing Note:  Lowell Arredondo presents today for C6D1 Folfox.    Patient seen by provider today: No   present during visit today: Not Applicable.    Note: Prior to discharge: Port is secured in place with tegaderm and flushed with 10cc NS with positive blood return noted.  Continuous home infusion CADD pump connected.    All connectors secured in place and clamps taped open.    Pump started, \"running\" noted on display (CADD): YES.  Pump Connection double checked with Laura Sandoval.  Patient instructed to call our clinic or [facility] with any questions or concerns at home.  Patient verbalized understanding.    Patient set up for pump disconnect at home with [facility] on 11/27/2020 10am. Appointment verbally confirmed with RN at home infusion.     Intravenous Access:  Implanted Port.    Treatment Conditions:  Lab Results   Component Value Date    HGB 9.9 11/25/2020     Lab Results   Component Value Date    WBC 3.6 11/25/2020      Lab Results   Component Value Date    ANEU 1.6 11/25/2020     Lab Results   Component Value Date     11/25/2020      Lab Results   Component Value Date     11/25/2020                   Lab Results   Component Value Date    POTASSIUM 3.7 11/25/2020           Lab Results   Component Value Date    MAG 2.0 01/25/2020            Lab Results   Component Value Date    CR 0.90 11/25/2020                   Lab Results   Component Value Date    VICENTE 9.2 11/25/2020                Lab Results   Component Value Date    BILITOTAL 0.4 11/25/2020           Lab Results   Component Value Date    ALBUMIN 2.8 11/25/2020                    Lab Results   Component Value Date    ALT 12 11/25/2020           Lab Results   Component Value Date    AST 21 11/25/2020       Results reviewed, labs MET treatment parameters, ok to proceed with treatment.      Post Infusion Assessment:  Patient tolerated infusion without incident.  Blood return noted pre and post " infusion.  Site patent and intact, free from redness, edema or discomfort.  No evidence of extravasations.       Discharge Plan:   Copy of AVS reviewed with patient and/or family.  Patient will receive care in [place] for next appointment.  Patient discharged in stable condition accompanied by: self.  Departure Mode: Ambulatory.    Danay Melo RN

## 2020-11-26 LAB — CANCER AG19-9 SERPL-ACNC: ABNORMAL U/ML (ref 0–37)

## 2020-11-27 ENCOUNTER — HOSPITAL ENCOUNTER (OUTPATIENT)
Facility: CLINIC | Age: 80
Setting detail: SPECIMEN
End: 2020-11-27
Attending: INTERNAL MEDICINE
Payer: COMMERCIAL

## 2020-11-30 NOTE — PROGRESS NOTES
This is a recent snapshot of the patient's [facility] medical record.  For current drug dose and complete information and questions, call [phone] or In Basket pool, fv home infusion ([id number])  CSN Number:  [id number]

## 2020-12-30 ENCOUNTER — TELEPHONE (OUTPATIENT)
Dept: ONCOLOGY | Facility: CLINIC | Age: 80
End: 2020-12-30

## 2020-12-30 NOTE — TELEPHONE ENCOUNTER
Chirag from [facility] called stating that they need the discharge summary signed by Dr. morales and faxed back to them, This was faxed to us on 12/21/20.  This was signed by Dr. Morales and faxed back to them at [phone] on 12/30/20.

## 2021-01-04 ENCOUNTER — PATIENT OUTREACH (OUTPATIENT)
Dept: ONCOLOGY | Facility: CLINIC | Age: 81
End: 2021-01-04

## 2021-01-04 NOTE — PROGRESS NOTES
NGS panel cancelled for 9/8.    If or when needed a new order will need to be submitted.    Kathe Coles RN

## 2021-01-15 ENCOUNTER — HEALTH MAINTENANCE LETTER (OUTPATIENT)
Age: 81
End: 2021-01-15

## 2021-01-27 ENCOUNTER — TRANSFERRED RECORDS (OUTPATIENT)
Dept: HEALTH INFORMATION MANAGEMENT | Facility: CLINIC | Age: 81
End: 2021-01-27

## 2021-01-29 LAB — COPATH REPORT: NORMAL

## 2021-02-05 ENCOUNTER — OFFICE VISIT (OUTPATIENT)
Dept: URBAN - METROPOLITAN AREA CLINIC 17 | Facility: CLINIC | Age: 81
End: 2021-02-05
Payer: COMMERCIAL

## 2021-02-05 DIAGNOSIS — Z18.9 RETAINED FB FRAGMENTS: Primary | ICD-10-CM

## 2021-02-05 DIAGNOSIS — T15.10XA: ICD-10-CM

## 2021-02-05 PROCEDURE — 99204 OFFICE O/P NEW MOD 45 MIN: CPT | Performed by: OPTOMETRIST

## 2021-02-05 NOTE — IMPRESSION/PLAN
Impression: Retained FB fragments: Z18.9. Plan: Removed small metal foreign body, right eye. Advised patient that foreign body has been removed and has fine corneal scratches that may be irritating but does not require treatment other than OTC tears if needed for comfort. Advised patient to contact office if condition worsens.

## 2021-02-08 NOTE — PROGRESS NOTES
Order(s) created erroneously. Erroneous order ID: [id number]   Order canceled by: JONI GOMES   Order cancel date/time: 02/08/2021 9:39 AM

## 2021-02-23 NOTE — NURSING NOTE
"Oncology Rooming Note    November 13, 2019 1:29 PM   Lowell Arredondo is a 79 year old male who presents for:    Chief Complaint   Patient presents with     Oncology Clinic Visit     Follow up labs     Initial Vitals: BP 92/56 (BP Location: Right arm, Patient Position: Sitting, Cuff Size: Adult Regular)   Pulse 83   Temp 96.6  F (35.9  C) (Oral)   Resp 14   Wt 71.3 kg (157 lb 3.2 oz)   SpO2 99%   BMI 23.91 kg/m   Estimated body mass index is 23.91 kg/m  as calculated from the following:    Height as of 11/7/19: 1.727 m (5' 7.99\").    Weight as of this encounter: 71.3 kg (157 lb 3.2 oz). Body surface area is 1.85 meters squared.  No Pain (0) Comment: Data Unavailable   No LMP for male patient.  Allergies reviewed: Yes  Medications reviewed: Yes    Medications: Medication refills not needed today.  Pharmacy name entered into [facility]:    [id number] [address]  [id number] [place], [place] - [address]    Clinical concerns:  NP was notified.      Farheen Bustamante CMA              "
Provider pre-printed instructions given

## 2021-04-07 ENCOUNTER — TRANSFERRED RECORDS (OUTPATIENT)
Dept: HEALTH INFORMATION MANAGEMENT | Facility: CLINIC | Age: 81
End: 2021-04-07

## 2021-04-09 NOTE — PROGRESS NOTES
Infusion Nursing Note:  Lowell GENTILE Socrateskarla presents today for neupogen.    Patient seen by provider today: No   present during visit today: Not Applicable.    Note: injection given in right arm.    Intravenous Access:  No Intravenous access/labs at this visit.    Treatment Conditions:  Not Applicable.      Post Infusion Assessment:  Patient tolerated injection without incident.  Site patent and intact, free from redness, edema or discomfort.       Discharge Plan:   Patient declined prescription refills.  Discharge instructions reviewed with: Patient.  Patient and/or family verbalized understanding of discharge instructions and all questions answered.  Patient discharged in stable condition accompanied by: wife.  Departure Mode: Ambulatory.    Jo Ann Galo RN                        
No

## 2021-04-14 DIAGNOSIS — G89.3 CANCER ASSOCIATED PAIN: ICD-10-CM

## 2021-04-14 DIAGNOSIS — C25.9 MALIGNANT NEOPLASM OF PANCREAS, UNSPECIFIED LOCATION OF MALIGNANCY (H): ICD-10-CM

## 2021-04-14 RX ORDER — MORPHINE SULFATE 15 MG/1
15 TABLET, FILM COATED, EXTENDED RELEASE ORAL EVERY 12 HOURS
Qty: 60 TABLET | Refills: 0 | Status: SHIPPED | OUTPATIENT
Start: 2021-04-14 | End: 2021-04-22

## 2021-04-14 NOTE — TELEPHONE ENCOUNTER
Pt flying back from [place] tmrw morning. Will need MS Contin 15mg BID ready for when he gets in.   Also, If Dr Segura would like to speak with Arvijays oncologist in [place], Dr Phu Singh,  he can be reached at [phone]  Routed to Dr Segura for refill. Pts wife will schedule follow up appt to see him.        oJlly Lion  Clinic & Infusion RN  [facility]

## 2021-04-21 NOTE — PROGRESS NOTES
Palliative Care Progress Note    Patient Name: Lowell Arredondo  Primary Provider: Diego Segura    Chief Complaint/Patient ID: 80-year-old male with pancreatic and prostate cancer.  Medical History:      - pancreatic cancer diagnosed Oct 2019, metastatic to abdominal wall              - Was on FOLFOX, was on hold 2/2 hip fx 9/19, resumed 11/11/20              - plan for palliative XRT to subcutaneous nodules      - prostate cancer  -Neuropathy   -Constipation  -Pain     Last Palliative care appointment: 11/12/20 with me.   Pain - currently well controlled with MS Contin 15 mg every 12 hours.  Agree with addition of 1 dose oxycodone overnight for breakthrough pain.     Neuropathy - improved with gabapentin but still present and bothersome.  Will increase bedtime dose to 600 mg and believed to daytime doses at 300 mg.     Bowels - no issues with current bowel regimen.  Continue MiraLAX and senna daily.  Discussed that if he gets constipated with treatments, he may need to increase to 2 or 3 senna tabs daily.      Reviewed: Yes, no concerns. Oncology refilled MSContin for #60 of the 15mg tablets on 4/17.    Interim History:  Lowell Arredondo 81 year old male is seen today for follow up with Palliative Care via billable video visit.      Has not seen Oncology since returning from [place]. Appt on 4/27.    Pain is bad when the pain pills wear off. Morning is the worst after sleeping. Takes morphine at 7AM and 8-9PM. Takes oxycodone at 7AM, 3:30PM, 8-9PM, and 2-3AM.     Bowels: Tends towards constipation. Taking miralax daily. Senna occasionally.    Struggling with dry mouth. Trying Biotene gel hard candy.    Neuropathy has been really bad. Wife ordered Mountain Ice gel for pain- using twice daily and really seems to be helpful.    Planning to discuss continuing treatment with Dr. Segura at visit.     Social History:  Spends Winter in [place] with his wife.  Social History     Tobacco Use     Smoking status: Never Smoker      Smokeless tobacco: Never Used   Substance Use Topics     Alcohol use: Not Currently     Alcohol/week: 10.0 - 14.0 standard drinks     Types: 10 - 14 Standard drinks or equivalent per week     Drug use: Never     Family History- Reviewed in Epic.    Allergies   Allergen Reactions     Demerol [Meperidine] Difficulty breathing     Advanced Care Planning: [facility] dated July 2010: designated his wife Melvi as his agent with their daughter Jesi as alternate. POLST completed 9/3/20- DNR/DNI.    Medications- Reviewed in Epic.    Past Medical History- Reviewed in [facility].    Past Surgical History- Reviewed in Epic.    Review of Systems:   ROS: 10 point ROS neg other than the symptoms noted above in the HPI.    Key Data Reviewed:  LABS: 11/25/20- Cr 0.9, Albumin 2.8, LFTs wnl. WBC 3.6, Hgb 9.9, Plts 195.   Cancer antigen 19-9: 18,072    IMAGING: CT CAP 11/19/20- IMPRESSION:   1.  Extensive enhancing nodules involving the anterior abdominal wall have increased in size and number, and are consistent with progression of metastatic disease.  2.  Heterogeneously enhancing mass in the region of the tail of the pancreas has increased in size, and is again noted to abut the splenic hilum and greater curvature of the stomach.  3.  Nodular soft tissue thickening along the superior aspect of the liver and multiple enhancing mesenteric nodules are new since the previous exam, highly suspicious for metastatic disease.  4.  Bilateral pleural effusions, moderate on the right and trace on the left, are new since the previous exam.  5.  Wedge-shaped area of hypoenhancement within the spleen anteriorly is consistent with an area of splenic infarction, possibly acute.  6.  Small amount of ascites is new since the previous exam.    [facility] Outpatient Palliative Care Opioid Prescribing Safety Plan     Opioid Safety Education: Reviewed by Park Hyman MD on 9/3/2020  Opioid Risk Assessment: Performed by Park Hyman MD on 9/3/2020.  ORT  score of 0.   Mood Disorder Assessment: Performed by Park Hyman MD on 9/3/2020.     reviewed with every prescription, last reviewed by Park Hyman MD on 09/03/2020      Additional recommendations based on patient's prognosis and indication for opioids include the following:  Expected prognosis: shorter  Risk: Low or Medium (ORT 0-7)  No further recommendations.     Impression & Recommendations & Counseling:  Lowell Arreodndo is a 81 year old male with history of pancreatic cancer.  He additionally had a hip fracture in September, status post ORIF of R femur 9/20/2020.     Cancer-related pain - Seems to struggle with end of dose failure particularly overnight. Discussed option of changing to Q8H dosing vs. Increasing nighttime dose. He would like to try increasing nighttime dose.   - Increase nightime dose of MSContin to 30mg, continue 15mg in AM  - Continue taking oxycodone 10mg for breakthrough pain. OK to take a dose an hour before bedtime MSContin.    Bowels - Continues to struggle with constipation.  Has gone 3 to 4 days without a bowel movement.  -Add 2nd dose of miralax today and tomorrow. Start Senna 2 tabs BID.  -May need to consider adding Movantik    Dry mouth - Discussed this is side effect of his other medications.  -Continue hard candies, artificial saliva, biotene gel    Neuropathy - He feels this is manageable currently with the Mountain Ice addition.  - Continue using the Mountain ice cream and gabapentin for neuropathy.       Due to technical issues, video visit had to be converted to telephone visit.  Telephone call length: 37 mins    Total time spent on day of encounter is 51 mins, including reviewing record, review of above studies, above visit with patient, and documentation.     Leatha Crews,   Palliative Medicine   Pager [phone], [facility] ID [id number]

## 2021-04-22 ENCOUNTER — VIRTUAL VISIT (OUTPATIENT)
Dept: ONCOLOGY | Facility: CLINIC | Age: 81
End: 2021-04-22
Attending: STUDENT IN AN ORGANIZED HEALTH CARE EDUCATION/TRAINING PROGRAM
Payer: COMMERCIAL

## 2021-04-22 DIAGNOSIS — K59.03 THERAPEUTIC OPIOID INDUCED CONSTIPATION: ICD-10-CM

## 2021-04-22 DIAGNOSIS — C25.2 MALIGNANT NEOPLASM OF TAIL OF PANCREAS (H): ICD-10-CM

## 2021-04-22 DIAGNOSIS — T45.1X5A POLYNEUROPATHY FOLLOWING CHEMOTHERAPY (H): ICD-10-CM

## 2021-04-22 DIAGNOSIS — Z79.891 ENCOUNTER FOR LONG-TERM USE OF OPIATE ANALGESIC: ICD-10-CM

## 2021-04-22 DIAGNOSIS — R68.2 DRY MOUTH: ICD-10-CM

## 2021-04-22 DIAGNOSIS — G89.3 CANCER ASSOCIATED PAIN: Primary | ICD-10-CM

## 2021-04-22 DIAGNOSIS — Z51.5 ENCOUNTER FOR PALLIATIVE CARE: ICD-10-CM

## 2021-04-22 DIAGNOSIS — T40.2X5A THERAPEUTIC OPIOID INDUCED CONSTIPATION: ICD-10-CM

## 2021-04-22 DIAGNOSIS — G62.0 POLYNEUROPATHY FOLLOWING CHEMOTHERAPY (H): ICD-10-CM

## 2021-04-22 DIAGNOSIS — C25.9 MALIGNANT NEOPLASM OF PANCREAS, UNSPECIFIED LOCATION OF MALIGNANCY (H): ICD-10-CM

## 2021-04-22 PROCEDURE — 99215 OFFICE O/P EST HI 40 MIN: CPT | Mod: 95 | Performed by: STUDENT IN AN ORGANIZED HEALTH CARE EDUCATION/TRAINING PROGRAM

## 2021-04-22 RX ORDER — MORPHINE SULFATE 15 MG/1
TABLET, FILM COATED, EXTENDED RELEASE ORAL
Qty: 90 TABLET | Refills: 0 | Status: SHIPPED | OUTPATIENT
Start: 2021-04-29

## 2021-04-22 ASSESSMENT — PAIN SCALES - GENERAL: PAINLEVEL: EXTREME PAIN (8)

## 2021-04-22 NOTE — LETTER
4/22/2021         RE: Lowell Arredondo  [address]  [place] [address]        Dear Colleague,    Thank you for referring your patient, Lowell Arredondo, to the [facility]. Please see a copy of my visit note below.    Palliative Care Progress Note    Patient Name: Lowell Arredondo  Primary Provider: Diego Segura    Chief Complaint/Patient ID: 80-year-old male with pancreatic and prostate cancer.  Medical History:      - pancreatic cancer diagnosed Oct 2019, metastatic to abdominal wall              - Was on FOLFOX, was on hold 2/2 hip fx 9/19, resumed 11/11/20              - plan for palliative XRT to subcutaneous nodules      - prostate cancer  -Neuropathy   -Constipation  -Pain     Last Palliative care appointment: 11/12/20 with me.   Pain - currently well controlled with MS Contin 15 mg every 12 hours.  Agree with addition of 1 dose oxycodone overnight for breakthrough pain.     Neuropathy - improved with gabapentin but still present and bothersome.  Will increase bedtime dose to 600 mg and believed to daytime doses at 300 mg.     Bowels - no issues with current bowel regimen.  Continue MiraLAX and senna daily.  Discussed that if he gets constipated with treatments, he may need to increase to 2 or 3 senna tabs daily.      Reviewed: Yes, no concerns. Oncology refilled MSContin for #60 of the 15mg tablets on 4/17.    Interim History:  Lowell Arredondo 81 year old male is seen today for follow up with Palliative Care via billable video visit.      Has not seen Oncology since returning from [place]. Appt on 4/27.    Pain is bad when the pain pills wear off. Morning is the worst after sleeping. Takes morphine at 7AM and 8-9PM. Takes oxycodone at 7AM, 3:30PM, 8-9PM, and 2-3AM.     Bowels: Tends towards constipation. Taking miralax daily. Senna occasionally.    Struggling with dry mouth. Trying Biotene gel hard candy.    Neuropathy has been really bad. Wife ordered Mountain Ice  gel for pain- using twice daily and really seems to be helpful.    Planning to discuss continuing treatment with Dr. Segura at visit.     Social History:  Spends Winter in [place] with his wife.  Social History     Tobacco Use     Smoking status: Never Smoker     Smokeless tobacco: Never Used   Substance Use Topics     Alcohol use: Not Currently     Alcohol/week: 10.0 - 14.0 standard drinks     Types: 10 - 14 Standard drinks or equivalent per week     Drug use: Never     Family History- Reviewed in Epic.    Allergies   Allergen Reactions     Demerol [Meperidine] Difficulty breathing     Advanced Care Planning: HCD dated July 2010: designated his wife Melvi as his agent with their daughter Jesi as alternate. POLST completed 9/3/20- DNR/DNI.    Medications- Reviewed in Epic.    Past Medical History- Reviewed in [facility].    Past Surgical History- Reviewed in Epic.    Review of Systems:   ROS: 10 point ROS neg other than the symptoms noted above in the HPI.    Key Data Reviewed:  LABS: 11/25/20- Cr 0.9, Albumin 2.8, LFTs wnl. WBC 3.6, Hgb 9.9, Plts 195.   Cancer antigen 19-9: 18,072    IMAGING: CT CAP 11/19/20- IMPRESSION:   1.  Extensive enhancing nodules involving the anterior abdominal wall have increased in size and number, and are consistent with progression of metastatic disease.  2.  Heterogeneously enhancing mass in the region of the tail of the pancreas has increased in size, and is again noted to abut the splenic hilum and greater curvature of the stomach.  3.  Nodular soft tissue thickening along the superior aspect of the liver and multiple enhancing mesenteric nodules are new since the previous exam, highly suspicious for metastatic disease.  4.  Bilateral pleural effusions, moderate on the right and trace on the left, are new since the previous exam.  5.  Wedge-shaped area of hypoenhancement within the spleen anteriorly is consistent with an area of splenic infarction, possibly acute.  6.  Small amount of ascites  is new since the previous exam.    [facility] Outpatient Palliative Care Opioid Prescribing Safety Plan     Opioid Safety Education: Reviewed by Park Hyman MD on 9/3/2020  Opioid Risk Assessment: Performed by Park Hyman MD on 9/3/2020.  ORT score of 0.   Mood Disorder Assessment: Performed by Park Hyman MD on 9/3/2020.     reviewed with every prescription, last reviewed by Park Hyman MD on 09/03/2020      Additional recommendations based on patient's prognosis and indication for opioids include the following:  Expected prognosis: shorter  Risk: Low or Medium (ORT 0-7)  No further recommendations.     Impression & Recommendations & Counseling:  Lowell Arredondo is a 81 year old male with history of pancreatic cancer.  He additionally had a hip fracture in September, status post ORIF of R femur 9/20/2020.     Cancer-related pain - Seems to struggle with end of dose failure particularly overnight. Discussed option of changing to Q8H dosing vs. Increasing nighttime dose. He would like to try increasing nighttime dose.   - Increase nightime dose of MSContin to 30mg, continue 15mg in AM  - Continue taking oxycodone 10mg for breakthrough pain. OK to take a dose an hour before bedtime MSContin.    Bowels - Continues to struggle with constipation.  Has gone 3 to 4 days without a bowel movement.  -Add 2nd dose of miralax today and tomorrow. Start Senna 2 tabs BID.  -May need to consider adding Movantik    Dry mouth - Discussed this is side effect of his other medications.  -Continue hard candies, artificial saliva, biotene gel    Neuropathy - He feels this is manageable currently with the Mountain Ice addition.  - Continue using the Mountain ice cream and gabapentin for neuropathy.       Due to technical issues, video visit had to be converted to telephone visit.  Telephone call length: 37 mins    Total time spent on day of encounter is 51 mins, including reviewing record, review of above  studies, above visit with patient, and documentation.     Leatha Crews DO  Palliative Medicine   Pager [phone], [id number]      Lowell is a 81 year old who is being evaluated via a billable video visit.      How would you like to obtain your AVS? MyChart  If the video visit is dropped, the invitation should be resent by: Text to cell phone: - [phone]    Will anyone else be joining your video visit? No      Video-Visit Details    Type of service:  Video Visit    Originating Location (pt. Location): Home    Distant Location (provider location):  [facility]     Platform used for Video Visit: [employer]      Send text to [phone]         Again, thank you for allowing me to participate in the care of your patient.        Sincerely,        Leatha Crews DO

## 2021-04-22 NOTE — PATIENT INSTRUCTIONS
Recommendations:  - Increase nighttime dose of morphine to 2 pills, Continue 1 pill in the morning.  I have sent an updated prescription to be filled next week.    - Continue taking the oxycodone for breakthrough pain.   -  Recommend taking an extra dose of MiraLAX today and tomorrow.  Also recommend starting senna 2 tablets twice daily.  You may need to increase this.  It is safe to take up to 4 tablets of senna twice daily.  Goal is for you to have an easy to pass bowel movement at least every 3 days.  If constipation is an ongoing issue despite appropriate use of these medications, we may need to consider a prescription medicine.  - Continue using the hard candies and artificial saliva or Biotene gel for your dry mouth   - Continue using the mouth and ice and gabapentin for neuropathy.  If you find neuropathy is no longer manageable, please let me know and we can make some adjustments to medications.       Follow up: One month    Reasons to Call    If you are having worsening/uncontrolled symptoms we want you to call!    You or your other physicians make any changes to medications we have prescribed.    Important Phone Numbers    Natalya Moreno. RN palliative care nurse clinician [phone]    Isela Parmar. Palliative Care RN. Answered 8 am to 4 pm . [phone]    Appointment Line.[phone]   *After hours or on weekends. Will connect you with on call MD. [phone]

## 2021-04-22 NOTE — LETTER
4/22/2021         RE: Lowell Arredondo  [address]  [place] [address]        Dear Colleague,    Thank you for referring your patient, Lowell Arredondo, to the [facility] CANCER [facility]. Please see a copy of my visit note below.    Palliative Care Progress Note    Patient Name: oLwell Arredondo  Primary Provider: Diego Segura    Chief Complaint/Patient ID: 80-year-old male with pancreatic and prostate cancer.  Medical History:      - pancreatic cancer diagnosed Oct 2019, metastatic to abdominal wall              - Was on FOLFOX, was on hold 2/2 hip fx 9/19, resumed 11/11/20              - plan for palliative XRT to subcutaneous nodules      - prostate cancer  -Neuropathy   -Constipation  -Pain     Last Palliative care appointment: 11/12/20 with me.   Pain - currently well controlled with MS Contin 15 mg every 12 hours.  Agree with addition of 1 dose oxycodone overnight for breakthrough pain.     Neuropathy - improved with gabapentin but still present and bothersome.  Will increase bedtime dose to 600 mg and believed to daytime doses at 300 mg.     Bowels - no issues with current bowel regimen.  Continue MiraLAX and senna daily.  Discussed that if he gets constipated with treatments, he may need to increase to 2 or 3 senna tabs daily.      Reviewed: Yes, no concerns. Oncology refilled MSContin for #60 of the 15mg tablets on 4/17.    Interim History:  Lowell Arredondo 81 year old male is seen today for follow up with Palliative Care via billable video visit.      Has not seen Oncology since returning from [place]. Appt on 4/27.    Pain is bad when the pain pills wear off. Morning is the worst after sleeping. Takes morphine at 7AM and 8-9PM. Takes oxycodone at 7AM, 3:30PM, 8-9PM, and 2-3AM.     Bowels: Tends towards constipation. Taking miralax daily. Senna occasionally.    Struggling with dry mouth. Trying Biotene gel hard candy.    Neuropathy has been really bad. Wife ordered Mountain Ice  gel for pain- using twice daily and really seems to be helpful.    Planning to discuss continuing treatment with Dr. Segura at visit.     Social History:  Spends Winter in [place] with his wife.  Social History     Tobacco Use     Smoking status: Never Smoker     Smokeless tobacco: Never Used   Substance Use Topics     Alcohol use: Not Currently     Alcohol/week: 10.0 - 14.0 standard drinks     Types: 10 - 14 Standard drinks or equivalent per week     Drug use: Never     Family History- Reviewed in Epic.    Allergies   Allergen Reactions     Demerol [Meperidine] Difficulty breathing     Advanced Care Planning: HCD dated July 2010: designated his wife Melvi as his agent with their daughter Jesi as alternate. POLST completed 9/3/20- DNR/DNI.    Medications- Reviewed in Epic.    Past Medical History- Reviewed in [facility].    Past Surgical History- Reviewed in Epic.    Review of Systems:   ROS: 10 point ROS neg other than the symptoms noted above in the HPI.    Key Data Reviewed:  LABS: 11/25/20- Cr 0.9, Albumin 2.8, LFTs wnl. WBC 3.6, Hgb 9.9, Plts 195.   Cancer antigen 19-9: 18,072    IMAGING: CT CAP 11/19/20- IMPRESSION:   1.  Extensive enhancing nodules involving the anterior abdominal wall have increased in size and number, and are consistent with progression of metastatic disease.  2.  Heterogeneously enhancing mass in the region of the tail of the pancreas has increased in size, and is again noted to abut the splenic hilum and greater curvature of the stomach.  3.  Nodular soft tissue thickening along the superior aspect of the liver and multiple enhancing mesenteric nodules are new since the previous exam, highly suspicious for metastatic disease.  4.  Bilateral pleural effusions, moderate on the right and trace on the left, are new since the previous exam.  5.  Wedge-shaped area of hypoenhancement within the spleen anteriorly is consistent with an area of splenic infarction, possibly acute.  6.  Small amount of ascites  is new since the previous exam.    [facility] Outpatient Palliative Care Opioid Prescribing Safety Plan     Opioid Safety Education: Reviewed by Park Hyman MD on 9/3/2020  Opioid Risk Assessment: Performed by Park Hyman MD on 9/3/2020.  ORT score of 0.   Mood Disorder Assessment: Performed by Park Hyman MD on 9/3/2020.     reviewed with every prescription, last reviewed by Park Hyman MD on 09/03/2020      Additional recommendations based on patient's prognosis and indication for opioids include the following:  Expected prognosis: shorter  Risk: Low or Medium (ORT 0-7)  No further recommendations.     Impression & Recommendations & Counseling:  Lowell Arredondo is a 81 year old male with history of pancreatic cancer.  He additionally had a hip fracture in September, status post ORIF of R femur 9/20/2020.     Cancer-related pain - Seems to struggle with end of dose failure particularly overnight. Discussed option of changing to Q8H dosing vs. Increasing nighttime dose. He would like to try increasing nighttime dose.   - Increase nightime dose of MSContin to 30mg, continue 15mg in AM  - Continue taking oxycodone 10mg for breakthrough pain. OK to take a dose an hour before bedtime MSContin.    Bowels - Continues to struggle with constipation.  Has gone 3 to 4 days without a bowel movement.  -Add 2nd dose of miralax today and tomorrow. Start Senna 2 tabs BID.  -May need to consider adding Movantik    Dry mouth - Discussed this is side effect of his other medications.  -Continue hard candies, artificial saliva, biotene gel    Neuropathy - He feels this is manageable currently with the Mountain Ice addition.  - Continue using the Mountain ice cream and gabapentin for neuropathy.       Due to technical issues, video visit had to be converted to telephone visit.  Telephone call length: 37 mins    Total time spent on day of encounter is 51 mins, including reviewing record, review of above  studies, above visit with patient, and documentation.     Leatha Crews DO  Palliative Medicine   Pager [phone], [facility] ID [id number]      Lowell is a 81 year old who is being evaluated via a billable video visit.      How would you like to obtain your AVS? MyChart  If the video visit is dropped, the invitation should be resent by: Text to cell phone: - [phone]    Will anyone else be joining your video visit? No      Video-Visit Details    Type of service:  Video Visit    Originating Location (pt. Location): Home    Distant Location (provider location):  [facility]     Platform used for Video Visit: [employer]      Send text to [phone]         Again, thank you for allowing me to participate in the care of your patient.        Sincerely,        Leatha Crews DO

## 2021-04-22 NOTE — PROGRESS NOTES
Lowell is a 81 year old who is being evaluated via a billable video visit.      How would you like to obtain your AVS? MyChart  If the video visit is dropped, the invitation should be resent by: Text to cell phone: - [phone]    Will anyone else be joining your video visit? No      Video-Visit Details    Type of service:  Video Visit    Originating Location (pt. Location): Home    Distant Location (provider location):  [facility]     Platform used for Video Visit: Stacey      Send text to [phone]

## 2021-04-27 ENCOUNTER — PATIENT OUTREACH (OUTPATIENT)
Dept: ONCOLOGY | Facility: CLINIC | Age: 81
End: 2021-04-27

## 2021-04-27 ENCOUNTER — VIRTUAL VISIT (OUTPATIENT)
Dept: ONCOLOGY | Facility: CLINIC | Age: 81
End: 2021-04-27
Attending: INTERNAL MEDICINE
Payer: COMMERCIAL

## 2021-04-27 DIAGNOSIS — C25.2 MALIGNANT NEOPLASM OF TAIL OF PANCREAS (H): Primary | ICD-10-CM

## 2021-04-27 PROCEDURE — 99214 OFFICE O/P EST MOD 30 MIN: CPT | Mod: 95 | Performed by: INTERNAL MEDICINE

## 2021-04-27 ASSESSMENT — PAIN SCALES - GENERAL: PAINLEVEL: SEVERE PAIN (6)

## 2021-04-27 NOTE — PROGRESS NOTES
Writer called and spoke with Melvi and was provided the # to the cancer clinic patient was seen at while in [place] over the winter.     MD Cyndi Keating NP, RN    Phone: [phone]  Fax: [phone]    Writer call the # and a VM was reached stating it was after hours.     Kathe Coles RN

## 2021-04-27 NOTE — LETTER
4/27/2021         RE: Lowell Arredondo  [address] [address]        Dear Colleague,    Thank you for referring your patient, Lowell Arredondo, to the [facility]. Please see a copy of my visit note below.    Lowell is a 81 year old who is being evaluated via a billable video visit.      How would you like to obtain your AVS? MyChart  If the video visit is dropped, the invitation should be resent by: Text to cell phone: - [phone]    Will anyone else be joining your video visit? No      Video-Visit Details    Type of service:  Video Visit    Originating Location (pt. Location): Home    Distant Location (provider location):  [facility]     Platform used for Video Visit: Orate      Visit Date: 04/27/2021    ONCOLOGY HISTORY: Mr. Arredondo is a gentleman with metastatic pancreatic cancer.  -Prostate cancer [place] 7.   1.  Prostate biopsy on 09/09/2019 revealed patricia 7 prostate cancer.  -Bone scan on 09/26/2019 does not reveal any bone metastasis.  There is some cardiac uptake.   -CT abdomen and pelvis on 09/26/2019 reveals new soft tissue mesenteric mass in the region of pancreatic tail.      2.  EUS on 10/07/2019 revealed an irregular mass in the pancreatic tail measuring 3.8 x 2.2 cm.  There was some evidence of invasion into the portal vein.  No lymphadenopathy seen.  Based on the EUS, it was T3 Nx disease.    -FNA is positive for moderately differentiated pancreatic adenocarcinoma.      3. PET scan on 10/15/2019 reveals hypermetabolic 4.2 cm mass in the pancreatic tail and an adjacent 1 cm peripancreatic lymph node. No evidence of any metastatic disease. There are 2 foci of mild FDG uptake in the prostate gland from prostate cancer.      4. Neoadjuvant gemcitabine and Abraxane x 6 cycles between 11/07/2019 and 04/20/2020.  Abraxane stopped after cycle 4 because of toxicity.   -PET scan on 05/05/2020 reveals decrease in size of  hypermetabolic mass at pancreatic tail and improvement in adjacent hypermetabolic lymph node.      5. Patient had diagnostic laparoscopy on 05/12/2020. He has unresectable pancreatic cancer. There are tumor implants.  -Peritoneal nodule biopsy is positive for adenocarcinoma.     6. FOLFOX without bolus 5-F U. started on 07/28/2020.  -Held after 09/08/2020 for hip fracture.  -Restarted on 11/11/2020.  -Last treatment was on 03/31/2021.    7. CT scan in 04/2021 in [place] revealed progression of disease.     SUBJECTIVE:  Mr. Arredondo is an 81-year-old gentleman with metastatic pancreatic cancer.  He was started on modified FOLFOX-6.  No bolus 5-FU.  He was in [place] and he continued on that.  His last treatment was on 03/31/2021.     The patient had CT scan done in [place].  It reveals progression of disease.     The patient's overall condition has been slowly deteriorating.  He is weaker.  He is still able to do daily activities of living.  He can still walk, bathe and eat.  Occasionally, he falls.     His appetite has been decreased.  He has lost some weight.  The patient said that he is all skin and bones.     No headache.  Some lightheadedness when he walks.  No chest pain.  No shortness of breath at rest.  Some nausea.  No recurrent vomiting.  Appetite has been decreased.  No urinary complaints.  No bowel problem.  No bleeding.  No fever or chills.     Wife was also on the video.  As per her also, patient has been getting weaker.  He has been requiring more help.     PHYSICAL EXAMINATION:    He is alert, oriented x 3. Not in any acute distress.    No cough.  No respiratory distress.  He is emaciated.   Rest of a comprehensive physical examination is deferred due to public health emergency and video visit restrictions.     ASSESSMENT:     1.  An 81-year-old gentleman with metastatic pancreatic cancer, which is progressing.    2.  Worsening fatigue.  3.  Decreased appetite.  4.  Weight loss.    5.  Peripheral  neuropathy.  6.  Recent falls.     PLAN:     1.  Discussed regarding pancreatic cancer.  He has been on palliative chemotherapy.  Unfortunately, his disease has progressed.     We discussed regarding further treatment.  The patient overall has been slowly deteriorating.  I explained to the patient that 1 option would be to give him further chemotherapy.  He can get 5-FU and liposomal irinotecan.  It will be associated with multiple different side effects. We also discussed regarding not doing any treatment and enrolling into hospice.  These were all discussed.     After discussion, patient does not want any further chemotherapy.  That is the best option given that his overall condition has  deteriorating.     Discussed regarding hospice.  This concept was discussed.  The patient said at this time he does not need hospice.  He is also open to meeting with hospice when his overall condition further deteriorates.  I told the wife to let us know when they would like to meet with hospice.      2.  The patient recently fell.  I advised him to be careful.  The patient uses a walker to move around.     3.  The patient and wife had a few questions, which were all answered.  I will see him in mid May.  We will repeat labs at that time.        Diego Segura MD           D: 2021   T: 2021   MT: [id number]     Name:     KARI YANG  MRN:      [id number]        Account:    [id number]   :      1940           Visit Date: 2021      Document: [id number]    Video time of 17 minutes.  Video started at 9:46 AM and completed at 10:03 AM.  Another 20 minutes spent in review of chart and investigations today.    This office note has been dictated.          Again, thank you for allowing me to participate in the care of your patient.        Sincerely,        Diego Segura MD

## 2021-04-27 NOTE — PROGRESS NOTES
Lowell is a 81 year old who is being evaluated via a billable video visit.      How would you like to obtain your AVS? MyChart  If the video visit is dropped, the invitation should be resent by: Text to cell phone: - [phone]    Will anyone else be joining your video visit? No      Video-Visit Details    Type of service:  Video Visit    Originating Location (pt. Location): Home    Distant Location (provider location):  [facility]     Platform used for Video Visit: [employer]

## 2021-04-27 NOTE — PROGRESS NOTES
Visit Date: 04/27/2021    ONCOLOGY HISTORY: Mr. Arredondo is a gentleman with metastatic pancreatic cancer.  -Prostate cancer [place] 7.   1.  Prostate biopsy on 09/09/2019 revealed patricia 7 prostate cancer.  -Bone scan on 09/26/2019 does not reveal any bone metastasis.  There is some cardiac uptake.   -CT abdomen and pelvis on 09/26/2019 reveals new soft tissue mesenteric mass in the region of pancreatic tail.      2.  EUS on 10/07/2019 revealed an irregular mass in the pancreatic tail measuring 3.8 x 2.2 cm.  There was some evidence of invasion into the portal vein.  No lymphadenopathy seen.  Based on the EUS, it was T3 Nx disease.    -FNA is positive for moderately differentiated pancreatic adenocarcinoma.      3. PET scan on 10/15/2019 reveals hypermetabolic 4.2 cm mass in the pancreatic tail and an adjacent 1 cm peripancreatic lymph node. No evidence of any metastatic disease. There are 2 foci of mild FDG uptake in the prostate gland from prostate cancer.      4. Neoadjuvant gemcitabine and Abraxane x 6 cycles between 11/07/2019 and 04/20/2020.  Abraxane stopped after cycle 4 because of toxicity.   -PET scan on 05/05/2020 reveals decrease in size of hypermetabolic mass at pancreatic tail and improvement in adjacent hypermetabolic lymph node.      5. Patient had diagnostic laparoscopy on 05/12/2020. He has unresectable pancreatic cancer. There are tumor implants.  -Peritoneal nodule biopsy is positive for adenocarcinoma.     6. FOLFOX without bolus 5-F U. started on 07/28/2020.  -Held after 09/08/2020 for hip fracture.  -Restarted on 11/11/2020.  -Last treatment was on 03/31/2021.    7. CT scan in 04/2021 in [place] revealed progression of disease.     SUBJECTIVE:  Mr. Arredondo is an 81-year-old gentleman with metastatic pancreatic cancer.  He was started on modified FOLFOX-6.  No bolus 5-FU.  He was in [place] and he continued on that.  His last treatment was on 03/31/2021.     The patient had CT scan done in  [place].  It reveals progression of disease.     The patient's overall condition has been slowly deteriorating.  He is weaker.  He is still able to do daily activities of living.  He can still walk, bathe and eat.  Occasionally, he falls.     His appetite has been decreased.  He has lost some weight.  The patient said that he is all skin and bones.     No headache.  Some lightheadedness when he walks.  No chest pain.  No shortness of breath at rest.  Some nausea.  No recurrent vomiting.  Appetite has been decreased.  No urinary complaints.  No bowel problem.  No bleeding.  No fever or chills.     Wife was also on the video.  As per her also, patient has been getting weaker.  He has been requiring more help.     PHYSICAL EXAMINATION:    He is alert, oriented x 3. Not in any acute distress.    No cough.  No respiratory distress.  He is emaciated.   Rest of a comprehensive physical examination is deferred due to public health emergency and video visit restrictions.     ASSESSMENT:     1.  An 81-year-old gentleman with metastatic pancreatic cancer, which is progressing.    2.  Worsening fatigue.  3.  Decreased appetite.  4.  Weight loss.    5.  Peripheral neuropathy.  6.  Recent falls.     PLAN:     1.  Discussed regarding pancreatic cancer.  He has been on palliative chemotherapy.  Unfortunately, his disease has progressed.     We discussed regarding further treatment.  The patient overall has been slowly deteriorating.  I explained to the patient that 1 option would be to give him further chemotherapy.  He can get 5-FU and liposomal irinotecan.  It will be associated with multiple different side effects. We also discussed regarding not doing any treatment and enrolling into hospice.  These were all discussed.     After discussion, patient does not want any further chemotherapy.  That is the best option given that his overall condition has  deteriorating.     Discussed regarding hospice.  This concept was discussed.   The patient said at this time he does not need hospice.  He is also open to meeting with hospice when his overall condition further deteriorates.  I told the wife to let us know when they would like to meet with hospice.      2.  The patient recently fell.  I advised him to be careful.  The patient uses a walker to move around.     3.  The patient and wife had a few questions, which were all answered.  I will see him in mid May.  We will repeat labs at that time.        Diego Segura MD           D: 2021   T: 2021   MT: JOHN     Name:     KARI YANG  MRN:      [id number]        Account:    [id number]   :      1940           Visit Date: 2021      Document: [id number]    Video time of 17 minutes.  Video started at 9:46 AM and completed at 10:03 AM.  Another 20 minutes spent in review of chart and investigations today.

## 2021-05-10 ENCOUNTER — PATIENT OUTREACH (OUTPATIENT)
Dept: ONCOLOGY | Facility: CLINIC | Age: 81
End: 2021-05-10

## 2021-05-10 NOTE — PROGRESS NOTES
Writer received a request from palliative care regarding Oxygen use for home.     Writer called and LVM requesting a return     Patient's previous documented vitals show 02 sats to be 93%    Kathe Coles RN

## 2021-05-10 NOTE — PROGRESS NOTES
Writer called and spoke with patient's wife Melvi tyler # for obtaining records from [place].     [place] [facility] [phone]    Writer called and LVM requesting a return call.    Kathe Coles RN

## 2021-05-11 DIAGNOSIS — C25.9 MALIGNANT NEOPLASM OF PANCREAS, UNSPECIFIED LOCATION OF MALIGNANCY (H): ICD-10-CM

## 2021-05-11 DIAGNOSIS — G89.3 CANCER ASSOCIATED PAIN: ICD-10-CM

## 2021-05-11 RX ORDER — OXYCODONE HYDROCHLORIDE 10 MG/1
10 TABLET ORAL EVERY 6 HOURS PRN
Qty: 120 TABLET | Refills: 0 | Status: SHIPPED | OUTPATIENT
Start: 2021-05-11

## 2021-05-11 NOTE — TELEPHONE ENCOUNTER
"REFILL REQUEST    Requested Medication(s):  Oxycodone 10 mg tabs  (6 tabs left at this time)    Last Refill:  Per wife, was last refilled at [facility] in [place], [place] on 4/11/2021 by an [place] provider, but she was unable to read the providers name on the label.  Wife states the prescription is for oxycodone 10 mg 4 times a day as needed for pain.  Attempted to call [facility] to verify Rx, but pharmacy was not yet open.    Most recent palliative note indicates patient is to \"continue taking oxycodone 10mg for breakthrough pain.\"    Last office visit:  4/22/2021    Next follow up scheduled:  5/20/2021    Preferred Pharmacy:  [facility] on [address] in [place]    Wife requests call back when prescription was sent to the pharmacy.    Routing to Dr. Crews to sign.    Chavez Howell, JANNYN, RN, OCN  Oncology Care Coordinator  [facility]  "

## 2021-05-11 NOTE — TELEPHONE ENCOUNTER
Refill signed by Dr. Crews.    Called Melvi and informed her that prescription was sent to the pharmacy.  Explained that 10 mg tablets were sent, so Lowell should only take 1 tablet at a time. She verbalized understanding.    Chavez Hwoell, JANNYN, RN, OCN  Oncology Care Coordinator  [facility]

## 2021-05-13 NOTE — PROGRESS NOTES
Writer received a return call from June and informed her that O2 requires a walk test.     Patient has an appointment on 5/18 and appt note has been edited to reflect the need for o2 testing.    Kathe Coles RN

## 2021-05-17 ENCOUNTER — HOSPITAL ENCOUNTER (OUTPATIENT)
Facility: CLINIC | Age: 81
Setting detail: SPECIMEN
Discharge: HOME | End: 2021-05-17
Attending: INTERNAL MEDICINE | Admitting: INTERNAL MEDICINE
Payer: COMMERCIAL

## 2021-05-17 ENCOUNTER — INFUSION THERAPY VISIT (OUTPATIENT)
Dept: INFUSION THERAPY | Facility: CLINIC | Age: 81
End: 2021-05-17
Attending: INTERNAL MEDICINE
Payer: COMMERCIAL

## 2021-05-17 DIAGNOSIS — Z95.828 PORT-A-CATH IN PLACE: ICD-10-CM

## 2021-05-17 DIAGNOSIS — T45.1X5A CHEMOTHERAPY-INDUCED NEUTROPENIA (H): Primary | ICD-10-CM

## 2021-05-17 DIAGNOSIS — C25.2 MALIGNANT NEOPLASM OF TAIL OF PANCREAS (H): ICD-10-CM

## 2021-05-17 DIAGNOSIS — D70.1 CHEMOTHERAPY-INDUCED NEUTROPENIA (H): Primary | ICD-10-CM

## 2021-05-17 LAB
ALBUMIN SERPL-MCNC: 2.4 G/DL (ref 3.4–5)
ALP SERPL-CCNC: 153 U/L (ref 40–150)
ALT SERPL W P-5'-P-CCNC: 14 U/L (ref 0–70)
ANION GAP SERPL CALCULATED.3IONS-SCNC: 4 MMOL/L (ref 3–14)
AST SERPL W P-5'-P-CCNC: 20 U/L (ref 0–45)
BASOPHILS # BLD AUTO: 0 10E9/L (ref 0–0.2)
BASOPHILS NFR BLD AUTO: 0.6 %
BILIRUB SERPL-MCNC: 0.3 MG/DL (ref 0.2–1.3)
BUN SERPL-MCNC: 23 MG/DL (ref 7–30)
CALCIUM SERPL-MCNC: 8.6 MG/DL (ref 8.5–10.1)
CHLORIDE SERPL-SCNC: 102 MMOL/L (ref 94–109)
CO2 SERPL-SCNC: 30 MMOL/L (ref 20–32)
CREAT SERPL-MCNC: 0.7 MG/DL (ref 0.66–1.25)
DIFFERENTIAL METHOD BLD: ABNORMAL
EOSINOPHIL # BLD AUTO: 0.1 10E9/L (ref 0–0.7)
EOSINOPHIL NFR BLD AUTO: 1.9 %
ERYTHROCYTE [DISTWIDTH] IN BLOOD BY AUTOMATED COUNT: 16.8 % (ref 10–15)
GFR SERPL CREATININE-BSD FRML MDRD: 87 ML/MIN/{1.73_M2}
GLUCOSE SERPL-MCNC: 147 MG/DL (ref 70–99)
HCT VFR BLD AUTO: 31.4 % (ref 40–53)
HGB BLD-MCNC: 9.9 G/DL (ref 13.3–17.7)
IMM GRANULOCYTES # BLD: 0 10E9/L (ref 0–0.4)
IMM GRANULOCYTES NFR BLD: 0.2 %
LYMPHOCYTES # BLD AUTO: 0.9 10E9/L (ref 0.8–5.3)
LYMPHOCYTES NFR BLD AUTO: 20.3 %
MCH RBC QN AUTO: 29.9 PG (ref 26.5–33)
MCHC RBC AUTO-ENTMCNC: 31.5 G/DL (ref 31.5–36.5)
MCV RBC AUTO: 95 FL (ref 78–100)
MONOCYTES # BLD AUTO: 0.6 10E9/L (ref 0–1.3)
MONOCYTES NFR BLD AUTO: 12.6 %
NEUTROPHILS # BLD AUTO: 3 10E9/L (ref 1.6–8.3)
NEUTROPHILS NFR BLD AUTO: 64.4 %
NRBC # BLD AUTO: 0 10*3/UL
NRBC BLD AUTO-RTO: 0 /100
PLATELET # BLD AUTO: 213 10E9/L (ref 150–450)
POTASSIUM SERPL-SCNC: 3.9 MMOL/L (ref 3.4–5.3)
PROT SERPL-MCNC: 7.1 G/DL (ref 6.8–8.8)
RBC # BLD AUTO: 3.31 10E12/L (ref 4.4–5.9)
SODIUM SERPL-SCNC: 136 MMOL/L (ref 133–144)
WBC # BLD AUTO: 4.6 10E9/L (ref 4–11)

## 2021-05-17 PROCEDURE — 80053 COMPREHEN METABOLIC PANEL: CPT | Performed by: INTERNAL MEDICINE

## 2021-05-17 PROCEDURE — 250N000011 HC RX IP 250 OP 636: Performed by: INTERNAL MEDICINE

## 2021-05-17 PROCEDURE — 36591 DRAW BLOOD OFF VENOUS DEVICE: CPT

## 2021-05-17 PROCEDURE — 86301 IMMUNOASSAY TUMOR CA 19-9: CPT | Performed by: INTERNAL MEDICINE

## 2021-05-17 PROCEDURE — 85025 COMPLETE CBC W/AUTO DIFF WBC: CPT | Performed by: INTERNAL MEDICINE

## 2021-05-17 RX ORDER — HEPARIN SODIUM,PORCINE 10 UNIT/ML
5 VIAL (ML) INTRAVENOUS
Status: DISCONTINUED | OUTPATIENT
Start: 2021-05-17 | End: 2021-05-17 | Stop reason: HOSPADM

## 2021-05-17 RX ORDER — HEPARIN SODIUM (PORCINE) LOCK FLUSH IV SOLN 100 UNIT/ML 100 UNIT/ML
5 SOLUTION INTRAVENOUS
Status: DISCONTINUED | OUTPATIENT
Start: 2021-05-17 | End: 2021-05-17 | Stop reason: HOSPADM

## 2021-05-17 RX ADMIN — Medication 5 ML: at 09:48

## 2021-05-17 NOTE — PROGRESS NOTES
Nursing Note:  Lowell SEFERINO Maria Elena presents today for port labs.    Patient seen by provider today: No   present during visit today: Not Applicable.    Note: N/A.    Intravenous Access:  Labs drawn without difficulty.  Implanted Port.    Discharge Plan:   Patient was sent home.  Using wheelchair today.  Family with patient.     Danay Carvajal RN

## 2021-05-18 ENCOUNTER — ONCOLOGY VISIT (OUTPATIENT)
Dept: ONCOLOGY | Facility: CLINIC | Age: 81
End: 2021-05-18
Attending: INTERNAL MEDICINE
Payer: COMMERCIAL

## 2021-05-18 VITALS
SYSTOLIC BLOOD PRESSURE: 137 MMHG | BODY MASS INDEX: 23.14 KG/M2 | RESPIRATION RATE: 16 BRPM | TEMPERATURE: 98.5 F | WEIGHT: 152.2 LBS | OXYGEN SATURATION: 87 % | HEART RATE: 99 BPM | DIASTOLIC BLOOD PRESSURE: 85 MMHG

## 2021-05-18 DIAGNOSIS — C25.9 MALIGNANT NEOPLASM OF PANCREAS, UNSPECIFIED LOCATION OF MALIGNANCY (H): Primary | ICD-10-CM

## 2021-05-18 PROCEDURE — G0463 HOSPITAL OUTPT CLINIC VISIT: HCPCS

## 2021-05-18 PROCEDURE — 99214 OFFICE O/P EST MOD 30 MIN: CPT | Performed by: INTERNAL MEDICINE

## 2021-05-18 ASSESSMENT — PAIN SCALES - GENERAL: PAINLEVEL: MODERATE PAIN (5)

## 2021-05-18 NOTE — PROGRESS NOTES
Writer called hospice  [facility] phone: [phone] and fax: [phone].      Hospice referral. Face sheet, office notes,  Recent labs and imaging faxed.     Kathe Coles RN

## 2021-05-18 NOTE — LETTER
"    5/18/2021         RE: Lowell Arredondo  [address] [address]        Dear Colleague,    Thank you for referring your patient, Lowell Arredondo, to the [facility]. Please see a copy of my visit note below.    Oncology Rooming Note    May 18, 2021 10:16 AM   Lowell Arredondo is a 81 year old male who presents for:    Chief Complaint   Patient presents with     Oncology Clinic Visit     Initial Vitals: /85   Pulse 99   Temp 98.5  F (36.9  C) (Oral)   Resp 16   Wt 69 kg (152 lb 3.2 oz)   SpO2 (!) 87%   BMI 23.14 kg/m   Estimated body mass index is 23.14 kg/m  as calculated from the following:    Height as of 10/8/20: 1.727 m (5' 8\").    Weight as of this encounter: 69 kg (152 lb 3.2 oz). Body surface area is 1.82 meters squared.  Moderate Pain (5) Comment: Data Unavailable   No LMP for male patient.  Allergies reviewed: Yes  Medications reviewed: Yes    Medications: Medication refills not needed today.  Pharmacy name entered into [employer]:    [id number] - [place], [place] - [address] [address] [place] [id number] [facility] [address]    Clinical concerns:  doctor was notified.     Would like to have home oxygen      Sarah Cifuentes CMA              Writer called hospice  [facility] phone: [phone] and fax: [phone].      Hospice referral. Face sheet, office notes,  Recent labs and imaging faxed.     Kathe Coles, RN      ONCOLOGY HISTORY: Mr. Arredondo is a gentleman with metastatic pancreatic cancer.  -Prostate cancer [place] 7.   1.  Prostate biopsy on 09/09/2019 revealed patricia 7 prostate cancer.  -Bone scan on 09/26/2019 does not reveal any bone metastasis.  There is some cardiac uptake.   -CT abdomen and pelvis on 09/26/2019 reveals new soft tissue mesenteric mass in the region of pancreatic tail.      2.  EUS on 10/07/2019 revealed an irregular mass in the pancreatic tail measuring 3.8 x 2.2 cm.  There was some evidence " of invasion into the portal vein.  No lymphadenopathy seen.  Based on the EUS, it was T3 Nx disease.    -FNA is positive for moderately differentiated pancreatic adenocarcinoma.      3. PET scan on 10/15/2019 reveals hypermetabolic 4.2 cm mass in the pancreatic tail and an adjacent 1 cm peripancreatic lymph node. No evidence of any metastatic disease. There are 2 foci of mild FDG uptake in the prostate gland from prostate cancer.      4. Neoadjuvant gemcitabine and Abraxane x 6 cycles between 11/07/2019 and 04/20/2020.  Abraxane stopped after cycle 4 because of toxicity.   -PET scan on 05/05/2020 reveals decrease in size of hypermetabolic mass at pancreatic tail and improvement in adjacent hypermetabolic lymph node.      5. Patient had diagnostic laparoscopy on 05/12/2020. He has unresectable pancreatic cancer. There are tumor implants.  -Peritoneal nodule biopsy is positive for adenocarcinoma.     6. FOLFOX without bolus 5-F U. started on 07/28/2020.  -Held after 09/08/2020 for hip fracture.  -Restarted on 11/11/2020.  -Last treatment was on 03/31/2021.     7. CT scan in 04/[place] in [place] revealed progression of disease.    SUBJECTIVE:  Mr. Arredondo is an 81-year-old gentleman with metastatic pancreatic cancer, which is progressing.  The patient presents to the clinic with a friend.  His wife is at another doctor's appointment.     The patient's overall condition has been deteriorating.  He is getting weaker.  He is getting more short of breath.  He also has lower abdominal pain.  He is on MS Contin and oxycodone.  The patient has noticed some bleeding in the right abdominal wall from malignancy involving the skin.     No headache.  Some dizziness.  No chest pain.  He gets short of breath on minimal exertion.  His appetite is fair.  No recurrent vomiting.     PHYSICAL EXAMINATION:    GENERAL:  He is alert.  He looked weak.  ECOG PS of 3.  ABDOMEN:  In the right lower quadrant there is malignant infiltration of the  skin.  There is some blood on his dressing from that.  No active bleeding.  On palpation, there are hard masses in the lower abdomen.  EXTREMITIES:  Bilateral leg edema.   Rest of systems not examined.     LABORATORY:  Reviewed.     ASSESSMENT:    1.  An 81-year-old gentleman with metastatic pancreatic cancer, which is progressing.  2.  Shortness of breath.  3.  Abdominal pain from metastatic disease.  4.  Fatigue.     PLAN:    1.  The patient's metastatic cancer is progressing.  The patient has poor performance status. I discussed regarding hospice.  The patient is agreeable for it.  We will get hospice consult.  The patient at this time, wants to stay at home and have hospice there.  I told him if he continues to get weaker he might have to go to hospice house or nursing home.  2.  For pain, he will continue on MS Contin and oxycodone.  3.  The patient has shortness of breath.  He will benefit from oxygen.  Hospice will arrange for it.  4.  The patient has malignant involvement of the skin in the abdomen.  I told him it can on and off bleed.  He will continue with dressing changes.  5.  He had few questions, which were all answered.  The patient will be enrolling into hospice.  No return appointment made.  I am hoping he will have comfortable last few months of his life.     Total face-to-face time spent was 30 minutes, more than 50% of the time spent in counseling and coordination of care.     Diego Segura MD    This office note has been dictated.          Again, thank you for allowing me to participate in the care of your patient.        Sincerely,        Diego Segura MD

## 2021-05-18 NOTE — PROGRESS NOTES
"Oncology Rooming Note    May 18, 2021 10:16 AM   Lowell Arredondo is a 81 year old male who presents for:    Chief Complaint   Patient presents with     Oncology Clinic Visit     Initial Vitals: /85   Pulse 99   Temp 98.5  F (36.9  C) (Oral)   Resp 16   Wt 69 kg (152 lb 3.2 oz)   SpO2 (!) 87%   BMI 23.14 kg/m   Estimated body mass index is 23.14 kg/m  as calculated from the following:    Height as of 10/8/20: 1.727 m (5' 8\").    Weight as of this encounter: 69 kg (152 lb 3.2 oz). Body surface area is 1.82 meters squared.  Moderate Pain (5) Comment: Data Unavailable   No LMP for male patient.  Allergies reviewed: Yes  Medications reviewed: Yes    Medications: Medication refills not needed today.  Pharmacy name entered into [employer]:    [id number] - [place], [place] - [address] [facility] [address] [place] [facility] [place] [address] [facility] [address]    Clinical concerns:  doctor was notified.     Would like to have home oxygen      Sarah Cifuentes CMA            "

## 2021-05-19 ENCOUNTER — HOME INFUSION (PRE-WILLOW HOME INFUSION) (OUTPATIENT)
Dept: PHARMACY | Facility: CLINIC | Age: 81
End: 2021-05-19

## 2021-05-19 ENCOUNTER — NURSE TRIAGE (OUTPATIENT)
Dept: NURSING | Facility: CLINIC | Age: 81
End: 2021-05-19

## 2021-05-19 LAB — CANCER AG19-9 SERPL-ACNC: ABNORMAL U/ML (ref 0–37)

## 2021-05-19 NOTE — TELEPHONE ENCOUNTER
Misty, nurse practitioner with [facility] is calling.   Caller is requesting patient's medical records and recent office visit note to be faxed to her. Caller states she hasn't received anything from the clinic today.   Patient is seen at [facility] in [place]  PCP: Diego Segura MD    Transferred caller to medical records.     Kayla Gould RN/[facility]      Additional Information    Negative: [1] Caller is not with the adult (patient) AND [2] reporting urgent symptoms    Negative: Lab result questions    Negative: Medication questions    Negative: Caller can't be reached by phone    Negative: Caller has already spoken to PCP or another triager    Negative: RN needs further essential information from caller in order to complete triage    Negative: Requesting regular office appointment    Negative: [1] Caller requesting NON-URGENT health information AND [2] PCP's office is the best resource    Negative: Health Information question, no triage required and triager able to answer question    General information question, no triage required and triager able to answer question    Protocols used: INFORMATION ONLY CALL-A-

## 2021-05-23 NOTE — PROGRESS NOTES
ONCOLOGY HISTORY: Mr. Arredondo is a gentleman with metastatic pancreatic cancer.  -Prostate cancer [place] 7.   1.  Prostate biopsy on 09/09/2019 revealed patricia 7 prostate cancer.  -Bone scan on 09/26/2019 does not reveal any bone metastasis.  There is some cardiac uptake.   -CT abdomen and pelvis on 09/26/2019 reveals new soft tissue mesenteric mass in the region of pancreatic tail.      2.  EUS on 10/07/2019 revealed an irregular mass in the pancreatic tail measuring 3.8 x 2.2 cm.  There was some evidence of invasion into the portal vein.  No lymphadenopathy seen.  Based on the EUS, it was T3 Nx disease.    -FNA is positive for moderately differentiated pancreatic adenocarcinoma.      3. PET scan on 10/15/2019 reveals hypermetabolic 4.2 cm mass in the pancreatic tail and an adjacent 1 cm peripancreatic lymph node. No evidence of any metastatic disease. There are 2 foci of mild FDG uptake in the prostate gland from prostate cancer.      4. Neoadjuvant gemcitabine and Abraxane x 6 cycles between 11/07/2019 and 04/20/2020.  Abraxane stopped after cycle 4 because of toxicity.   -PET scan on 05/05/2020 reveals decrease in size of hypermetabolic mass at pancreatic tail and improvement in adjacent hypermetabolic lymph node.      5. Patient had diagnostic laparoscopy on 05/12/2020. He has unresectable pancreatic cancer. There are tumor implants.  -Peritoneal nodule biopsy is positive for adenocarcinoma.     6. FOLFOX without bolus 5-F U. started on 07/28/2020.  -Held after 09/08/2020 for hip fracture.  -Restarted on 11/11/2020.  -Last treatment was on 03/31/2021.     7. CT scan in 04/[place] in [place] revealed progression of disease.    SUBJECTIVE:  Mr. Arredondo is an 81-year-old gentleman with metastatic pancreatic cancer, which is progressing.  The patient presents to the clinic with a friend.  His wife is at another doctor's appointment.     The patient's overall condition has been deteriorating.  He is getting weaker.  He  is getting more short of breath.  He also has lower abdominal pain.  He is on MS Contin and oxycodone.  The patient has noticed some bleeding in the right abdominal wall from malignancy involving the skin.     No headache.  Some dizziness.  No chest pain.  He gets short of breath on minimal exertion.  His appetite is fair.  No recurrent vomiting.     PHYSICAL EXAMINATION:    GENERAL:  He is alert.  He looked weak.  ECOG PS of 3.  ABDOMEN:  In the right lower quadrant there is malignant infiltration of the skin.  There is some blood on his dressing from that.  No active bleeding.  On palpation, there are hard masses in the lower abdomen.  EXTREMITIES:  Bilateral leg edema.   Rest of systems not examined.     LABORATORY:  Reviewed.     ASSESSMENT:    1.  An 81-year-old gentleman with metastatic pancreatic cancer, which is progressing.  2.  Shortness of breath.  3.  Abdominal pain from metastatic disease.  4.  Fatigue.     PLAN:    1.  The patient's metastatic cancer is progressing.  The patient has poor performance status. I discussed regarding hospice.  The patient is agreeable for it.  We will get hospice consult.  The patient at this time, wants to stay at home and have hospice there.  I told him if he continues to get weaker he might have to go to hospice house or nursing home.  2.  For pain, he will continue on MS Contin and oxycodone.  3.  The patient has shortness of breath.  He will benefit from oxygen.  Hospice will arrange for it.  4.  The patient has malignant involvement of the skin in the abdomen.  I told him it can on and off bleed.  He will continue with dressing changes.  5.  He had few questions, which were all answered.  The patient will be enrolling into hospice.  No return appointment made.  I am hoping he will have comfortable last few months of his life.     Total face-to-face time spent was 30 minutes, more than 50% of the time spent in counseling and coordination of care.     Diego Segura  MD

## 2021-05-24 ENCOUNTER — DOCUMENTATION ONLY (OUTPATIENT)
Dept: ONCOLOGY | Facility: CLINIC | Age: 81
End: 2021-05-24

## 2021-05-27 NOTE — PROGRESS NOTES
This is a recent snapshot of the patient's [facility] medical record.  For current drug dose and complete information and questions, call [phone] or In Basket pool, [facility] ([id number])  CSN Number:  [id number]

## 2021-09-04 ENCOUNTER — HEALTH MAINTENANCE LETTER (OUTPATIENT)
Age: 81
End: 2021-09-04

## 2021-12-10 NOTE — DISCHARGE INSTRUCTIONS
Please call Dr. Segura's office tomorrow and ask about your Neupogen injection.    Please return to the emergency department as needed for new or worsening symptoms including repeated fainting episodes, chest pain, shortness of breath, fever greater than 100.4  F, vomiting and unable to keep anything down, any other concerning symptoms.   [] : left short arm cast

## 2022-02-19 ENCOUNTER — HEALTH MAINTENANCE LETTER (OUTPATIENT)
Age: 82
End: 2022-02-19

## 2022-10-16 ENCOUNTER — HEALTH MAINTENANCE LETTER (OUTPATIENT)
Age: 82
End: 2022-10-16

## 2023-04-01 ENCOUNTER — HEALTH MAINTENANCE LETTER (OUTPATIENT)
Age: 83
End: 2023-04-01

## 2024-04-10 NOTE — TELEPHONE ENCOUNTER
RECORDS STATUS - ALL OTHER DIAGNOSIS      RECORDS RECEIVED FROM: Epic/   DATE RECEIVED: 10/3/2019    NOTES STATUS DETAILS   OFFICE NOTE from referring provider Complete  referred by Dr. Astorga    OFFICE NOTE from medical oncologist Complete Urologist Dr. Astorga   DISCHARGE SUMMARY from hospital N/A    DISCHARGE REPORT from the ER N/A    OPERATIVE REPORT Complete Epic 9/9/2019    MEDICATION LIST Complete [facility]   CLINICAL TRIAL TREATMENTS TO DATE N/A    LABS     PATHOLOGY REPORTS Complete [facility] 9/9/2019   ANYTHING RELATED TO DIAGNOSIS     GENONOMIC TESTING     TYPE:     IMAGING (NEED IMAGES & REPORT)     CT SCANS Complete PACS   MRI Complete PACS   MAMMO     ULTRASOUND     BONE SCAN  Complete  PACS   PET          This is a chronic condition.  He has a 50-pack-year smoking history and still smokes about 1 pack/day.  5 minutes were spent in smoking cessation counseling where he was strongly advised to use this hospitalization as an opportunity to quit smoking.  Family was encouraged to support him in this.    Plan  1.  Smoking cessation counseling to continue throughout the hospitalization  2.  Hold off on nicotine patch until the acute cardiac event has resolved

## 2024-06-01 ENCOUNTER — HEALTH MAINTENANCE LETTER (OUTPATIENT)
Age: 84
End: 2024-06-01

## 2024-06-04 NOTE — PROGRESS NOTES
"This very pleasant 77-year-old gentleman returns today for transrectal ultrasound biopsy of prostate.  We recall he has a history of low volume low-grade prostate cancer and is being managed by active surveillance.  However we were concerned about a mild rise in PSA and some slight nodularity of the right apical area of prostate.  The T3 MRI had indicated areas considered PIRADS 5, in the anterior aspect of the gland and the peripheral zone and the transitional zone near the apex.    Procedure.  A transrectal ultrasound biopsy of prostate.  Surgeon. Gauri.  Anesthesia.  Local periprostatic block.  Description.  With the patient in the left lateral position and the genital area prepped and draped in the customary fashion, and after digital rectal examination, the transrectal ultrasound probe was inserted into the rectum.  Following the prostate was 22 cc.  Right apical area did show some hypoechogenicity.  Sextant biopsies were taken from each side of the gland.      The patient has had a mild reactive to lidocaine following the procedure.    Impression.  I discussed the situation with the patient.  We will write the results of the pathology report.  We may need to consider a change in management if we do find I'll volume of disease of more aggressive disease in the gland.    I did discuss the situation with the patient in detail today.  I answered all questions.    Plan.  I will see him in follow-up in the office to discuss the pathology report.    Time.  10 minutes is spent in addition to the procedure to discuss the findings of about potential alternative treatment options based on the results of the biopsies.    \"This dictation was performed with voice recognition software and may contain errors,  omissions and inadvertent word substitution.\"       "
No

## 2024-09-19 NOTE — RESULT ENCOUNTER NOTE
Dear Mr. Crowellholgerdustin,    Your tumor marker CA 19 9 is decreasing.  It is 1645.  A month ago it was 2492.  This is good.    Please, call me with any questions.    Diego Segura MD       Report given to [facility] ED RN.  Nurse is aware that 7700 mL of ascites fluid was removed during paracentesis.  Labs collected and sent.  Albumin 12.5/50 mL infused X2 at 5L and 7L of removal.  See MAR for detail.  Dressing C/D/I.  VSS and monitored every 15 minutes.

## 2024-12-26 NOTE — PATIENT INSTRUCTIONS
1. Home oxygen.  2. Hospice consult soon.   HAYDER we received a fax letting us know that  Xochitl has an appointment with [facility] on Monday December 30 th at 7:45 am

## (undated) DEVICE — DRSG ADAPTIC 3X8" 6113

## (undated) DEVICE — GLOVE PROTEXIS BLUE W/NEU-THERA 8.0  2D73EB80

## (undated) DEVICE — ADH SKIN CLOSURE PREMIERPRO EXOFIN 1.0ML 3470

## (undated) DEVICE — ANTIFOG SOLUTION W/FOAM PAD 31142527

## (undated) DEVICE — SOL NACL 0.9% INJ 1000ML BAG 2B1324X

## (undated) DEVICE — SUCTION IRR STRYKERFLOW II W/TIP 250-070-520

## (undated) DEVICE — SU SILK 3-0 SH 30" K832H

## (undated) DEVICE — SOL WATER IRRIG 1000ML BOTTLE 2F7114

## (undated) DEVICE — BLADE KNIFE SURG 15 371115

## (undated) DEVICE — TUBING INSUFFLATION W/FILTER CPC TO LUER 620-030-301

## (undated) DEVICE — SU MONOCRYL 4-0 PS-2 18" UND Y496G

## (undated) DEVICE — PREP CHLORAPREP 26ML TINTED ORANGE  260815

## (undated) DEVICE — SU VICRYL 0 CT-2 27" J334H

## (undated) DEVICE — ESU LIGASURE LAPAROSCOPIC BLUNT TIP SEALER 5MMX37CM LF1837

## (undated) DEVICE — LINEN HALF SHEET 5512

## (undated) DEVICE — LINEN TOWEL PACK X6 WHITE 5487

## (undated) DEVICE — CAST PADDING 4" UNSTERILE 9044

## (undated) DEVICE — Device

## (undated) DEVICE — DECANTER BAG 2002S

## (undated) DEVICE — SU MONOCRYL 3-0 PS-2 27" Y427H

## (undated) DEVICE — ENDO SCOPE WARMER SEAL  C3101

## (undated) DEVICE — DRILL BIT QUICK COUPLING 3 FLUTE 4.2MMX330/100MM CALIBRATE

## (undated) DEVICE — LINEN TOWEL PACK X30 5481

## (undated) DEVICE — ENDO TROCAR FIRST ENTRY KII FIOS Z-THRD 05X100MM CTF03

## (undated) DEVICE — LINEN ORTHO ACL PACK 5447

## (undated) DEVICE — GLOVE PROTEXIS POWDER FREE 8.0 ORTHOPEDIC 2D73ET80

## (undated) DEVICE — DRSG GAUZE 4X4" TRAY

## (undated) DEVICE — PREP CHLORAPREP 26ML TINTED HI-LITE ORANGE 930815

## (undated) DEVICE — SU VICRYL 2-0 CT-1 27" UND J259H

## (undated) DEVICE — GLOVE PROTEXIS BLUE W/NEU-THERA 7.5  2D73EB75

## (undated) DEVICE — GLOVE PROTEXIS MICRO 7.0  2D73PM70

## (undated) DEVICE — DRSG TELFA 3X8" 1238

## (undated) DEVICE — SU VICRYL 0 TIE 54" J608H

## (undated) DEVICE — WIRE GUIDE 3.2X400MM  357.399

## (undated) DEVICE — GLOVE PROTEXIS BLUE W/NEU-THERA 7.0  2D73EB70

## (undated) DEVICE — GLOVE PROTEXIS POWDER FREE 6.5 ORTHOPEDIC 2D73ET65

## (undated) DEVICE — DRAPE IOBAN INCISE 23X17" 6650EZ

## (undated) DEVICE — ESU GROUND PAD ADULT W/CORD E7507

## (undated) DEVICE — LINEN FULL SHEET 5511

## (undated) DEVICE — DRSG ABDOMINAL 07 1/2X8" 7197D

## (undated) DEVICE — NDL INSUFFLATION 13GA 120MM C2201

## (undated) DEVICE — BAG CLEAR TRASH 1.3M 39X33" P4040C

## (undated) DEVICE — ENDO TROCAR SLEEVE KII Z-THREADED 05X100MM CTS02

## (undated) DEVICE — PACK HIP NAILING SOP32HPFC4

## (undated) RX ORDER — FENTANYL CITRATE 50 UG/ML
INJECTION, SOLUTION INTRAMUSCULAR; INTRAVENOUS
Status: DISPENSED
Start: 2020-05-12

## (undated) RX ORDER — ACETAMINOPHEN 500 MG
TABLET ORAL
Status: DISPENSED
Start: 2020-05-12

## (undated) RX ORDER — CEFAZOLIN SODIUM 2 G/100ML
INJECTION, SOLUTION INTRAVENOUS
Status: DISPENSED
Start: 2020-09-20

## (undated) RX ORDER — FENTANYL CITRATE 50 UG/ML
INJECTION, SOLUTION INTRAMUSCULAR; INTRAVENOUS
Status: DISPENSED
Start: 2020-09-20

## (undated) RX ORDER — FENTANYL CITRATE-0.9 % NACL/PF 10 MCG/ML
PLASTIC BAG, INJECTION (ML) INTRAVENOUS
Status: DISPENSED
Start: 2020-09-20

## (undated) RX ORDER — DEXAMETHASONE SODIUM PHOSPHATE 4 MG/ML
INJECTION, SOLUTION INTRA-ARTICULAR; INTRALESIONAL; INTRAMUSCULAR; INTRAVENOUS; SOFT TISSUE
Status: DISPENSED
Start: 2020-05-12

## (undated) RX ORDER — EPHEDRINE SULFATE 50 MG/ML
INJECTION, SOLUTION INTRAMUSCULAR; INTRAVENOUS; SUBCUTANEOUS
Status: DISPENSED
Start: 2020-05-12

## (undated) RX ORDER — ONDANSETRON 2 MG/ML
INJECTION INTRAMUSCULAR; INTRAVENOUS
Status: DISPENSED
Start: 2020-05-12

## (undated) RX ORDER — DEXAMETHASONE SODIUM PHOSPHATE 4 MG/ML
INJECTION, SOLUTION INTRA-ARTICULAR; INTRALESIONAL; INTRAMUSCULAR; INTRAVENOUS; SOFT TISSUE
Status: DISPENSED
Start: 2020-09-20

## (undated) RX ORDER — LIDOCAINE HYDROCHLORIDE 10 MG/ML
INJECTION, SOLUTION EPIDURAL; INFILTRATION; INTRACAUDAL; PERINEURAL
Status: DISPENSED
Start: 2020-09-20

## (undated) RX ORDER — GLYCOPYRROLATE 0.2 MG/ML
INJECTION, SOLUTION INTRAMUSCULAR; INTRAVENOUS
Status: DISPENSED
Start: 2020-05-12

## (undated) RX ORDER — CEFOXITIN 2 G/1
INJECTION, POWDER, FOR SOLUTION INTRAVENOUS
Status: DISPENSED
Start: 2020-05-12

## (undated) RX ORDER — LIDOCAINE HYDROCHLORIDE 20 MG/ML
INJECTION, SOLUTION EPIDURAL; INFILTRATION; INTRACAUDAL; PERINEURAL
Status: DISPENSED
Start: 2020-05-12

## (undated) RX ORDER — PROPOFOL 10 MG/ML
INJECTION, EMULSION INTRAVENOUS
Status: DISPENSED
Start: 2020-09-20

## (undated) RX ORDER — GLYCOPYRROLATE 0.2 MG/ML
INJECTION INTRAMUSCULAR; INTRAVENOUS
Status: DISPENSED
Start: 2020-09-20

## (undated) RX ORDER — NEOSTIGMINE METHYLSULFATE 1 MG/ML
VIAL (ML) INJECTION
Status: DISPENSED
Start: 2020-09-20

## (undated) RX ORDER — ONDANSETRON 2 MG/ML
INJECTION INTRAMUSCULAR; INTRAVENOUS
Status: DISPENSED
Start: 2020-09-20

## (undated) RX ORDER — ESMOLOL HYDROCHLORIDE 10 MG/ML
INJECTION INTRAVENOUS
Status: DISPENSED
Start: 2020-05-12

## (undated) RX ORDER — PHENYLEPHRINE HCL IN 0.9% NACL 1 MG/10 ML
SYRINGE (ML) INTRAVENOUS
Status: DISPENSED
Start: 2020-05-12

## (undated) RX ORDER — PROPOFOL 10 MG/ML
INJECTION, EMULSION INTRAVENOUS
Status: DISPENSED
Start: 2020-05-12